# Patient Record
Sex: MALE | Race: WHITE | Employment: OTHER | URBAN - METROPOLITAN AREA
[De-identification: names, ages, dates, MRNs, and addresses within clinical notes are randomized per-mention and may not be internally consistent; named-entity substitution may affect disease eponyms.]

---

## 2017-01-06 ENCOUNTER — TELEPHONE (OUTPATIENT)
Dept: CARDIOLOGY CLINIC | Age: 72
End: 2017-01-06

## 2017-01-06 NOTE — TELEPHONE ENCOUNTER
----- Message from [address], [place] sent at 1/5/2017  4:45 PM EST -----  Echo shows normal heart function, thx.

## 2017-01-10 RX ORDER — OMEPRAZOLE 20 MG/1
CAPSULE, DELAYED RELEASE ORAL
Qty: 90 CAP | Refills: 0 | Status: SHIPPED | OUTPATIENT
Start: 2017-01-10 | End: 2017-04-08 | Stop reason: SDUPTHER

## 2017-01-24 RX ORDER — ATORVASTATIN CALCIUM 20 MG/1
TABLET, FILM COATED ORAL
Qty: 30 TAB | Refills: 6 | Status: SHIPPED | OUTPATIENT
Start: 2017-01-24 | End: 2017-04-24 | Stop reason: SDUPTHER

## 2017-02-21 ENCOUNTER — OFFICE VISIT (OUTPATIENT)
Dept: FAMILY MEDICINE CLINIC | Age: 72
End: 2017-02-21

## 2017-02-21 VITALS
BODY MASS INDEX: 27.08 KG/M2 | HEIGHT: 76 IN | DIASTOLIC BLOOD PRESSURE: 59 MMHG | OXYGEN SATURATION: 97 % | SYSTOLIC BLOOD PRESSURE: 109 MMHG | TEMPERATURE: 96.8 F | WEIGHT: 222.4 LBS | RESPIRATION RATE: 14 BRPM | HEART RATE: 60 BPM

## 2017-02-21 DIAGNOSIS — J01.01 ACUTE RECURRENT MAXILLARY SINUSITIS: ICD-10-CM

## 2017-02-21 DIAGNOSIS — M25.512 CHRONIC LEFT SHOULDER PAIN: ICD-10-CM

## 2017-02-21 DIAGNOSIS — G89.29 CHRONIC LEFT SHOULDER PAIN: ICD-10-CM

## 2017-02-21 DIAGNOSIS — J20.8 ACUTE BRONCHITIS DUE TO OTHER SPECIFIED ORGANISMS: Primary | ICD-10-CM

## 2017-02-21 DIAGNOSIS — J00 ACUTE NASOPHARYNGITIS: ICD-10-CM

## 2017-02-21 RX ORDER — HYDROCODONE POLISTIREX AND CHLORPHENIRAMINE POLISTIREX 10; 8 MG/5ML; MG/5ML
5 SUSPENSION, EXTENDED RELEASE ORAL
Qty: 160 ML | Refills: 0 | Status: SHIPPED | OUTPATIENT
Start: 2017-02-21 | End: 2017-11-14 | Stop reason: ALTCHOICE

## 2017-02-21 RX ORDER — PNEUMOCOCCAL 13-VALENT CONJUGATE VACCINE 2.2; 2.2; 2.2; 2.2; 2.2; 4.4; 2.2; 2.2; 2.2; 2.2; 2.2; 2.2; 2.2 UG/.5ML; UG/.5ML; UG/.5ML; UG/.5ML; UG/.5ML; UG/.5ML; UG/.5ML; UG/.5ML; UG/.5ML; UG/.5ML; UG/.5ML; UG/.5ML; UG/.5ML
INJECTION, SUSPENSION INTRAMUSCULAR
Refills: 0 | COMMUNITY
Start: 2016-11-21 | End: 2017-11-14 | Stop reason: ALTCHOICE

## 2017-02-21 RX ORDER — LEVOFLOXACIN 500 MG/1
500 TABLET, FILM COATED ORAL DAILY
Qty: 10 TAB | Refills: 0 | Status: SHIPPED | OUTPATIENT
Start: 2017-02-21 | End: 2017-03-03

## 2017-02-21 NOTE — PROGRESS NOTES
Chief Complaint   Patient presents with    Sinus Pain     C/o sinus headaches and pressure x 1 week, mary ear itching, taking zyrtec and mucinex DM with no relief,

## 2017-02-21 NOTE — PROGRESS NOTES
HISTORY OF PRESENT ILLNESS  Osvaldo Varela is a 66 y.o. male. HPI    Upper respiratory problem    Started >9 days ago not better,   otc not helping, have ++ Sore throat, with none painful Cough which are Not Productive yellowish , no hx of asthma , there has been a lot of decrease in the patient's sleep pattern, in addition there has been some muscle ache responding to OTC , no diarhea, no ear ache,also there has been a decrease in the appetite, has been had an exposure to sick person, fortunately a none smoker, never been exposed to chemical no tobacco chewing          Current Outpatient Prescriptions   Medication Sig Dispense Refill    atorvastatin (LIPITOR) 20 mg tablet take 1 tablet by mouth NIGHTLY 30 Tab 6    omeprazole (PRILOSEC) 20 mg capsule TAKE 1 CAPSULE DAILY 90 Cap 0    lisinopril (PRINIVIL, ZESTRIL) 30 mg tablet TAKE 1 TABLET DAILY 90 Tab 1    calcium-cholecalciferol, D3, (CALTRATE 600+D) tablet Take 1 Tab by mouth daily. 60 Tab 11    docusate sodium (COLACE) 50 mg capsule Take 50 mg by mouth two (2) times a day.  aspirin (ASPIRIN) 325 mg tablet Take 325 mg by mouth daily.  mometasone (NASONEX) 50 mcg/actuation nasal spray 2 Sprays by Both Nostrils route daily. 1 Container 6    chlorpheniramine-HYDROcodone (TUSSIONEX) 10-8 mg/5 mL suspension Take 5 mL by mouth every twelve (12) hours as needed for Cough or Congestion. Max Daily Amount: 10 mL. 160 mL 0    hydrocortisone (ANUSOL-HC) 2.5 % rectal cream Insert  into rectum four (4) times daily. 30 g 7    lidocaine (LIDODERM) 5 % Apply patch to the affected area for 12 hours a day and remove for 12 hours a day. 1 Each 0    meloxicam (MOBIC) 7.5 mg tablet Take 1 Tab by mouth two (2) times daily as needed for Pain. 60 Tab 3    DICLOFENAC POTASSIUM PO Take 75 mg by mouth as needed.  terazosin (HYTRIN) 1 mg capsule Take 1 Cap by mouth daily.  30 Cap 1     No Known Allergies  Past Medical History   Diagnosis Date    Anemia 9/10/2014    Back pain 4/1/2010    CAD (coronary artery disease)     Calf pain 3/11/2015    Chronic kidney disease      cyst on kidney    Chronic left shoulder pain 10/24/2016    Constipation 6/11/2014    Decreased vision 6/13/2011    Diverticulitis 3/11/2015    Enlarged LA (left atrium)     Fall at home 3/17/2016    GERD (gastroesophageal reflux disease)     Hemorrhoids 9/10/2014    HTN (hypertension) 8/24/2010    Hx-TIA (transient ischemic attack)     Hypercholesterolemia 4/1/2010    Hypertension     Irregular heart beat 9/23/2015    Kidney disease     Mitral regurgitation     Need for varicella vaccine 9/10/2014    Other ill-defined conditions(799.89)      heart murmur    Rash 8/24/2010    Rib pain on right side 3/17/2016    Right calf pain 3/11/2015    Risk for falls 4/4/2014    S/P knee replacement 6/11/2014    Screening for depression 4/4/2014    Tinea versicolor 9/10/2014    Urinary frequency 8/24/2010     Past Surgical History   Procedure Laterality Date    Pr cardiac surg procedure unlist       heart cath    Hx hernia repair      Hx urological      Pr colonoscopy flx dx w/collj spec when pfrmd  4/18/2011          Hx heent       nasal septum repair    Hx tonsillectomy  age 10    Hx orthopaedic       left knee surgery    Hx knee replacement Left     Hx orthopaedic      Hx heent       Family History   Problem Relation Age of Onset    COPD Mother     Heart Attack Mother     Hypertension Mother     Heart Disease Mother     COPD Father     Heart Attack Father     Hypertension Father     Heart Disease Father     Cancer Father     Cancer Maternal Grandmother      Social History   Substance Use Topics    Smoking status: Never Smoker    Smokeless tobacco: Never Used    Alcohol use 0.0 oz/week     0 Standard drinks or equivalent per week      Comment: 12 beers, half a fifth a month      Lab Results  Component Value Date/Time   WBC 6.5 10/24/2016 02:19 PM   HGB 13.0 10/24/2016 02:19 PM   HCT 38.9 10/24/2016 02:19 PM   PLATELET 902 28/84/0741 02:19 PM   MCV 93 10/24/2016 02:19 PM       Lab Results  Component Value Date/Time   GFR est  10/24/2016 02:19 PM   GFR est non-AA 89 10/24/2016 02:19 PM   Creatinine (POC) 0.8 03/29/2011 09:23 AM   Creatinine 0.81 10/24/2016 02:19 PM   BUN 15 10/24/2016 02:19 PM   Sodium 140 10/24/2016 02:19 PM   Potassium 4.7 10/24/2016 02:19 PM   Chloride 101 10/24/2016 02:19 PM   CO2 22 10/24/2016 02:19 PM         Review of Systems   Constitutional: Positive for chills and fever. HENT: Positive for congestion and sore throat. Negative for ear pain and nosebleeds. Eyes: Negative for blurred vision, pain and discharge. Respiratory: Positive for cough and sputum production. Negative for shortness of breath. Cardiovascular: Negative for chest pain and leg swelling. Gastrointestinal: Negative for constipation, diarrhea, nausea and vomiting. Genitourinary: Negative for frequency. Musculoskeletal: Negative for joint pain. Skin: Negative for itching and rash. Neurological: Positive for headaches. Psychiatric/Behavioral: Negative for depression. The patient is not nervous/anxious. Physical Exam   Constitutional: He is oriented to person, place, and time. He appears well-developed and well-nourished. HENT:   Head: Normocephalic and atraumatic. Nose: Mucosal edema and rhinorrhea present. Mouth/Throat: Mucous membranes are dry. Posterior oropharyngeal edema and posterior oropharyngeal erythema present. No oropharyngeal exudate. Eyes: Conjunctivae and EOM are normal.   Neck: Normal range of motion. Neck supple. Cardiovascular: Normal rate, regular rhythm and normal heart sounds. No murmur heard. Pulmonary/Chest: Effort normal and breath sounds normal. No respiratory distress. Abdominal: Soft. Bowel sounds are normal. He exhibits no distension. There is no rebound.    Musculoskeletal: He exhibits no edema or tenderness. Neurological: He is alert and oriented to person, place, and time. Skin: Skin is warm. No erythema. Psychiatric: He has a normal mood and affect. His behavior is normal.   Nursing note and vitals reviewed. ASSESSMENT and PLAN  Serena Mena was seen today for sinus pain. Diagnoses and all orders for this visit:    Acute bronchitis due to other specified organisms    Acute recurrent maxillary sinusitis  -     chlorpheniramine-HYDROcodone (TUSSIONEX) 10-8 mg/5 mL suspension; Take 5 mL by mouth every twelve (12) hours as needed for Cough or Congestion. Max Daily Amount: 10 mL. Chronic left shoulder pain  -     chlorpheniramine-HYDROcodone (TUSSIONEX) 10-8 mg/5 mL suspension; Take 5 mL by mouth every twelve (12) hours as needed for Cough or Congestion. Max Daily Amount: 10 mL. Acute nasopharyngitis    Other orders  -     levoFLOXacin (LEVAQUIN) 500 mg tablet; Take 1 Tab by mouth daily for 10 days.       Salt gurgle, incr po fluid intake, avoid cigs and 2nd hand exposure, rts if worsens, tylenol otc for pain, advised on meds side effects and compliance, hand washing and hygiene advised

## 2017-02-21 NOTE — MR AVS SNAPSHOT
Visit Information Date & Time Provider Department Dept. Phone Encounter #  
 2/21/2017  3:30 PM Juan Carlos Al MD [address] [facility] [phone] [id number] Your Appointments 4/24/2017 10:45 AM  
ROUTINE CARE with Juan Carlos Al MD  
[address] [facility] ([facility]) Appt Note: 6 [facility]  
 [address] [facility] [place] [address] [id number] [phone] [facility] [place] [id number] Upcoming Health Maintenance Date Due FOBT Q 1 YEAR AGE 50-75 3/11/2016 GLAUCOMA SCREENING Q2Y 8/12/2016 MEDICARE YEARLY EXAM 4/28/2017 Pneumococcal 65+ Low/Medium Risk (2 of 2 - PPSV23) 11/21/2017 DTaP/Tdap/Td series (2 - Td) 10/1/2023 Allergies as of 2/21/2017  Review Complete On: 2/21/2017 By: Khushbu Samuels LPN No Known Allergies Current Immunizations  Reviewed on 2/21/2017 Name Date Influenza High Dose Vaccine PF 9/14/2016, 9/23/2015, 9/10/2014 Influenza Vaccine PF 10/1/2013 Influenza Vaccine Split 9/29/2011 PREVNAR 7 11/14/2012 Pneumococcal Conjugate (PCV-13) 11/21/2016 Tdap 10/1/2013 Zoster Vaccine, Live 11/5/2013 Reviewed by Juan Carlos Al MD on 2/21/2017 at  4:10 PM  
 Reviewed by Juan Carlos Al MD on 2/21/2017 at  4:10 PM  
 Reviewed by Juan Carlos Al MD on 2/21/2017 at  4:13 PM  
You Were Diagnosed With   
  
 Codes Comments Acute bronchitis due to other specified organisms    -  Primary ICD-10-CM: J20.8 ICD-9-CM: 466.0 Acute recurrent maxillary sinusitis     ICD-10-CM: J01.01 
ICD-9-CM: 461.0 Chronic left shoulder pain     ICD-10-CM: M25.512, G89.29 ICD-9-CM: 719.41, 338.29 Acute nasopharyngitis     ICD-10-CM: Ana M Fail ICD-9-CM: 720 Vitals BP Pulse Temp Resp Height(growth percentile) Weight(growth percentile)  109/59 (BP 1 Location: Right arm, BP Patient Position: Sitting) 60 96.8 °F (36 °C) (Oral) 14 6' 3.5\" (1.918 m) 222 lb 6.4 oz (100.9 kg) SpO2 BMI Smoking Status 97% 27.43 kg/m2 Never Smoker Vitals History BMI and BSA Data Body Mass Index Body Surface Area  
 27.43 kg/m 2 2.32 m 2 Preferred Pharmacy Pharmacy Name Phone [phone]  [phone] Your Updated Medication List  
  
   
This list is accurate as of: 2/21/17  4:28 PM.  Always use your most recent med list.  
  
  
  
  
 aspirin 325 mg tablet Commonly known as:  ASPIRIN Take 325 mg by mouth daily. atorvastatin 20 mg tablet Commonly known as:  LIPITOR  
take 1 tablet by mouth NIGHTLY  
  
 calcium-cholecalciferol (D3) tablet Commonly known as:  CALTRATE 600+D Take 1 Tab by mouth daily. chlorpheniramine-HYDROcodone 10-8 mg/5 mL suspension Commonly known as:  Braden Ban Take 5 mL by mouth every twelve (12) hours as needed for Cough or Congestion. Max Daily Amount: 10 mL. COLACE 50 mg capsule Generic drug:  docusate sodium Take 50 mg by mouth two (2) times a day. DICLOFENAC POTASSIUM PO Take 75 mg by mouth as needed. hydrocortisone 2.5 % rectal cream  
Commonly known as:  ANUSOL-HC Insert  into rectum four (4) times daily. levoFLOXacin 500 mg tablet Commonly known as:  Kenard Boni Take 1 Tab by mouth daily for 10 days. lidocaine 5 % Commonly known as:  Malva Kym Apply patch to the affected area for 12 hours a day and remove for 12 hours a day. lisinopril 30 mg tablet Commonly known as:  PRINIVIL, ZESTRIL  
TAKE 1 TABLET DAILY  
  
 meloxicam 7.5 mg tablet Commonly known as:  MOBIC Take 1 Tab by mouth two (2) times daily as needed for Pain.  
  
 mometasone 50 mcg/actuation nasal spray Commonly known as:  NASONEX  
2 Sprays by Both Nostrils route daily. omeprazole 20 mg capsule Commonly known as:  PRILOSEC  
TAKE 1 CAPSULE DAILY PREVNAR 13 (PF) 0.5 mL Syrg injection Generic drug:  pneumococcal 13 jada conj dip  
inject 0.5 milliliter intramuscularly  
  
 terazosin 1 mg capsule Commonly known as:  HYTRIN Take 1 Cap by mouth daily. Prescriptions Printed Refills  
 chlorpheniramine-HYDROcodone (TUSSIONEX) 10-8 mg/5 mL suspension 0 Sig: Take 5 mL by mouth every twelve (12) hours as needed for Cough or Congestion. Max Daily Amount: 10 mL. Class: Print Route: Oral  
 levoFLOXacin (LEVAQUIN) 500 mg tablet 0 Sig: Take 1 Tab by mouth daily for 10 days. Class: Print Route: Oral  
  
Introducing [facility]! Dear Elisa Alcala: Thank you for requesting a [employer] account. Our records indicate that you already have an active [employer]astio account. You can access your account anytime at [email or web address] Did you know that you can access your hospital and ER discharge instructions at any time in [employer]? You can also review all of your test results from your hospital stay or ER visit. Additional Information If you have questions, please visit the Frequently Asked Questions section of the [employer] website at [email or web address] Remember, [employer] is NOT to be used for urgent needs. For medical emergencies, dial 911. Now available from your iPhone and Android! Please provide this summary of care documentation to your next provider. Your primary care clinician is listed as Chance Fontaine. If you have any questions after today's visit, please call [phone].

## 2017-03-27 RX ORDER — LISINOPRIL 30 MG/1
TABLET ORAL
Qty: 90 TAB | Refills: 0 | Status: SHIPPED | OUTPATIENT
Start: 2017-03-27 | End: 2017-06-25 | Stop reason: SDUPTHER

## 2017-04-12 RX ORDER — OMEPRAZOLE 20 MG/1
CAPSULE, DELAYED RELEASE ORAL
Qty: 90 CAP | Refills: 1 | Status: SHIPPED | OUTPATIENT
Start: 2017-04-12 | End: 2017-07-31 | Stop reason: SDUPTHER

## 2017-04-24 ENCOUNTER — OFFICE VISIT (OUTPATIENT)
Dept: FAMILY MEDICINE CLINIC | Age: 72
End: 2017-04-24

## 2017-04-24 VITALS
BODY MASS INDEX: 26.99 KG/M2 | HEART RATE: 57 BPM | SYSTOLIC BLOOD PRESSURE: 117 MMHG | HEIGHT: 76 IN | RESPIRATION RATE: 14 BRPM | OXYGEN SATURATION: 98 % | TEMPERATURE: 96.8 F | WEIGHT: 221.6 LBS | DIASTOLIC BLOOD PRESSURE: 78 MMHG

## 2017-04-24 DIAGNOSIS — Z13.39 SCREENING FOR ALCOHOLISM: ICD-10-CM

## 2017-04-24 DIAGNOSIS — Z12.11 SCREEN FOR COLON CANCER: ICD-10-CM

## 2017-04-24 DIAGNOSIS — Z00.00 ROUTINE GENERAL MEDICAL EXAMINATION AT A HEALTH CARE FACILITY: Primary | ICD-10-CM

## 2017-04-24 DIAGNOSIS — R35.1 NOCTURIA: ICD-10-CM

## 2017-04-24 DIAGNOSIS — Z23 ENCOUNTER FOR IMMUNIZATION: ICD-10-CM

## 2017-04-24 DIAGNOSIS — E78.00 HYPERCHOLESTEROLEMIA: ICD-10-CM

## 2017-04-24 DIAGNOSIS — Z71.89 ADVANCED DIRECTIVES, COUNSELING/DISCUSSION: ICD-10-CM

## 2017-04-24 RX ORDER — ATORVASTATIN CALCIUM 20 MG/1
TABLET, FILM COATED ORAL
Qty: 90 TAB | Refills: 3 | Status: SHIPPED | OUTPATIENT
Start: 2017-04-24 | End: 2017-07-31 | Stop reason: SDUPTHER

## 2017-04-24 RX ORDER — TAMSULOSIN HYDROCHLORIDE 0.4 MG/1
0.4 CAPSULE ORAL DAILY
Qty: 30 CAP | Refills: 2 | Status: SHIPPED | OUTPATIENT
Start: 2017-04-24 | End: 2017-07-25 | Stop reason: SDUPTHER

## 2017-04-24 NOTE — PROGRESS NOTES
This is a Subsequent Medicare Annual Wellness Visit providing Personalized Prevention Plan Services (PPPS) (Performed 12 months after initial AWV and PPPS )    I have reviewed the patient's medical history in detail and updated the computerized patient record. History     Present for CPE, last Complete Physical exam was last yr ,  Up todate w/ all vaccination, last tetanus vaccine was in < 5yrs ago . Last psa exam was normal and was in last yr,       last colonoscopy was normal and was   Ago,   No past surgical hx,  last bone dexa scan was normal and was   Ago,      No family hx of breast cancer   no family hx of prostate cancer   no family hx of colon cancer, parent  and not sexaully active and uses Safe sex, ++physically active,  compliant w/ meds, ++Rf needed for today for his meds. Screening for fall   Medications causing fall and the risk for future fall screened  the current meds r/w'd and addressed,  Depression    the depression at this age addressed pt with improvig interests and enjoy to do things, not anxious not depressed, not wanted to heart self or others  Functional assesement   pt at this visit, is physically functional with gait nl and nicely independent and walks w/out mobility device and, in addition mentaly is functional,  very Alert and oriented ,  Required Immunizations   The immunizations including flu, PNA, Tdap, r/w'd mostly uptodate ,   BMI for pt's age  the almost nl BMI r/w'd and information given,   bp was screened for abnormality,   the colon ca screening uptodate, no further testing required except for FIT, given to the pt today  The diabetic, lipid and daily Aspirin care    States that he needs refill for his statin 3months supply denies myalgia, w/ the c/o  Urinary Frequency mostly after he sleep  The history is provided by the patient. This is a new problem. The current episode started more than few week ago. The problem occurs every urination.  The problem has been gradually worsening. no hx of recurrent UTIs or urinary stasis. Past Medical History:   Diagnosis Date    Anemia 9/10/2014    Back pain 4/1/2010    CAD (coronary artery disease)     Calf pain 3/11/2015    Chronic kidney disease     cyst on kidney    Chronic left shoulder pain 10/24/2016    Constipation 6/11/2014    Decreased vision 6/13/2011    Diverticulitis 3/11/2015    Enlarged LA (left atrium)     Fall at home 3/17/2016    GERD (gastroesophageal reflux disease)     Hemorrhoids 9/10/2014    HTN (hypertension) 8/24/2010    Hx-TIA (transient ischemic attack)     Hypercholesterolemia 4/1/2010    Hypertension     Irregular heart beat 9/23/2015    Kidney disease     Mitral regurgitation     Need for varicella vaccine 9/10/2014    Other ill-defined conditions     heart murmur    Rash 8/24/2010    Rib pain on right side 3/17/2016    Right calf pain 3/11/2015    Risk for falls 4/4/2014    S/P knee replacement 6/11/2014    Screening for depression 4/4/2014    Tinea versicolor 9/10/2014    Urinary frequency 8/24/2010      Past Surgical History:   Procedure Laterality Date    CARDIAC SURG PROCEDURE UNLIST      heart cath    HX HEENT      nasal septum repair    HX HEENT      HX HERNIA REPAIR      HX KNEE REPLACEMENT Left     HX ORTHOPAEDIC      left knee surgery    HX ORTHOPAEDIC      HX TONSILLECTOMY  age 5   [facility]      MN COLONOSCOPY FLX DX W/COLLJ SPEC WHEN PFRMD  4/18/2011          Current Outpatient Prescriptions   Medication Sig Dispense Refill    omeprazole (PRILOSEC) 20 mg capsule TAKE 1 CAPSULE DAILY 90 Cap 1    lisinopril (PRINIVIL, ZESTRIL) 30 mg tablet TAKE 1 TABLET DAILY 90 Tab 0    PREVNAR 13, PF, 0.5 mL syrg injection inject 0.5 milliliter intramuscularly  0    atorvastatin (LIPITOR) 20 mg tablet take 1 tablet by mouth NIGHTLY 30 Tab 6    calcium-cholecalciferol, D3, (CALTRATE 600+D) tablet Take 1 Tab by mouth daily.  60 Tab 11    docusate sodium (COLACE) 50 mg capsule Take 50 mg by mouth two (2) times a day.  aspirin (ASPIRIN) 325 mg tablet Take 325 mg by mouth daily.  mometasone (NASONEX) 50 mcg/actuation nasal spray 2 Sprays by Both Nostrils route daily. 1 Container 6    chlorpheniramine-HYDROcodone (TUSSIONEX) 10-8 mg/5 mL suspension Take 5 mL by mouth every twelve (12) hours as needed for Cough or Congestion. Max Daily Amount: 10 mL. 160 mL 0    hydrocortisone (ANUSOL-HC) 2.5 % rectal cream Insert  into rectum four (4) times daily. 30 g 7    lidocaine (LIDODERM) 5 % Apply patch to the affected area for 12 hours a day and remove for 12 hours a day. 1 Each 0    meloxicam (MOBIC) 7.5 mg tablet Take 1 Tab by mouth two (2) times daily as needed for Pain. 60 Tab 3    DICLOFENAC POTASSIUM PO Take 75 mg by mouth as needed.  terazosin (HYTRIN) 1 mg capsule Take 1 Cap by mouth daily. 27 Cap 1     No Known Allergies  Family History   Problem Relation Age of Onset    COPD Mother     Heart Attack Mother     Hypertension Mother     Heart Disease Mother     COPD Father     Heart Attack Father     Hypertension Father     Heart Disease Father     Cancer Father     Cancer Maternal Grandmother      Social History   Substance Use Topics    Smoking status: Never Smoker    Smokeless tobacco: Never Used    Alcohol use 0.0 oz/week     0 Standard drinks or equivalent per week      Comment: 12 beers, half a fifth a month     Patient Active Problem List   Diagnosis Code    Back pain M54.9    Hypercholesterolemia E78.00    HTN (hypertension) I10    Rash R21    Urinary frequency R35.0    Foot pain, right M79.671    Acute sinusitis J01.90    Corn L84    Tick bite W57. XXXA    Decreased vision H54.7    Knee pain, left M25.562    Acute bronchitis J20.9    Otitis externa of right ear H60.91    ED (erectile dysfunction) N52.9    Risk for falls Z91.81    Screening for depression Z13.89    Osteopenia M85.80    S/P knee replacement Z96.659    Constipation K59.00    Need for varicella vaccine Z23    Anemia D64.9    Tinea versicolor B36.0    Hemorrhoids K64.9    Diverticulitis K57.92    Calf pain M79.669    Right calf pain M79.661    Irregular heart beat I49.9    Enlarged LA (left atrium) I51.7    Mitral regurgitation I34.0    Fall at home W19. Sharifa Phelan, Y92.099    Rib pain on right side R07.81    Anginal chest pain at rest [facility]) I20.8    Chronic left shoulder pain M25.512, G89.29    Acute nasopharyngitis J00       Depression Risk Factor Screening:     PHQ 2 / 9, over the last two weeks 2/21/2017   Little interest or pleasure in doing things Not at all   Feeling down, depressed or hopeless Not at all   Total Score PHQ 2 0     Alcohol Risk Factor Screening: On any occasion during the past 3 months, have you had more than 4 drinks containing alcohol? No    Do you average more than 14 drinks per week? No      Functional Ability and Level of Safety:     Hearing Loss   normal-to-mild    Activities of Daily Living   Self-care. Requires assistance with: no ADLs    Fall Risk     Fall Risk Assessment, last 12 mths 2/21/2017   Able to walk? Yes   Fall in past 12 months?  No   Fall with injury? -   Number of falls in past 12 months -   Fall Risk Score -     Abuse Screen   Patient is not abused    Review of Systems   Constitutional: negative  Eyes: negative  Ears, nose, mouth, throat, and face: negative  Respiratory: negative  Cardiovascular: negative  Gastrointestinal: negative  Genitourinary: +++nocturia  Integument/breast: negative  Hematologic/lymphatic: negative  Musculoskeletal:negative  Neurological: negative  Behavioral/Psych: negative  Endocrine: negative  Allergic/Immunologic: negative    Physical Examination     Evaluation of Cognitive Function:  Mood/affect:  happy  Appearance: age appropriate, well dressed and within normal Limits  Family member/caregiver input: wife    Visit Vitals    /78 (BP 1 Location: Left arm, BP Patient Position: At rest)    Pulse (!) 57    Temp 96.8 °F (36 °C) (Oral)    Resp 14    Ht 6' 3.5\" (1.918 m)    Wt 221 lb 9.6 oz (100.5 kg)    SpO2 98%    BMI 27.33 kg/m2     General:  Alert, cooperative, no distress, appears stated age. Head:  Normocephalic, without obvious abnormality, atraumatic. Eyes:  Conjunctivae/corneas clear. PERRL, EOMs intact. Fundi benign. Ears:  Normal TMs and external ear canals both ears. Nose: Nares normal. Septum midline. Mucosa normal. No drainage or sinus tenderness. Throat: Lips, mucosa, and tongue normal. Teeth and gums normal.   Neck: Supple, symmetrical, trachea midline, no adenopathy, thyroid: no enlargement/tenderness/nodules, no carotid bruit and no JVD. Back:   Symmetric, no curvature. ROM normal. No CVA tenderness. Lungs:   Clear to auscultation bilaterally. Chest wall:  No tenderness or deformity. Heart:  Regular rate and rhythm, S1, S2 normal, no murmur, click, rub or gallop. Breast Exam:  differed. Abdomen:   Soft, non-tender. Bowel sounds normal. No masses,  No organomegaly. Genitalia:  Not done. Rectal:  Not done  Guaiac Pstool. Extremities: Extremities normal, atraumatic, no cyanosis or edema. Pulses: 2+ and symmetric all extremities. Skin: Skin color, texture, turgor normal. No rashes or lesions. Lymph nodes: Cervical, supraclavicular, and axillary nodes normal.   Neurologic: CNII-XII intact. Normal strength, sensation and reflexes throughout.        Patient Care Team:  Albina Peabody, MD as PCP - General  Lorane Hodgkin, MD (Dermatology)  Stephen Arellano MD (Cardiology)  Juhi Matias MD (Plastic Surgery)  Rodriguez Rodriguez OD (Optometry)  Adelaida Hernandez MD as Physician (Cardiology)  Morales Chaudhary MD as Physician (Sleep Medicine)  Jose Hassan RN as Ambulatory Care Navigator  Hanna Tolentino MD (Gastroenterology)    Advice/Referrals/Counseling   Education and counseling provided:      Are appropriate based on today's review and evaluation  End-of-Life planning (with patient's consent): Initial ACP discussion, pt wants thewife as SDM,  Pneumococcal Vaccuptodateine, uptodate  Influenza Vaccine, uptodate  Hepatitis B Vaccine, declined  Prostate cancer screening tests done, with med tx  Colorectal cancer screening tests, fit test ordered today  Cardiovascular screening blood test, addressed  Bone mass measurement (DEXA),not ordered await for approval  Screening for glaucoma, done  Diabetes screening test, done today, no hx of it          Assessment/Plan   Trae Tomas was seen today for annual wellness visit.     Diagnoses and all orders for this visit:    Routine general medical examination at a health care facility  -     CBC W/O DIFF  -     METABOLIC PANEL, COMPREHENSIVE  -     TSH 3RD GENERATION  -     VITAMIN D, 25 HYDROXY  -     CHOLESTEROL, TOTAL  -     CHOLESTEROL, HDL  -     PSA - PROSTATE SPECIFIC AG  -     WI COLLECTION VENOUS BLOOD,VENIPUNCTURE    Screening for alcoholism  -     CBC W/O DIFF  -     METABOLIC PANEL, COMPREHENSIVE  -     TSH 3RD GENERATION  -     VITAMIN D, 25 HYDROXY  -     CHOLESTEROL, TOTAL  -     CHOLESTEROL, HDL  -     WI COLLECTION VENOUS BLOOD,VENIPUNCTURE    Encounter for immunization  -     CBC W/O DIFF  -     METABOLIC PANEL, COMPREHENSIVE  -     TSH 3RD GENERATION  -     VITAMIN D, 25 HYDROXY  -     CHOLESTEROL, TOTAL  -     CHOLESTEROL, HDL    Advanced directives, counseling/discussion  -     ADVANCE CARE PLANNING FIRST 30 MINS  -     CBC W/O DIFF  -     METABOLIC PANEL, COMPREHENSIVE  -     TSH 3RD GENERATION  -     VITAMIN D, 25 HYDROXY  -     CHOLESTEROL, TOTAL  -     CHOLESTEROL, HDL  -     WI COLLECTION VENOUS BLOOD,VENIPUNCTURE    Screen for colon cancer  -     OCCULT BLOOD, IMMUNOASSAY (FIT) (70854)  -     CBC W/O DIFF  -     METABOLIC PANEL, COMPREHENSIVE  -     TSH 3RD GENERATION  -     VITAMIN D, 25 HYDROXY  -     CHOLESTEROL, TOTAL  -     CHOLESTEROL, HDL  -     WI COLLECTION VENOUS BLOOD,VENIPUNCTURE    Hypercholesterolemia  -     CBC W/O DIFF  -     METABOLIC PANEL, COMPREHENSIVE  -     TSH 3RD GENERATION  -     VITAMIN D, 25 HYDROXY  -     CHOLESTEROL, TOTAL  -     CHOLESTEROL, HDL  -     MO COLLECTION VENOUS BLOOD,VENIPUNCTURE    Nocturia    Other orders  -     atorvastatin (LIPITOR) 20 mg tablet; take 1 tablet by mouth NIGHTLY  -     tamsulosin (FLOMAX) 0.4 mg capsule; Take 1 Cap by mouth daily. .    Was advised to stay at a healthy weight, which will lower the risk for many problems, such as the current obesity, less chance of developing diabetes, heart disease, and high blood pressure. Was also told to get at least 30 minutes of physical activity on most days of the week, was told to start with daily brisk walking as a good choice, and then later on can add and do other activities, such as running, swimming, cycling, or playing tennis or team sports for a better outcome.   No alcohol, Not tobacco smoke, and not allowing others to smoke around you,    lab results and schedule of future lab studies reviewed with patient  A lean diet also advised in addition of avoiding fast foods, in additions his current medications and their side effects reviewed with the patient      The continuity of the care  Pt was told if for any reason, the pt's future Office appointments and the concerns not addressed, pt should obtain the name of the corresponding tel representatives and call us back or send us a letter for future investigation,   Pt agreed

## 2017-04-24 NOTE — ACP (ADVANCE CARE PLANNING)
Advance Care Planning      Advance Care Planning        Authorized Decision Maker (if patient is incapable of making informed decisions): This person is: Other Legally Authorized Decision Maker which would be his current wife  Tel ## noted      For Patients with Decision Making Capacity:   Values/Goals: Exploration of values, goals, and preferences if recovery is not expected, even with continued medical treatment in the event of:  Imminent death, Severe, permanent brain injury  \"In these circumstances, what matters most to you? \" patient in the presence of familymember stated that care focused more on comfort and the quality of life than Care focused more on quantity (length) of life and wants to be DNR form given will sign and bring us a copy on the later date

## 2017-04-24 NOTE — PATIENT INSTRUCTIONS
Prostate Cancer Screening: Care Instructions  Your Care Instructions    The prostate gland is an organ found just below a man's bladder. It is the size and shape of a walnut. It surrounds the tube that carries urine from the bladder out of the body through the penis. This tube is called the urethra. Prostate cancer is the abnormal growth of cells in the prostate. It is the second most common type of cancer in men. (Skin cancer is the most common.)  Most cases of prostate cancer occur in men older than 72. The disease runs in families. And it's more common in -American men. When it's found and treated early, prostate cancer may be cured. But it is not always treated. This is because prostate cancer may not shorten your life, especially if you are older and the cancer is growing slowly. Follow-up care is a key part of your treatment and safety. Be sure to make and go to all appointments, and call your doctor if you are having problems. It's also a good idea to know your test results and keep a list of the medicines you take. What are the screening tests for prostate cancer? The main screening test for prostate cancer is the prostate-specific antigen (PSA) test. This is a blood test that measures how much PSA is in your blood. A high level may mean that you have an enlargement, an infection, or cancer. Along with the PSA test, you may have a digital rectal exam. The digital (finger) rectal exam checks for anything abnormal in your prostate. To do the exam, the doctor puts a lubricated, gloved finger into your rectum. If these tests suggest cancer, you may need a prostate biopsy. How is prostate cancer diagnosed? In a biopsy, the doctor takes small tissue samples from your prostate gland. Another doctor then looks at the tissue under a microscope to see if there are cancer cells, signs of infection, or other problems. The results help diagnose prostate cancer.   What are the pros and cons of screening? Neither a PSA test nor a digital rectal exam can tell you for sure that you do or do not have cancer. But they can help you decide if you need more tests, such as a prostate biopsy. Screening tests may be useful because most men with prostate cancer don't have symptoms. It can be hard to know if you have cancer until it is more advanced. And then it's harder to treat. But having a PSA test can also cause harm. The test may show high levels of PSA that aren't caused by cancer. So you could have a prostate biopsy you didn't need. Or the PSA test might be normal when there is cancer, so a cancer might not be found early. The test can also find cancers that would never have caused a problem during your lifetime. So you might have treatment that was not needed. Prostate cancer usually develops late in life and grows slowly. For many men, it does not shorten their lives. Some experts advise screening only for men who are at high risk. Talk with your doctor to see if screening is right for you. Where can you learn more? Go to http://nico-millicent.info/. Enter R550 in the search box to learn more about \"Prostate Cancer Screening: Care Instructions. \"  Current as of: July 26, 2016  Content Version: 11.2  © 5574-5235 [employer]. Care instructions adapted under license by [employer] (which disclaims liability or warranty for this information). If you have questions about a medical condition or this instruction, always ask your healthcare professional. [place] [address] any warranty or liability for your use of this information. Learning About Colonoscopy  What is a colonoscopy? A colonoscopy is a test (also called a procedure) that lets a doctor look inside your large intestine. The doctor uses a thin, lighted tube called a colonoscope.  The doctor uses it to look for small growths called polyps, colon or rectal cancer (colorectal cancer), or other problems like bleeding. During the procedure, the doctor can take samples of tissue. The samples can then be checked for cancer or other conditions. The doctor can also take out polyps. How is colonoscopy done? This procedure is done in a doctor's office or a clinic or hospital. You will get medicine to help you relax and not feel pain. Some people find that they do not remember having the test because of the medicine. The doctor gently moves the colonoscope, or scope, through the colon. The scope is also a small video camera. It lets the doctor see the colon and take pictures. A colonoscopy usually takes 30 to 45 minutes. It may take longer if the doctor has to remove polyps. How do you prepare for the procedure? You need to clean out your colon before the procedure so the doctor can see all of your colon. You may start the cleaning process a day or two before the test. This depends on which \"colon prep\" your doctor recommends. To clean your colon, you stop eating solid foods and drink only clear liquids. You can have water, tea, coffee, clear juices, clear broths, flavored ice pops, and gelatin (such as Jell-O). Do not drink anything red or purple, such as grape juice or fruit punch. And do not eat red or purple foods, such as grape ice pops or cherry gelatin. The day or night before the procedure, you drink a large amount of a special liquid. This causes loose, frequent stools. You will go to the bathroom a lot. It is very important to drink all of the colon prep liquid. If you have problems drinking the liquid, call your doctor. For many people, the prep is worse than the test. It may be uncomfortable, and you may feel hungry on the clear liquid diet. Some people do not go to work or do their usual activities on the day of the prep. Arrange to have someone take you home after the test.  What can you expect after a colonoscopy?   The nurses will watch you for 1 to 2 hours until the medicines wear off. Then you can go home. You will need a ride. Your doctor will tell you when you can eat and do your usual activities. Your doctor will talk to you about when you will need your next colonoscopy. The results of your test and your risk for colorectal cancer will help your doctor decide how often you need to be checked. Follow-up care is a key part of your treatment and safety. Be sure to make and go to all appointments, and call your doctor if you are having problems. It's also a good idea to know your test results and keep a list of the medicines you take. Where can you learn more? Go to http://nico-millicent.info/. Enter R715 in the search box to learn more about \"Learning About Colonoscopy. \"  Current as of: July 26, 2016  Content Version: 11.2  © 7973-8757 [employer]. Care instructions adapted under license by [employer] (which disclaims liability or warranty for this information). If you have questions about a medical condition or this instruction, always ask your healthcare professional. [place] [address] any warranty or liability for your use of this information. Heart-Healthy Diet: Care Instructions  Your Care Instructions    A heart-healthy diet has lots of vegetables, fruits, nuts, beans, and whole grains, and is low in salt. It limits foods that are high in saturated fat, such as meats, cheeses, and fried foods. It may be hard to change your diet, but even small changes can lower your risk of heart attack and heart disease. Follow-up care is a key part of your treatment and safety. Be sure to make and go to all appointments, and call your doctor if you are having problems. It's also a good idea to know your test results and keep a list of the medicines you take. How can you care for yourself at home? Watch your portions  · Learn what a serving is. A \"serving\" and a \"portion\" are not always the same thing.  Make sure that you are not eating larger portions than are recommended. For example, a serving of pasta is ½ cup. A serving size of meat is 2 to 3 ounces. A 3-ounce serving is about the size of a deck of cards. Measure serving sizes until you are good at [place]" them. Keep in mind that restaurants often serve portions that are 2 or 3 times the size of one serving. · To keep your energy level up and keep you from feeling hungry, eat often but in smaller portions. · Eat only the number of calories you need to stay at a healthy weight. If you need to lose weight, eat fewer calories than your body burns (through exercise and other physical activity). Eat more fruits and vegetables  · Eat a variety of fruit and vegetables every day. Dark green, deep orange, red, or yellow fruits and vegetables are especially good for you. Examples include spinach, carrots, peaches, and berries. · Keep carrots, celery, and other veggies handy for snacks. Buy fruit that is in season and store it where you can see it so that you will be tempted to eat it. · Cook dishes that have a lot of veggies in them, such as stir-fries and soups. Limit saturated and trans fat  · Read food labels, and try to avoid saturated and trans fats. They increase your risk of heart disease. Trans fat is found in many processed foods such as cookies and crackers. · Use olive or canola oil when you cook. Try cholesterol-lowering spreads, such as Benecol or Take Control. · Bake, broil, grill, or steam foods instead of frying them. · Choose lean meats instead of high-fat meats such as hot dogs and sausages. Cut off all visible fat when you prepare meat. · Eat fish, skinless poultry, and meat alternatives such as soy products instead of high-fat meats. Soy products, such as tofu, may be especially good for your heart. · Choose low-fat or fat-free milk and dairy products. Eat fish  · Eat at least two servings of fish a week.  Certain fish, such as salmon and tuna, contain omega-3 fatty acids, which may help reduce your risk of heart attack. Eat foods high in fiber  · Eat a variety of grain products every day. Include whole-grain foods that have lots of fiber and nutrients. Examples of whole-grain foods include oats, whole wheat bread, and brown rice. · Buy whole-grain breads and cereals, instead of white bread or pastries. Limit salt and sodium  · Limit how much salt and sodium you eat to help lower your blood pressure. · Taste food before you salt it. Add only a little salt when you think you need it. With time, your taste buds will adjust to less salt. · Eat fewer snack items, fast foods, and other high-salt, processed foods. Check food labels for the amount of sodium in packaged foods. · Choose low-sodium versions of canned goods (such as soups, vegetables, and beans). Limit sugar  · Limit drinks and foods with added sugar. These include candy, desserts, and soda pop. Limit alcohol  · Limit alcohol to no more than 2 drinks a day for men and 1 drink a day for women. Too much alcohol can cause health problems. When should you call for help? Watch closely for changes in your health, and be sure to contact your doctor if:  · You would like help planning heart-healthy meals. Where can you learn more? Go to http://nico-millicent.info/. Enter V137 in the search box to learn more about \"Heart-Healthy Diet: Care Instructions. \"  Current as of: January 27, 2016  Content Version: 11.2  © 9582-6978 [employer]. Care instructions adapted under license by [employer] (which disclaims liability or warranty for this information). If you have questions about a medical condition or this instruction, always ask your healthcare professional. [place] [address] any warranty or liability for your use of this information.          Advance Directives: Care Instructions  Your Care Instructions  An advance directive is a legal way to state your wishes at the end of your life. It tells your family and your doctor what to do if you can no longer say what you want. There are two main types of advance directives. You can change them any time that your wishes change. · A living will tells your family and your doctor your wishes about life support and other treatment. · A durable power of  for health care lets you name a person to make treatment decisions for you when you can't speak for yourself. This person is called a health care agent. If you do not have an advance directive, decisions about your medical care may be made by a doctor or a  who doesn't know you. It may help to think of an advance directive as a gift to the people who care for you. If you have one, they won't have to make tough decisions by themselves. Follow-up care is a key part of your treatment and safety. Be sure to make and go to all appointments, and call your doctor if you are having problems. It's also a good idea to know your test results and keep a list of the medicines you take. How can you care for yourself at home? · Discuss your wishes with your loved ones and your doctor. This way, there are no surprises. · Many states have a unique form. Or you might use a universal form that has been approved by many states. This kind of form can sometimes be completed and stored online. Your electronic copy will then be available wherever you have a connection to the Internet. In most cases, doctors will respect your wishes even if you have a form from a different state. · You don't need a  to do an advance directive. But you may want to get legal advice. · Think about these questions when you prepare an advance directive:  ¨ Who do you want to make decisions about your medical care if you are not able to? Many people choose a family member or close friend. ¨ Do you know enough about life support methods that might be used?  If not, talk to your doctor so you understand. ¨ What are you most afraid of that might happen? You might be afraid of having pain, losing your independence, or being kept alive by machines. ¨ Where would you prefer to die? Choices include your home, a hospital, or a nursing home. ¨ Would you like to have information about hospice care to support you and your family? ¨ Do you want to donate organs when you die? ¨ Do you want certain [employer] practices performed before you die? If so, put your wishes in the advance directive. · Read your advance directive every year, and make changes as needed. When should you call for help? Be sure to contact your doctor if you have any questions. Where can you learn more? Go to http://nico-millicent.info/. Enter R264 in the search box to learn more about \"Advance Directives: Care Instructions. \"  Current as of: November 17, 2016  Content Version: 11.2  © 9636-2972 [employer]. Care instructions adapted under license by [employer] (which disclaims liability or warranty for this information). If you have questions about a medical condition or this instruction, always ask your healthcare professional. [place] [address] any warranty or liability for your use of this information.

## 2017-04-24 NOTE — MR AVS SNAPSHOT
Visit Information Date & Time Provider Department Dept. Phone Encounter #  
 4/24/2017 10:45 AM Ally Robles MD  [facility] [phone] [id number] Upcoming Health Maintenance Date Due FOBT Q 1 YEAR AGE 50-75 3/11/2016 GLAUCOMA SCREENING Q2Y 8/12/2016 MEDICARE YEARLY EXAM 4/28/2017 Pneumococcal 65+ Low/Medium Risk (2 of 2 - PPSV23) 11/21/2017 DTaP/Tdap/Td series (2 - Td) 10/1/2023 Allergies as of 4/24/2017  Review Complete On: 4/24/2017 By: Leandro Franco LPN No Known Allergies Current Immunizations  Reviewed on 4/24/2017 Name Date Influenza High Dose Vaccine PF 9/14/2016, 9/23/2015, 9/10/2014 Influenza Vaccine PF 10/1/2013 Influenza Vaccine Split 9/29/2011 PREVNAR 7 11/14/2012 Pneumococcal Conjugate (PCV-13) 11/21/2016 Tdap 10/1/2013 Zoster Vaccine, Live 11/5/2013 Reviewed by Ally Robles MD on 4/24/2017 at 11:28 AM  
 Reviewed by Ally Robles MD on 4/24/2017 at 11:28 AM  
You Were Diagnosed With   
  
 Codes Comments Routine general medical examination at a health care facility    -  Primary ICD-10-CM: Z00.00 ICD-9-CM: V70.0 Screening for alcoholism     ICD-10-CM: Z13.89 ICD-9-CM: V79.1 Encounter for immunization     ICD-10-CM: V57 ICD-9-CM: V03.89 Advanced directives, counseling/discussion     ICD-10-CM: Z71.89 ICD-9-CM: V65.49 Screen for colon cancer     ICD-10-CM: Z12.11 ICD-9-CM: V76.51 Hypercholesterolemia     ICD-10-CM: E78.00 ICD-9-CM: 272.0 Vitals BP Pulse Temp Resp Height(growth percentile) Weight(growth percentile) 117/78 (BP 1 Location: Left arm, BP Patient Position: At rest) (!) 57 96.8 °F (36 °C) (Oral) 14 6' 3.5\" (1.918 m) 221 lb 9.6 oz (100.5 kg) SpO2 BMI Smoking Status 98% 27.33 kg/m2 Never Smoker BMI and BSA Data Body Mass Index Body Surface Area  
 27.33 kg/m 2 2.31 m 2 Preferred Pharmacy Pharmacy Name Phone [id number] [address] [facility] [phone] Your Updated Medication List  
  
   
This list is accurate as of: 4/24/17  5:01 PM.  Always use your most recent med list.  
  
  
  
  
 aspirin 325 mg tablet Commonly known as:  ASPIRIN Take 325 mg by mouth daily. atorvastatin 20 mg tablet Commonly known as:  LIPITOR  
take 1 tablet by mouth NIGHTLY  
  
 calcium-cholecalciferol (D3) tablet Commonly known as:  CALTRATE 600+D Take 1 Tab by mouth daily. chlorpheniramine-HYDROcodone 10-8 mg/5 mL suspension Commonly known as:  Hanson Hu Take 5 mL by mouth every twelve (12) hours as needed for Cough or Congestion. Max Daily Amount: 10 mL. COLACE 50 mg capsule Generic drug:  docusate sodium Take 50 mg by mouth two (2) times a day. DICLOFENAC POTASSIUM PO Take 75 mg by mouth as needed. hydrocortisone 2.5 % rectal cream  
Commonly known as:  ANUSOL-HC Insert  into rectum four (4) times daily. lidocaine 5 % Commonly known as:  Maninder Hammond Apply patch to the affected area for 12 hours a day and remove for 12 hours a day. lisinopril 30 mg tablet Commonly known as:  PRINIVIL, ZESTRIL  
TAKE 1 TABLET DAILY  
  
 meloxicam 7.5 mg tablet Commonly known as:  MOBIC Take 1 Tab by mouth two (2) times daily as needed for Pain.  
  
 mometasone 50 mcg/actuation nasal spray Commonly known as:  NASONEX  
2 Sprays by Both Nostrils route daily. omeprazole 20 mg capsule Commonly known as:  PRILOSEC  
TAKE 1 CAPSULE DAILY  
  
 PREVNAR 13 (PF) 0.5 mL Syrg injection Generic drug:  pneumococcal 13 jada conj dip  
inject 0.5 milliliter intramuscularly  
  
 tamsulosin 0.4 mg capsule Commonly known as:  FLOMAX Take 1 Cap by mouth daily. Prescriptions Sent to Pharmacy Refills  
 atorvastatin (LIPITOR) 20 mg tablet 3 Sig: take 1 tablet by mouth NIGHTLY  Class: Normal  
 Pharmacy: [address], [facility] Ph #: [phone]  
 tamsulosin (FLOMAX) 0.4 mg capsule 2 Sig: Take 1 Cap by mouth daily. Class: Normal  
 Pharmacy: [id number] [address], [address] Ph #: [phone] Route: Oral  
  
We Performed the Following ADVANCE CARE PLANNING FIRST 30 MINS [71580 CPT(R)] CBC W/O DIFF [97933 CPT(R)] CHOLESTEROL, HDL [id number] CPT(R)] CHOLESTEROL, TOTAL [97075 CPT(R)] METABOLIC PANEL, COMPREHENSIVE [81145 CPT(R)] OCCULT BLOOD, IMMUNOASSAY (FIT) [id number] CPT(R)] TN COLLECTION VENOUS BLOOD,VENIPUNCTURE [id number] CPT(R)] PROSTATE SPECIFIC AG (PSA) [id number] CPT(R)] TSH 3RD GENERATION [65240 CPT(R)] VITAMIN D, 25 HYDROXY [id number] CPT(R)] Patient Instructions Prostate Cancer Screening: Care Instructions Your Care Instructions The prostate gland is an organ found just below a man's bladder. It is the size and shape of a walnut. It surrounds the tube that carries urine from the bladder out of the body through the penis. This tube is called the urethra. Prostate cancer is the abnormal growth of cells in the prostate. It is the second most common type of cancer in men. (Skin cancer is the most common.) Most cases of prostate cancer occur in men older than 72. The disease runs in families. And it's more common in -American men. When it's found and treated early, prostate cancer may be cured. But it is not always treated. This is because prostate cancer may not shorten your life, especially if you are older and the cancer is growing slowly. Follow-up care is a key part of your treatment and safety. Be sure to make and go to all appointments, and call your doctor if you are having problems. It's also a good idea to know your test results and keep a list of the medicines you take. What are the screening tests for prostate cancer? The main screening test for prostate cancer is the prostate-specific antigen (PSA) test. This is a blood test that measures how much PSA is in your blood. A high level may mean that you have an enlargement, an infection, or cancer. Along with the PSA test, you may have a digital rectal exam. The digital (finger) rectal exam checks for anything abnormal in your prostate. To do the exam, the doctor puts a lubricated, gloved finger into your rectum. If these tests suggest cancer, you may need a prostate biopsy. How is prostate cancer diagnosed? In a biopsy, the doctor takes small tissue samples from your prostate gland. Another doctor then looks at the tissue under a microscope to see if there are cancer cells, signs of infection, or other problems. The results help diagnose prostate cancer. What are the pros and cons of screening? Neither a PSA test nor a digital rectal exam can tell you for sure that you do or do not have cancer. But they can help you decide if you need more tests, such as a prostate biopsy. Screening tests may be useful because most men with prostate cancer don't have symptoms. It can be hard to know if you have cancer until it is more advanced. And then it's harder to treat. But having a PSA test can also cause harm. The test may show high levels of PSA that aren't caused by cancer. So you could have a prostate biopsy you didn't need. Or the PSA test might be normal when there is cancer, so a cancer might not be found early. The test can also find cancers that would never have caused a problem during your lifetime. So you might have treatment that was not needed. Prostate cancer usually develops late in life and grows slowly. For many men, it does not shorten their lives. Some experts advise screening only for men who are at high risk. Talk with your doctor to see if screening is right for you. Where can you learn more? Go to http://nico-millicent.info/. Enter R550 in the search box to learn more about \"Prostate Cancer Screening: Care Instructions. \" Current as of: July 26, 2016 Content Version: 11.2 © 9838-5201 1d4 Pty. Care instructions adapted under license by [employer] (which disclaims liability or warranty for this information). If you have questions about a medical condition or this instruction, always ask your healthcare professional. [facility] any warranty or liability for your use of this information. Learning About Colonoscopy What is a colonoscopy? A colonoscopy is a test (also called a procedure) that lets a doctor look inside your large intestine. The doctor uses a thin, lighted tube called a colonoscope. The doctor uses it to look for small growths called polyps, colon or rectal cancer (colorectal cancer), or other problems like bleeding. During the procedure, the doctor can take samples of tissue. The samples can then be checked for cancer or other conditions. The doctor can also take out polyps. How is colonoscopy done? This procedure is done in a doctor's office or a clinic or hospital. You will get medicine to help you relax and not feel pain. Some people find that they do not remember having the test because of the medicine. The doctor gently moves the colonoscope, or scope, through the colon. The scope is also a small video camera. It lets the doctor see the colon and take pictures. A colonoscopy usually takes 30 to 45 minutes. It may take longer if the doctor has to remove polyps. How do you prepare for the procedure? You need to clean out your colon before the procedure so the doctor can see all of your colon. You may start the cleaning process a day or two before the test. This depends on which \"colon prep\" your doctor recommends. To clean your colon, you stop eating solid foods and drink only clear liquids.  You can have water, tea, coffee, clear juices, clear broths, flavored ice pops, and gelatin (such as Jell-O). Do not drink anything red or purple, such as grape juice or fruit punch. And do not eat red or purple foods, such as grape ice pops or cherry gelatin. The day or night before the procedure, you drink a large amount of a special liquid. This causes loose, frequent stools. You will go to the bathroom a lot. It is very important to drink all of the colon prep liquid. If you have problems drinking the liquid, call your doctor. For many people, the prep is worse than the test. It may be uncomfortable, and you may feel hungry on the clear liquid diet. Some people do not go to work or do their usual activities on the day of the prep. Arrange to have someone take you home after the test. 
What can you expect after a colonoscopy? The nurses will watch you for 1 to 2 hours until the medicines wear off. Then you can go home. You will need a ride. Your doctor will tell you when you can eat and do your usual activities. Your doctor will talk to you about when you will need your next colonoscopy. The results of your test and your risk for colorectal cancer will help your doctor decide how often you need to be checked. Follow-up care is a key part of your treatment and safety. Be sure to make and go to all appointments, and call your doctor if you are having problems. It's also a good idea to know your test results and keep a list of the medicines you take. Where can you learn more? Go to http://nico-millicent.info/. Enter W042 in the search box to learn more about \"Learning About Colonoscopy. \" Current as of: July 26, 2016 Content Version: 11.2 © 1347-6936 [employer]. Care instructions adapted under license by [employer] (which disclaims liability or warranty for this information).  If you have questions about a medical condition or this instruction, always ask your healthcare professional. [employer] disclaims any warranty or liability for your use of this information. Heart-Healthy Diet: Care Instructions Your Care Instructions A heart-healthy diet has lots of vegetables, fruits, nuts, beans, and whole grains, and is low in salt. It limits foods that are high in saturated fat, such as meats, cheeses, and fried foods. It may be hard to change your diet, but even small changes can lower your risk of heart attack and heart disease. Follow-up care is a key part of your treatment and safety. Be sure to make and go to all appointments, and call your doctor if you are having problems. It's also a good idea to know your test results and keep a list of the medicines you take. How can you care for yourself at home? Watch your portions · Learn what a serving is. A \"serving\" and a \"portion\" are not always the same thing. Make sure that you are not eating larger portions than are recommended. For example, a serving of pasta is ½ cup. A serving size of meat is 2 to 3 ounces. A 3-ounce serving is about the size of a deck of cards. Measure serving sizes until you are good at Bradley" them. Keep in mind that restaurants often serve portions that are 2 or 3 times the size of one serving. · To keep your energy level up and keep you from feeling hungry, eat often but in smaller portions. · Eat only the number of calories you need to stay at a healthy weight. If you need to lose weight, eat fewer calories than your body burns (through exercise and other physical activity). Eat more fruits and vegetables · Eat a variety of fruit and vegetables every day. Dark green, deep orange, red, or yellow fruits and vegetables are especially good for you. Examples include spinach, carrots, peaches, and berries. · Keep carrots, celery, and other veggies handy for snacks. Buy fruit that is in season and store it where you can see it so that you will be tempted to eat it. · Cook dishes that have a lot of veggies in them, such as stir-fries and soups. Limit saturated and trans fat · Read food labels, and try to avoid saturated and trans fats. They increase your risk of heart disease. Trans fat is found in many processed foods such as cookies and crackers. · Use olive or canola oil when you cook. Try cholesterol-lowering spreads, such as Benecol or Take Control. · Bake, broil, grill, or steam foods instead of frying them. · Choose lean meats instead of high-fat meats such as hot dogs and sausages. Cut off all visible fat when you prepare meat. · Eat fish, skinless poultry, and meat alternatives such as soy products instead of high-fat meats. Soy products, such as tofu, may be especially good for your heart. · Choose low-fat or fat-free milk and dairy products. Eat fish · Eat at least two servings of fish a week. Certain fish, such as salmon and tuna, contain omega-3 fatty acids, which may help reduce your risk of heart attack. Eat foods high in fiber · Eat a variety of grain products every day. Include whole-grain foods that have lots of fiber and nutrients. Examples of whole-grain foods include oats, whole wheat bread, and brown rice. · Buy whole-grain breads and cereals, instead of white bread or pastries. Limit salt and sodium · Limit how much salt and sodium you eat to help lower your blood pressure. · Taste food before you salt it. Add only a little salt when you think you need it. With time, your taste buds will adjust to less salt. · Eat fewer snack items, fast foods, and other high-salt, processed foods. Check food labels for the amount of sodium in packaged foods. · Choose low-sodium versions of canned goods (such as soups, vegetables, and beans). Limit sugar · Limit drinks and foods with added sugar. These include candy, desserts, and soda pop. Limit alcohol · Limit alcohol to no more than 2 drinks a day for men and 1 drink a day for women. Too much alcohol can cause health problems. When should you call for help? Watch closely for changes in your health, and be sure to contact your doctor if: 
· You would like help planning heart-healthy meals. Where can you learn more? Go to http://nico-millicent.info/. Enter V137 in the search box to learn more about \"Heart-Healthy Diet: Care Instructions. \" Current as of: January 27, 2016 Content Version: 11.2 © 9886-8359 [employer]. Care instructions adapted under license by [employer]im[employer] (which disclaims liability or warranty for this information). If you have questions about a medical condition or this instruction, always ask your healthcare professional. [place] [address] any warranty or liability for your use of this information. Advance Directives: Care Instructions Your Care Instructions An advance directive is a legal way to state your wishes at the end of your life. It tells your family and your doctor what to do if you can no longer say what you want. There are two main types of advance directives. You can change them any time that your wishes change. · A living will tells your family and your doctor your wishes about life support and other treatment. · A durable power of  for health care lets you name a person to make treatment decisions for you when you can't speak for yourself. This person is called a health care agent. If you do not have an advance directive, decisions about your medical care may be made by a doctor or a  who doesn't know you. It may help to think of an advance directive as a gift to the people who care for you. If you have one, they won't have to make tough decisions by themselves. Follow-up care is a key part of your treatment and safety.  Be sure to make and go to all appointments, and call your doctor if you are having problems. It's also a good idea to know your test results and keep a list of the medicines you take. How can you care for yourself at home? · Discuss your wishes with your loved ones and your doctor. This way, there are no surprises. · Many states have a unique form. Or you might use a universal form that has been approved by many states. This kind of form can sometimes be completed and stored online. Your electronic copy will then be available wherever you have a connection to the Internet. In most cases, doctors will respect your wishes even if you have a form from a different state. · You don't need a  to do an advance directive. But you may want to get legal advice. · Think about these questions when you prepare an advance directive: ¨ Who do you want to make decisions about your medical care if you are not able to? Many people choose a family member or close friend. ¨ Do you know enough about life support methods that might be used? If not, talk to your doctor so you understand. ¨ What are you most afraid of that might happen? You might be afraid of having pain, losing your independence, or being kept alive by machines. ¨ Where would you prefer to die? Choices include your home, a hospital, or a nursing home. ¨ Would you like to have information about hospice care to support you and your family? ¨ Do you want to donate organs when you die? ¨ Do you want certain Jainism practices performed before you die? If so, put your wishes in the advance directive. · Read your advance directive every year, and make changes as needed. When should you call for help? Be sure to contact your doctor if you have any questions. Where can you learn more? Go to http://nico-millicent.info/. Enter R264 in the search box to learn more about \"Advance Directives: Care Instructions. \" Current as of: November 17, 2016 Content Version: 11.2 © 8335-4971 Healthwise, Incorporated. Care instructions adapted under license by Shelfari (which disclaims liability or warranty for this information). If you have questions about a medical condition or this instruction, always ask your healthcare professional. [address] any warranty or liability for your use of this information. Introducing [facility]! Dear Katrina Jeffries: Thank you for requesting a Snoox account. Our records indicate that you already have an active Snoox account. You can access your account anytime at [email or web address] Did you know that you can access your hospital and ER discharge instructions at any time in Snoox? You can also review all of your test results from your hospital stay or ER visit. Additional Information If you have questions, please visit the Frequently Asked Questions section of the Snoox website at [email or web address] Remember, Snoox is NOT to be used for urgent needs. For medical emergencies, dial 911. Now available from your iPhone and Android! Please provide this summary of care documentation to your next provider. Your primary care clinician is listed as Piyush Moss. If you have any questions after today's visit, please call [phone].

## 2017-04-24 NOTE — PROGRESS NOTES
Jc Garcia. Name and  verified        Chief Complaint   Patient presents with   [address] [facility] reviewed-discussed with patient.

## 2017-04-25 LAB
25(OH)D3+25(OH)D2 SERPL-MCNC: 28.6 NG/ML (ref 30–100)
ALBUMIN SERPL-MCNC: 4.5 G/DL (ref 3.5–4.8)
ALBUMIN/GLOB SERPL: 1.9 {RATIO} (ref 1.2–2.2)
ALP SERPL-CCNC: 90 IU/L (ref 39–117)
ALT SERPL-CCNC: 21 IU/L (ref 0–44)
AST SERPL-CCNC: 19 IU/L (ref 0–40)
BILIRUB SERPL-MCNC: 1.3 MG/DL (ref 0–1.2)
BUN SERPL-MCNC: 14 MG/DL (ref 8–27)
BUN/CREAT SERPL: 15 (ref 10–24)
CALCIUM SERPL-MCNC: 9.9 MG/DL (ref 8.6–10.2)
CHLORIDE SERPL-SCNC: 98 MMOL/L (ref 96–106)
CHOLEST SERPL-MCNC: 145 MG/DL (ref 100–199)
CO2 SERPL-SCNC: 24 MMOL/L (ref 18–29)
CREAT SERPL-MCNC: 0.91 MG/DL (ref 0.76–1.27)
ERYTHROCYTE [DISTWIDTH] IN BLOOD BY AUTOMATED COUNT: 13.7 % (ref 12.3–15.4)
GLOBULIN SER CALC-MCNC: 2.4 G/DL (ref 1.5–4.5)
GLUCOSE SERPL-MCNC: 81 MG/DL (ref 65–99)
HCT VFR BLD AUTO: 40.9 % (ref 37.5–51)
HDLC SERPL-MCNC: 46 MG/DL
HGB BLD-MCNC: 13.6 G/DL (ref 12.6–17.7)
MCH RBC QN AUTO: 30.5 PG (ref 26.6–33)
MCHC RBC AUTO-ENTMCNC: 33.3 G/DL (ref 31.5–35.7)
MCV RBC AUTO: 92 FL (ref 79–97)
PLATELET # BLD AUTO: 266 X10E3/UL (ref 150–379)
POTASSIUM SERPL-SCNC: 4.6 MMOL/L (ref 3.5–5.2)
PROT SERPL-MCNC: 6.9 G/DL (ref 6–8.5)
PSA SERPL-MCNC: 1.3 NG/ML (ref 0–4)
RBC # BLD AUTO: 4.46 X10E6/UL (ref 4.14–5.8)
SODIUM SERPL-SCNC: 139 MMOL/L (ref 134–144)
TSH SERPL DL<=0.005 MIU/L-ACNC: 1.71 UIU/ML (ref 0.45–4.5)
WBC # BLD AUTO: 7 X10E3/UL (ref 3.4–10.8)

## 2017-05-01 ENCOUNTER — APPOINTMENT (OUTPATIENT)
Dept: CT IMAGING | Age: 72
End: 2017-05-01
Attending: EMERGENCY MEDICINE
Payer: MEDICARE

## 2017-05-01 ENCOUNTER — HOSPITAL ENCOUNTER (OUTPATIENT)
Age: 72
Setting detail: OBSERVATION
Discharge: HOME | End: 2017-05-02
Attending: EMERGENCY MEDICINE | Admitting: INTERNAL MEDICINE
Payer: MEDICARE

## 2017-05-01 DIAGNOSIS — I10 ESSENTIAL HYPERTENSION: ICD-10-CM

## 2017-05-01 DIAGNOSIS — E78.00 HYPERCHOLESTEROLEMIA: ICD-10-CM

## 2017-05-01 DIAGNOSIS — G45.9 TRANSIENT CEREBRAL ISCHEMIA, UNSPECIFIED TYPE: Primary | ICD-10-CM

## 2017-05-01 LAB
ALBUMIN SERPL BCP-MCNC: 3.8 G/DL (ref 3.5–5)
ALBUMIN/GLOB SERPL: 1.1 {RATIO} (ref 1.1–2.2)
ALP SERPL-CCNC: 93 U/L (ref 45–117)
ALT SERPL-CCNC: 29 U/L (ref 12–78)
ANION GAP BLD CALC-SCNC: 6 MMOL/L (ref 5–15)
APPEARANCE UR: ABNORMAL
AST SERPL W P-5'-P-CCNC: 20 U/L (ref 15–37)
BASOPHILS # BLD AUTO: 0 K/UL (ref 0–0.1)
BASOPHILS # BLD: 1 % (ref 0–1)
BILIRUB SERPL-MCNC: 0.9 MG/DL (ref 0.2–1)
BILIRUB UR QL: NEGATIVE
BUN SERPL-MCNC: 14 MG/DL (ref 6–20)
BUN/CREAT SERPL: 12 (ref 12–20)
CALCIUM SERPL-MCNC: 9 MG/DL (ref 8.5–10.1)
CHLORIDE SERPL-SCNC: 106 MMOL/L (ref 97–108)
CK MB CFR SERPL CALC: 2.6 % (ref 0–2.5)
CK MB SERPL-MCNC: 8.4 NG/ML (ref 5–25)
CK SERPL-CCNC: 324 U/L (ref 39–308)
CO2 SERPL-SCNC: 28 MMOL/L (ref 21–32)
COLOR UR: ABNORMAL
CREAT SERPL-MCNC: 1.18 MG/DL (ref 0.7–1.3)
EOSINOPHIL # BLD: 0.2 K/UL (ref 0–0.4)
EOSINOPHIL NFR BLD: 3 % (ref 0–7)
ERYTHROCYTE [DISTWIDTH] IN BLOOD BY AUTOMATED COUNT: 13.2 % (ref 11.5–14.5)
GLOBULIN SER CALC-MCNC: 3.4 G/DL (ref 2–4)
GLUCOSE SERPL-MCNC: 117 MG/DL (ref 65–100)
GLUCOSE UR STRIP.AUTO-MCNC: NEGATIVE MG/DL
HCT VFR BLD AUTO: 35.5 % (ref 36.6–50.3)
HEMOCCULT STL QL IA: NEGATIVE
HGB BLD-MCNC: 12.1 G/DL (ref 12.1–17)
HGB UR QL STRIP: NEGATIVE
KETONES UR QL STRIP.AUTO: NEGATIVE MG/DL
LEUKOCYTE ESTERASE UR QL STRIP.AUTO: NEGATIVE
LYMPHOCYTES # BLD AUTO: 27 % (ref 12–49)
LYMPHOCYTES # BLD: 1.7 K/UL (ref 0.8–3.5)
MCH RBC QN AUTO: 31.3 PG (ref 26–34)
MCHC RBC AUTO-ENTMCNC: 34.1 G/DL (ref 30–36.5)
MCV RBC AUTO: 91.7 FL (ref 80–99)
MONOCYTES # BLD: 0.6 K/UL (ref 0–1)
MONOCYTES NFR BLD AUTO: 9 % (ref 5–13)
NEUTS SEG # BLD: 4 K/UL (ref 1.8–8)
NEUTS SEG NFR BLD AUTO: 60 % (ref 32–75)
NITRITE UR QL STRIP.AUTO: NEGATIVE
PH UR STRIP: 6 [PH] (ref 5–8)
PLATELET # BLD AUTO: 185 K/UL (ref 150–400)
POTASSIUM SERPL-SCNC: 3.9 MMOL/L (ref 3.5–5.1)
PROT SERPL-MCNC: 7.2 G/DL (ref 6.4–8.2)
PROT UR STRIP-MCNC: NEGATIVE MG/DL
RBC # BLD AUTO: 3.87 M/UL (ref 4.1–5.7)
SODIUM SERPL-SCNC: 140 MMOL/L (ref 136–145)
SP GR UR REFRACTOMETRY: 1.01 (ref 1–1.03)
TROPONIN I SERPL-MCNC: <0.04 NG/ML
UROBILINOGEN UR QL STRIP.AUTO: 1 EU/DL (ref 0.2–1)
WBC # BLD AUTO: 6.5 K/UL (ref 4.1–11.1)

## 2017-05-01 PROCEDURE — 70450 CT HEAD/BRAIN W/O DYE: CPT

## 2017-05-01 PROCEDURE — 82553 CREATINE MB FRACTION: CPT | Performed by: EMERGENCY MEDICINE

## 2017-05-01 PROCEDURE — 85025 COMPLETE CBC W/AUTO DIFF WBC: CPT | Performed by: EMERGENCY MEDICINE

## 2017-05-01 PROCEDURE — 99218 HC RM OBSERVATION: CPT

## 2017-05-01 PROCEDURE — 99285 EMERGENCY DEPT VISIT HI MDM: CPT

## 2017-05-01 PROCEDURE — 81003 URINALYSIS AUTO W/O SCOPE: CPT | Performed by: EMERGENCY MEDICINE

## 2017-05-01 PROCEDURE — 36415 COLL VENOUS BLD VENIPUNCTURE: CPT | Performed by: EMERGENCY MEDICINE

## 2017-05-01 PROCEDURE — 74011250636 HC RX REV CODE- 250/636: Performed by: EMERGENCY MEDICINE

## 2017-05-01 PROCEDURE — 93005 ELECTROCARDIOGRAM TRACING: CPT

## 2017-05-01 PROCEDURE — 82550 ASSAY OF CK (CPK): CPT | Performed by: EMERGENCY MEDICINE

## 2017-05-01 PROCEDURE — 74011250637 HC RX REV CODE- 250/637: Performed by: INTERNAL MEDICINE

## 2017-05-01 PROCEDURE — 80053 COMPREHEN METABOLIC PANEL: CPT | Performed by: EMERGENCY MEDICINE

## 2017-05-01 PROCEDURE — 84484 ASSAY OF TROPONIN QUANT: CPT | Performed by: EMERGENCY MEDICINE

## 2017-05-01 PROCEDURE — 96360 HYDRATION IV INFUSION INIT: CPT

## 2017-05-01 RX ORDER — ACETAMINOPHEN 650 MG/1
650 SUPPOSITORY RECTAL
Status: DISCONTINUED | OUTPATIENT
Start: 2017-05-01 | End: 2017-05-02 | Stop reason: HOSPADM

## 2017-05-01 RX ORDER — ATORVASTATIN CALCIUM 20 MG/1
20 TABLET, FILM COATED ORAL
Status: DISCONTINUED | OUTPATIENT
Start: 2017-05-02 | End: 2017-05-02 | Stop reason: HOSPADM

## 2017-05-01 RX ORDER — ASPIRIN 325 MG
325 TABLET ORAL DAILY
Status: DISCONTINUED | OUTPATIENT
Start: 2017-05-02 | End: 2017-05-02 | Stop reason: HOSPADM

## 2017-05-01 RX ORDER — ENOXAPARIN SODIUM 100 MG/ML
40 INJECTION SUBCUTANEOUS EVERY 24 HOURS
Status: DISCONTINUED | OUTPATIENT
Start: 2017-05-02 | End: 2017-05-02 | Stop reason: HOSPADM

## 2017-05-01 RX ORDER — FAMOTIDINE 20 MG/1
20 TABLET, FILM COATED ORAL EVERY 12 HOURS
Status: DISCONTINUED | OUTPATIENT
Start: 2017-05-01 | End: 2017-05-02 | Stop reason: HOSPADM

## 2017-05-01 RX ORDER — SODIUM CHLORIDE 0.9 % (FLUSH) 0.9 %
5-10 SYRINGE (ML) INJECTION EVERY 8 HOURS
Status: DISCONTINUED | OUTPATIENT
Start: 2017-05-01 | End: 2017-05-02 | Stop reason: HOSPADM

## 2017-05-01 RX ORDER — LABETALOL HYDROCHLORIDE 5 MG/ML
5 INJECTION, SOLUTION INTRAVENOUS
Status: DISCONTINUED | OUTPATIENT
Start: 2017-05-01 | End: 2017-05-02 | Stop reason: HOSPADM

## 2017-05-01 RX ORDER — ACETAMINOPHEN 325 MG/1
650 TABLET ORAL
Status: DISCONTINUED | OUTPATIENT
Start: 2017-05-01 | End: 2017-05-02 | Stop reason: HOSPADM

## 2017-05-01 RX ORDER — TAMSULOSIN HYDROCHLORIDE 0.4 MG/1
0.4 CAPSULE ORAL DAILY
Status: DISCONTINUED | OUTPATIENT
Start: 2017-05-02 | End: 2017-05-02 | Stop reason: HOSPADM

## 2017-05-01 RX ORDER — SODIUM CHLORIDE 0.9 % (FLUSH) 0.9 %
5-10 SYRINGE (ML) INJECTION AS NEEDED
Status: DISCONTINUED | OUTPATIENT
Start: 2017-05-01 | End: 2017-05-02 | Stop reason: HOSPADM

## 2017-05-01 RX ADMIN — FAMOTIDINE 20 MG: 20 TABLET ORAL at 22:48

## 2017-05-01 RX ADMIN — SODIUM CHLORIDE 1000 ML: 900 INJECTION, SOLUTION INTRAVENOUS at 16:53

## 2017-05-01 RX ADMIN — Medication 10 ML: at 22:49

## 2017-05-01 NOTE — ED PROVIDER NOTES
HPI Comments: Veronica Shah., 66 y.o. Male with PMHx of HTN, CAD, GERD, CKD, anemia, TIA, and hypercholesterolemia presents ambulatory to the ED with cc of severe pressure HA and vision change episode while driving at ~ 2 PM. The pt felt fine today and had finished packing up his truck to head to the beach when he developed a pressing sensation in his had that radiated inward with associated blurred and dark vision. The episode only lasted 1-2 seconds and he felt normal ~ 10 minutes later. Wife and pt note that the last time he had similar sxs was when he had a TIA ~ 4 years ago. Pt states that he called his insurance company's nurse and was instructed to come to the ED. He takes  mg daily and took 1 tablet PTA. Pt also takes Flomax, omeprazole, lisinopril, and Lipitor daily. He is not currently experiencing any head pressure or vision changes. Pt denies any fevers, chills, nausea, vomiting, diarrhea, CP, SOB, numbness, tingling, change in sensation, one-sided weakness, myalgias, or arthralgias. PCP: Negar Fong MD  Neurologist: Dr. Shira Lawton history significant for: - Tobacco, + EtOH, - Illicit Drug Use  PSHx: cardiac catheterization, tonsillectomy, L knee surgery    There are no other complaints, changes, or physical findings at this time. Written by Jie Romo ED Scribe, as dictated by Yani Angulo MD.      The history is provided by the patient. No  was used.         Past Medical History:   Diagnosis Date    Anemia 9/10/2014    Back pain 4/1/2010    CAD (coronary artery disease)     Calf pain 3/11/2015    Chronic kidney disease     cyst on kidney    Chronic left shoulder pain 10/24/2016    Constipation 6/11/2014    Decreased vision 6/13/2011    Diverticulitis 3/11/2015    Enlarged LA (left atrium)     Fall at home 3/17/2016    GERD (gastroesophageal reflux disease)     Hemorrhoids 9/10/2014    HTN (hypertension) 8/24/2010    Hx-TIA (transient ischemic attack)     Hypercholesterolemia 4/1/2010    Hypertension     Irregular heart beat 9/23/2015    Kidney disease     Mitral regurgitation     Need for varicella vaccine 9/10/2014    Other ill-defined conditions     heart murmur    Rash 8/24/2010    Rib pain on right side 3/17/2016    Right calf pain 3/11/2015    Risk for falls 4/4/2014    S/P knee replacement 6/11/2014    Screening for depression 4/4/2014    Tinea versicolor 9/10/2014    Urinary frequency 8/24/2010       Past Surgical History:   Procedure Laterality Date    CARDIAC SURG PROCEDURE UNLIST      heart cath    HX HEENT      nasal septum repair    HX HEENT      HX HERNIA REPAIR      HX KNEE REPLACEMENT Left     HX ORTHOPAEDIC      left knee surgery    HX ORTHOPAEDIC      HX TONSILLECTOMY  age 5    HX UROLOGICAL      NV COLONOSCOPY FLX DX W/COLLJ SPEC WHEN PFRMD  4/18/2011              Family History:   Problem Relation Age of Onset    COPD Mother     Heart Attack Mother     Hypertension Mother     Heart Disease Mother     COPD Father     Heart Attack Father     Hypertension Father     Heart Disease Father     Cancer Father     Cancer Maternal Grandmother        Social History     Social History    Marital status:      Spouse name: N/A    Number of children: N/A    Years of education: N/A     Occupational History    Not on file. Social History Main Topics    Smoking status: Never Smoker    Smokeless tobacco: Never Used    Alcohol use 0.0 oz/week     0 Standard drinks or equivalent per week      Comment: 12 beers, half a fifth a month    Drug use: No    Sexual activity: No     Other Topics Concern    Not on file     Social History Narrative    ** Merged History Encounter **              ALLERGIES: Review of patient's allergies indicates no known allergies. Review of Systems   Constitutional: Negative for chills and fever. Eyes: Positive for visual disturbance.    Respiratory: Negative for shortness of breath. Cardiovascular: Negative for chest pain. Gastrointestinal: Negative for abdominal pain, diarrhea, nausea and vomiting. Endocrine: Negative for heat intolerance. Genitourinary: Negative for dysuria. Musculoskeletal: Negative for arthralgias and myalgias. Skin: Negative for rash. Allergic/Immunologic: Negative for immunocompromised state. Neurological: Positive for headaches. Negative for weakness and numbness. (-) loss of sensation   Hematological: Does not bruise/bleed easily. Psychiatric/Behavioral: Negative. All other systems reviewed and are negative. Patient Vitals for the past 12 hrs:   Temp Pulse Resp BP SpO2   05/01/17 1707 - (!) 58 15 - 99 %   05/01/17 1655 - 71 - 132/85 98 %   05/01/17 1652 - 68 - (!) 140/109 97 %   05/01/17 1651 - 62 - 136/88 -   05/01/17 1507 98.3 °F (36.8 °C) 73 16 133/86 98 %       Physical Exam   Constitutional: He is oriented to person, place, and time. He appears well-developed and well-nourished. No distress. NAD   HENT:   Head: Normocephalic and atraumatic. Eyes: EOM are normal. Pupils are equal, round, and reactive to light. Pupils equally reactive, EOMI   Neck: Normal range of motion. Neck supple. Cardiovascular: Normal rate, regular rhythm and normal heart sounds. Pulmonary/Chest: Effort normal and breath sounds normal. He has no wheezes. Abdominal: Soft. Bowel sounds are normal. There is no tenderness. Musculoskeletal: Normal range of motion. He exhibits no edema or tenderness. Neurological: He is alert and oriented to person, place, and time. No cranial nerve deficit. No sensorimotor deficits   Skin: Skin is warm and dry. Psychiatric: He has a normal mood and affect. Nursing note and vitals reviewed.        MDM  Number of Diagnoses or Management Options  Transient cerebral ischemia, unspecified type:   Diagnosis management comments: Dehydration, orthostatic TIA, electrolyte abnormality, UTI       Amount and/or Complexity of Data Reviewed  Clinical lab tests: ordered and reviewed  Tests in the radiology section of CPT®: ordered and reviewed  Tests in the medicine section of CPT®: ordered and reviewed  Obtain history from someone other than the patient: (wife)  Review and summarize past medical records: yes  Discuss the patient with other providers: yes (Neuro, hospitalist)  Independent visualization of images, tracings, or specimens: yes    Patient Progress  Patient progress: stable    ED Course       Procedures    EKG interpretation: (Preliminary) 15:14  Rhythm: normal sinus rhythm; and regular . Rate (approx.): 66; Axis: normal; IA interval: normal; QRS interval: normal ; ST/T wave: normal;  Other findings: no significant change. Written by NOBLE Recio, as dictated by Amanda Rodriguez MD.    CONSULT NOTE:   5:20 PM  Amanda Rodriguez MD spoke with Dr. Roberta Acevedo,   Specialty: neuro  Discussed pt's hx, disposition, and available diagnostic and imaging results. Reviewed care plans. Consultant agrees with plans as outlined. Dr. Roberta Acevedo recommends hospital admission. Written by NOBLE Recio, as dictated by Amanda Rodriguez MD.    CONSULT NOTE:   5:23 PM  Amanda Rodriguez MD spoke with Dr. Lupe Nash,   Specialty: Hospitalist  Discussed pt's hx, disposition, and available diagnostic and imaging results. Reviewed care plans. Consultant will evaluate pt for admission. Written by NOBLE Recio, as dictated by Amanda Rodriguez MD.      5:31 PM  Discussed need for hospital admission with the pt and his wife, they indicate their understanding and agree with admission.    Written by NOBLE Recio, as dictated by Amanda Rodriguez MD.    LABORATORY TESTS:  Recent Results (from the past 12 hour(s))   EKG, 12 LEAD, INITIAL    Collection Time: 05/01/17  3:14 PM   Result Value Ref Range    Ventricular Rate 66 BPM    Atrial Rate 66 BPM    P-R Interval 198 ms    QRS Duration 96 ms    Q-T Interval 396 ms    QTC Calculation (Bezet) 415 ms    Calculated P Axis 43 degrees    Calculated R Axis 8 degrees    Calculated T Axis 18 degrees    Diagnosis       Normal sinus rhythm  Cannot rule out Inferior infarct , age undetermined  When compared with ECG of 13-MAY-2014 08:17,  No significant change was found     CBC WITH AUTOMATED DIFF    Collection Time: 05/01/17  3:19 PM   Result Value Ref Range    WBC 6.5 4.1 - 11.1 K/uL    RBC 3.87 (L) 4.10 - 5.70 M/uL    HGB 12.1 12.1 - 17.0 g/dL    HCT 35.5 (L) 36.6 - 50.3 %    MCV 91.7 80.0 - 99.0 FL    MCH 31.3 26.0 - 34.0 PG    MCHC 34.1 30.0 - 36.5 g/dL    RDW 13.2 11.5 - 14.5 %    PLATELET 314 677 - 271 K/uL    NEUTROPHILS 60 32 - 75 %    LYMPHOCYTES 27 12 - 49 %    MONOCYTES 9 5 - 13 %    EOSINOPHILS 3 0 - 7 %    BASOPHILS 1 0 - 1 %    ABS. NEUTROPHILS 4.0 1.8 - 8.0 K/UL    ABS. LYMPHOCYTES 1.7 0.8 - 3.5 K/UL    ABS. MONOCYTES 0.6 0.0 - 1.0 K/UL    ABS. EOSINOPHILS 0.2 0.0 - 0.4 K/UL    ABS. BASOPHILS 0.0 0.0 - 0.1 K/UL   METABOLIC PANEL, COMPREHENSIVE    Collection Time: 05/01/17  3:19 PM   Result Value Ref Range    Sodium 140 136 - 145 mmol/L    Potassium 3.9 3.5 - 5.1 mmol/L    Chloride 106 97 - 108 mmol/L    CO2 28 21 - 32 mmol/L    Anion gap 6 5 - 15 mmol/L    Glucose 117 (H) 65 - 100 mg/dL    BUN 14 6 - 20 MG/DL    Creatinine 1.18 0.70 - 1.30 MG/DL    BUN/Creatinine ratio 12 12 - 20      GFR est AA >60 >60 ml/min/1.73m2    GFR est non-AA >60 >60 ml/min/1.73m2    Calcium 9.0 8.5 - 10.1 MG/DL    Bilirubin, total 0.9 0.2 - 1.0 MG/DL    ALT (SGPT) 29 12 - 78 U/L    AST (SGOT) 20 15 - 37 U/L    Alk.  phosphatase 93 45 - 117 U/L    Protein, total 7.2 6.4 - 8.2 g/dL    Albumin 3.8 3.5 - 5.0 g/dL    Globulin 3.4 2.0 - 4.0 g/dL    A-G Ratio 1.1 1.1 - 2.2     CK W/ REFLX CKMB    Collection Time: 05/01/17  3:19 PM   Result Value Ref Range     (H) 39 - 308 U/L   TROPONIN I    Collection Time: 05/01/17  3:19 PM   Result Value Ref Range    Troponin-I, Qt. <0.04 <0.05 ng/mL   CK-MB,QUANT. Collection Time: 05/01/17  3:19 PM   Result Value Ref Range    CK - MB 8.4 (H) <3.6 NG/ML    CK-MB Index 2.6 (H) 0 - 2.5     URINALYSIS W/ RFLX MICROSCOPIC    Collection Time: 05/01/17  5:02 PM   Result Value Ref Range    Color YELLOW/STRAW      Appearance CLOUDY (A) CLEAR      Specific gravity 1.007 1.003 - 1.030      pH (UA) 6.0 5.0 - 8.0      Protein NEGATIVE  NEG mg/dL    Glucose NEGATIVE  NEG mg/dL    Ketone NEGATIVE  NEG mg/dL    Bilirubin NEGATIVE  NEG      Blood NEGATIVE  NEG      Urobilinogen 1.0 0.2 - 1.0 EU/dL    Nitrites NEGATIVE  NEG      Leukocyte Esterase NEGATIVE  NEG         IMAGING RESULTS:  CT HEAD WO CONT   Final Result   EXAM: Head CT     INDICATION: Head pressure and numbness, followed by a presyncopal episode. Symptoms now resolved.     COMPARISON: None.     TECHNIQUE: Unenhanced CT of the head was performed using 5 mm images. Brain and  bone windows were generated. CT dose reduction was achieved through use of a  standardized protocol tailored for this examination and automatic exposure  control for dose modulation.      FINDINGS:  The ventricles and sulci are normal in size, shape and configuration and  midline. There is no significant white matter disease. There is no intracranial  hemorrhage, extra-axial collection, mass, mass effect or midline shift. The  basilar cisterns are open. No acute infarct is identified. The bone windows  demonstrate no abnormalities. The visualized portions of the paranasal sinuses  and mastoid air cells are clear.     IMPRESSION  IMPRESSION: No acute intracranial process seen             MEDICATIONS GIVEN:  Medications   sodium chloride 0.9 % bolus infusion 1,000 mL (0 mL IntraVENous IV Completed 5/1/17 [id number])       IMPRESSION:  1. Transient cerebral ischemia, unspecified type        PLAN:  1. Admit      Admit Note:  5:23 PM  Pt is being admitted by Dr. Alise Mahan.  The results of their tests and reason(s) for their admission have been discussed with pt and/or available family. They convey agreement and understanding for the need to be admitted and for admission diagnosis. This note is prepared by Sonny Smith, acting as a Scribe for Amanda Rodriguez MD.    Amanda Rodriguez MD: The scribe's documentation has been prepared under my direction and personally reviewed by me in its entirety. I confirm that the notes above accurately reflects all work, treatment, procedures, and medical decision making performed by me.

## 2017-05-01 NOTE — IP AVS SNAPSHOT
[address] [facility] 
[phone] Patient: Matthew Mckinley MRN: [id number] UBO:[birth year] You are allergic to the following No active allergies Recent Documentation Height Weight BMI Smoking Status 1.892 m 100.7 kg 28.12 kg/m2 Never Smoker Emergency Contacts Name Discharge Info Relation Home Work Mobile [address] 046 CAREGIVER [3] Spouse [3] [phone] About your hospitalization You were admitted on:  May 1, 2017 You last received care in the:  [facility] 3 NEUROSCIENCE TELEMETRY You were discharged on:  May 2, 2017 Unit phone number:  [phone] Why you were hospitalized Your primary diagnosis was:  Not on File Your diagnoses also included:  Tia (Transient Ischemic Attack) Providers Seen During Your Hospitalizations Provider Role Specialty Primary office phone Renetta Sánchez MD Attending Provider Emergency Medicine [phone] Harry Bah MD Attending Provider Internal Medicine [phone] Your Primary Care Physician (PCP) Primary Care Physician Office Phone Office Fax Anabella Ji [phone] Follow-up Information Follow up With Details Comments Contact Info Jimy Gupta MD  As needed [address] [facility] [address] [phone] 
[phone] Current Discharge Medication List  
  
CONTINUE these medications which have NOT CHANGED Dose & Instructions Dispensing Information Comments Morning Noon Evening Bedtime  
 aspirin 325 mg tablet Commonly known as:  ASPIRIN Your last dose was: Your next dose is:    
   
   
 Dose:  325 mg Take 325 mg by mouth daily. Refills:  0  
     
   
   
   
  
 atorvastatin 20 mg tablet Commonly known as:  LIPITOR Your last dose was: Your next dose is:    
   
   
 take 1 tablet by mouth NIGHTLY Quantity:  90 Tab Refills:  3  
     
   
   
   
  
 calcium-cholecalciferol (D3) tablet Commonly known as:  CALTRATE 600+D Your last dose was: Your next dose is:    
   
   
 Dose:  1 Tab Take 1 Tab by mouth daily. Quantity:  60 Tab Refills:  11  
     
   
   
   
  
 chlorpheniramine-HYDROcodone 10-8 mg/5 mL suspension Commonly known as:  Olivia Hutchinss Your last dose was: Your next dose is:    
   
   
 Dose:  5 mL Take 5 mL by mouth every twelve (12) hours as needed for Cough or Congestion. Max Daily Amount: 10 mL. Quantity:  160 mL Refills:  0  
     
   
   
   
  
 COLACE 50 mg capsule Generic drug:  docusate sodium Your last dose was: Your next dose is:    
   
   
 Dose:  50 mg Take 50 mg by mouth two (2) times a day. Refills:  0 DICLOFENAC POTASSIUM PO Your last dose was: Your next dose is:    
   
   
 Dose:  75 mg Take 75 mg by mouth as needed. Refills:  0  
     
   
   
   
  
 hydrocortisone 2.5 % rectal cream  
Commonly known as:  ANUSOL-HC Your last dose was: Your next dose is: Insert  into rectum four (4) times daily. Quantity:  30 g Refills:  7  
     
   
   
   
  
 lidocaine 5 % Commonly known as:  Chancy Kalata Your last dose was: Your next dose is:    
   
   
 Apply patch to the affected area for 12 hours a day and remove for 12 hours a day. Quantity:  1 Each Refills:  0  
     
   
   
   
  
 lisinopril 30 mg tablet Commonly known as:  Kiko Kapoor Your last dose was: Your next dose is: TAKE 1 TABLET DAILY Quantity:  90 Tab Refills:  0  
     
   
   
   
  
 meloxicam 7.5 mg tablet Commonly known as:  MOBIC Your last dose was:     
   
Your next dose is:    
   
   
 Dose:  7.5 mg  
 Take 1 Tab by mouth two (2) times daily as needed for Pain. Quantity:  60 Tab Refills:  3  
     
   
   
   
  
 mometasone 50 mcg/actuation nasal spray Commonly known as:  Chelsey Tilley Your last dose was: Your next dose is:    
   
   
 Dose:  2 Spray 2 Sprays by Both Nostrils route daily. Quantity:  1 Container Refills:  6  
     
   
   
   
  
 omeprazole 20 mg capsule Commonly known as:  PRILOSEC Your last dose was: Your next dose is: TAKE 1 CAPSULE DAILY Quantity:  90 Cap Refills:  1 PREVNAR 13 (PF) 0.5 mL Syrg injection Generic drug:  pneumococcal 13 jada conj dip Your last dose was: Your next dose is:    
   
   
 inject 0.5 milliliter intramuscularly Refills:  0  
     
   
   
   
  
 tamsulosin 0.4 mg capsule Commonly known as:  FLOMAX Your last dose was: Your next dose is:    
   
   
 Dose:  0.4 mg Take 1 Cap by mouth daily. Quantity:  30 Cap Refills:  2 Discharge Instructions HOSPITALIST DISCHARGE INSTRUCTIONS 
 
NAME: Josiah Carvalho. :  1945 MRN:  [id number] Date/Time:  2017 1:31 PM 
 
ADMIT DATE: 2017 DISCHARGE DATE: 2017 Attending Physician: Pankaj Rogers MD 
 
DISCHARGE DIAGNOSIS: 
Transient ischemic attack Hyperlipidemia Hypertension BPH 
GERD MEDICATIONS: 
See above · It is important that you take the medication exactly as they are prescribed. · Keep your medication in the bottles provided by the pharmacist and keep a list of the medication names, dosages, and times to be taken in your wallet. · Do not take other medications without consulting your doctor. Pain Management: per above medications What to do at [facility] Recommended diet:  Resume previous diet Recommended activity: Activity as tolerated If you have questions regarding the hospital related prescriptions or hospital related issues please call [facility] at . You can always direct your questions to your primary care doctor if you are unable to reach your hospital physician; your PCP works as an extension of your hospital doctor just like your hospital doctor is an extension of your PCP for your time at [facility]. If you experience any of the following symptoms then please call your primary care physician or return to the emergency room if you cannot get hold of your doctor: 
Fever, chills, nausea, vomiting, diarrhea, change in mentation, falling, bleeding, shortness of breath Additional Instructions: 
 
 
Bring these papers with you to your follow up appointments. The papers will help your doctors be sure to continue the care plan from the hospital. 
 
 
 
 
 
 
Information obtained by : 
I understand that if any problems occur once I am at home I am to contact my physician. I understand and acknowledge receipt of the instructions indicated above. Physician's or R.N.'s Signature                                                                  Date/Time Patient or Representative Signature                                                          Date/Time Transient Ischemic Attack: Care Instructions Your Care Instructions A transient ischemic attack (TIA) is when blood flow to a part of your brain is blocked for a short time. A TIA is like a stroke but usually lasts only a few minutes. A TIA does not cause lasting brain damage.  Any vision problems, slurred speech, or other symptoms usually go away in 10 to 20 minutes. But they may last for up to 24 hours. TIAs are often warning signs of a stroke. Some people who have a TIA may have a stroke in the future. A stroke can cause symptoms like those of a TIA. But a stroke causes lasting damage to your brain. You can take steps to help prevent a stroke. One thing you can do is get early treatment. If you have other new symptoms, or if your symptoms do not get better, go back to the emergency room or call your doctor right away. Getting treatment right away may prevent long-term brain damage caused by a stroke. The doctor has checked you carefully, but problems can develop later. If you notice any problems or new symptoms, get medical treatment right away. Follow-up care is a key part of your treatment and safety. Be sure to make and go to all appointments, and call your doctor if you are having problems. It's also a good idea to know your test results and keep a list of the medicines you take. How can you care for yourself at home? Medicines · Be safe with medicines. Take your medicines exactly as prescribed. Call your doctor if you think you are having a problem with your medicine. · If you take a blood thinner, such as aspirin, be sure you get instructions about how to take your medicine safely. Blood thinners can cause serious bleeding problems. · Call your doctor if you are not able to take your medicines for any reason. · Do not take any over-the-counter medicines or herbal products without talking to your doctor first. 
· If you take birth control pills or hormone therapy, talk to your doctor. Ask if these treatments are right for you. Lifestyle changes · Do not smoke. If you need help quitting, talk to your doctor about stop-smoking programs and medicines. · Be active. If your doctor recommends it, get more exercise. Walking is a good choice. Bit by bit, increase the amount you walk every day.  Try for at least 30 minutes on most days of the week. You also may want to swim, bike, or do other activities. · Eat heart-healthy foods. These include fruits, vegetables, high-fiber foods, fish, and foods that are low in sodium, saturated fat, and trans fat. · Stay at a healthy weight. Lose weight if you need to. · Limit alcohol to 2 drinks a day for men and 1 drink a day for women. Staying healthy · Manage other health problems such as diabetes, high blood pressure, and high cholesterol. · Get the flu vaccine every year. When should you call for help? Call 911 anytime you think you may need emergency care. For example, call if: 
· You have new or worse symptoms of a stroke. These may include: 
¨ Sudden numbness, tingling, weakness, or loss of movement in your face, arm, or leg, especially on only one side of your body. ¨ Sudden vision changes. ¨ Sudden trouble speaking. ¨ Sudden confusion or trouble understanding simple statements. ¨ Sudden problems with walking or balance. ¨ A sudden, severe headache that is different from past headaches. Call 911 even if these symptoms go away in a few minutes. · You feel like you are having another TIA. Watch closely for changes in your health, and be sure to contact your doctor if you have any problems. Where can you learn more? Go to http://nico-millicent.info/. Enter (07) [phone] in the search box to learn more about \"Transient Ischemic Attack: Care Instructions. \" Current as of: June 4, 2016 Content Version: 11.2 © 9829-8435 [employer]. Care instructions adapted under license by [employer] (which disclaims liability or warranty for this information). If you have questions about a medical condition or this instruction, always ask your healthcare professional. [place] [address] any warranty or liability for your use of this information. Discharge Orders None [facility] Announcement We are excited to announce that we are making your provider's discharge notes available to you in TrendMD. You will see these notes when they are completed and signed by the physician that discharged you from your recent hospital stay. If you have any questions or concerns about any information you see in TrendMD, please call the Health Information Department where you were seen or reach out to your Primary Care Provider for more information about your plan of care. Introducing [facility]! Dear Mikki Paez: Thank you for requesting a TrendMD account. Our records indicate that you already have an active TrendMD account. You can access your account anytime at [email or web address] Did you know that you can access your hospital and ER discharge instructions at any time in TrendMD? You can also review all of your test results from your hospital stay or ER visit. Additional Information If you have questions, please visit the Frequently Asked Questions section of the TrendMD website at [email or web address] Remember, TrendMD is NOT to be used for urgent needs. For medical emergencies, dial 911. Now available from your iPhone and Android! General Information Please provide this summary of care documentation to your next provider. Patient Signature:  ____________________________________________________________ Date:  ____________________________________________________________  
  
Sallie Mercedes Provider Signature:  ____________________________________________________________ Date:  ____________________________________________________________

## 2017-05-02 ENCOUNTER — APPOINTMENT (OUTPATIENT)
Dept: MRI IMAGING | Age: 72
End: 2017-05-02
Attending: INTERNAL MEDICINE
Payer: MEDICARE

## 2017-05-02 VITALS
SYSTOLIC BLOOD PRESSURE: 110 MMHG | HEIGHT: 75 IN | RESPIRATION RATE: 18 BRPM | DIASTOLIC BLOOD PRESSURE: 74 MMHG | BODY MASS INDEX: 27.6 KG/M2 | WEIGHT: 222 LBS | TEMPERATURE: 98.4 F | OXYGEN SATURATION: 98 % | HEART RATE: 62 BPM

## 2017-05-02 PROBLEM — G45.9 TIA (TRANSIENT ISCHEMIC ATTACK): Status: ACTIVE | Noted: 2017-05-02

## 2017-05-02 LAB
ATRIAL RATE: 66 BPM
CALCULATED P AXIS, ECG09: 43 DEGREES
CALCULATED R AXIS, ECG10: 8 DEGREES
CALCULATED T AXIS, ECG11: 18 DEGREES
CHOLEST SERPL-MCNC: 119 MG/DL
DIAGNOSIS, 93000: NORMAL
EST. AVERAGE GLUCOSE BLD GHB EST-MCNC: 111 MG/DL
HBA1C MFR BLD: 5.5 % (ref 4.2–6.3)
HDLC SERPL-MCNC: 45 MG/DL
HDLC SERPL: 2.6 {RATIO} (ref 0–5)
LDLC SERPL CALC-MCNC: 50.4 MG/DL (ref 0–100)
LIPID PROFILE,FLP: NORMAL
P-R INTERVAL, ECG05: 198 MS
Q-T INTERVAL, ECG07: 396 MS
QRS DURATION, ECG06: 96 MS
QTC CALCULATION (BEZET), ECG08: 415 MS
TRIGL SERPL-MCNC: 118 MG/DL (ref ?–150)
VENTRICULAR RATE, ECG03: 66 BPM
VLDLC SERPL CALC-MCNC: 23.6 MG/DL

## 2017-05-02 PROCEDURE — 97530 THERAPEUTIC ACTIVITIES: CPT

## 2017-05-02 PROCEDURE — 70544 MR ANGIOGRAPHY HEAD W/O DYE: CPT

## 2017-05-02 PROCEDURE — 70547 MR ANGIOGRAPHY NECK W/O DYE: CPT

## 2017-05-02 PROCEDURE — 97165 OT EVAL LOW COMPLEX 30 MIN: CPT

## 2017-05-02 PROCEDURE — 74011250637 HC RX REV CODE- 250/637: Performed by: INTERNAL MEDICINE

## 2017-05-02 PROCEDURE — 80061 LIPID PANEL: CPT | Performed by: INTERNAL MEDICINE

## 2017-05-02 PROCEDURE — G8987 SELF CARE CURRENT STATUS: HCPCS

## 2017-05-02 PROCEDURE — G8989 SELF CARE D/C STATUS: HCPCS

## 2017-05-02 PROCEDURE — 74011250636 HC RX REV CODE- 250/636: Performed by: INTERNAL MEDICINE

## 2017-05-02 PROCEDURE — 93306 TTE W/DOPPLER COMPLETE: CPT

## 2017-05-02 PROCEDURE — 36415 COLL VENOUS BLD VENIPUNCTURE: CPT | Performed by: INTERNAL MEDICINE

## 2017-05-02 PROCEDURE — 70551 MRI BRAIN STEM W/O DYE: CPT

## 2017-05-02 PROCEDURE — G8988 SELF CARE GOAL STATUS: HCPCS

## 2017-05-02 PROCEDURE — 96372 THER/PROPH/DIAG INJ SC/IM: CPT

## 2017-05-02 PROCEDURE — 83036 HEMOGLOBIN GLYCOSYLATED A1C: CPT | Performed by: INTERNAL MEDICINE

## 2017-05-02 PROCEDURE — 99218 HC RM OBSERVATION: CPT

## 2017-05-02 RX ADMIN — ENOXAPARIN SODIUM 40 MG: 40 INJECTION SUBCUTANEOUS at 08:00

## 2017-05-02 RX ADMIN — LISINOPRIL 30 MG: 20 TABLET ORAL at 08:00

## 2017-05-02 RX ADMIN — FAMOTIDINE 20 MG: 20 TABLET ORAL at 08:00

## 2017-05-02 RX ADMIN — ASPIRIN 325 MG: 325 TABLET ORAL at 08:00

## 2017-05-02 RX ADMIN — Medication 10 ML: at 03:56

## 2017-05-02 RX ADMIN — TAMSULOSIN HYDROCHLORIDE 0.4 MG: 0.4 CAPSULE ORAL at 08:00

## 2017-05-02 NOTE — PROGRESS NOTES
Occupational Therapy neurological EVALUATION with discharge  Patient: Maria R Tejada (72 y.o. male)  Date: 5/2/2017  Primary Diagnosis: TIA        Precautions: None       ASSESSMENT:  Based on the objective data described below, the patient presents with resolution of visual changes and reports back to ADL baseline s/p admission for TIA. Pt reported change in vision while driving with scattered colors for 1-2 seconds, denied any HA. Pt demonstrated 5/5 B UE strength and functional ROM of UE. Pt with intact finger to nose with eyes closed, slightly dysmetric bilaterally but likely related to speed rather than precision. Pt is up ad ryan in room but wife reports at least 3 falls since start of the new year. Wife reports pt's balance has worsened since TKR 3 yrs ago. Pt is at his ADL baseline and will defer any OP recommendation to PT for balance. Further skilled acute occupational therapy is not indicated at this time. Discharge Recommendations: defer to PT  Further Equipment Recommendations for Discharge: none     SUBJECTIVE:   Patient stated I didn't have a headache.  (ER reported pt with severe HA)    OBJECTIVE DATA SUMMARY:   HISTORY:   Past Medical History:   Diagnosis Date    Anemia 9/10/2014    Back pain 4/1/2010    CAD (coronary artery disease)     Calf pain 3/11/2015    Chronic kidney disease     cyst on kidney    Chronic left shoulder pain 10/24/2016    Constipation 6/11/2014    Decreased vision 6/13/2011    Diverticulitis 3/11/2015    Enlarged LA (left atrium)     Fall at home 3/17/2016    GERD (gastroesophageal reflux disease)     Hemorrhoids 9/10/2014    HTN (hypertension) 8/24/2010    Hx-TIA (transient ischemic attack)     Hypercholesterolemia 4/1/2010    Hypertension     Irregular heart beat 9/23/2015    Kidney disease     Mitral regurgitation     Need for varicella vaccine 9/10/2014    Other ill-defined conditions     heart murmur    Rash 8/24/2010    Rib pain on right side 3/17/2016    Right calf pain 3/11/2015    Risk for falls 4/4/2014    S/P knee replacement 6/11/2014    Screening for depression 4/4/2014    Tinea versicolor 9/10/2014    Urinary frequency 8/24/2010     Past Surgical History:   Procedure Laterality Date    CARDIAC SURG PROCEDURE UNLIST      heart cath    HX HEENT      nasal septum repair    HX HEENT      HX HERNIA REPAIR      HX KNEE REPLACEMENT Left     HX ORTHOPAEDIC      left knee surgery    HX ORTHOPAEDIC      HX TONSILLECTOMY  age 5    HX UROLOGICAL      IL COLONOSCOPY FLX DX W/COLLJ SPEC WHEN PFRMD  4/18/2011            Prior Level of Function/Home Situation: independent, active, lives with wife, ambulates independently, 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 Wife reports two falls while hunting and one in yard since start of 2017. Expanded or extensive additional review of patient history: hx TIA     Home Situation  Home Environment: Private residence  # Steps to Enter: 4  Rails to Enter: Yes  Hand Rails : Bilateral  One/Two Story Residence: Two story  # of Interior Steps: 25  Interior Rails: Left  Living Alone: No  Support Systems: Family member(s), Spouse/Significant Other/Partner  Patient Expects to be Discharged to[de-identified] Private residence  Current DME Used/Available at Home: Walker, rolling, Commode, bedside  Tub or Shower Type: Tub/Shower combination (cut out tub)  [x]  Right hand dominant   []  Left hand dominant    EXAMINATION OF PERFORMANCE DEFICITS:  Cognitive/Behavioral Status:  Neurologic State: Alert  Orientation Level: Oriented X4  Cognition: Appropriate decision making; Appropriate for age attention/concentration; Appropriate safety awareness; Follows commands  Perception: Appears intact  Perseveration: No perseveration noted  Safety/Judgement: Awareness of environment; Fall prevention;Home safety    Skin: intact    Edema: none noted    Hearing:   Auditory  Auditory Impairment: None    Vision/Perceptual:    Tracking: Able to track stimulus in all quadrants w/o difficulty                 Diplopia: No    Acuity: Within Defined Limits;Able to read clock/calendar on wall without difficulty    Corrective Lenses: Glasses    Range of Motion:    AROM: Within functional limits                         Strength:    Strength: Within functional limits (5/5 B UE)                Coordination:  Coordination: Within functional limits  Fine Motor Skills-Upper: Left Intact; Right Intact    Gross Motor Skills-Upper: Left Intact; Right Intact    Tone & Sensation:       Sensation: Intact                      Balance:  Sitting: Intact  Standing: Intact; Without support    Functional Mobility and Transfers for ADLs:  Bed Mobility:       Transfers:  Sit to Stand: Independent  Functional Transfers  Toilet Transfer : Independent    ADL Assessment:  Feeding: Independent    Oral Facial Hygiene/Grooming: Independent    Bathing: Independent    Upper Body Dressing: Independent    Lower Body Dressing: Independent    Toileting: Independent                ADL Intervention and task modifications:         Pt educated on role of OT and rationale s/p admission for TIA. Cognitive Retraining  Safety/Judgement: Awareness of environment; Fall prevention;Home safety       Functional Measure:   Fugl-Merritt Assessment of Motor Recovery after Stroke:     Reflex Activity  Flexors/Biceps/Fingers: Can be elicited  Extensors/Triceps: Can be elicited  Reflex Subtotal: 4    Volitional Movement Within Synergies  Shoulder Retraction: Full  Shoulder Elevation: Full  Shoulder Abduction (90 degrees): Full  Shoulder External Rotation: Full  Elbow Flexion: Full  Forearm Supination: Full  Shoulder Adduction/Internal Rotation: Full  Elbow Extension: Full  Forearm Pronation: Full  Subtotal: 18    Volitional Movement Mixing Synergies  Hand to Lumbar Spine: Full  Shoulder Flexion (0-90 degrees): Full  Pronation-Supination: Full  Subtotal: 6    Volitional Movement With Little or No Synergy  Shoulder Abduction (0-90 degrees): Full  Shoulder Flexion ( degrees): Full  Pronation/Supination: Full  Subtotal : 6    Normal Reflex Activity  Biceps, Triceps, Finger Flexors: Full  Subtotal : 2    Upper Extremity Total   Upper Extremity Total: 36    Wrist  Stability at 15 Degree Dorsiflexion: Full  Repeated Dorsiflexion/ Volar Flexion: Full  Stability at 15 Degree Dorsiflexion: Full  Repeated Dorsiflexion/ Volar Flexion: Full  Circumduction: Full  Wrist Total: 10    Hand  Mass Flexion: Full  Mass Extension: Full  Grasp A: Full  Grasp B: Full  Grasp C: Full  Grasp D: Full  Grasp E: Full  Hand Total: 14    Coordination/Speed  Tremor: None  Dysmetria: Slight (slighlty dysmetric, likely related to increased speed)  Time: <1s  Coordination/Speed Total : 5    Total A-D  Total A-D (Motor Function): 65/66     Percentage of impairment CH  0% CI  1-19% CJ  20-39% CK  40-59% CL  60-79% CM  80-99% CN  100%   Fugl-Merritt score: 0-66 66 53-65 39-52 26-38 13-25 1-12   0      This is a reliable/valid measure of arm function after a neurological event. It has established value to characterize functional status and for measuring spontaneous and therapy-induced recovery; tests proximal and distal motor functions. Fugl-Merritt Assessment  UE scores recorded between five and 30 days post neurologic event can be used to predict UE recovery at six months post neurologic event. Severe = 0-21 points   Moderately Severe = 22-33 points   Moderate = 34-47 points   Mild = 48-66 points  NURA Beckham, ESTUARDO Mccormack, & RYAN Bryant (1992). Measurement of motor recovery after stroke: Outcome assessment and sample size requirements.  Stroke, 23, pp. 1670-2774.   ------------------------------------------------------------------------------------------------------------------------------------------------------------------  MCID:  Stroke:   Enedina Brower al, 2001; n = 171; mean age 79 (6) years; assessed within 16 (12) days of stroke, Acute Stroke)  FMA Motor Scores from Admission to Discharge   10 point increase in FMA Upper Extremity = 1.5 change in discharge FIM   10 point increase in FMA Lower Extremity = 1.9 change in discharge FIM  MDC:   Stroke:   Kendal Ernst et al, 2008, n = 14, mean age = 59.9 (14.6) years, assessed on average 14 (6.5) months post stroke, Chronic Stroke)   FMA = 5.2 points for the Upper Extremity portion of the assessment     G codes: In compliance with CMSs Claims Based Outcome Reporting, the following G-code set was chosen for this patient based on their primary functional limitation being treated: The outcome measure chosen to determine the severity of the functional limitation was the [facility] with a score of 65/66 which was correlated with the impairment scale. ? Self Care:     - CURRENT STATUS: CI - 1%-19% impaired, limited or restricted    - GOAL STATUS: CI - 1%-19% impaired, limited or restricted    - D/C STATUS:  CI - 1%-19% impaired, limited or restricted      Occupational Therapy Evaluation Charge Determination   History Examination Decision-Making   LOW Complexity : Brief history review  LOW Complexity : 1-3 performance deficits relating to physical, cognitive , or psychosocial skils that result in activity limitations and / or participation restrictions  LOW Complexity : No comorbidities that affect functional and no verbal or physical assistance needed to complete eval tasks       Based on the above components, the patient evaluation is determined to be of the following complexity level: LOW     Pain:  Pain Scale 1: Numeric (0 - 10)  Pain Intensity 1: 0              Activity Tolerance:   VSS  Please refer to the flowsheet for vital signs taken during this treatment.   After treatment:   [x]  Patient left in no apparent distress with transport for MRI  []  Patient left in no apparent distress in bed  []  Call bell left within reach  [x]  Nursing notified  [x] Caregiver present  []  Bed alarm activated    COMMUNICATION/EDUCATION:   Findings and recommendations were discussed with: Physical Therapist and Registered Nurse      Patient and/or family was verbally educated on the BE FAST acronym for signs/symptoms of CVA and TIA. BE FAST was written on patient's communication board  for visual education and reinforcement. All questions answered with patient indicating good understanding. []      Home safety education was provided and the patient/caregiver indicated understanding. [x]      Patient/family have participated as able and agree with findings and recommendations. []      Patient is unable to participate in plan of care at this time. This patients plan of care is appropriate for delegation to [facility].     Thank you for this referral.  Mary Grace Garcia OT  Time Calculation: 21 mins

## 2017-05-02 NOTE — INTERDISCIPLINARY ROUNDS
NeuroScience Telemetry Unit Interdisciplinary rounds were held today to discuss patient plan of care and outcomes. The interdisciplinary team collaborated to facilitate discharge planning needs. The following members were present; Nurse Manager, Ascencion Fang [id number], Pharmacist, Primary Nurse, , Physical Therapy,   Physician, and Case Management.      PLAN:  Possible DC later today  after MRI and echo

## 2017-05-02 NOTE — CONSULTS
NEUROLOGY CONSULTATION NOTE       DATE OF CONSULTATION: 5/2/2017    CONSULTED BY: Perla Awad MD    Reason for Consult  I have been asked to see the patient in neurological consultation to render advice and opinion regarding vision changes    HISTORY OF PRESENT ILLNESS  Carmel Holden is a 66 y.o. male who presents to the hospital because of vision changes. The patient was driving to [place] when all of a sudden for a few seconds he lost his vision. It was as if all the colors were mingled together and was not able to see the road. When this happened, there was no associated weakness, no changes in speech or comprehension. He did drive himself to the hospital for an evaluation. CT scan of the head was done which was normal and the patient was seen by to the neurology and TIA was suspected.     ROS  A ten system review of constitutional, cardiovascular, respiratory, musculoskeletal, endocrine, skin, SHEENT, genitourinary, psychiatric and neurologic systems was obtained and is unremarkable except as stated in HPI     PMH  Past Medical History:   Diagnosis Date    Anemia 9/10/2014    Back pain 4/1/2010    CAD (coronary artery disease)     Calf pain 3/11/2015    Chronic kidney disease     cyst on kidney    Chronic left shoulder pain 10/24/2016    Constipation 6/11/2014    Decreased vision 6/13/2011    Diverticulitis 3/11/2015    Enlarged LA (left atrium)     Fall at home 3/17/2016    GERD (gastroesophageal reflux disease)     Hemorrhoids 9/10/2014    HTN (hypertension) 8/24/2010    Hx-TIA (transient ischemic attack)     Hypercholesterolemia 4/1/2010    Hypertension     Irregular heart beat 9/23/2015    Kidney disease     Mitral regurgitation     Need for varicella vaccine 9/10/2014    Other ill-defined conditions     heart murmur    Rash 8/24/2010    Rib pain on right side 3/17/2016    Right calf pain 3/11/2015    Risk for falls 4/4/2014    S/P knee replacement 6/11/2014    Screening for depression 4/4/2014    Tinea versicolor 9/10/2014    Urinary frequency 8/24/2010       FH  Family History   Problem Relation Age of Onset    COPD Mother     Heart Attack Mother     Hypertension Mother     Heart Disease Mother     COPD Father     Heart Attack Father     Hypertension Father     Heart Disease Father     Cancer Father     Cancer Maternal Grandmother        [address] Hammonds  Social History     Social History    Marital status:      Spouse name: N/A    Number of children: N/A    Years of education: N/A     Social History Main Topics    Smoking status: Never Smoker    Smokeless tobacco: Never Used    Alcohol use 0.0 oz/week     0 Standard drinks or equivalent per week      Comment: 12 beers, half a fifth a month    Drug use: No    Sexual activity: No     Other Topics Concern    Not on file     Social History Narrative    ** Merged History Encounter **            ALLERGIES  No Known Allergies    PHYSICAL EXAM  EXAMINATION:   Patient Vitals for the past 24 hrs:   Temp Pulse Resp BP SpO2   05/02/17 0740 98.4 °F (36.9 °C) 62 18 110/74 98 %   05/02/17 0356 98 °F (36.7 °C) 63 18 117/73 98 %   05/01/17 2213 96.9 °F (36.1 °C) (!) 54 16 129/82 99 %   05/01/17 1818 - (!) 59 15 - 99 %   05/01/17 1804 - 63 19 - 100 %   05/01/17 1707 - (!) 58 15 - 99 %   05/01/17 1655 - 71 - 132/85 98 %   05/01/17 1652 - 68 - (!) 140/109 97 %   05/01/17 1651 - 62 - 136/88 -   05/01/17 1507 98.3 °F (36.8 °C) 73 16 133/86 98 %        General:   General appearance: Pt is in no acute distress   Distal pulses are preserved  Fundoscopic exam: attempted    Neurological Examination:   Mental Status:  AAO x3. Speech is fluent. Follows commands, has normal fund of knowledge, attention, short term recall, comprehension and insight. Cranial Nerves: Visual fields are full. PERRL, Extraocular movements are full. Facial sensation intact V1- V3. Facial movement intact, symmetric. Hearing intact to conversation.  Palate elevates symmetrically. Shoulder shrug symmetric. Tongue midline. Motor: Strength is 5/5 in all 4 ext. Normal tone. No atrophy. Sensation: Normal to light touch    Reflexes: DTRs absent throughout. Coordination/Cerebellar: Intact to finger-nose-finger     Gait: Deferred    Skin: No significant bruising or lacerations. LAB DATA REVIEWED:    Results for orders placed or performed during the hospital encounter of 05/01/17   CBC WITH AUTOMATED DIFF   Result Value Ref Range    WBC 6.5 4.1 - 11.1 K/uL    RBC 3.87 (L) 4.10 - 5.70 M/uL    HGB 12.1 12.1 - 17.0 g/dL    HCT 35.5 (L) 36.6 - 50.3 %    MCV 91.7 80.0 - 99.0 FL    MCH 31.3 26.0 - 34.0 PG    MCHC 34.1 30.0 - 36.5 g/dL    RDW 13.2 11.5 - 14.5 %    PLATELET 229 355 - 923 K/uL    NEUTROPHILS 60 32 - 75 %    LYMPHOCYTES 27 12 - 49 %    MONOCYTES 9 5 - 13 %    EOSINOPHILS 3 0 - 7 %    BASOPHILS 1 0 - 1 %    ABS. NEUTROPHILS 4.0 1.8 - 8.0 K/UL    ABS. LYMPHOCYTES 1.7 0.8 - 3.5 K/UL    ABS. MONOCYTES 0.6 0.0 - 1.0 K/UL    ABS. EOSINOPHILS 0.2 0.0 - 0.4 K/UL    ABS. BASOPHILS 0.0 0.0 - 0.1 K/UL   METABOLIC PANEL, COMPREHENSIVE   Result Value Ref Range    Sodium 140 136 - 145 mmol/L    Potassium 3.9 3.5 - 5.1 mmol/L    Chloride 106 97 - 108 mmol/L    CO2 28 21 - 32 mmol/L    Anion gap 6 5 - 15 mmol/L    Glucose 117 (H) 65 - 100 mg/dL    BUN 14 6 - 20 MG/DL    Creatinine 1.18 0.70 - 1.30 MG/DL    BUN/Creatinine ratio 12 12 - 20      GFR est AA >60 >60 ml/min/1.73m2    GFR est non-AA >60 >60 ml/min/1.73m2    Calcium 9.0 8.5 - 10.1 MG/DL    Bilirubin, total 0.9 0.2 - 1.0 MG/DL    ALT (SGPT) 29 12 - 78 U/L    AST (SGOT) 20 15 - 37 U/L    Alk. phosphatase 93 45 - 117 U/L    Protein, total 7.2 6.4 - 8.2 g/dL    Albumin 3.8 3.5 - 5.0 g/dL    Globulin 3.4 2.0 - 4.0 g/dL    A-G Ratio 1.1 1.1 - 2.2     CK W/ REFLX CKMB   Result Value Ref Range     (H) 39 - 308 U/L   TROPONIN I   Result Value Ref Range    Troponin-I, Qt. <0.04 <0.05 ng/mL   CK-MB,QUANT.    Result Value Ref Range    CK - MB 8.4 (H) <3.6 NG/ML    CK-MB Index 2.6 (H) 0 - 2.5     URINALYSIS W/ RFLX MICROSCOPIC   Result Value Ref Range    Color YELLOW/STRAW      Appearance CLOUDY (A) CLEAR      Specific gravity 1.007 1.003 - 1.030      pH (UA) 6.0 5.0 - 8.0      Protein NEGATIVE  NEG mg/dL    Glucose NEGATIVE  NEG mg/dL    Ketone NEGATIVE  NEG mg/dL    Bilirubin NEGATIVE  NEG      Blood NEGATIVE  NEG      Urobilinogen 1.0 0.2 - 1.0 EU/dL    Nitrites NEGATIVE  NEG      Leukocyte Esterase NEGATIVE  NEG     LIPID PANEL   Result Value Ref Range    LIPID PROFILE          Cholesterol, total 119 <200 MG/DL    Triglyceride 118 <150 MG/DL    HDL Cholesterol 45 MG/DL    LDL, calculated 50.4 0 - 100 MG/DL    VLDL, calculated 23.6 MG/DL    CHOL/HDL Ratio 2.6 0 - 5.0     HEMOGLOBIN A1C WITH EAG   Result Value Ref Range    Hemoglobin A1c 5.5 4.2 - 6.3 %    Est. average glucose 111 mg/dL   EKG, 12 LEAD, INITIAL   Result Value Ref Range    Ventricular Rate 66 BPM    Atrial Rate 66 BPM    P-R Interval 198 ms    QRS Duration 96 ms    Q-T Interval 396 ms    QTC Calculation (Bezet) 415 ms    Calculated P Axis 43 degrees    Calculated R Axis 8 degrees    Calculated T Axis 18 degrees    Diagnosis       Normal sinus rhythm  Cannot rule out Inferior infarct , age undetermined  When compared with ECG of 13-MAY-2014 08:17,  No significant change was found          Imaging review:  CT scan head  No acute intracranial process seen    Stroke workup    MRI Brain:     MRA head/neck:     Carotids:     TTE:     Stroke labs:  HgBA1c    Lab Results   Component Value Date/Time    Hemoglobin A1c 5.5 05/02/2017 03:56 AM     LDL   Lab Results   Component Value Date/Time    LDL, calculated 50.4 05/02/2017 03:56 AM       HOME MEDS  Prior to Admission Medications   Prescriptions Last Dose Informant Patient Reported? Taking? DICLOFENAC POTASSIUM PO   Yes No   Sig: Take 75 mg by mouth as needed.    PREVNAR 13, PF, 0.5 mL syrg injection   Yes No   Sig: inject 0.5 milliliter intramuscularly   aspirin (ASPIRIN) 325 mg tablet 2017 at Unknown time  Yes Yes   Sig: Take 325 mg by mouth daily. atorvastatin (LIPITOR) 20 mg tablet 2017 at Unknown time  No Yes   Sig: take 1 tablet by mouth NIGHTLY   calcium-cholecalciferol, D3, (CALTRATE 600+D) tablet   No No   Sig: Take 1 Tab by mouth daily. chlorpheniramine-HYDROcodone (TUSSIONEX) 10-8 mg/5 mL suspension   No No   Sig: Take 5 mL by mouth every twelve (12) hours as needed for Cough or Congestion. Max Daily Amount: 10 mL. docusate sodium (COLACE) 50 mg capsule   Yes No   Sig: Take 50 mg by mouth two (2) times a day. hydrocortisone (ANUSOL-HC) 2.5 % rectal cream   No No   Sig: Insert  into rectum four (4) times daily. lidocaine (LIDODERM) 5 %   No No   Sig: Apply patch to the affected area for 12 hours a day and remove for 12 hours a day. lisinopril (PRINIVIL, ZESTRIL) 30 mg tablet 2017 at Unknown time  No Yes   Sig: TAKE 1 TABLET DAILY   meloxicam (MOBIC) 7.5 mg tablet   No No   Sig: Take 1 Tab by mouth two (2) times daily as needed for Pain.   mometasone (NASONEX) 50 mcg/actuation nasal spray 2017 at Unknown time  No Yes   Si Sprays by Both Nostrils route daily. omeprazole (PRILOSEC) 20 mg capsule   No Yes   Sig: TAKE 1 CAPSULE DAILY   tamsulosin (FLOMAX) 0.4 mg capsule   No Yes   Sig: Take 1 Cap by mouth daily.       Facility-Administered Medications: None       CURRENT MEDS  Current Facility-Administered Medications   Medication Dose Route Frequency    aspirin (ASPIRIN) tablet 325 mg  325 mg Oral DAILY    atorvastatin (LIPITOR) tablet 20 mg  20 mg Oral QHS    lisinopril (PRINIVIL, ZESTRIL) tablet 30 mg  30 mg Oral DAILY    tamsulosin (FLOMAX) capsule 0.4 mg  0.4 mg Oral DAILY    sodium chloride (NS) flush 5-10 mL  5-10 mL IntraVENous Q8H    famotidine (PEPCID) tablet 20 mg  20 mg Oral Q12H    enoxaparin (LOVENOX) injection 40 mg  40 mg SubCUTAneous Q24H       IMPRESSION:  Oneda Basket Ashley Patel is a 66 y.o. male who presents with visual disturbance that lasted for a few seconds. Patient back to baseline. No focal neurological deficits. Suspect TIA. RECOMMENDATIONS:  - MRI brain w/o C + MRA headpending  - MRA Neckpending  - TTEpending  - Telemetry  - Permissive HTN (SBP<220/<120) for 24 hrs from symptom onset and then BP goal is less than 140/90  - Stroke labs (HgbA1c, TSH, lipid panel)normal  - Continue  mg daily  -Continue atorvastatin 20 mg daily  - ST/OT/PT eval  - Discussed healthy lifestyle changes, and modifiable risk factors for stroke with patient and family    If the above workup is negative, the patient can be discharged from neurological standpoint and will need an outpatient follow-up.   Call with questions      Devyn Haro MD  Diplomate, American Board of Psychiatry & Neurology (Neurology)  [employer] Board of Psychiatry & Neurology (Clinical Neurophysiology)

## 2017-05-02 NOTE — ROUTINE PROCESS
TRANSFER - IN REPORT:    Verbal report received from Jennie Lee (name) on Anmol Hanson.  being received from ED (unit) for routine progression of care      Report consisted of patients Situation, Background, Assessment and   Recommendations(SBAR). Information from the following report(s) SBAR, Kardex, ED Summary, Procedure Summary, Intake/Output, MAR and Recent Results was reviewed with the receiving nurse. Opportunity for questions and clarification was provided. Assessment completed upon patients arrival to unit and care assumed.

## 2017-05-02 NOTE — PROGRESS NOTES
Pt is a 67 y/o  male under observation for TIA. CM provided pt with [facility] and Medicare verbal and written notification of observation status. Pt lives with spouse in a two story residence with 18 steps to the bedroom and four steps to the entrance of the residence. Pt is independent to include driving. Pt had previous HH with [facility] [address]. Pt has no previous SNF provider. Pt has access to BSC, rolling walker and cane. Pt prefers to use [employer]. CM met with pt to complete initial assessment. Pt is alert and oriented to person, place, time and situation. There were no additional needs. Care Management Interventions  PCP Verified by CM: Yes (Dr. Jhoyn Wilson )  Mode of Transport at Discharge:  Other (see comment) (private vehicle )  Transition of Care Consult (CM Consult): Discharge Planning (CM to assist as needed )  Discharge Durable Medical Equipment: No (pt has BSC, rolling walker, and cane )  Health Maintenance Reviewed: Yes  Physical Therapy Consult: Yes  Occupational Therapy Consult: Yes  Speech Therapy Consult: Yes  Current Support Network: Lives with Spouse  Confirm Follow Up Transport: Self  Discharge Location  Discharge Placement: Home    SABRINA Ivey, [address] [facility]   [phone]

## 2017-05-02 NOTE — DISCHARGE INSTRUCTIONS
HOSPITALIST DISCHARGE INSTRUCTIONS    NAME: Fatou Mckeon :  1945   MRN:  [id number]     Date/Time:  2017 1:31 PM    ADMIT DATE: 2017   DISCHARGE DATE: 2017     Attending Physician: Greg Ariza MD    DISCHARGE DIAGNOSIS:  Transient ischemic attack  Hyperlipidemia   Hypertension  BPH  GERD    MEDICATIONS:  See above    · It is important that you take the medication exactly as they are prescribed. · Keep your medication in the bottles provided by the pharmacist and keep a list of the medication names, dosages, and times to be taken in your wallet. · Do not take other medications without consulting your doctor. Pain Management: per above medications    What to do at [address]:  Resume previous diet    Recommended activity: Activity as tolerated    If you have questions regarding the hospital related prescriptions or hospital related issues please call [facility] at . You can always direct your questions to your primary care doctor if you are unable to reach your hospital physician; your PCP works as an extension of your hospital doctor just like your hospital doctor is an extension of your PCP for your time at [facility]. If you experience any of the following symptoms then please call your primary care physician or return to the emergency room if you cannot get hold of your doctor:  Fever, chills, nausea, vomiting, diarrhea, change in mentation, falling, bleeding, shortness of breath    Additional Instructions:      Bring these papers with you to your follow up appointments. The papers will help your doctors be sure to continue the care plan from the hospital.              Information obtained by :  I understand that if any problems occur once I am at home I am to contact my physician. I understand and acknowledge receipt of the instructions indicated above. [de-identified] or R.N.'s Signature                                                                  Date/Time                                                                                                                                              Patient or Representative Signature                                                          Date/Time       Transient Ischemic Attack: Care Instructions  Your Care Instructions    A transient ischemic attack (TIA) is when blood flow to a part of your brain is blocked for a short time. A TIA is like a stroke but usually lasts only a few minutes. A TIA does not cause lasting brain damage. Any vision problems, slurred speech, or other symptoms usually go away in 10 to 20 minutes. But they may last for up to 24 hours. TIAs are often warning signs of a stroke. Some people who have a TIA may have a stroke in the future. A stroke can cause symptoms like those of a TIA. But a stroke causes lasting damage to your brain. You can take steps to help prevent a stroke. One thing you can do is get early treatment. If you have other new symptoms, or if your symptoms do not get better, go back to the emergency room or call your doctor right away. Getting treatment right away may prevent long-term brain damage caused by a stroke. The doctor has checked you carefully, but problems can develop later. If you notice any problems or new symptoms, get medical treatment right away. Follow-up care is a key part of your treatment and safety. Be sure to make and go to all appointments, and call your doctor if you are having problems. It's also a good idea to know your test results and keep a list of the medicines you take. How can you care for yourself at home? Medicines  · Be safe with medicines. Take your medicines exactly as prescribed.  Call your doctor if you think you are having a problem with your medicine. · If you take a blood thinner, such as aspirin, be sure you get instructions about how to take your medicine safely. Blood thinners can cause serious bleeding problems. · Call your doctor if you are not able to take your medicines for any reason. · Do not take any over-the-counter medicines or herbal products without talking to your doctor first.  · If you take birth control pills or hormone therapy, talk to your doctor. Ask if these treatments are right for you. Lifestyle changes  · Do not smoke. If you need help quitting, talk to your doctor about stop-smoking programs and medicines. · Be active. If your doctor recommends it, get more exercise. Walking is a good choice. Bit by bit, increase the amount you walk every day. Try for at least 30 minutes on most days of the week. You also may want to swim, bike, or do other activities. · Eat heart-healthy foods. These include fruits, vegetables, high-fiber foods, fish, and foods that are low in sodium, saturated fat, and trans fat. · Stay at a healthy weight. Lose weight if you need to. · Limit alcohol to 2 drinks a day for men and 1 drink a day for women. Staying healthy  · Manage other health problems such as diabetes, high blood pressure, and high cholesterol. · Get the flu vaccine every year. When should you call for help? Call 911 anytime you think you may need emergency care. For example, call if:  · You have new or worse symptoms of a stroke. These may include:  ¨ Sudden numbness, tingling, weakness, or loss of movement in your face, arm, or leg, especially on only one side of your body. ¨ Sudden vision changes. ¨ Sudden trouble speaking. ¨ Sudden confusion or trouble understanding simple statements. ¨ Sudden problems with walking or balance. ¨ A sudden, severe headache that is different from past headaches. Call 911 even if these symptoms go away in a few minutes. · You feel like you are having another TIA.   Watch closely for changes in your health, and be sure to contact your doctor if you have any problems. Where can you learn more? Go to http://nico-millicent.info/. Enter ([id number] in the search box to learn more about \"Transient Ischemic Attack: Care Instructions. \"  Current as of: June 4, 2016  Content Version: 11.2  © 3451-1874 [employer]. Care instructions adapted under license by [employer] (which disclaims liability or warranty for this information). If you have questions about a medical condition or this instruction, always ask your healthcare professional. [place] [address] any warranty or liability for your use of this information.

## 2017-05-02 NOTE — PROGRESS NOTES
Problem: TIA: First 24 hours  Goal: *Hemodynamically stable  Outcome: Progressing Towards Goal  Vital signs stable. NIH 0.  Goal: *Progressive mobility and function  Outcome: Progressing Towards Goal  Up ad ryan. Gait steady.   Goal: *Absence of aspiration  Outcome: Progressing Towards Goal  STAND swallow evaluation passed

## 2017-05-02 NOTE — PROGRESS NOTES
Speech Therapy    Chart reviewed and spoke with RN. Patient has a regular diet ordered. No speech, swallow, or communication issues per RN. Will complete order. Rupali Womack M.S., CCC-SLP

## 2017-05-02 NOTE — ED NOTES
TRANSFER - OUT REPORT:    Verbal report given to Antoine Brandt (name) on Matthew Barth.  being transferred to [facility] (unit) for routine progression of care       Report consisted of patients Situation, Background, Assessment and   Recommendations(SBAR). Information from the following report(s) SBAR and ED Summary was reviewed with the receiving nurse. Lines:   Peripheral IV 05/01/17 Right Antecubital (Active)   Site Assessment Clean, dry, & intact 5/1/2017  5:18 PM   Phlebitis Assessment 0 5/1/2017  5:18 PM   Infiltration Assessment 0 5/1/2017  5:18 PM   Dressing Status Clean, dry, & intact 5/1/2017  5:18 PM        Opportunity for questions and clarification was provided.       Patient transported with:   [facility]
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
## 2017-05-02 NOTE — H&P
Hospitalist Admission Note    NAME: Karley Matos. :  1945   MRN:  [id number]     Date/Time:  2017 8:56 PM    Patient PCP: Kaleigh Agarwal MD  ________________________________________________________________________    My assessment of this patient's clinical condition and my plan of care is as follows. Assessment / Plan:    TIA? (brief visual disturbance and HA)  - CT head negative  - ordered MRI brain + MRA head and neck to assess his occipital lobe and posterior circulation better  - Echocardiogram to assess LVEF and valves  - Telemetry to screen for atrial fibrillation  - Permissive HTN (SBP<220/<120) for 24 hrs from symptom onset and then adjust medications for BP goal is less than 140/90  - HgBA1c and LDL . Continue his statin -adjust dose prn   - Continue ASA 325mg daily for now. His symptoms lasted only a couple of seconds and associated with some HA. Could change to Plavix if neurology feels this is more TIA than complicated migraine.   - ST/OT/PT eval and treat  - Neurology consult pending    HLD  HTN  Mitral regurgitation (mild on last Echo)  -continue asa, ACEi, statin  -follows with RCA. BPH  -continue flomax    GERD  -continue with H2B. Hold PPI      Code Status:  Full  Surrogate Decision Maker:  wife    DVT Prophylaxis:  lovenox  GI Prophylaxis: not indicated          Subjective:   CHIEF COMPLAINT:   Concern that he is having a stroke    HISTORY OF PRESENT ILLNESS:       Kerline Kim is a 66 y.o.  male with a history of HTN, mitral regurgitation and prior TIA who presents with transient HA and vision disturbance. Patient was driving with his wife when he suddenly experience severe head pressure and his vision appears like all the \"colors are scrambled\". This all lasted 1-2 seconds and he did not need to pull over and kept driving until he got home.    He did not have any dizziness, confusion, nausea, vomiting, CP, SOB, focal weakness or speech disturbance. They called their insurance company and spoke with a triage nurse who recommended that they go to the ER for evaluation. CT head negative, UA, CBC and CMP all wnl. He reports having had a similar episode 4 years ago but his visual change then, was more \"black and white\". Teleneurology recommended admission for work up and neuro consult     We were asked to admit for work up and evaluation of the above problems.      Past Medical History:   Diagnosis Date    Anemia 9/10/2014    Back pain 4/1/2010    CAD (coronary artery disease)     Calf pain 3/11/2015    Chronic kidney disease     cyst on kidney    Chronic left shoulder pain 10/24/2016    Constipation 6/11/2014    Decreased vision 6/13/2011    Diverticulitis 3/11/2015    Enlarged LA (left atrium)     Fall at home 3/17/2016    GERD (gastroesophageal reflux disease)     Hemorrhoids 9/10/2014    HTN (hypertension) 8/24/2010    Hx-TIA (transient ischemic attack)     Hypercholesterolemia 4/1/2010    Hypertension     Irregular heart beat 9/23/2015    Kidney disease     Mitral regurgitation     Need for varicella vaccine 9/10/2014    Other ill-defined conditions     heart murmur    Rash 8/24/2010    Rib pain on right side 3/17/2016    Right calf pain 3/11/2015    Risk for falls 4/4/2014    S/P knee replacement 6/11/2014    Screening for depression 4/4/2014    Tinea versicolor 9/10/2014    Urinary frequency 8/24/2010        Past Surgical History:   Procedure Laterality Date    CARDIAC SURG PROCEDURE UNLIST      heart cath    HX HEENT      nasal septum repair    HX HEENT      HX HERNIA REPAIR      HX KNEE REPLACEMENT Left     HX ORTHOPAEDIC      left knee surgery    HX ORTHOPAEDIC      HX TONSILLECTOMY  age 5    HX UROLOGICAL      ID COLONOSCOPY FLX DX W/COLLJ SPEC WHEN PFRMD  4/18/2011            Social History   Substance Use Topics    Smoking status: Never Smoker    Smokeless tobacco: Never Used   [facility] Alcohol use 0.0 oz/week     0 Standard drinks or equivalent per week      Comment: 12 beers, half a fifth a month        Family History   Problem Relation Age of Onset   [place] COPD Mother     Heart Attack Mother     Hypertension Mother     Heart Disease Mother     COPD Father     Heart Attack Father     Hypertension Father     Heart Disease Father     Cancer Father     Cancer Maternal Grandmother      No Known Allergies     Prior to Admission medications    Medication Sig Start Date End Date Taking? Authorizing Provider   atorvastatin (LIPITOR) 20 mg tablet take 1 tablet by mouth NIGHTLY 4/24/17  Yes Ольга Malin MD   tamsulosin (FLOMAX) 0.4 mg capsule Take 1 Cap by mouth daily. 4/24/17  Yes Ольга Malin MD   omeprazole (PRILOSEC) 20 mg capsule TAKE 1 CAPSULE DAILY 4/12/17  Yes Ольга Malin MD   lisinopril (PRINIVIL, ZESTRIL) 30 mg tablet TAKE 1 TABLET DAILY 3/27/17  Yes Ольга Malin MD   aspirin (ASPIRIN) 325 mg tablet Take 325 mg by mouth daily. Yes Historical Provider   PREVNAR 13, PF, 0.5 mL syrg injection inject 0.5 milliliter intramuscularly 11/21/16   Historical Provider   chlorpheniramine-HYDROcodone (TUSSIONEX) 10-8 mg/5 mL suspension Take 5 mL by mouth every twelve (12) hours as needed for Cough or Congestion. Max Daily Amount: 10 mL. 2/21/17   Ольга Malin MD   hydrocortisone (ANUSOL-HC) 2.5 % rectal cream Insert  into rectum four (4) times daily. 4/27/16   Ольга Malin MD   calcium-cholecalciferol, D3, (CALTRATE 600+D) tablet Take 1 Tab by mouth daily. 3/17/16   Ольга Malin MD   lidocaine (LIDODERM) 5 % Apply patch to the affected area for 12 hours a day and remove for 12 hours a day. 3/17/16   Ольга Malin MD   meloxicam (MOBIC) 7.5 mg tablet Take 1 Tab by mouth two (2) times daily as needed for Pain. 3/17/16   Ольга Malin MD   docusate sodium (COLACE) 50 mg capsule Take 50 mg by mouth two (2) times a day.     Historical Provider   DICLOFENAC POTASSIUM PO Take 75 mg by mouth as needed. Historical Provider   mometasone (NASONEX) 50 mcg/actuation nasal spray 2 Sprays by Both Nostrils route daily. 4/4/14   Laine Belcher MD       REVIEW OF SYSTEMS:       Total of 12 systems reviewed as follows:       POSITIVE= underlined text  Negative = text not underlined  General:  fever, chills, sweats, generalized weakness, weight loss/gain,      loss of appetite   Eyes:    blurred vision, eye pain, visual disturbance, double vision  ENT:    rhinorrhea, pharyngitis   Respiratory:   cough, sputum production, SOB, BEAR, wheezing, pleuritic pain   Cardiology:   chest pain, palpitations, orthopnea, PND, edema, syncope   Gastrointestinal:  abdominal pain , N/V, diarrhea, dysphagia, constipation, bleeding   Genitourinary:  frequency, urgency, dysuria, hematuria, incontinence   Muskuloskeletal :  arthralgia, myalgia, back pain  Hematology:  easy bruising, nose or gum bleeding, lymphadenopathy   Dermatological: rash, ulceration, pruritis, color change / jaundice  Endocrine:   hot flashes or polydipsia   Neurological:  headache, dizziness, confusion, focal weakness, paresthesia,     Speech difficulties, memory loss, gait difficulty  Psychological: Feelings of anxiety, depression, agitation    Objective:   VITALS:    Visit Vitals    /85 (BP Patient Position: Standing)    Pulse (!) 59    Temp 98.3 °F (36.8 °C)    Resp 15    Ht 6' 2.5\" (1.892 m)    Wt 100.7 kg (222 lb 0.1 oz)    SpO2 99%    BMI 28.12 kg/m2       PHYSICAL EXAM:    General:    Alert, cooperative, no distress, appears stated age. HEENT: Atraumatic, anicteric sclerae, pink conjunctivae     No oral ulcers, mucosa moist, throat clear, dentition fair  Neck:  Supple, symmetrical,  thyroid: non tender  Lungs:   Clear to auscultation bilaterally. No Wheezing or Rhonchi. No rales. Chest wall:  No tenderness  No Accessory muscle use. Heart:   Regular  rhythm,  No  murmur   No edema  Abdomen:   Soft, non-tender. Not distended.   Bowel sounds normal  Extremities: No cyanosis. No clubbing,      Skin turgor normal, Capillary refill normal, Radial dial pulse 2+  Skin:     Not pale. Not Jaundiced  No rashes   Psych:  Good insight. Not depressed. Not anxious or agitated. Neurologic: EOMs intact. No facial asymmetry. No aphasia or slurred speech. Symmetrical strength, Sensation grossly intact. Alert and oriented X 4.     _______________________________________________________________________  Care Plan discussed with:    Comments   Patient x    Family      RN     Care Manager                    Consultant:      _______________________________________________________________________  Expected  Disposition:   Home with Family x   HH/PT/OT/RN    SNF/LTC    ISAEL    ________________________________________________________________________  TOTAL TIME:  48  Minutes    Critical Care Provided     Minutes non procedure based      Comments    x Reviewed previous records   >50% of visit spent in counseling and coordination of care  Discussion with patient and/or family and questions answered       Given the patient's current clinical presentation, I have a high level of concern for decompensation if discharged from the ED. Complex decision making was performed which includes reviewing the patient's available past medical records, laboratory results, and Xray films. I have also directly communicated my plan and discussed this case with the involved ED physician.     ____________________________________________________________________  Jamia Arriola MD    Procedures: see electronic medical records for all procedures/Xrays and details which were not copied into this note but were reviewed prior to creation of Plan.     LAB DATA REVIEWED:    Recent Results (from the past 24 hour(s))   EKG, 12 LEAD, INITIAL    Collection Time: 05/01/17  3:14 PM   Result Value Ref Range    Ventricular Rate 66 BPM    Atrial Rate 66 BPM    P-R Interval 198 ms    QRS Duration 96 ms Q-T Interval 396 ms    QTC Calculation (Bezet) 415 ms    Calculated P Axis 43 degrees    Calculated R Axis 8 degrees    Calculated T Axis 18 degrees    Diagnosis       Normal sinus rhythm  Cannot rule out Inferior infarct , age undetermined  When compared with ECG of 13-MAY-2014 08:17,  No significant change was found     CBC WITH AUTOMATED DIFF    Collection Time: 05/01/17  3:19 PM   Result Value Ref Range    WBC 6.5 4.1 - 11.1 K/uL    RBC 3.87 (L) 4.10 - 5.70 M/uL    HGB 12.1 12.1 - 17.0 g/dL    HCT 35.5 (L) 36.6 - 50.3 %    MCV 91.7 80.0 - 99.0 FL    MCH 31.3 26.0 - 34.0 PG    MCHC 34.1 30.0 - 36.5 g/dL    RDW 13.2 11.5 - 14.5 %    PLATELET 648 824 - 795 K/uL    NEUTROPHILS 60 32 - 75 %    LYMPHOCYTES 27 12 - 49 %    MONOCYTES 9 5 - 13 %    EOSINOPHILS 3 0 - 7 %    BASOPHILS 1 0 - 1 %    ABS. NEUTROPHILS 4.0 1.8 - 8.0 K/UL    ABS. LYMPHOCYTES 1.7 0.8 - 3.5 K/UL    ABS. MONOCYTES 0.6 0.0 - 1.0 K/UL    ABS. EOSINOPHILS 0.2 0.0 - 0.4 K/UL    ABS. BASOPHILS 0.0 0.0 - 0.1 K/UL   METABOLIC PANEL, COMPREHENSIVE    Collection Time: 05/01/17  3:19 PM   Result Value Ref Range    Sodium 140 136 - 145 mmol/L    Potassium 3.9 3.5 - 5.1 mmol/L    Chloride 106 97 - 108 mmol/L    CO2 28 21 - 32 mmol/L    Anion gap 6 5 - 15 mmol/L    Glucose 117 (H) 65 - 100 mg/dL    BUN 14 6 - 20 MG/DL    Creatinine 1.18 0.70 - 1.30 MG/DL    BUN/Creatinine ratio 12 12 - 20      GFR est AA >60 >60 ml/min/1.73m2    GFR est non-AA >60 >60 ml/min/1.73m2    Calcium 9.0 8.5 - 10.1 MG/DL    Bilirubin, total 0.9 0.2 - 1.0 MG/DL    ALT (SGPT) 29 12 - 78 U/L    AST (SGOT) 20 15 - 37 U/L    Alk.  phosphatase 93 45 - 117 U/L    Protein, total 7.2 6.4 - 8.2 g/dL    Albumin 3.8 3.5 - 5.0 g/dL    Globulin 3.4 2.0 - 4.0 g/dL    A-G Ratio 1.1 1.1 - 2.2     CK W/ REFLX CKMB    Collection Time: 05/01/17  3:19 PM   Result Value Ref Range     (H) 39 - 308 U/L   TROPONIN I    Collection Time: 05/01/17  3:19 PM   Result Value Ref Range    Troponin-I, Qt. <0.04 <0.05 ng/mL   CK-MB,QUANT.     Collection Time: 05/01/17  3:19 PM   Result Value Ref Range    CK - MB 8.4 (H) <3.6 NG/ML    CK-MB Index 2.6 (H) 0 - 2.5     URINALYSIS W/ RFLX MICROSCOPIC    Collection Time: 05/01/17  5:02 PM   Result Value Ref Range    Color YELLOW/STRAW      Appearance CLOUDY (A) CLEAR      Specific gravity 1.007 1.003 - 1.030      pH (UA) 6.0 5.0 - 8.0      Protein NEGATIVE  NEG mg/dL    Glucose NEGATIVE  NEG mg/dL    Ketone NEGATIVE  NEG mg/dL    Bilirubin NEGATIVE  NEG      Blood NEGATIVE  NEG      Urobilinogen 1.0 0.2 - 1.0 EU/dL    Nitrites NEGATIVE  NEG      Leukocyte Esterase NEGATIVE  NEG

## 2017-05-02 NOTE — ROUTINE PROCESS
* No surgery found *  Bedside and Verbal shift change report given to Meghan Plasencia (oncoming nurse) by Kimberly Reed (offgoing nurse). Report included the following information SBAR, Kardex, ED Summary, Procedure Summary, Intake/Output, MAR and Recent Results.     Zone Phone:   NA      Significant changes during shift:  None        Patient Information    Karley Fine  66 y.o.  5/1/2017  4:16 PM by Shirley Latif MD. Karley Matos. was admitted from Home    Problem List    Patient Active Problem List    Diagnosis Date Noted    Acute nasopharyngitis 02/21/2017    Anginal chest pain at rest [facility]) 10/24/2016    Chronic left shoulder pain 10/24/2016    Fall at home 03/17/2016    Rib pain on right side 03/17/2016    Enlarged LA (left atrium)     Mitral regurgitation     Irregular heart beat 09/23/2015    Diverticulitis 03/11/2015    Calf pain 03/11/2015    Right calf pain 03/11/2015    Need for varicella vaccine 09/10/2014    Anemia 09/10/2014    Tinea versicolor 09/10/2014    Hemorrhoids 09/10/2014    S/P knee replacement 06/11/2014    Constipation 06/11/2014    Risk for falls 04/04/2014    Screening for depression 04/04/2014    Osteopenia 04/04/2014    ED (erectile dysfunction) 07/16/2012    Otitis externa of right ear 04/11/2012    Acute bronchitis 02/03/2012    Knee pain, left 11/28/2011    Tick bite 06/13/2011    Decreased vision 06/13/2011    Foot pain, right 01/24/2011    Acute sinusitis 01/24/2011    Corn 01/24/2011    HTN (hypertension) 08/24/2010    Rash 08/24/2010    Urinary frequency 08/24/2010    Back pain 04/01/2010    Hypercholesterolemia 04/01/2010     Past Medical History:   Diagnosis Date    Anemia 9/10/2014    Back pain 4/1/2010    CAD (coronary artery disease)     Calf pain 3/11/2015    Chronic kidney disease     cyst on kidney    Chronic left shoulder pain 10/24/2016    Constipation 6/11/2014    Decreased vision 6/13/2011    Diverticulitis 3/11/2015    Enlarged LA (left atrium)     Fall at home 3/17/2016    GERD (gastroesophageal reflux disease)     Hemorrhoids 9/10/2014    HTN (hypertension) 8/24/2010    Hx-TIA (transient ischemic attack)     Hypercholesterolemia 4/1/2010    Hypertension     Irregular heart beat 9/23/2015    Kidney disease     Mitral regurgitation     Need for varicella vaccine 9/10/2014    Other ill-defined conditions     heart murmur    Rash 8/24/2010    Rib pain on right side 3/17/2016    Right calf pain 3/11/2015    Risk for falls 4/4/2014    S/P knee replacement 6/11/2014    Screening for depression 4/4/2014    Tinea versicolor 9/10/2014    Urinary frequency 8/24/2010         Core Measures:    CVA: YES YES    Post Op Surgical (If Applicable):     NA    Activity Status:    OOB to Chair Yes  Ambulated this shift Yes   Bed Rest No    Supplemental O2: (If Applicable)    NONE      LINES AND DRAINS:    NONE    DVT prophylaxis:    DVT prophylaxis Med- Yes  DVT prophylaxis SCD or RAFA- No     Wounds: (If Applicable)    NONE    Patient Safety:    Falls Score Total Score: 1  Safety Level_______  Bed Alarm On? No  Sitter?  No    Plan for upcoming shift: MRI, ECHO        Discharge Plan: Yes home    Active Consults:  IP CONSULT TO NEUROLOGY  IP CONSULT TO NEUROLOGY

## 2017-05-02 NOTE — ROUTINE PROCESS
Stroke Education provided to patient and the following topics were discussed    1. Patients personal risk factors for stroke are hypertension, family history, hyperlipidemia, hypercoagulable state, obesity, heart valve disease and prior stroke    2. Warning signs of Stroke:        * Sudden numbness or weakness of the face, arm or leg, especially on one side of          The body            * Sudden confusion, trouble speaking or understanding        * Sudden trouble seeing in one or both eyes        * Sudden trouble walking, dizziness, loss of balance or coordination        * Sudden severe headache with no known cause      3. Importance of activation Emergency Medical Services ( 9-1-1 ) immediately if experience any warning signs of stroke. 4. Be sure and schedule a follow-up appointment with your primary care doctor or any specialists as instructed. 5. You must take medicine every day to treat your risk factors for stroke. Be sure to take your medicines exactly as your doctor tells you: no more, no less. Know what your medicines are for , what they do. Anti-thrombotics /anticoagulants can help prevent strokes. You are taking the following medicine(s)  ASPIRIN     6. Smoking and second-hand smoke greatly increase your risk of stroke, cardiovascular disease and death. Smoking history never    7. Information provided was [facility] Stroke Education [facility] [place] or Verbal Education    8. Documentation of teaching completed in Patient Education Activity and on Care Plan with teaching response noted?   yes

## 2017-06-26 RX ORDER — LISINOPRIL 30 MG/1
TABLET ORAL
Qty: 90 TAB | Refills: 1 | Status: SHIPPED | OUTPATIENT
Start: 2017-06-26 | End: 2017-07-31 | Stop reason: SDUPTHER

## 2017-07-27 RX ORDER — TAMSULOSIN HYDROCHLORIDE 0.4 MG/1
CAPSULE ORAL
Qty: 30 CAP | Refills: 2 | Status: SHIPPED | OUTPATIENT
Start: 2017-07-27 | End: 2017-11-14 | Stop reason: SDUPTHER

## 2017-08-02 RX ORDER — LISINOPRIL 30 MG/1
30 TABLET ORAL DAILY
Qty: 90 TAB | Refills: 1 | Status: SHIPPED | OUTPATIENT
Start: 2017-08-02 | End: 2017-11-16 | Stop reason: SDUPTHER

## 2017-08-02 RX ORDER — ATORVASTATIN CALCIUM 20 MG/1
TABLET, FILM COATED ORAL
Qty: 90 TAB | Refills: 3 | Status: SHIPPED | OUTPATIENT
Start: 2017-08-02 | End: 2017-10-03 | Stop reason: SDUPTHER

## 2017-08-02 RX ORDER — OMEPRAZOLE 20 MG/1
20 CAPSULE, DELAYED RELEASE ORAL DAILY
Qty: 90 CAP | Refills: 1 | Status: SHIPPED | OUTPATIENT
Start: 2017-08-02 | End: 2018-03-15 | Stop reason: SDUPTHER

## 2017-10-03 ENCOUNTER — OFFICE VISIT (OUTPATIENT)
Dept: FAMILY MEDICINE CLINIC | Age: 72
End: 2017-10-03

## 2017-10-03 VITALS
OXYGEN SATURATION: 99 % | TEMPERATURE: 96.2 F | DIASTOLIC BLOOD PRESSURE: 70 MMHG | HEIGHT: 74 IN | HEART RATE: 54 BPM | SYSTOLIC BLOOD PRESSURE: 111 MMHG | WEIGHT: 225 LBS | BODY MASS INDEX: 28.88 KG/M2 | RESPIRATION RATE: 14 BRPM

## 2017-10-03 DIAGNOSIS — M70.61 TROCHANTERIC BURSITIS OF RIGHT HIP: Primary | ICD-10-CM

## 2017-10-03 DIAGNOSIS — M25.551 HIP PAIN, CHRONIC, RIGHT: ICD-10-CM

## 2017-10-03 DIAGNOSIS — G89.29 HIP PAIN, CHRONIC, RIGHT: ICD-10-CM

## 2017-10-03 DIAGNOSIS — E78.00 HYPERCHOLESTEROLEMIA: ICD-10-CM

## 2017-10-03 RX ORDER — ATORVASTATIN CALCIUM 40 MG/1
TABLET, FILM COATED ORAL
Qty: 90 TAB | Refills: 3 | Status: SHIPPED | OUTPATIENT
Start: 2017-10-03 | End: 2018-09-11 | Stop reason: SDUPTHER

## 2017-10-03 NOTE — PATIENT INSTRUCTIONS
Hip Pain: Care Instructions  Your Care Instructions  Hip pain may be caused by many things, including overuse, a fall, or a twisting movement. Another cause of hip pain is arthritis. Your pain may increase when you stand up, walk, or squat. The pain may come and go or may be constant. Home treatment can help relieve hip pain, swelling, and stiffness. If your pain is ongoing, you may need more tests and treatment. Follow-up care is a key part of your treatment and safety. Be sure to make and go to all appointments, and call your doctor if you are having problems. Its also a good idea to know your test results and keep a list of the medicines you take. How can you care for yourself at home? · Take pain medicines exactly as directed. ¨ If the doctor gave you a prescription medicine for pain, take it as prescribed. ¨ If you are not taking a prescription pain medicine, ask your doctor if you can take an over-the-counter medicine. · Rest and protect your hip. Take a break from any activity, including standing or walking, that may cause pain. · Put ice or a cold pack against your hip for 10 to 20 minutes at a time. Try to do this every 1 to 2 hours for the next 3 days (when you are awake) or until the swelling goes down. Put a thin cloth between the ice and your skin. · Sleep on your healthy side with a pillow between your knees, or sleep on your back with pillows under your knees. · If there is no swelling, you can put moist heat, a heating pad, or a warm cloth on your hip. Do gentle stretching exercises to help keep your hip flexible. · Learn how to prevent falls. Have your vision and hearing checked regularly. Wear slippers or shoes with a nonskid sole. · Stay at a healthy weight. · Wear comfortable shoes. When should you call for help? Call 911 anytime you think you may need emergency care. For example, call if:  · You have sudden chest pain and shortness of breath, or you cough up blood.   · You are not able to stand or walk or bear weight. · Your buttocks, legs, or feet feel numb or tingly. · Your leg or foot is cool or pale or changes color. · You have severe pain. Call your doctor now or seek immediate medical care if:  · You have signs of infection, such as:  ¨ Increased pain, swelling, warmth, or redness in the hip area. ¨ Red streaks leading from the hip area. ¨ Pus draining from the hip area. ¨ A fever. · You have signs of a blood clot, such as:  ¨ Pain in your calf, back of the knee, thigh, or groin. ¨ Redness and swelling in your leg or groin. · You are not able to bend, straighten, or move your leg normally. · You have trouble urinating or having bowel movements. Watch closely for changes in your health, and be sure to contact your doctor if:  · You do not get better as expected. Where can you learn more? Go to http://nico-millicent.info/. Enter H485 in the search box to learn more about \"Hip Pain: Care Instructions. \"  Current as of: March 20, 2017  Content Version: 11.3  © 5381-8386 [employer]. Care instructions adapted under license by [employer] (which disclaims liability or warranty for this information). If you have questions about a medical condition or this instruction, always ask your healthcare professional. [place] [address] any warranty or liability for your use of this information.

## 2017-10-03 NOTE — PROGRESS NOTES
HISTORY OF PRESENT ILLNESS  Sammy Hyman is a 67 y.o. male. HPI     HIP  pain  The history is provided by the patient. This is a chronic problem. Episode onset:3 weeks ago, pt's MBI is at 28.89, pt  is working at this time but is cumbersome for the patient, to do any mopping, heavy pushing and lifting, , pt is currently able to walk w/out any mobility device. and the patient states that the pain is worsening specially by going up and down the steps . The problem occurs constantly. The problem has changed and worsening since onset. The pain is present in the RT Hip area. is a dull pain and  is at a severity of 6/10. the pt has the AM stiffness, but denies numbness and tingling sensations  There has been no history of extremity trauma. no incontinence of urine nor of stool, and no lower ext Weaknesses, last inj was in last may,         Current Outpatient Prescriptions   Medication Sig Dispense Refill    FLUARIX QUAD 8828-2183, PF, syrg injection inject 0.5 milliliter intramuscularly  0    omeprazole (PRILOSEC) 20 mg capsule Take 1 Cap by mouth daily. 90 Cap 1    atorvastatin (LIPITOR) 20 mg tablet take 1 tablet by mouth NIGHTLY 90 Tab 3    lisinopril (PRINIVIL, ZESTRIL) 30 mg tablet Take 1 Tab by mouth daily. 90 Tab 1    tamsulosin (FLOMAX) 0.4 mg capsule take 1 capsule by mouth once daily 30 Cap 2    PREVNAR 13, PF, 0.5 mL syrg injection inject 0.5 milliliter intramuscularly  0    calcium-cholecalciferol, D3, (CALTRATE 600+D) tablet Take 1 Tab by mouth daily. 60 Tab 11    docusate sodium (COLACE) 50 mg capsule Take 50 mg by mouth two (2) times a day.  DICLOFENAC POTASSIUM PO Take 75 mg by mouth as needed.  aspirin (ASPIRIN) 325 mg tablet Take 325 mg by mouth daily.  mometasone (NASONEX) 50 mcg/actuation nasal spray 2 Sprays by Both Nostrils route daily.  1 Container 6    chlorpheniramine-HYDROcodone (TUSSIONEX) 10-8 mg/5 mL suspension Take 5 mL by mouth every twelve (12) hours as needed for Cough or Congestion. Max Daily Amount: 10 mL. 160 mL 0    hydrocortisone (ANUSOL-HC) 2.5 % rectal cream Insert  into rectum four (4) times daily. 30 g 7    lidocaine (LIDODERM) 5 % Apply patch to the affected area for 12 hours a day and remove for 12 hours a day. 1 Each 0    meloxicam (MOBIC) 7.5 mg tablet Take 1 Tab by mouth two (2) times daily as needed for Pain.  61 Tab 3     No Known Allergies  Past Medical History:   Diagnosis Date    Anemia 9/10/2014    Back pain 4/1/2010    CAD (coronary artery disease)     Calf pain 3/11/2015    Chronic kidney disease     cyst on kidney    Chronic left shoulder pain 10/24/2016    Constipation 6/11/2014    Decreased vision 6/13/2011    Diverticulitis 3/11/2015    Enlarged LA (left atrium)     Fall at home 3/17/2016    GERD (gastroesophageal reflux disease)     Hemorrhoids 9/10/2014    HTN (hypertension) 8/24/2010    Hx-TIA (transient ischemic attack)     Hypercholesterolemia 4/1/2010    Hypertension     Irregular heart beat 9/23/2015    Kidney disease     Mitral regurgitation     Need for varicella vaccine 9/10/2014    Other ill-defined conditions(799.89)     heart murmur    Rash 8/24/2010    Rib pain on right side 3/17/2016    Right calf pain 3/11/2015    Risk for falls 4/4/2014    S/P knee replacement 6/11/2014    Screening for depression 4/4/2014    Tinea versicolor 9/10/2014    Urinary frequency 8/24/2010     Past Surgical History:   Procedure Laterality Date    CARDIAC SURG PROCEDURE UNLIST      heart cath    HX HEENT      nasal septum repair    HX HEENT      HX HERNIA REPAIR      HX KNEE REPLACEMENT Left     HX ORTHOPAEDIC      left knee surgery    HX ORTHOPAEDIC      HX TONSILLECTOMY  age 5    HX UROLOGICAL      IN COLONOSCOPY FLX DX W/COLLJ SPEC WHEN PFRMD  4/18/2011          Family History   Problem Relation Age of Onset    COPD Mother     Heart Attack Mother     Hypertension Mother     Heart Disease Mother     COPD Father     Heart Attack Father     Hypertension Father     Heart Disease Father     Cancer Father     Cancer Maternal Grandmother      Social History   Substance Use Topics    Smoking status: Never Smoker    Smokeless tobacco: Never Used    Alcohol use 0.0 oz/week     0 Standard drinks or equivalent per week      Comment: 12 beers, half a fifth a month      Lab Results  Component Value Date/Time   WBC 6.5 05/01/2017 03:19 PM   HGB 12.1 05/01/2017 03:19 PM   HCT 35.5 05/01/2017 03:19 PM   PLATELET 155 44/30/9680 03:19 PM   MCV 91.7 05/01/2017 03:19 PM     Lab Results  Component Value Date/Time   TSH 1.710 04/24/2017 11:48 AM         Review of Systems   Constitutional: Negative for chills and fever. HENT: Negative for ear pain and nosebleeds. Eyes: Negative for blurred vision, pain and discharge. Respiratory: Negative for shortness of breath. Cardiovascular: Negative for chest pain and leg swelling. Gastrointestinal: Negative for constipation, diarrhea, nausea and vomiting. Genitourinary: Negative for frequency. Musculoskeletal: Negative for joint pain. Skin: Negative for itching and rash. Neurological: Negative for headaches. Psychiatric/Behavioral: Negative for depression. The patient is not nervous/anxious. Physical Exam   Constitutional: He is oriented to person, place, and time. He appears well-developed and well-nourished. HENT:   Head: Normocephalic and atraumatic. Mouth/Throat: No oropharyngeal exudate. Eyes: Conjunctivae and EOM are normal.   Neck: Normal range of motion. Neck supple. Cardiovascular: Normal rate, regular rhythm and normal heart sounds. No murmur heard. Pulmonary/Chest: Effort normal and breath sounds normal. No respiratory distress. Abdominal: Soft. Bowel sounds are normal. He exhibits no distension. There is no rebound. Musculoskeletal: He exhibits tenderness and deformity. He exhibits no edema.    Neurological: He is alert and oriented to person, place, and time. Skin: Skin is warm. No erythema. Psychiatric: He has a normal mood and affect. His behavior is normal.   Nursing note and vitals reviewed. ASSESSMENT and PLAN  Diagnoses and all orders for this visit:    1. Hip pain, chronic, right  -     METHYLPREDNISOLONE ACETATE INJECTION 40 MG  -     OR THER/PROPH/DIAG INJECTION, SUBCUT/IM    2. Hypercholesterolemia  -     METHYLPREDNISOLONE ACETATE INJECTION 40 MG  -     OR THER/PROPH/DIAG INJECTION, SUBCUT/IM    Other orders  -     atorvastatin (LIPITOR) 40 mg tablet; take 1 tablet by mouth NIGHTLY  -     methylPREDNISolone acetate (DEPO-MEDROL) 40 mg/mL injection; 1 mL by IntraMUSCular route once for 1 dose. After consent was obtained, using sterile technique the rt hip was prepped and The joint was entered and 0ml's of  fluid was withdrawn. Steroid 40 mg and 2 ml plain Lidocaine was then injected and the needle withdrawn. The procedure was well tolerated. The patient is asked to continue to rest the joint for a few more days before resuming regular activities. It may be more painful for the first 1-2 days. Watch for fever, or increased swelling or persistent pain in the joint. Call or return to clinic prn if such symptoms occur or there is failure to improve as anticipated.

## 2017-10-03 NOTE — PROGRESS NOTES
[address]  OFFICE PROCEDURE PROGRESS NOTE        Chart reviewed for the following:   Yen Spaulding LPN, have reviewed the History, Physical and updated the Allergic reactions for [address] performed immediately prior to start of procedure:   Yen Spaulding LPN, have performed the following reviews on Leonid Jean prior to the start of the procedure:            * Patient was identified by name and date of birth   * Agreement on procedure being performed was verified  * Risks and Benefits explained to the patient  * Procedure site verified and marked as necessary  * Patient was positioned for comfort  * Consent was signed and verified     Time: 12:01pm    Date of procedure: 10/3/2017    Procedure performed by:  Ponce Cuevas MD    Provider assisted by:  Christiana mendenhall    Patient assisted by: self    How tolerated by patient: tolerated the procedure well with no complications    Post Procedural Pain Scale: 3    Comments: none    TIME ENDED:12:25pm    PROCEDURE: right hip steroid injection    PRE-PAIN: 6    POST-PAIN: 3    SPECIMEN SENT TO LAB: no    Flu depo-medrol  given. Tolerated well, no reaction noted.

## 2017-10-05 RX ORDER — METHYLPREDNISOLONE ACETATE 40 MG/ML
40 INJECTION, SUSPENSION INTRA-ARTICULAR; INTRALESIONAL; INTRAMUSCULAR; SOFT TISSUE ONCE
Qty: 1 VIAL | Refills: 0
Start: 2017-10-05 | End: 2017-10-31 | Stop reason: CLARIF

## 2017-10-31 RX ORDER — METHYLPREDNISOLONE ACETATE 40 MG/ML
40 INJECTION, SUSPENSION INTRA-ARTICULAR; INTRALESIONAL; INTRAMUSCULAR; SOFT TISSUE ONCE
Qty: 1 ML | Refills: 0
Start: 2017-10-31 | End: 2017-10-31 | Stop reason: CLARIF

## 2017-11-14 ENCOUNTER — OFFICE VISIT (OUTPATIENT)
Dept: FAMILY MEDICINE CLINIC | Age: 72
End: 2017-11-14

## 2017-11-14 ENCOUNTER — APPOINTMENT (OUTPATIENT)
Dept: GENERAL RADIOLOGY | Age: 72
End: 2017-11-14
Attending: EMERGENCY MEDICINE
Payer: MEDICARE

## 2017-11-14 ENCOUNTER — APPOINTMENT (OUTPATIENT)
Dept: CT IMAGING | Age: 72
End: 2017-11-14
Attending: EMERGENCY MEDICINE
Payer: MEDICARE

## 2017-11-14 ENCOUNTER — HOSPITAL ENCOUNTER (OUTPATIENT)
Age: 72
Setting detail: OBSERVATION
Discharge: HOME | End: 2017-11-16
Attending: EMERGENCY MEDICINE | Admitting: INTERNAL MEDICINE
Payer: MEDICARE

## 2017-11-14 VITALS
WEIGHT: 219.6 LBS | TEMPERATURE: 97 F | RESPIRATION RATE: 18 BRPM | DIASTOLIC BLOOD PRESSURE: 80 MMHG | HEART RATE: 62 BPM | HEIGHT: 74 IN | SYSTOLIC BLOOD PRESSURE: 141 MMHG | BODY MASS INDEX: 28.18 KG/M2 | OXYGEN SATURATION: 96 %

## 2017-11-14 DIAGNOSIS — G45.9 TRANSIENT CEREBRAL ISCHEMIA, UNSPECIFIED TYPE: ICD-10-CM

## 2017-11-14 DIAGNOSIS — R42 DIZZINESS: Primary | ICD-10-CM

## 2017-11-14 DIAGNOSIS — H53.8 BLURRY VISION, BILATERAL: ICD-10-CM

## 2017-11-14 DIAGNOSIS — H53.9 VISUAL DISTURBANCE: Primary | ICD-10-CM

## 2017-11-14 LAB
ALBUMIN SERPL-MCNC: 3.7 G/DL (ref 3.5–5)
ALBUMIN/GLOB SERPL: 1 {RATIO} (ref 1.1–2.2)
ALP SERPL-CCNC: 89 U/L (ref 45–117)
ALT SERPL-CCNC: 31 U/L (ref 12–78)
ANION GAP SERPL CALC-SCNC: 8 MMOL/L (ref 5–15)
APPEARANCE UR: CLEAR
AST SERPL-CCNC: 17 U/L (ref 15–37)
BACTERIA URNS QL MICRO: NEGATIVE /HPF
BASOPHILS # BLD: 0 K/UL (ref 0–0.1)
BASOPHILS NFR BLD: 0 % (ref 0–1)
BILIRUB SERPL-MCNC: 1.1 MG/DL (ref 0.2–1)
BILIRUB UR QL: NEGATIVE
BUN SERPL-MCNC: 12 MG/DL (ref 6–20)
BUN/CREAT SERPL: 10 (ref 12–20)
CALCIUM SERPL-MCNC: 9.3 MG/DL (ref 8.5–10.1)
CHLORIDE SERPL-SCNC: 106 MMOL/L (ref 97–108)
CK SERPL-CCNC: 209 U/L (ref 39–308)
CO2 SERPL-SCNC: 27 MMOL/L (ref 21–32)
COLOR UR: NORMAL
CREAT SERPL-MCNC: 1.15 MG/DL (ref 0.7–1.3)
EOSINOPHIL # BLD: 0.1 K/UL (ref 0–0.4)
EOSINOPHIL NFR BLD: 1 % (ref 0–7)
EPITH CASTS URNS QL MICRO: NORMAL /LPF
ERYTHROCYTE [DISTWIDTH] IN BLOOD BY AUTOMATED COUNT: 12.8 % (ref 11.5–14.5)
GLOBULIN SER CALC-MCNC: 3.6 G/DL (ref 2–4)
GLUCOSE SERPL-MCNC: 127 MG/DL (ref 65–100)
GLUCOSE UR STRIP.AUTO-MCNC: NEGATIVE MG/DL
HCT VFR BLD AUTO: 36.5 % (ref 36.6–50.3)
HGB BLD-MCNC: 12.2 G/DL (ref 12.1–17)
HGB UR QL STRIP: NEGATIVE
KETONES UR QL STRIP.AUTO: NEGATIVE MG/DL
LEUKOCYTE ESTERASE UR QL STRIP.AUTO: NEGATIVE
LYMPHOCYTES # BLD: 1.3 K/UL (ref 0.8–3.5)
LYMPHOCYTES NFR BLD: 20 % (ref 12–49)
MCH RBC QN AUTO: 30.6 PG (ref 26–34)
MCHC RBC AUTO-ENTMCNC: 33.4 G/DL (ref 30–36.5)
MCV RBC AUTO: 91.5 FL (ref 80–99)
MONOCYTES # BLD: 0.3 K/UL (ref 0–1)
MONOCYTES NFR BLD: 5 % (ref 5–13)
NEUTS SEG # BLD: 4.8 K/UL (ref 1.8–8)
NEUTS SEG NFR BLD: 74 % (ref 32–75)
NITRITE UR QL STRIP.AUTO: NEGATIVE
PH UR STRIP: 6 [PH] (ref 5–8)
PLATELET # BLD AUTO: 240 K/UL (ref 150–400)
POTASSIUM SERPL-SCNC: 3.9 MMOL/L (ref 3.5–5.1)
PROT SERPL-MCNC: 7.3 G/DL (ref 6.4–8.2)
PROT UR STRIP-MCNC: NEGATIVE MG/DL
RBC # BLD AUTO: 3.99 M/UL (ref 4.1–5.7)
RBC #/AREA URNS HPF: NORMAL /HPF (ref 0–5)
SODIUM SERPL-SCNC: 141 MMOL/L (ref 136–145)
SP GR UR REFRACTOMETRY: 1 (ref 1–1.03)
TROPONIN I SERPL-MCNC: <0.04 NG/ML
UA: UC IF INDICATED,UAUC: NORMAL
UROBILINOGEN UR QL STRIP.AUTO: 0.2 EU/DL (ref 0.2–1)
WBC # BLD AUTO: 6.5 K/UL (ref 4.1–11.1)
WBC URNS QL MICRO: NORMAL /HPF (ref 0–4)

## 2017-11-14 PROCEDURE — 70450 CT HEAD/BRAIN W/O DYE: CPT

## 2017-11-14 PROCEDURE — 99218 HC RM OBSERVATION: CPT

## 2017-11-14 PROCEDURE — 71020 XR CHEST PA LAT: CPT

## 2017-11-14 PROCEDURE — 84484 ASSAY OF TROPONIN QUANT: CPT | Performed by: EMERGENCY MEDICINE

## 2017-11-14 PROCEDURE — 99285 EMERGENCY DEPT VISIT HI MDM: CPT

## 2017-11-14 PROCEDURE — 36415 COLL VENOUS BLD VENIPUNCTURE: CPT | Performed by: EMERGENCY MEDICINE

## 2017-11-14 PROCEDURE — 93005 ELECTROCARDIOGRAM TRACING: CPT

## 2017-11-14 PROCEDURE — 80053 COMPREHEN METABOLIC PANEL: CPT | Performed by: EMERGENCY MEDICINE

## 2017-11-14 PROCEDURE — 81001 URINALYSIS AUTO W/SCOPE: CPT | Performed by: EMERGENCY MEDICINE

## 2017-11-14 PROCEDURE — 82550 ASSAY OF CK (CPK): CPT | Performed by: EMERGENCY MEDICINE

## 2017-11-14 PROCEDURE — 85025 COMPLETE CBC W/AUTO DIFF WBC: CPT | Performed by: EMERGENCY MEDICINE

## 2017-11-14 RX ORDER — ACETAMINOPHEN 500 MG
1000 TABLET ORAL
COMMUNITY

## 2017-11-14 RX ORDER — HYDROCODONE POLISTIREX AND CHLORPHENIRAMINE POLISTIREX 10; 8 MG/5ML; MG/5ML
5 SUSPENSION, EXTENDED RELEASE ORAL
COMMUNITY
End: 2017-11-16

## 2017-11-14 RX ORDER — TAMSULOSIN HYDROCHLORIDE 0.4 MG/1
CAPSULE ORAL
Qty: 30 CAP | Refills: 2 | Status: SHIPPED | OUTPATIENT
Start: 2017-11-14 | End: 2019-08-04 | Stop reason: SDUPTHER

## 2017-11-14 RX ORDER — MOMETASONE FUROATE 50 UG/1
2 SPRAY, METERED NASAL
COMMUNITY
End: 2017-11-16

## 2017-11-14 RX ORDER — OXYMETAZOLINE HCL 0.05 %
1 SPRAY, NON-AEROSOL (ML) NASAL
COMMUNITY
End: 2020-12-14 | Stop reason: ALTCHOICE

## 2017-11-14 NOTE — MR AVS SNAPSHOT
Visit Information Date & Time Provider Department Dept. Phone Encounter #  
 11/14/2017 11:45 AM Chino Mujica MD [facility] [phone] [id number] Your Appointments 12/26/2017  9:20 AM  
Follow Up with Lars Cowden, MD  
[address] [facility] [address] [facility]) Appt Note: f/up get medications adjusted [address] [facility] [address] [address]  
[facility] [address] [facility],[address] [id number]  
  
    
 1/4/2018  1:45 PM  
ANNUAL with Jovan Waddell MD  
[facility] [address] [facility]) Appt Note: Per Dr. Xochitl Lui [facility]  
[phone] [address] [place] [address] Upcoming Health Maintenance Date Due  
 GLAUCOMA SCREENING Q2Y 8/12/2016 Pneumococcal 65+ Low/Medium Risk (2 of 2 - PPSV23) 11/21/2017 MEDICARE YEARLY EXAM 4/25/2018 FOBT Q 1 YEAR AGE 50-75 4/26/2018 DTaP/Tdap/Td series (2 - Td) 10/1/2023 Allergies as of 11/14/2017  Review Complete On: 11/14/2017 By: Shannan Luna LPN No Known Allergies Current Immunizations  Reviewed on 4/24/2017 Name Date Influenza High Dose Vaccine PF 9/14/2016, 9/23/2015, 9/10/2014 Influenza Vaccine 9/25/2017 Influenza Vaccine (Quadrivalent) 9/27/2017 Influenza Vaccine PF 10/1/2013 Influenza Vaccine Split 9/29/2011 PREVNAR 7 11/14/2012 Pneumococcal Conjugate (PCV-13) 11/21/2016 Tdap 10/1/2013 Zoster Vaccine, Live 11/5/2013 Not reviewed this visit You Were Diagnosed With   
  
 Codes Comments Dizziness    -  Primary ICD-10-CM: G82 ICD-9-CM: 780.4 Blurry vision, bilateral     ICD-10-CM: H53.8 ICD-9-CM: 368.8 Vitals BP Pulse Temp Resp Height(growth percentile) Weight(growth percentile)  141/80 (BP 1 Location: Right arm, BP Patient Position: Sitting) 62 97 °F (36.1 °C) (Oral) 18 6' 2\" (1.88 m) 219 lb 9.6 oz (99.6 kg) SpO2 BMI Smoking Status 96% 28.19 kg/m2 Never Smoker Vitals History BMI and BSA Data Body Mass Index Body Surface Area  
 28.19 kg/m 2 2.28 m 2 Preferred Pharmacy Pharmacy Name Phone [address] [phone] Your Updated Medication List  
  
   
This list is accurate as of: 11/14/17  1:41 PM.  Always use your most recent med list.  
  
  
  
  
 aspirin 325 mg tablet Commonly known as:  ASPIRIN Take 325 mg by mouth daily. atorvastatin 40 mg tablet Commonly known as:  LIPITOR  
take 1 tablet by mouth NIGHTLY  
  
 calcium-cholecalciferol (D3) tablet Commonly known as:  CALTRATE 600+D Take 1 Tab by mouth daily. COLACE 50 mg capsule Generic drug:  docusate sodium Take 50 mg by mouth two (2) times a day. hydrocortisone 2.5 % rectal cream  
Commonly known as:  ANUSOL-HC Insert  into rectum four (4) times daily. lidocaine 5 % Commonly known as:  Mars Carranza Apply patch to the affected area for 12 hours a day and remove for 12 hours a day. lisinopril 30 mg tablet Commonly known as:  Radha [place] Take 1 Tab by mouth daily. mometasone 50 mcg/actuation nasal spray Commonly known as:  NASONEX  
2 Sprays by Both Nostrils route daily. omeprazole 20 mg capsule Commonly known as:  PRILOSEC Take 1 Cap by mouth daily. tamsulosin 0.4 mg capsule Commonly known as:  FLOMAX  
take 1 capsule by mouth once daily Prescriptions Sent to Pharmacy Refills  
 tamsulosin (FLOMAX) 0.4 mg capsule 2 Sig: take 1 capsule by mouth once daily Class: Normal  
 Pharmacy: [address], [address] [place] [address] #: [phone] Patient Instructions Dizziness: Care Instructions Your Care Instructions Dizziness is the feeling of unsteadiness or fuzziness in your head. It is different than having vertigo, which is a feeling that the room is spinning or that you are moving or falling. It is also different from lightheadedness, which is the feeling that you are about to faint. It can be hard to know what causes dizziness. Some people feel dizzy when they have migraine headaches. Sometimes bouts of flu can make you feel dizzy. Some medical conditions, such as heart problems or high blood pressure, can make you feel dizzy. Many medicines can cause dizziness, including medicines for high blood pressure, pain, or anxiety. If a medicine causes your symptoms, your doctor may recommend that you stop or change the medicine. If it is a problem with your heart, you may need medicine to help your heart work better. If there is no clear reason for your symptoms, your doctor may suggest watching and waiting for a while to see if the dizziness goes away on its own. Follow-up care is a key part of your treatment and safety. Be sure to make and go to all appointments, and call your doctor if you are having problems. It's also a good idea to know your test results and keep a list of the medicines you take. How can you care for yourself at home? · If your doctor recommends or prescribes medicine, take it exactly as directed. Call your doctor if you think you are having a problem with your medicine. · Do not drive while you feel dizzy. · Try to prevent falls. Steps you can take include: ¨ Using nonskid mats, adding grab bars near the tub, and using night-lights. ¨ Clearing your home so that walkways are free of anything you might trip on. ¨ Letting family and friends know that you have been feeling dizzy. This will help them know how to help you. When should you call for help? Call 911 anytime you think you may need emergency care. For example, call if: 
? · You passed out (lost consciousness). ? · You have dizziness along with symptoms of a heart attack. These may include: ¨ Chest pain or pressure, or a strange feeling in the chest. 
¨ Sweating. ¨ Shortness of breath. ¨ Nausea or vomiting. ¨ Pain, pressure, or a strange feeling in the back, neck, jaw, or upper belly or in one or both shoulders or arms. ¨ Lightheadedness or sudden weakness. ¨ A fast or irregular heartbeat. ? · You have symptoms of a stroke. These may include: 
¨ Sudden numbness, tingling, weakness, or loss of movement in your face, arm, or leg, especially on only one side of your body. ¨ Sudden vision changes. ¨ Sudden trouble speaking. ¨ Sudden confusion or trouble understanding simple statements. ¨ Sudden problems with walking or balance. ¨ A sudden, severe headache that is different from past headaches. ?Call your doctor now or seek immediate medical care if: 
? · You feel dizzy and have a fever, headache, or ringing in your ears. ? · You have new or increased nausea and vomiting. ? · Your dizziness does not go away or comes back. ? Watch closely for changes in your health, and be sure to contact your doctor if: 
? · You do not get better as expected. Where can you learn more? Go to http://nico-millicent.info/. Enter T758 in the search box to learn more about \"Dizziness: Care Instructions. \" Current as of: March 20, 2017 Content Version: 11.4 © 3188-4690 [employer]. Care instructions adapted under license by [employer] (which disclaims liability or warranty for this information). If you have questions about a medical condition or this instruction, always ask your healthcare professional. [address] any warranty or liability for your use of this information. Introducing [facility]! Dear Uzma Calero: Thank you for requesting a [employer] account.   Our records indicate that you already have an active [employer] account. You can access your account anytime at [email or web address] "[email or web address] Did you know that you can access your hospital and ER discharge instructions at any time in Sim[employer]? You can also review all of your test results from your hospital stay or ER visit. Additional Information If you have questions, please visit the Frequently Asked Questions section of the [employer] website at [email or web address] "[email or web address] Remember, SimPrints is NOT to be used for urgent needs. For medical emergencies, dial 911. Now available from your iPhone and Android! Please provide this summary of care documentation to your next provider. Your primary care clinician is listed as Shelley Bhakta. If you have any questions after today's visit, please call [phone].

## 2017-11-14 NOTE — ED NOTES
Received pt to exam room for c/o dizziness and just not feeling right. Pt has hx of previous TIA and PCP referred to ED.

## 2017-11-14 NOTE — PATIENT INSTRUCTIONS
Dizziness: Care Instructions  Your Care Instructions  Dizziness is the feeling of unsteadiness or fuzziness in your head. It is different than having vertigo, which is a feeling that the room is spinning or that you are moving or falling. It is also different from lightheadedness, which is the feeling that you are about to faint. It can be hard to know what causes dizziness. Some people feel dizzy when they have migraine headaches. Sometimes bouts of flu can make you feel dizzy. Some medical conditions, such as heart problems or high blood pressure, can make you feel dizzy. Many medicines can cause dizziness, including medicines for high blood pressure, pain, or anxiety. If a medicine causes your symptoms, your doctor may recommend that you stop or change the medicine. If it is a problem with your heart, you may need medicine to help your heart work better. If there is no clear reason for your symptoms, your doctor may suggest watching and waiting for a while to see if the dizziness goes away on its own. Follow-up care is a key part of your treatment and safety. Be sure to make and go to all appointments, and call your doctor if you are having problems. It's also a good idea to know your test results and keep a list of the medicines you take. How can you care for yourself at home? · If your doctor recommends or prescribes medicine, take it exactly as directed. Call your doctor if you think you are having a problem with your medicine. · Do not drive while you feel dizzy. · Try to prevent falls. Steps you can take include:  ¨ Using nonskid mats, adding grab bars near the tub, and using night-lights. ¨ Clearing your home so that walkways are free of anything you might trip on. ¨ Letting family and friends know that you have been feeling dizzy. This will help them know how to help you. When should you call for help? Call 911 anytime you think you may need emergency care.  For example, call if:  ? · You passed out (lost consciousness). ? · You have dizziness along with symptoms of a heart attack. These may include:  ¨ Chest pain or pressure, or a strange feeling in the chest.  ¨ Sweating. ¨ Shortness of breath. ¨ Nausea or vomiting. ¨ Pain, pressure, or a strange feeling in the back, neck, jaw, or upper belly or in one or both shoulders or arms. ¨ Lightheadedness or sudden weakness. ¨ A fast or irregular heartbeat. ? · You have symptoms of a stroke. These may include:  ¨ Sudden numbness, tingling, weakness, or loss of movement in your face, arm, or leg, especially on only one side of your body. ¨ Sudden vision changes. ¨ Sudden trouble speaking. ¨ Sudden confusion or trouble understanding simple statements. ¨ Sudden problems with walking or balance. ¨ A sudden, severe headache that is different from past headaches. ?Call your doctor now or seek immediate medical care if:  ? · You feel dizzy and have a fever, headache, or ringing in your ears. ? · You have new or increased nausea and vomiting. ? · Your dizziness does not go away or comes back. ? Watch closely for changes in your health, and be sure to contact your doctor if:  ? · You do not get better as expected. Where can you learn more? Go to http://nico-millicent.info/. Enter K175 in the search box to learn more about \"Dizziness: Care Instructions. \"  Current as of: March 20, 2017  Content Version: 11.4  © 6577-6346 [employer]. Care instructions adapted under license by [employer] (which disclaims liability or warranty for this information). If you have questions about a medical condition or this instruction, always ask your healthcare professional. [place] [address] any warranty or liability for your use of this information.

## 2017-11-14 NOTE — PROGRESS NOTES
HISTORY OF PRESENT ILLNESS  Vincent Malone is a 67 y.o. male. HPI   Present with dizziness trouble with his vision and an almost fall few days ago on the stairway, in addition he has a hx of abnl cardiac cath and questionable TIA  In May 2017, since then for the last 5 months, he has been taking 325 mg of daily, seen the cardiologist and was told to be ok, was told that he may need a valve surgery later on, but his echo improved from a mod MR to Mild MR, pt has no LOC recently, went to Hospital for the same reason in May of 2017, stating that the MD's ran all the tests on him and was marked for TIA and had nl work up,   Pt also stating that it has been 2 weeks since the 12 hrs of driving each way to [facility] for [employer],, felt great when came back from the trip but his trouble started to worsen on him since Sat, and stating that he has feeling dizzy as sitting on the examining table right now, pt states that he no n/ v and having no Headache, denies fever and has had no chest pain. Pt denies any ringing sensation and has no spining sensation today or not recently, stating that whatever it is, is not getting better, als had no change of meds, had some sweating and some chills, recently had lost two family members in one hr last week not depressed, but feeling nervous, and not feeling right         Current Outpatient Prescriptions   Medication Sig Dispense Refill    atorvastatin (LIPITOR) 40 mg tablet take 1 tablet by mouth NIGHTLY 90 Tab 3    omeprazole (PRILOSEC) 20 mg capsule Take 1 Cap by mouth daily. 90 Cap 1    lisinopril (PRINIVIL, ZESTRIL) 30 mg tablet Take 1 Tab by mouth daily. 90 Tab 1    tamsulosin (FLOMAX) 0.4 mg capsule take 1 capsule by mouth once daily 30 Cap 2    docusate sodium (COLACE) 50 mg capsule Take 50 mg by mouth two (2) times a day.  aspirin (ASPIRIN) 325 mg tablet Take 325 mg by mouth daily.       mometasone (NASONEX) 50 mcg/actuation nasal spray 2 Sprays by Both Nostrils route daily. 1 Container 6    chlorpheniramine-HYDROcodone (TUSSIONEX) 10-8 mg/5 mL suspension Take 5 mL by mouth every twelve (12) hours as needed for Cough or Congestion. Max Daily Amount: 10 mL. 160 mL 0    hydrocortisone (ANUSOL-HC) 2.5 % rectal cream Insert  into rectum four (4) times daily. 30 g 7    calcium-cholecalciferol, D3, (CALTRATE 600+D) tablet Take 1 Tab by mouth daily. 60 Tab 11    lidocaine (LIDODERM) 5 % Apply patch to the affected area for 12 hours a day and remove for 12 hours a day.  1 Each 0     No Known Allergies  Past Medical History:   Diagnosis Date    Anemia 9/10/2014    Back pain 4/1/2010    CAD (coronary artery disease)     Calf pain 3/11/2015    Chronic kidney disease     cyst on kidney    Chronic left shoulder pain 10/24/2016    Constipation 6/11/2014    Decreased vision 6/13/2011    Diverticulitis 3/11/2015    Enlarged LA (left atrium)     Fall at home 3/17/2016    GERD (gastroesophageal reflux disease)     Hemorrhoids 9/10/2014    Hip pain, chronic, right 10/3/2017    HTN (hypertension) 8/24/2010    Hx-TIA (transient ischemic attack)     Hypercholesterolemia 4/1/2010    Hypertension     Irregular heart beat 9/23/2015    Kidney disease     Mitral regurgitation     Need for varicella vaccine 9/10/2014    Other ill-defined conditions(799.89)     heart murmur    Rash 8/24/2010    Rib pain on right side 3/17/2016    Right calf pain 3/11/2015    Risk for falls 4/4/2014    S/P knee replacement 6/11/2014    Screening for depression 4/4/2014    Tinea versicolor 9/10/2014    Urinary frequency 8/24/2010     Past Surgical History:   Procedure Laterality Date    CARDIAC SURG PROCEDURE UNLIST      heart cath    HX HEENT      nasal septum repair    HX HEENT      HX HERNIA REPAIR      HX KNEE REPLACEMENT Left     HX ORTHOPAEDIC      left knee surgery    HX ORTHOPAEDIC      HX TONSILLECTOMY  age 5   [facility]      MI COLONOSCOPY FLX DX W/COLLJ SPEC WHEN PFRMD  4/18/2011          Family History   Problem Relation Age of Onset    COPD Mother     Heart Attack Mother     Hypertension Mother     Heart Disease Mother     COPD Father     Heart Attack Father     Hypertension Father     Heart Disease Father     Cancer Father     Cancer Maternal Grandmother      Social History   Substance Use Topics    Smoking status: Never Smoker    Smokeless tobacco: Never Used    Alcohol use 0.0 oz/week     0 Standard drinks or equivalent per week      Comment: 12 beers, half a fifth a month      Lab Results  Component Value Date/Time   WBC 6.5 05/01/2017 03:19 PM   HGB 12.1 05/01/2017 03:19 PM   HCT 35.5 05/01/2017 03:19 PM   PLATELET 597 93/88/7121 03:19 PM   MCV 91.7 05/01/2017 03:19 PM     Lab Results  Component Value Date/Time   Hemoglobin A1c 5.5 05/02/2017 03:56 AM   Hemoglobin A1c 5.9 05/13/2014 09:30 AM   Hemoglobin A1c 5.8 04/04/2014 08:36 AM   Glucose 117 05/01/2017 03:19 PM   LDL, calculated 50.4 05/02/2017 03:56 AM   Creatinine (POC) 0.8 03/29/2011 09:23 AM   Creatinine 1.18 05/01/2017 03:19 PM      Lab Results  Component Value Date/Time   Cholesterol, total 119 05/02/2017 03:56 AM   HDL Cholesterol 45 05/02/2017 03:56 AM   LDL, calculated 50.4 05/02/2017 03:56 AM   Triglyceride 118 05/02/2017 03:56 AM   CHOL/HDL Ratio 2.6 05/02/2017 03:56 AM   Lab Results  Component Value Date/Time   TSH 1.710 04/24/2017 11:48 AM               Review of Systems   Constitutional: Negative for chills and fever. HENT: Negative for ear pain and nosebleeds. Eyes: Negative for blurred vision, pain and discharge. Respiratory: Negative for shortness of breath. Cardiovascular: Negative for chest pain and leg swelling. Gastrointestinal: Negative for constipation, diarrhea, nausea and vomiting. Genitourinary: Negative for frequency. Musculoskeletal: Negative for joint pain. Skin: Negative for itching and rash. Neurological: Negative for headaches. Psychiatric/Behavioral: Negative for depression. The patient is not nervous/anxious. Physical Exam   Constitutional: He is oriented to person, place, and time. He appears well-developed and well-nourished. HENT:   Head: Normocephalic and atraumatic. Mouth/Throat: No oropharyngeal exudate. Eyes: Conjunctivae and EOM are normal.   Neck: Normal range of motion. Neck supple. Cardiovascular: Normal rate, regular rhythm and normal heart sounds. No murmur heard. Pulmonary/Chest: Effort normal and breath sounds normal. No respiratory distress. Abdominal: Soft. Bowel sounds are normal. He exhibits no distension. There is no rebound. Musculoskeletal: He exhibits no edema or tenderness. Neurological: He is alert and oriented to person, place, and time. Skin: Skin is warm. No erythema. Psychiatric: He has a normal mood and affect. His behavior is normal.   Nursing note and vitals reviewed. ASSESSMENT and PLAN  Diagnoses and all orders for this visit:    1. Dizziness    2.  Blurry vision, bilateral    Other orders  -     tamsulosin (FLOMAX) 0.4 mg capsule; take 1 capsule by mouth once daily      Pt's wife was talked to and was told that the best way to deal with her  at this time with his pmhx  Is a recommendation of an acute attention at ER for further care, pt and the wife agreed with the concern f/u prn

## 2017-11-14 NOTE — ED PROVIDER NOTES
[facility].  EMERGENCY DEPARTMENT HISTORY AND PHYSICAL EXAM       Date of Service: 11/14/2017   Patient Name: Joni Morel   YOB: 1945  Medical Record Number: [id number]    History of Presenting Illness     Chief Complaint   Patient presents with    Dizziness        History Provided By:  patient    Additional History:   Joni Morel is a 67 y.o. male with PMhx significant for TIA, HTN, HLD, CAD, MR who presents ambulatory to the ED with cc of dizziness. Patient was driving from [facility] on Saturday when he states his vision got \"yellow\" and felt like it was closing in, at which point he pulled over tot he side of the road. The episode lasted less than a minute, and resolved, but he has had a sensation of \"closing in\"/dizziness/lightheadedness since then. He denies vertigo or room spinning. Says it is worse when he goes from laying/sitting to a standing position. He has not had a syncopal event. Denies chest pain, but says he has a baseline level of SOB on exertion. Denies headache. Patient says he was here in May for a TIA workup. He takes 325mg ASA daily, which he has been compliant with. Social Hx: Denies Tobacco, Occasional beer/alcohol EtOH, Denies Illicit Drugs    There are no other complaints, changes or physical findings at this time.     Primary Care Provider: Alma Rosa Alexander MD     Past History     Past Medical History:   Past Medical History:   Diagnosis Date    Anemia 9/10/2014    Back pain 4/1/2010    Blurry vision, bilateral 11/14/2017    CAD (coronary artery disease)     Calf pain 3/11/2015    Chronic kidney disease     cyst on kidney    Chronic left shoulder pain 10/24/2016    Constipation 6/11/2014    Decreased vision 6/13/2011    Diverticulitis 3/11/2015    Dizziness 11/14/2017    Enlarged LA (left atrium)     Fall at home 3/17/2016    GERD (gastroesophageal reflux disease)     Hemorrhoids 9/10/2014    Hip pain, chronic, right 10/3/2017    HTN (hypertension) 8/24/2010    Hx-TIA (transient ischemic attack)     Hypercholesterolemia 4/1/2010    Hypertension     Irregular heart beat 9/23/2015    Kidney disease     Mitral regurgitation     Need for varicella vaccine 9/10/2014    Other ill-defined conditions(799.89)     heart murmur    Rash 8/24/2010    Rib pain on right side 3/17/2016    Right calf pain 3/11/2015    Risk for falls 4/4/2014    S/P knee replacement 6/11/2014    Screening for depression 4/4/2014    Tinea versicolor 9/10/2014    Urinary frequency 8/24/2010        Past Surgical History:   Past Surgical History:   Procedure Laterality Date    CARDIAC SURG PROCEDURE UNLIST      heart cath    HX HEENT      nasal septum repair    HX HEENT      HX HERNIA REPAIR      HX KNEE REPLACEMENT Left     HX ORTHOPAEDIC      left knee surgery    HX ORTHOPAEDIC      HX TONSILLECTOMY  age 10    HX UROLOGICAL      MN COLONOSCOPY FLX DX W/COLLJ SPEC WHEN PFRMD  4/18/2011             Family History:   Family History   Problem Relation Age of Onset    COPD Mother     Heart Attack Mother     Hypertension Mother     Heart Disease Mother     COPD Father     Heart Attack Father     Hypertension Father     Heart Disease Father     Cancer Father     Cancer Maternal Grandmother         Social History:   Social History   Substance Use Topics    Smoking status: Never Smoker    Smokeless tobacco: Never Used    Alcohol use 0.0 oz/week     0 Standard drinks or equivalent per week      Comment: 12 beers, half a fifth a month        Allergies:   No Known Allergies      Review of Systems   Review of Systems   Constitutional: Negative for diaphoresis and fever. HENT: Negative for hearing loss and sore throat. Eyes: Positive for visual disturbance. Negative for photophobia and pain. Respiratory: Positive for shortness of breath. Negative for cough. Cardiovascular: Negative for chest pain and palpitations. Gastrointestinal: Negative for abdominal pain, nausea and vomiting. Endocrine: Negative for polydipsia and polyuria. Genitourinary: Negative for dysuria and hematuria. Musculoskeletal: Negative for arthralgias and back pain. Neurological: Positive for light-headedness. Negative for dizziness, syncope and headaches. Psychiatric/Behavioral: Negative for agitation and confusion. Physical Exam  Physical Exam   Constitutional: He is oriented to person, place, and time. He appears well-developed and well-nourished. HENT:   Head: Normocephalic and atraumatic. Eyes: Conjunctivae are normal. Pupils are equal, round, and reactive to light. Right eye exhibits no discharge. Neck: Normal range of motion. Neck supple. No tracheal deviation present. Cardiovascular: Normal rate, regular rhythm and normal heart sounds. No murmur heard. Pulmonary/Chest: Effort normal and breath sounds normal. No stridor. No respiratory distress. He has no wheezes. Abdominal: Soft. Bowel sounds are normal. He exhibits no distension. There is no tenderness. Patient has small ventral hernia   Musculoskeletal: He exhibits no edema, tenderness or deformity. Neurological: He is alert and oriented to person, place, and time. No cranial nerve deficit. Coordination normal.   Patient sways slightly during rhombergs test but does not fall. Patient has intact finger to nose, and benign cranial nerve examination for nerves 2-12. Intact rapid alternating movements. Intact sensation to light and deep palpation. No visual field deficits. Skin: Skin is warm and dry. No rash noted. No erythema. Psychiatric: He has a normal mood and affect. His behavior is normal.     Medical Decision Making   I am the first provider for this patient. I reviewed the vital signs, available nursing notes, past medical history, past surgical history, family history and social history. Old Medical Records:  Old medical records, PCP office visit today.    Provider Notes:   67year old male, with history of previous TIA presenting with visual disturbance. Patient's visual symptoms have resolved, however he feels a sensation like his vision is \"closing in.\" We'll get ekg, labs, head ct to evaluate for acute abnormality. After results of tests, we'll discuss the case with [facility]urology for recommendations. If they recommend admission for MRI we'll proceed with that. DDx  TIA  CVA  Occular Migraine  Conversion Disorder from recent stress. ED Course:  3:40 PM   Initial assessment performed. The patients presenting problems have been discussed, and they are in agreement with the care plan formulated and outlined with them. I have encouraged them to ask questions as they arise throughout their visit. Progress Notes:   CONSULT NOTE:   5:41 PM  Chuck Crespo MD spoke with Dr. Pat Raymond,   Specialty: Teleneurology  Discussed pt's hx, disposition, and available diagnostic and imaging results. Reviewed care plans. Consultant  Recommends admission for CVA work up with MRI and MRA. Written by Violet Saldaña ED Scribe, as dictated by Chuck Crespo MD.    CONSULT NOTE:   5:48 PM  Mally spoke with Manfred Grayson,   Specialty: Hospitalist  Discussed pt's hx, disposition, and available diagnostic and imaging results. Reviewed care plans. Consultant agrees with plans as outlined. TIA, discussed that we talked to teleneurology and they recommend MRI /MRA.     6:00 PM Patient and family updated on plan for observation admission.      Diagnostic Study Results   Labs -  Recent Results (from the past 12 hour(s))   EKG, 12 LEAD, INITIAL    Collection Time: 11/14/17  2:30 PM   Result Value Ref Range    Ventricular Rate 66 BPM    Atrial Rate 66 BPM    P-R Interval 198 ms    QRS Duration 94 ms    Q-T Interval 398 ms    QTC Calculation (Bezet) 417 ms    Calculated P Axis 36 degrees    Calculated R Axis 23 degrees    Calculated T Axis 24 degrees    Diagnosis Normal sinus rhythm  Normal ECG      CBC WITH AUTOMATED DIFF    Collection Time: 11/14/17  3:18 PM   Result Value Ref Range    WBC 6.5 4.1 - 11.1 K/uL    RBC 3.99 (L) 4.10 - 5.70 M/uL    HGB 12.2 12.1 - 17.0 g/dL    HCT 36.5 (L) 36.6 - 50.3 %    MCV 91.5 80.0 - 99.0 FL    MCH 30.6 26.0 - 34.0 PG    MCHC 33.4 30.0 - 36.5 g/dL    RDW 12.8 11.5 - 14.5 %    PLATELET 288 335 - 178 K/uL    NEUTROPHILS 74 32 - 75 %    LYMPHOCYTES 20 12 - 49 %    MONOCYTES 5 5 - 13 %    EOSINOPHILS 1 0 - 7 %    BASOPHILS 0 0 - 1 %    ABS. NEUTROPHILS 4.8 1.8 - 8.0 K/UL    ABS. LYMPHOCYTES 1.3 0.8 - 3.5 K/UL    ABS. MONOCYTES 0.3 0.0 - 1.0 K/UL    ABS. EOSINOPHILS 0.1 0.0 - 0.4 K/UL    ABS. BASOPHILS 0.0 0.0 - 0.1 K/UL   METABOLIC PANEL, COMPREHENSIVE    Collection Time: 11/14/17  3:18 PM   Result Value Ref Range    Sodium 141 136 - 145 mmol/L    Potassium 3.9 3.5 - 5.1 mmol/L    Chloride 106 97 - 108 mmol/L    CO2 27 21 - 32 mmol/L    Anion gap 8 5 - 15 mmol/L    Glucose 127 (H) 65 - 100 mg/dL    BUN 12 6 - 20 MG/DL    Creatinine 1.15 0.70 - 1.30 MG/DL    BUN/Creatinine ratio 10 (L) 12 - 20      GFR est AA >60 >60 ml/min/1.73m2    GFR est non-AA >60 >60 ml/min/1.73m2    Calcium 9.3 8.5 - 10.1 MG/DL    Bilirubin, total 1.1 (H) 0.2 - 1.0 MG/DL    ALT (SGPT) 31 12 - 78 U/L    AST (SGOT) 17 15 - 37 U/L    Alk.  phosphatase 89 45 - 117 U/L    Protein, total 7.3 6.4 - 8.2 g/dL    Albumin 3.7 3.5 - 5.0 g/dL    Globulin 3.6 2.0 - 4.0 g/dL    A-G Ratio 1.0 (L) 1.1 - 2.2     TROPONIN I    Collection Time: 11/14/17  3:18 PM   Result Value Ref Range    Troponin-I, Qt. <0.04 <0.05 ng/mL   CK W/ REFLX CKMB    Collection Time: 11/14/17  3:18 PM   Result Value Ref Range     39 - 308 U/L   URINALYSIS W/ REFLEX CULTURE    Collection Time: 11/14/17  3:49 PM   Result Value Ref Range    Color YELLOW/STRAW      Appearance CLEAR CLEAR      Specific gravity 1.005 1.003 - 1.030      pH (UA) 6.0 5.0 - 8.0      Protein NEGATIVE  NEG mg/dL    Glucose NEGATIVE  NEG mg/dL Ketone NEGATIVE  NEG mg/dL    Bilirubin NEGATIVE  NEG      Blood NEGATIVE  NEG      Urobilinogen 0.2 0.2 - 1.0 EU/dL    Nitrites NEGATIVE  NEG      Leukocyte Esterase NEGATIVE  NEG      WBC 0-4 0 - 4 /hpf    RBC 0-5 0 - 5 /hpf    Epithelial cells FEW FEW /lpf    Bacteria NEGATIVE  NEG /hpf    UA:UC IF INDICATED CULTURE NOT INDICATED BY UA RESULT CNI         Radiologic Studies -  The following have been ordered and reviewed:  CT Results  (Last 48 hours)               11/14/17 1625  CT HEAD WO CONT Final result    Impression:  IMPRESSION:       Mild atrophy. Mild deep white matter ischemic change. No confluent infarct or hemorrhage or mass effect. Narrative:  INDICATION: History of hypertension, TIA, visual disturbance this weekend. EXAM: Multiple contiguous helically acquired images were obtained through the   brain without intravenous contrast administration. CT dose reduction was achieved through the use of a standardized protocol   tailored for this examination and automatic exposure control for dose   modulation. FINDINGS:        Comparison exam:  5/2/2017, May 1, 2017. Images through the brain reveal mild prominence of the basilar cisterns and   cortical sulci and ventricles. Mild relatively symmetric hypoattenuation is seen to be present in the deep   white matter and centrum semiovale of both cerebral hemispheres. No confluent infarct or mass effect or shift of midline structures is   identified. There is no intracranial hemorrhage identified. Bone windows demonstrate no depressed skull fracture. CXR Results  (Last 48 hours)               11/14/17 1727  XR CHEST PA LAT Final result    Impression:  IMPRESSION:       No acute process. No change. Emphysema. Narrative:  EXAM:  XR CHEST PA LAT       INDICATION:  Dizziness and malaise. Hypertension, coronary artery disease.        COMPARISON: Chest and rib views on 3/17/2016       TECHNIQUE: PA and lateral chest views       FINDINGS: Cardiac monitoring wires overlie the thorax. The cardiomediastinal and   hilar contours are within normal limits. The pulmonary vasculature is within   normal limits. Heterogeneously lucent lungs are unchanged and compatible with emphysema. No   focal airspace opacity. Pleural spaces are clear. Osteopenia is unchanged. Vital Signs-Reviewed the patient's vital signs. Patient Vitals for the past 12 hrs:   Temp Pulse Resp BP SpO2   11/14/17 2244 98.3 °F (36.8 °C) 62 18 125/88 98 %   11/14/17 2145 - 64 15 109/68 96 %   11/14/17 1715 - 66 12 118/74 100 %   11/14/17 1700 - 67 14 129/83 100 %   11/14/17 1645 - 65 9 123/73 98 %   11/14/17 1600 - 72 16 134/75 99 %   11/14/17 1549 - 72 16 115/79 98 %   11/14/17 1548 - - - 119/72 -   11/14/17 1547 - 70 13 123/71 97 %   11/14/17 1545 - 71 11 121/84 98 %   11/14/17 1427 97.4 °F (36.3 °C) 75 16 152/90 100 %     Pulse Oximetry Analysis - Normal 99% on RA     Cardiac Monitor:   Rate: 73 bpm  Rhythm: Normal Sinus Rhythm      EKG interpretation: (Preliminary) 1430  Rhythm: normal sinus rhythm; and regular . Rate (approx.): 66 bpm; Axis: normal; FL interval: normal; QRS interval: normal ; ST/T wave: normal; Other findings: normal.  Written by NOBLE Cash, as dictated by Milli Porter MD    Diagnosis   Clinical Impression:   1. Visual disturbance         Plan:  1: Admission for TIA workup, Dr. Armando Tinoco. Disposition Note:  ADMISSION NOTE:  5:49 PM  Patient is being admitted to the hospital by Dr. Armando Tinoco. The results of their tests and reasons for their admission have been discussed with them and/or available family. They convey agreement and understanding for the need to be admitted and for their admission diagnosis.    _______________________________           ------------------------------------------  Begin Attending Documentation  ------------------------------------------    Attending Attestation: I was not present during the patient's evaluation by the resident. I personally evaluated the patient including the history and physical. I have read the resident's note and agree with their history, physical and plan. HPI: Sammy Hyman is a 67 y.o. male, pmhx significant for HTN, CAD, anemia, TIAx2, mitral regurgitation,  who presents ambulatory to the ED c/o single episode of visual disturbance with associated intermittent lightheadedness x 3 days. He endorses a hx of TIA x 2, with his most recent one being in May 2017. Pt had similar sxs with the TIA in May as he is experiencing now. Pt reports that he was driving when \"things turned yellow\", his vision went \"skrewy\" and everything was in \"slow motion\". He promptly pulled over and had full resolution of his sxs. He did not experience vision loss or blurred vision at that time. He called the nursing hotline for his insurance who recommended he call the on-call physician from his PCP's practice. He said that he wasn't concerned that the pt was having a TIA but noted that the pt should have an EKG at his appointment that he had scheduled this week. Pt was seen by Dr. Denice Wallis today who sent him to the ED for further evaluation of his sxs. Pt reports that he drove to and from [place] 2 weeks ago in the span of 2 days for a . He states that he and his wife both developed colds after returning with productive cough, fever and chills. He endorses taking mucinex D and tylenol and his sxs have resolved for the most part. Pt specifically denies having another episode of visual disturbance, dizziness, unilateral weakness, numbness or tingling, LOC, speech difficulty, fever, chills, HA, abd pain, nausea, vomiting, diarrhea or recent falls.       PCP: Zacarias Valle MD      Social Hx: -tobacco, +EtOH (12 beers and half a fifth/month), -Illicit Drugs   FHx: no pertinent family hx Medication Allergies: NKDA      There are no other complaints, changes, or physical findings at this time. This note is prepared by Latasha Cooper acting as scribe for MD Chantelle Wright MD : The scribe's documentation has been prepared under my direction and personally reviewed by me in its entirety. I confirm that the note above accurately reflects all work, treatment, procedures, and medical decision making performed by me. PE:  Gen: NAD, WD/WN   Heart: nl S1, S2, no m/r/g   Lungs: CTAB, no w/r/r   Abd: soft, nttp, ND   Ext: no swelling   Skin: no rashes   Neuro: grossly intact, no focal deficits, normal speech, facial symmetry, moving all extremities equally, FNF and HTS intact BL, gait stable    Assessment: Patient presents to ED with visual disturbance three days ago. Symptoms readily resolved and have not reoccurred since then. He is neurologically intact. Differential includes TIA, CVA, ICH, hypoglycemia, ACS, seizure.   - CBC, CMP, troponin  - HCT  - Discuss with tele-neurology    Diagnosis: Visual disturbance    Disposition: Ray Otero MD.    ------------------------------------------  End Attending Documentation  ------------------------------------------

## 2017-11-14 NOTE — PROGRESS NOTES
Chief Complaint   Patient presents with    Dizziness     C/o dizzy spell on Saturday, denies headache or trouble with speech at that time. Spoke with on call provider Dr Kalpana Sexton who advised him to see PCP. 1. Have you been to the ER, urgent care clinic since your last visit? Hospitalized since your last visit? No    2. Have you seen or consulted any other health care providers outside of the [address] [place] [address] since your last visit? Include any pap smears or colon screening.  No

## 2017-11-14 NOTE — IP AVS SNAPSHOT
[address] [facility] 
[phone] Patient: Pradip Hutchinson MRN: [id number] About your hospitalization You were admitted on:  November 14, 2017 You last received care in the:  [facility] 3 NEUROSCIENCE TELEMETRY You were discharged on:  November 16, 2017 Why you were hospitalized Your primary diagnosis was:  Not on File Your diagnoses also included:  Tia (Transient Ischemic Attack) Things You Need To Do (next 8 weeks) Tuesday Nov 21, 2017 TRANSITIONAL CARE MANAGEMENT with Ami Campos MD at 12:00 PM  
Where:  [address] [place] [address] ([address] [place] [address]) Friday Dec 15, 2017 New Patient with Rina Garcia NP at 11:00 AM  
Where:  Neurology Clinic at [address]) Tuesday Dec 26, 2017 Follow Up with Kassandra Hernandez MD at  9:20 AM  
Where: [address] [place] [address] Neurology Clinic ([address] [place] [address]) Thursday Jan 04, 2018 ANNUAL with Dex Anders MD at  1:45 PM  
Where:  [facility] [address] [place] [address]) Discharge Orders None A check yasmani indicates which time of day the medication should be taken. My Medications STOP taking these medications   
 aspirin 325 mg tablet Commonly known as:  ASPIRIN  
   
  
 mometasone 50 mcg/actuation nasal spray Commonly known as:  NASONEX  
   
  
 MUCINEX PO  
   
  
 TUSSIONEX PENNKINETIC ER 10-8 mg/5 mL suspension Generic drug:  chlorpheniramine-HYDROcodone TAKE these medications as instructed Instructions Each Dose to Equal  
 Morning Noon Evening Bedtime  
 acetaminophen 500 mg tablet Commonly known as:  TYLENOL Your last dose was: Your next dose is: Take 1,000 mg by mouth every six (6) hours as needed for Pain.   
 1000 mg  
    
   
   
 atorvastatin 40 mg tablet Commonly known as:  LIPITOR Your last dose was: Your next dose is:    
   
   
 take 1 tablet by mouth NIGHTLY  
     
   
   
   
  
 clopidogrel 75 mg Tab Commonly known as:  PLAVIX Your last dose was: Your next dose is: Take 1 Tab by mouth daily for 60 days. 75 mg  
    
   
   
   
  
 COLACE 50 mg capsule Generic drug:  docusate sodium Your last dose was: Your next dose is: Take 50 mg by mouth nightly. 50 mg  
    
   
   
   
  
 lisinopril 30 mg tablet Commonly known as:  Carina Loss Your last dose was: Your next dose is: Take 1 Tab by mouth daily. 30 mg  
    
   
   
   
  
 omeprazole 20 mg capsule Commonly known as:  PRILOSEC Your last dose was: Your next dose is: Take 1 Cap by mouth daily. 20 mg  
    
   
   
   
  
 tamsulosin 0.4 mg capsule Commonly known as:  FLOMAX Your last dose was: Your next dose is:    
   
   
 take 1 capsule by mouth once daily VICKS SINEX 12-HOUR 0.05 % nasal spray Generic drug:  oxymetazoline Your last dose was: Your next dose is:    
   
   
 1 Spray by Both Nostrils route two (2) times daily as needed for Congestion. 1 Spray Where to Get Your Medications Information on where to get these meds will be given to you by the nurse or doctor. ! Ask your nurse or doctor about these medications  
  clopidogrel 75 mg Tab Discharge Instructions HOSPITALIST DISCHARGE INSTRUCTIONS 
 
NAME: Darius Rebolledo :  1945 MRN:  [id number] Date/Time:  11/15/2017 1:30 PM 
 
ADMIT DATE: 2017 DISCHARGE DATE: 11/15/2017 · It is important that you take the medication exactly as they are prescribed. · Keep your medication in the bottles provided by the pharmacist and keep a list of the medication names, dosages, and times to be taken in your wallet. · Do not take other medications without consulting your doctor. What to do at [facility] Recommended diet:  Cardiac Diet Recommended activity: Activity as tolerated If you have questions regarding the hospital related prescriptions or hospital related issues please call [facility] [facility] at [phone]. You can always direct your questions to your primary care doctor if you are unable to reach your hospital physician; your PCP works as an extension of your hospital doctor just like your hospital doctor is an extension of your PCP for your time at the hospital [facility]) If you experience any of the following symptoms then please call your primary care physician or return to the emergency room if you cannot get hold of your doctor: 
 
Fever, chills, nausea, vomiting, or persistent diarrhea Worsening weakness or new problems with your speech or balance Dark stools or visible blood in your stools New Leg swelling or shortness of breath as these could be signs of a clot Additional Instructions: Follow up with your PCP to consider having a cardiac monitor or loop recorder as out patient. May need a cardiology referal  
 
 
 
 
Information obtained by : 
I understand that if any problems occur once I am at home I am to contact my physician. I understand and acknowledge receipt of the instructions indicated above. Physician's or R.N.'s Signature                                                                  Date/Time Patient or Representative Signature [employer] Announcement We are excited to announce that we are making your provider's discharge notes available to you in [employer]. You will see these notes when they are completed and signed by the physician that discharged you from your recent hospital stay. If you have any questions or concerns about any information you see in [employer], please call the Health Information Department where you were seen or reach out to your Primary Care Provider for more information about your plan of care. Introducing [facility]! Dear Ofe Hewitt: Thank you for requesting a [employer] account. Our records indicate that you already have an active [employer] account. You can access your account anytime at [email or web address] Did you know that you can access your hospital and ER discharge instructions at any time in [employer]? You can also review all of your test results from your hospital stay or ER visit. Additional Information If you have questions, please visit the Frequently Asked Questions section of the [employer] website at [email or web address] Remember, [employer] is NOT to be used for urgent needs. For medical emergencies, dial 911. Now available from your iPhone and Android! Providers Seen During Your Hospitalization Provider Specialty Primary office phone Sancho Reed MD Emergency Medicine [phone] Hoang Dias MD Internal Medicine [phone] Your Primary Care Physician (PCP) Primary Care Physician Office Phone Office Fax [facility] [phone] You are allergic to the following No active allergies Recent Documentation Height Weight BMI Smoking Status 1.88 m 100.6 kg 28.48 kg/m2 Never Smoker Emergency Contacts Name Discharge Info Relation Home Work Mobile [address] [facility] [address] CAREGIVER [3] Spouse [3] [phone] Patient Belongings The following personal items are in your possession at time of discharge: 
  Dental Appliances: None  Visual Aid: Glasses      Home Medications: None   Jewelry: Watch  Clothing: At bedside    Other Valuables: Cell Phone Please provide this summary of care documentation to your next provider. Signatures-by signing, you are acknowledging that this After Visit Summary has been reviewed with you and you have received a copy. Patient Signature:  ____________________________________________________________ Date:  ____________________________________________________________  
  
[facility] Provider Signature:  ____________________________________________________________ Date:  ____________________________________________________________

## 2017-11-15 ENCOUNTER — APPOINTMENT (OUTPATIENT)
Dept: MRI IMAGING | Age: 72
End: 2017-11-15
Attending: INTERNAL MEDICINE
Payer: MEDICARE

## 2017-11-15 LAB
ANION GAP SERPL CALC-SCNC: 9 MMOL/L (ref 5–15)
ATRIAL RATE: 66 BPM
BUN SERPL-MCNC: 13 MG/DL (ref 6–20)
BUN/CREAT SERPL: 13 (ref 12–20)
CALCIUM SERPL-MCNC: 9.4 MG/DL (ref 8.5–10.1)
CALCULATED P AXIS, ECG09: 36 DEGREES
CALCULATED R AXIS, ECG10: 23 DEGREES
CALCULATED T AXIS, ECG11: 24 DEGREES
CHLORIDE SERPL-SCNC: 108 MMOL/L (ref 97–108)
CHOLEST SERPL-MCNC: 119 MG/DL
CO2 SERPL-SCNC: 24 MMOL/L (ref 21–32)
CREAT SERPL-MCNC: 1.03 MG/DL (ref 0.7–1.3)
DIAGNOSIS, 93000: NORMAL
ERYTHROCYTE [DISTWIDTH] IN BLOOD BY AUTOMATED COUNT: 12.8 % (ref 11.5–14.5)
EST. AVERAGE GLUCOSE BLD GHB EST-MCNC: 114 MG/DL
GLUCOSE SERPL-MCNC: 83 MG/DL (ref 65–100)
HBA1C MFR BLD: 5.6 % (ref 4.2–6.3)
HCT VFR BLD AUTO: 36.3 % (ref 36.6–50.3)
HDLC SERPL-MCNC: 40 MG/DL
HDLC SERPL: 3 {RATIO} (ref 0–5)
HGB BLD-MCNC: 11.9 G/DL (ref 12.1–17)
LDLC SERPL CALC-MCNC: 58.6 MG/DL (ref 0–100)
LIPID PROFILE,FLP: NORMAL
MCH RBC QN AUTO: 30.1 PG (ref 26–34)
MCHC RBC AUTO-ENTMCNC: 32.8 G/DL (ref 30–36.5)
MCV RBC AUTO: 91.7 FL (ref 80–99)
P-R INTERVAL, ECG05: 198 MS
PLATELET # BLD AUTO: 241 K/UL (ref 150–400)
POTASSIUM SERPL-SCNC: 4 MMOL/L (ref 3.5–5.1)
Q-T INTERVAL, ECG07: 398 MS
QRS DURATION, ECG06: 94 MS
QTC CALCULATION (BEZET), ECG08: 417 MS
RBC # BLD AUTO: 3.96 M/UL (ref 4.1–5.7)
SODIUM SERPL-SCNC: 141 MMOL/L (ref 136–145)
TRIGL SERPL-MCNC: 102 MG/DL (ref ?–150)
TSH SERPL DL<=0.05 MIU/L-ACNC: 1.24 UIU/ML (ref 0.36–3.74)
VENTRICULAR RATE, ECG03: 66 BPM
VLDLC SERPL CALC-MCNC: 20.4 MG/DL
WBC # BLD AUTO: 6.1 K/UL (ref 4.1–11.1)

## 2017-11-15 PROCEDURE — 84443 ASSAY THYROID STIM HORMONE: CPT | Performed by: INTERNAL MEDICINE

## 2017-11-15 PROCEDURE — 70544 MR ANGIOGRAPHY HEAD W/O DYE: CPT

## 2017-11-15 PROCEDURE — 97165 OT EVAL LOW COMPLEX 30 MIN: CPT | Performed by: OCCUPATIONAL THERAPIST

## 2017-11-15 PROCEDURE — 99218 HC RM OBSERVATION: CPT

## 2017-11-15 PROCEDURE — 80048 BASIC METABOLIC PNL TOTAL CA: CPT | Performed by: INTERNAL MEDICINE

## 2017-11-15 PROCEDURE — G8989 SELF CARE D/C STATUS: HCPCS | Performed by: OCCUPATIONAL THERAPIST

## 2017-11-15 PROCEDURE — 83036 HEMOGLOBIN GLYCOSYLATED A1C: CPT | Performed by: INTERNAL MEDICINE

## 2017-11-15 PROCEDURE — 85027 COMPLETE CBC AUTOMATED: CPT | Performed by: INTERNAL MEDICINE

## 2017-11-15 PROCEDURE — 70551 MRI BRAIN STEM W/O DYE: CPT

## 2017-11-15 PROCEDURE — 74011250636 HC RX REV CODE- 250/636: Performed by: INTERNAL MEDICINE

## 2017-11-15 PROCEDURE — 80061 LIPID PANEL: CPT | Performed by: INTERNAL MEDICINE

## 2017-11-15 PROCEDURE — 97161 PT EVAL LOW COMPLEX 20 MIN: CPT

## 2017-11-15 PROCEDURE — 36415 COLL VENOUS BLD VENIPUNCTURE: CPT | Performed by: INTERNAL MEDICINE

## 2017-11-15 PROCEDURE — G8988 SELF CARE GOAL STATUS: HCPCS | Performed by: OCCUPATIONAL THERAPIST

## 2017-11-15 PROCEDURE — 96372 THER/PROPH/DIAG INJ SC/IM: CPT

## 2017-11-15 PROCEDURE — 74011250637 HC RX REV CODE- 250/637: Performed by: INTERNAL MEDICINE

## 2017-11-15 PROCEDURE — G8979 MOBILITY GOAL STATUS: HCPCS

## 2017-11-15 PROCEDURE — 93306 TTE W/DOPPLER COMPLETE: CPT

## 2017-11-15 PROCEDURE — G8980 MOBILITY D/C STATUS: HCPCS

## 2017-11-15 PROCEDURE — 70547 MR ANGIOGRAPHY NECK W/O DYE: CPT

## 2017-11-15 PROCEDURE — G8987 SELF CARE CURRENT STATUS: HCPCS | Performed by: OCCUPATIONAL THERAPIST

## 2017-11-15 PROCEDURE — G8978 MOBILITY CURRENT STATUS: HCPCS

## 2017-11-15 RX ORDER — ACETAMINOPHEN 650 MG/1
650 SUPPOSITORY RECTAL
Status: DISCONTINUED | OUTPATIENT
Start: 2017-11-15 | End: 2017-11-16 | Stop reason: HOSPADM

## 2017-11-15 RX ORDER — SODIUM CHLORIDE 0.9 % (FLUSH) 0.9 %
5-10 SYRINGE (ML) INJECTION AS NEEDED
Status: DISCONTINUED | OUTPATIENT
Start: 2017-11-15 | End: 2017-11-16 | Stop reason: HOSPADM

## 2017-11-15 RX ORDER — PANTOPRAZOLE SODIUM 40 MG/1
40 TABLET, DELAYED RELEASE ORAL
Status: DISCONTINUED | OUTPATIENT
Start: 2017-11-15 | End: 2017-11-16 | Stop reason: HOSPADM

## 2017-11-15 RX ORDER — ATORVASTATIN CALCIUM 40 MG/1
40 TABLET, FILM COATED ORAL
Status: DISCONTINUED | OUTPATIENT
Start: 2017-11-15 | End: 2017-11-16 | Stop reason: HOSPADM

## 2017-11-15 RX ORDER — SODIUM CHLORIDE 0.9 % (FLUSH) 0.9 %
5-10 SYRINGE (ML) INJECTION EVERY 8 HOURS
Status: DISCONTINUED | OUTPATIENT
Start: 2017-11-15 | End: 2017-11-16 | Stop reason: HOSPADM

## 2017-11-15 RX ORDER — TAMSULOSIN HYDROCHLORIDE 0.4 MG/1
0.4 CAPSULE ORAL DAILY
Status: DISCONTINUED | OUTPATIENT
Start: 2017-11-15 | End: 2017-11-16 | Stop reason: HOSPADM

## 2017-11-15 RX ORDER — ACETAMINOPHEN 325 MG/1
650 TABLET ORAL
Status: DISCONTINUED | OUTPATIENT
Start: 2017-11-15 | End: 2017-11-16 | Stop reason: HOSPADM

## 2017-11-15 RX ORDER — DOCUSATE SODIUM 100 MG/1
100 CAPSULE, LIQUID FILLED ORAL
Status: DISCONTINUED | OUTPATIENT
Start: 2017-11-15 | End: 2017-11-16 | Stop reason: HOSPADM

## 2017-11-15 RX ORDER — ASPIRIN 325 MG
325 TABLET ORAL DAILY
Status: DISCONTINUED | OUTPATIENT
Start: 2017-11-15 | End: 2017-11-16

## 2017-11-15 RX ORDER — CLOPIDOGREL BISULFATE 75 MG/1
75 TABLET ORAL DAILY
Qty: 30 TAB | Refills: 1 | Status: SHIPPED | OUTPATIENT
Start: 2017-11-15 | End: 2018-01-04 | Stop reason: SDUPTHER

## 2017-11-15 RX ORDER — ENOXAPARIN SODIUM 100 MG/ML
40 INJECTION SUBCUTANEOUS EVERY 24 HOURS
Status: DISCONTINUED | OUTPATIENT
Start: 2017-11-15 | End: 2017-11-16 | Stop reason: HOSPADM

## 2017-11-15 RX ORDER — LABETALOL HYDROCHLORIDE 5 MG/ML
5 INJECTION, SOLUTION INTRAVENOUS
Status: DISCONTINUED | OUTPATIENT
Start: 2017-11-15 | End: 2017-11-16 | Stop reason: HOSPADM

## 2017-11-15 RX ADMIN — DOCUSATE SODIUM 100 MG: 100 CAPSULE, LIQUID FILLED ORAL at 21:40

## 2017-11-15 RX ADMIN — ATORVASTATIN CALCIUM 40 MG: 40 TABLET, FILM COATED ORAL at 01:00

## 2017-11-15 RX ADMIN — ENOXAPARIN SODIUM 40 MG: 40 INJECTION SUBCUTANEOUS at 09:58

## 2017-11-15 RX ADMIN — TAMSULOSIN HYDROCHLORIDE 0.4 MG: 0.4 CAPSULE ORAL at 09:58

## 2017-11-15 RX ADMIN — Medication 10 ML: at 21:42

## 2017-11-15 RX ADMIN — Medication 10 ML: at 06:09

## 2017-11-15 RX ADMIN — ASPIRIN 325 MG: 325 TABLET ORAL at 09:58

## 2017-11-15 RX ADMIN — ACETAMINOPHEN 650 MG: 325 TABLET ORAL at 21:40

## 2017-11-15 RX ADMIN — ATORVASTATIN CALCIUM 40 MG: 40 TABLET, FILM COATED ORAL at 21:40

## 2017-11-15 RX ADMIN — DOCUSATE SODIUM 100 MG: 100 CAPSULE, LIQUID FILLED ORAL at 01:00

## 2017-11-15 RX ADMIN — Medication 10 ML: at 01:00

## 2017-11-15 RX ADMIN — PANTOPRAZOLE SODIUM 40 MG: 40 TABLET, DELAYED RELEASE ORAL at 09:58

## 2017-11-15 RX ADMIN — Medication 10 ML: at 15:20

## 2017-11-15 NOTE — PROGRESS NOTES
physical Therapy neuro EVALUATION/discharge     Patient: Wilmer Tapia (51 y.o. male)  Date: 11/15/2017  Primary Diagnosis: TIA (transient ischemic attack)        Precautions:        ASSESSMENT :  Based on the objective data described below, the patient presents with good strength, intact balance, and baseline independent functional mobility. Pt received supine in bed reporting resolution of previously noted vision impairments. Pt independently performed all aspects of functional mobility including bed mobility, sit<>stand transfers, ambulation of 200ft, and stair climbing. Pt with mildly antalgic gait pattern as a result of b/l knee DJD however gait steady and stable overall with no LOB/balance deficits noted. Gait stability remained intact as pt ascended/descended 12 steps w/ L hand rail use at supervision level. Pt scored 54/56 on Grover Balance Test, indicating that he is a low falls risk. At this time, pt reports feeling at his baseline level in terms of functional mobility with this author in agreement. Educated pt regarding BEFAST with pt voicing understanding. DC PT. Skilled physical therapy is not indicated at this time. PLAN :  Discharge Recommendations: None  Further Equipment Recommendations for Discharge: None       SUBJECTIVE:   Patient stated Yeah, I'm fine.     OBJECTIVE DATA SUMMARY:   HISTORY:    Past Medical History:   Diagnosis Date    Anemia 9/10/2014    Back pain 4/1/2010    Blurry vision, bilateral 11/14/2017    CAD (coronary artery disease)     Calf pain 3/11/2015    Chronic kidney disease     cyst on kidney    Chronic left shoulder pain 10/24/2016    Constipation 6/11/2014    Decreased vision 6/13/2011    Diverticulitis 3/11/2015    Dizziness 11/14/2017    Enlarged LA (left atrium)     Fall at home 3/17/2016    GERD (gastroesophageal reflux disease)     Hemorrhoids 9/10/2014    Hip pain, chronic, right 10/3/2017    HTN (hypertension) 8/24/2010    Hx-TIA (transient ischemic attack)     Hypercholesterolemia 4/1/2010    Hypertension     Irregular heart beat 9/23/2015    Kidney disease     Mitral regurgitation     Need for varicella vaccine 9/10/2014    Other ill-defined conditions(799.89)     heart murmur    Rash 8/24/2010    Rib pain on right side 3/17/2016    Right calf pain 3/11/2015    Risk for falls 4/4/2014    S/P knee replacement 6/11/2014    Screening for depression 4/4/2014    Tinea versicolor 9/10/2014    Urinary frequency 8/24/2010     Past Surgical History:   Procedure Laterality Date    CARDIAC SURG PROCEDURE UNLIST      heart cath    HX HEENT      nasal septum repair    HX HEENT      HX HERNIA REPAIR      HX KNEE REPLACEMENT Left     HX ORTHOPAEDIC      left knee surgery    HX ORTHOPAEDIC      HX TONSILLECTOMY  age 10    HX UROLOGICAL      AL COLONOSCOPY FLX DX W/COLLJ SPEC WHEN PFRMD  4/18/2011          Prior Level of Function/Home Situation: Pt reports independence w/ ambulation and ADLs. History of 2 falls over previous 6 months with both falls occurring as a result of patient carrying heavy things during stair climbing. Still driving.  Lives with wife  Personal factors and/or comorbidities impacting plan of care:     Home Situation  Home Environment: Private residence  # Steps to Enter: 5  Rails to Enter: Yes  Hand Rails : Bilateral  One/Two Story Residence: Two story  # of Interior Steps: 25  Interior Rails: Left  Lift Chair Available: No  Living Alone: No  Support Systems: Spouse/Significant Other/Partner, Family member(s)  Patient Expects to be Discharged to[de-identified] Private residence  Current DME Used/Available at Home: Cyrilla Crape, straight, Commode, bedside, Walker, rolling    EXAMINATION/PRESENTATION/DECISION MAKING:   Critical Behavior:  Neurologic State: Alert  Orientation Level: Oriented X4  Cognition: Appropriate decision making, Appropriate for age attention/concentration, Appropriate safety awareness, Follows commands Hearing: Auditory  Auditory Impairment: Hard of hearing, bilateral  Skin:  Intact  Edema: None noted   Range Of Motion:  AROM: Within functional limits                       Strength:    Strength:  Within functional limits                    Tone & Sensation:   Tone: Normal              Sensation: Intact               Coordination:  Coordination: Within functional limits  Vision:      Functional Mobility:  Bed Mobility:  Rolling: Independent  Supine to Sit: Independent     Scooting: Independent  Transfers:  Sit to Stand: Independent  Stand to Sit: Independent        Bed to Chair: Independent              Balance:   Sitting: Intact  Standing: Intact  Ambulation/Gait Training:  Distance (ft): 200 Feet (ft)  Assistive Device: Gait belt  Ambulation - Level of Assistance: Independent        Gait Abnormalities: Decreased step clearance        Base of Support: Widened     Speed/Annalisa: Pace decreased (<100 feet/min)  Step Length: Left shortened;Right shortened                  Pt independently ambulated 200ft exhibiting mildly antalgic gait pattern however steady, stable gait with no LOB/balance deficits noted (pt w/ hx of L TKA )    Functional Measure:  Grover Balance Test:    Sitting to Standin  Standing Unsupported: 4  Sitting with Back Unsupported: 4  Standing to Sittin  Transfers: 4  Standing Unsupported with Eyes Closed: 4  Standing Unsupported with Feet Together: 4  Reach Forward with Outstretched Arm: 4   Object: 4  Turn to Look Over Shoulders: 4  Turn 360 Degrees: 4  Alternate Foot on Step/Stool: 4  Standing Unsupported One Foot in Front: 3  Stand on One Leg: 3  Total: 54         56=Maximum possible score;   0-20=High fall risk  21-40=Moderate fall risk   41-56=Low fall risk     Grover Balance Test and G-code impairment scale:  Percentage of Impairment CH    0%   CI    1-19% CJ    20-39% CK    40-59% CL    60-79% CM    80-99% CN     100%   Grover   Score 0-56 56 45-55 34-44 23-33 12-22 1-11 0     G codes:  In compliance with CMSs Claims Based Outcome Reporting, the following G-code set was chosen for this patient based on their primary functional limitation being treated: The outcome measure chosen to determine the severity of the functional limitation was the [employer] with a score of 54/56 which was correlated with the impairment scale. ? Mobility - Walking and Moving Around:     - CURRENT STATUS: CI - 1%-19% impaired, limited or restricted    - GOAL STATUS: CI - 1%-19% impaired, limited or restricted    - D/C STATUS:  CI - 1%-19% impaired, limited or restricted        Physical Therapy Evaluation Charge Determination   History Examination Presentation Decision-Making   MEDIUM  Complexity : 1-2 comorbidities / personal factors will impact the outcome/ POC  MEDIUM Complexity : 3 Standardized tests and measures addressing body structure, function, activity limitation and / or participation in recreation  MEDIUM Complexity : Evolving with changing characteristics  MEDIUM Complexity : FOTO score of 26-74      Based on the above components, the patient evaluation is determined to be of the following complexity level: MEDIUM    Pain:Pain Scale 1: Numeric (0 - 10)  Pain Intensity 1: 0              Activity Tolerance:   VSS   Please refer to the flowsheet for vital signs taken during this treatment. After treatment:   [x]         Patient left in no apparent distress sitting up in chair  []         Patient left in no apparent distress in bed  [x]         Call bell left within reach  [x]         Nursing notified  [x]         Caregiver present  []         Bed alarm activated    COMMUNICATION/EDUCATION:   Patient was educated regarding His deficit(s) of impaired vision as this relates to His diagnosis of TIA. He demonstrated Excellent understanding as evidenced by verbalization. Patient and/or family was verbally educated on the BE FAST acronym for signs/symptoms of CVA and TIA.  BE FAST was written on patient's communication board  for visual education and reinforcement. All questions answered with patient indicating good understanding. [x]   Fall prevention education was provided and the patient/caregiver indicated understanding. [x]   Patient/family have participated as able and agree with findings and recommendations. []   Patient is unable to participate in plan of care at this time.     Findings and recommendations were discussed with: Registered Nurse    Thank you for this referral.  Torrie Garcia, PT, DPT   Time Calculation: 16 mins

## 2017-11-15 NOTE — ROUTINE PROCESS
Bedside shift change report given to Julio César Sanchez RN (oncoming nurse) by Bradford Boykin RN (offgoing nurse). Report included the following information SBAR, Kardex and Recent Results.     Zone Phone:   [phone]       Significant changes during shift:  Admitted to Neuro        Patient Information    Manuel Dubose  67 y.o.  11/14/2017  3:19 PM by Henry Driscoll MD. Manuel Dubose was admitted from Home    Problem List    Patient Active Problem List    Diagnosis Date Noted    Dizziness 11/14/2017    Blurry vision, bilateral 11/14/2017    Hip pain, chronic, right 10/03/2017    TIA (transient ischemic attack) 05/02/2017    Acute nasopharyngitis 02/21/2017    Anginal chest pain at rest [facility]) 10/24/2016    Chronic left shoulder pain 10/24/2016    Fall at home 03/17/2016    Rib pain on right side 03/17/2016    Enlarged LA (left atrium)     Mitral regurgitation     Irregular heart beat 09/23/2015    Diverticulitis 03/11/2015    Calf pain 03/11/2015    Right calf pain 03/11/2015    Need for varicella vaccine 09/10/2014    Anemia 09/10/2014    Tinea versicolor 09/10/2014    Hemorrhoids 09/10/2014    S/P knee replacement 06/11/2014    Constipation 06/11/2014    Risk for falls 04/04/2014    Screening for depression 04/04/2014    Osteopenia 04/04/2014    ED (erectile dysfunction) 07/16/2012    Otitis externa of right ear 04/11/2012    Acute bronchitis 02/03/2012    Knee pain, left 11/28/2011    Tick bite 06/13/2011    Decreased vision 06/13/2011    Foot pain, right 01/24/2011    Acute sinusitis 01/24/2011    Corn 01/24/2011    HTN (hypertension) 08/24/2010    Rash 08/24/2010    Urinary frequency 08/24/2010    Back pain 04/01/2010    Hypercholesterolemia 04/01/2010     Past Medical History:   Diagnosis Date    Anemia 9/10/2014    Back pain 4/1/2010    Blurry vision, bilateral 11/14/2017    CAD (coronary artery disease)     Calf pain 3/11/2015    Chronic kidney disease     cyst on kidney    Chronic left shoulder pain 10/24/2016    Constipation 6/11/2014    Decreased vision 6/13/2011    Diverticulitis 3/11/2015    Dizziness 11/14/2017    Enlarged LA (left atrium)     Fall at home 3/17/2016    GERD (gastroesophageal reflux disease)     Hemorrhoids 9/10/2014    Hip pain, chronic, right 10/3/2017    HTN (hypertension) 8/24/2010    Hx-TIA (transient ischemic attack)     Hypercholesterolemia 4/1/2010    Hypertension     Irregular heart beat 9/23/2015    Kidney disease     Mitral regurgitation     Need for varicella vaccine 9/10/2014    Other ill-defined conditions(799.89)     heart murmur    Rash 8/24/2010    Rib pain on right side 3/17/2016    Right calf pain 3/11/2015    Risk for falls 4/4/2014    S/P knee replacement 6/11/2014    Screening for depression 4/4/2014    Tinea versicolor 9/10/2014    Urinary frequency 8/24/2010     Activity Status:    OOB to Chair No  Ambulated this shift Yes   Bed Rest No      DVT prophylaxis:    DVT prophylaxis Med- Yes  DVT prophylaxis SCD or RAFA- No       Patient Safety:    Falls Score Total Score: 1  Safety Level_______  Bed Alarm On? No  Sitter?  No    Plan for upcoming shift: MRI        Discharge Plan: Yes     Active Consults:  IP CONSULT TO NEUROLOGY

## 2017-11-15 NOTE — PROGRESS NOTES
The patient is a 67year old male who was admitted to rule out TIA/CVA with a history of TIA, HTN, HLD and GERD. CM verified his address, contact numbers for him and his wife. He uses [address] [place] [address] on [employer].  He saw his PCP the day of admission. The patient was given the [facility] Observation letter and Sonam Mehta 130 letter, answered his questions and signed copies placed in the chart. Care Management Interventions  PCP Verified by CM: Yes  Last Visit to PCP: 11/14/17  Mode of Transport at Discharge: Other (see comment) (family by car)  Transition of Care Consult (CM Consult): Discharge Planning (pending PT and OT)  MyChart Signup: No  Discharge Durable Medical Equipment: No (no)  Physical Therapy Consult: Yes  Speech Therapy Consult: No  Current Support Network: Lives with Spouse, Own Home (one story home, 3 steps to enter, was independent and driving prior to admission)  Confirm Follow Up Transport: Family (by car)  Plan discussed with Pt/Family/Caregiver: Yes (he is pending MRI, MRA and neuro consult, plans to go home at discharge)   The patient plans to go home at discharge. CM will follow for discharge planning.  Karlo Wallace RN #[phone]

## 2017-11-15 NOTE — PROGRESS NOTES
Orders received, chart reviewed. Attempted to see pt this AM for PT evaluation however pt currently off the floor. Will defer however continue to follow. Juan Cao.  Lucia Dykes, MICHAELT

## 2017-11-15 NOTE — H&P
Hospitalist Admission Note    NAME: Reynold Bullock   :     MRN:  [id number]     Date/Time:  2017 9:04 PM    Patient PCP: Chance Fontaine MD  ________________________________________________________________________    My assessment of this patient's clinical condition and my plan of care is as follows. Assessment / Plan:  Transient visual disturbance/ unsteady gait/ lightheadedness  Hx of TIA with similar symptoms May 2017  -Reports brief visual disturbance on Saturday while driving similar to past episodes. Intermittent lightheadedness worse on standing and reported gait instability over the last couple weeks.  -Head CT- \"Mild atrophy. Mild deep white matter ischemic change. No confluent infarct or hemorrhage or mass effect. \" Unclear indication of findings as radiology read is changed from CT read in May which was wnl. Will consult to neurology.  -check MRI/MRA  -check ECHO  -check Stroke blood work: LDL, A1C, TSH  -Admit to telemetry on observation. -neuro checks per protocol  -Permissive HTN for first 24 hours. Prn IV labetalol for BP > 220/110. May already be beyond this window based on reported symptoms. BP goal should be changed to 140/90 by tomorrow.  -continue pta ASA 325mg and statin.  -Check orthostatics. Lightheadedness with standing. Holding BP med. ?flomax may be contributing.  -consult neurology  -PT/OT consult    HTN  -hold pta ACEi  -prn IV labetalol as above. HLD  -continue statin    BPH  -pta flowmax    GERD  -pta PPI      Code Status: full  Surrogate Decision Maker: wife  DVT Prophylaxis: lovenox  GI Prophylaxis: not indicated  Baseline: independent        Subjective:   CHIEF COMPLAINT: Transient visual disturbance/ unsteady gait/ light headedness    HISTORY OF PRESENT ILLNESS:     Reji Mayes is a 67 y.o.    male with PMH HTN, HLD, BPH, GERD, and TIA who presents with complaint of a transient visual disturbance, unsteady gait, and light headedness. Reports he had the visual disturbance on Saturday while driving. He reports feeling like things started to become \"slow motion\" with vision blurred in both eyes. Says symptoms resolved within 30 seconds of pulling off the road. Denies associated HA, change in strength or sensation. Similar symptoms in may of this year with negative neurological work up and diagnosis of TIA. Remains on 325mg asa and a statin. Also has noted an unsteady gait x 2 weeks and lightheadedness on standing. Denies falls or LOC. In the ED patient is afebrile with stable vitals. CT head shows \"\"Mild atrophy. Mild deep white matter ischemic change. No confluent infarct or hemorrhage or mass effect. \" ED spoke with teleneurology who recommend observation for further neurological work up. We were asked to admit for work up and evaluation of the above problems.      Past Medical History:   Diagnosis Date    Anemia 9/10/2014    Back pain 4/1/2010    Blurry vision, bilateral 11/14/2017    CAD (coronary artery disease)     Calf pain 3/11/2015    Chronic kidney disease     cyst on kidney    Chronic left shoulder pain 10/24/2016    Constipation 6/11/2014    Decreased vision 6/13/2011    Diverticulitis 3/11/2015    Dizziness 11/14/2017    Enlarged LA (left atrium)     Fall at home 3/17/2016    GERD (gastroesophageal reflux disease)     Hemorrhoids 9/10/2014    Hip pain, chronic, right 10/3/2017    HTN (hypertension) 8/24/2010    Hx-TIA (transient ischemic attack)     Hypercholesterolemia 4/1/2010    Hypertension     Irregular heart beat 9/23/2015    Kidney disease     Mitral regurgitation     Need for varicella vaccine 9/10/2014    Other ill-defined conditions(799.89)     heart murmur    Rash 8/24/2010    Rib pain on right side 3/17/2016    Right calf pain 3/11/2015    Risk for falls 4/4/2014    S/P knee replacement 6/11/2014    Screening for depression 4/4/2014    Tinea versicolor 9/10/2014    Urinary frequency 8/24/2010        Past Surgical History:   Procedure Laterality Date    CARDIAC SURG PROCEDURE UNLIST      heart cath    HX HEENT      nasal septum repair    HX HEENT      HX HERNIA REPAIR      HX KNEE REPLACEMENT Left     HX ORTHOPAEDIC      left knee surgery    HX ORTHOPAEDIC      HX TONSILLECTOMY  age 5   [de-identified] UROLOGICAL      SC COLONOSCOPY FLX DX W/COLLJ SPEC WHEN PFRMD  4/18/2011            Social History   Substance Use Topics    Smoking status: Never Smoker    Smokeless tobacco: Never Used    Alcohol use 0.0 oz/week     0 Standard drinks or equivalent per week      Comment: 12 beers, half a fifth a month        Family History   Problem Relation Age of Onset    COPD Mother     Heart Attack Mother     Hypertension Mother     Heart Disease Mother     COPD Father     Heart Attack Father     Hypertension Father     Heart Disease Father     Cancer Father     Cancer Maternal Grandmother      No Known Allergies     Prior to Admission medications    Medication Sig Start Date End Date Taking? Authorizing Provider   tamsulosin (FLOMAX) 0.4 mg capsule take 1 capsule by mouth once daily 11/14/17  Yes Beryle Clam, MD   mometasone (NASONEX) 50 mcg/actuation nasal spray 2 Sprays by Both Nostrils route daily as needed (allergies). Yes Historical Provider   oxymetazoline (VICKS SINEX 12-HOUR) 0.05 % nasal spray 1 Orrum by Both Nostrils route two (2) times daily as needed for Congestion. Yes Historical Provider   acetaminophen (TYLENOL) 500 mg tablet Take 1,000 mg by mouth every six (6) hours as needed for Pain. Yes Historical Provider   atorvastatin (LIPITOR) 40 mg tablet take 1 tablet by mouth NIGHTLY 10/3/17  Yes Beryle Clam, MD   omeprazole (PRILOSEC) 20 mg capsule Take 1 Cap by mouth daily. 8/2/17  Yes Beryle Clam, MD   lisinopril (PRINIVIL, ZESTRIL) 30 mg tablet Take 1 Tab by mouth daily.  8/2/17  Yes Beryle Clam, MD   docusate sodium (COLACE) 50 mg capsule Take 50 mg by mouth nightly. Yes Historical Provider   aspirin (ASPIRIN) 325 mg tablet Take 325 mg by mouth daily. Yes Historical Provider   chlorpheniramine-HYDROcodone [place] PENNKINETIC ER) 10-8 mg/5 mL suspension Take 5 mL by mouth every twelve (12) hours as needed for Cough. Historical Provider   GUAIFENESIN (MUCINEX PO) Take 1 Tab by mouth two (2) times daily as needed (congestion). Historical Provider       REVIEW OF SYSTEMS:     I am not able to complete the review of systems because:    The patient is intubated and sedated    The patient has altered mental status due to his acute medical problems    The patient has baseline aphasia from prior stroke(s)    The patient has baseline dementia and is not reliable historian    The patient is in acute medical distress and unable to provide information           Total of 12 systems reviewed as follows:       POSITIVE= underlined text  Negative = text not underlined  General:  fever, chills, sweats, generalized weakness, weight loss/gain,      loss of appetite   Eyes:    blurred vision, eye pain, loss of vision, double vision  ENT:    rhinorrhea, pharyngitis   Respiratory:   cough, sputum production, SOB, BEAR, wheezing, pleuritic pain   Cardiology:   chest pain, palpitations, orthopnea, PND, edema, syncope   Gastrointestinal:  abdominal pain , N/V, diarrhea, dysphagia, constipation, bleeding   Genitourinary:  frequency, urgency, dysuria, hematuria, incontinence   Muskuloskeletal :  arthralgia, myalgia, back pain  Hematology:  easy bruising, nose or gum bleeding, lymphadenopathy   Dermatological: rash, ulceration, pruritis, color change / jaundice  Endocrine:   hot flashes or polydipsia   Neurological:  headache, dizziness, confusion, focal weakness, paresthesia,     Speech difficulties, memory loss, gait difficulty  Psychological: Feelings of anxiety, depression, agitation    Objective:   VITALS:    Visit Vitals    /74    Pulse 66    Temp 97.4 °F (36.3 °C)    Resp 12    Ht 6' 2\" (1.88 m)    Wt 100.6 kg (221 lb 12.5 oz)    SpO2 100%    BMI 28.48 kg/m2       PHYSICAL EXAM:    General:    Alert, cooperative, no distress, appears stated age. HEENT: Atraumatic, anicteric sclerae, pink conjunctivae     No oral ulcers, mucosa moist, throat clear, dentition fair  Neck:  Supple, symmetrical,  thyroid: non tender  Lungs:   Clear to auscultation bilaterally. No Wheezing or Rhonchi. No rales. Chest wall:  No tenderness  No Accessory muscle use. Heart:   Regular  rhythm,  No  murmur   No edema  Abdomen:   Soft, non-tender. Not distended. Bowel sounds normal  Extremities: No cyanosis. No clubbing,      Skin turgor normal, Capillary refill normal, Radial dial pulse 2+  Skin:     Not pale. Not Jaundiced  No rashes   Psych:  Good insight. Not depressed. Not anxious or agitated. Neurologic: EOMs intact. No facial asymmetry. No aphasia or slurred speech. Symmetrical strength, Sensation grossly intact. Alert and oriented X 4.     _______________________________________________________________________  Care Plan discussed with:    Comments   Patient y    Family      RN y    Care Manager                    Consultant:      _______________________________________________________________________  Expected  Disposition:   Home with Family x   HH/PT/OT/RN    SNF/LTC    ISAEL    ________________________________________________________________________  TOTAL TIME:  61 Minutes    Critical Care Provided     Minutes non procedure based      Comments    x Reviewed previous records   >50% of visit spent in counseling and coordination of care  Discussion with patient and/or family and questions answered       ________________________________________________________________________  Signed: Gina Salas MD    Procedures: see electronic medical records for all procedures/Xrays and details which were not copied into this note but were reviewed prior to creation of Plan.     LAB DATA REVIEWED:    Recent Results (from the past 24 hour(s))   EKG, 12 LEAD, INITIAL    Collection Time: 11/14/17  2:30 PM   Result Value Ref Range    Ventricular Rate 66 BPM    Atrial Rate 66 BPM    P-R Interval 198 ms    QRS Duration 94 ms    Q-T Interval 398 ms    QTC Calculation (Bezet) 417 ms    Calculated P Axis 36 degrees    Calculated R Axis 23 degrees    Calculated T Axis 24 degrees    Diagnosis Normal sinus rhythm  Normal ECG      CBC WITH AUTOMATED DIFF    Collection Time: 11/14/17  3:18 PM   Result Value Ref Range    WBC 6.5 4.1 - 11.1 K/uL    RBC 3.99 (L) 4.10 - 5.70 M/uL    HGB 12.2 12.1 - 17.0 g/dL    HCT 36.5 (L) 36.6 - 50.3 %    MCV 91.5 80.0 - 99.0 FL    MCH 30.6 26.0 - 34.0 PG    MCHC 33.4 30.0 - 36.5 g/dL    RDW 12.8 11.5 - 14.5 %    PLATELET 704 923 - 620 K/uL    NEUTROPHILS 74 32 - 75 %    LYMPHOCYTES 20 12 - 49 %    MONOCYTES 5 5 - 13 %    EOSINOPHILS 1 0 - 7 %    BASOPHILS 0 0 - 1 %    ABS. NEUTROPHILS 4.8 1.8 - 8.0 K/UL    ABS. LYMPHOCYTES 1.3 0.8 - 3.5 K/UL    ABS. MONOCYTES 0.3 0.0 - 1.0 K/UL    ABS. EOSINOPHILS 0.1 0.0 - 0.4 K/UL    ABS. BASOPHILS 0.0 0.0 - 0.1 K/UL   METABOLIC PANEL, COMPREHENSIVE    Collection Time: 11/14/17  3:18 PM   Result Value Ref Range    Sodium 141 136 - 145 mmol/L    Potassium 3.9 3.5 - 5.1 mmol/L    Chloride 106 97 - 108 mmol/L    CO2 27 21 - 32 mmol/L    Anion gap 8 5 - 15 mmol/L    Glucose 127 (H) 65 - 100 mg/dL    BUN 12 6 - 20 MG/DL    Creatinine 1.15 0.70 - 1.30 MG/DL    BUN/Creatinine ratio 10 (L) 12 - 20      GFR est AA >60 >60 ml/min/1.73m2    GFR est non-AA >60 >60 ml/min/1.73m2    Calcium 9.3 8.5 - 10.1 MG/DL    Bilirubin, total 1.1 (H) 0.2 - 1.0 MG/DL    ALT (SGPT) 31 12 - 78 U/L    AST (SGOT) 17 15 - 37 U/L    Alk.  phosphatase 89 45 - 117 U/L    Protein, total 7.3 6.4 - 8.2 g/dL    Albumin 3.7 3.5 - 5.0 g/dL    Globulin 3.6 2.0 - 4.0 g/dL    A-G Ratio 1.0 (L) 1.1 - 2.2     TROPONIN I    Collection Time: 11/14/17  3:18 PM   Result Value Ref Range Troponin-I, Qt. <0.04 <0.05 ng/mL   CK W/ REFLX CKMB    Collection Time: 11/14/17  3:18 PM   Result Value Ref Range     39 - 308 U/L   URINALYSIS W/ REFLEX CULTURE    Collection Time: 11/14/17  3:49 PM   Result Value Ref Range    Color YELLOW/STRAW      Appearance CLEAR CLEAR      Specific gravity 1.005 1.003 - 1.030      pH (UA) 6.0 5.0 - 8.0      Protein NEGATIVE  NEG mg/dL    Glucose NEGATIVE  NEG mg/dL    Ketone NEGATIVE  NEG mg/dL    Bilirubin NEGATIVE  NEG      Blood NEGATIVE  NEG      Urobilinogen 0.2 0.2 - 1.0 EU/dL    Nitrites NEGATIVE  NEG      Leukocyte Esterase NEGATIVE  NEG      WBC 0-4 0 - 4 /hpf    RBC 0-5 0 - 5 /hpf    Epithelial cells FEW FEW /lpf    Bacteria NEGATIVE  NEG /hpf    UA:UC IF INDICATED CULTURE NOT INDICATED BY UA RESULT CNI

## 2017-11-15 NOTE — ED NOTES
TRANSFER - OUT REPORT:    Verbal report given to AGGIE Lerma(name) on Eugenio  [facility] of care       Report consisted of patients Situation, Background, Assessment and   Recommendations(SBAR). Information from the following report(s) SBAR, Kardex, ED Summary, MAR, Recent Results and Med Rec Status was reviewed with the receiving nurse. Lines:   Peripheral IV 11/14/17 Left Antecubital (Active)   Site Assessment Clean, dry, & intact 11/14/2017  7:38 PM   Phlebitis Assessment 0 11/14/2017  7:38 PM   Infiltration Assessment 0 11/14/2017  7:38 PM   Dressing Status Clean, dry, & intact 11/14/2017  7:38 PM   Dressing Type Transparent 11/14/2017  7:38 PM   Hub Color/Line Status Patent; Flushed 11/14/2017  7:38 PM   Action Taken Dressing reinforced 11/14/2017  7:38 PM   Alcohol Cap Used Yes 11/14/2017  7:38 PM        Opportunity for questions and clarification was provided.       Patient transported with:   [employer]

## 2017-11-15 NOTE — PROGRESS NOTES
Occupational Therapy neurological EVALUATION with discharge  Patient: Kuldip Olson (84 y.o. male)  Date: 11/15/2017  Primary Diagnosis: TIA (transient ischemic attack)        Precautions: none       ASSESSMENT:  Based on the objective data described below, the patient presents with intact vision, sensation, coordination and overall indpendence with ADLS. Pt was educated on the BEFAST scale and the importance of coming to the hospital via ambulance (and rationale behind this). Wife was present this session and was also educated. Both indicated understanding. Pt scored a 66/66 on the fugl paredes and further skilled acute occupational therapy is not indicated at this time.   Discharge Recommendations: home without services  Further Equipment Recommendations for Discharge: none needed     SUBJECTIVE:   Patient stated Everything is back to normal.    OBJECTIVE DATA SUMMARY:   HISTORY:   Past Medical History:   Diagnosis Date    Anemia 9/10/2014    Back pain 4/1/2010    Blurry vision, bilateral 11/14/2017    CAD (coronary artery disease)     Calf pain 3/11/2015    Chronic kidney disease     cyst on kidney    Chronic left shoulder pain 10/24/2016    Constipation 6/11/2014    Decreased vision 6/13/2011    Diverticulitis 3/11/2015    Dizziness 11/14/2017    Enlarged LA (left atrium)     Fall at home 3/17/2016    GERD (gastroesophageal reflux disease)     Hemorrhoids 9/10/2014    Hip pain, chronic, right 10/3/2017    HTN (hypertension) 8/24/2010    Hx-TIA (transient ischemic attack)     Hypercholesterolemia 4/1/2010    Hypertension     Irregular heart beat 9/23/2015    Kidney disease     Mitral regurgitation     Need for varicella vaccine 9/10/2014    Other ill-defined conditions(799.89)     heart murmur    Rash 8/24/2010    Rib pain on right side 3/17/2016    Right calf pain 3/11/2015    Risk for falls 4/4/2014    S/P knee replacement 6/11/2014    Screening for depression 4/4/2014    Tinea versicolor 9/10/2014    Urinary frequency 8/24/2010     Past Surgical History:   Procedure Laterality Date    CARDIAC SURG PROCEDURE UNLIST      heart cath    HX HEENT      nasal septum repair    HX HEENT      HX HERNIA REPAIR      HX KNEE REPLACEMENT Left     HX ORTHOPAEDIC      left knee surgery    HX ORTHOPAEDIC      HX TONSILLECTOMY  age 5   [facility]      MO COLONOSCOPY FLX DX W/COLLJ SPEC WHEN PFRMD  4/18/2011            Prior Level of Function/Environment/Context: independent without assist devices for ADLs/IADLS    Expanded or extensive additional review of patient history:     Home Situation  Home Environment: Private residence  # Steps to Enter: 5  Rails to Enter: Yes  Hand Rails : Bilateral  One/Two Story Residence: Two story  # of Interior Steps: 25  Interior Rails: Left  Lift Chair Available: No  Living Alone: No  Support Systems: Spouse/Significant Other/Partner, Family member(s)  Patient Expects to be Discharged to[de-identified] Private residence  Current DME Used/Available at Home: Cane, straight, Commode, bedside, Walker, rolling  Tub or Shower Type: Tub/Shower combination  []  Right hand dominant   []  Left hand dominant    EXAMINATION OF PERFORMANCE DEFICITS:  Cognitive/Behavioral Status:  Neurologic State: Alert  Orientation Level: Oriented X4  Cognition: Appropriate decision making; Appropriate for age attention/concentration; Appropriate safety awareness  Perception: Appears intact  Perseveration: No perseveration noted  Safety/Judgement: Awareness of environment; Fall prevention;Home safety      Hearing: Auditory  Auditory Impairment: Hard of hearing, bilateral    Vision/Perceptual:                      Diplopia: No    Acuity: Impaired near vision; Impaired far vision (at baseline w/ corrected with glasses)    Corrective Lenses: Glasses (bifocals)    Range of Motion:    AROM: Within functional limits                         Strength:    Strength:  Within functional limits Coordination:  Coordination: Within functional limits  Fine Motor Skills-Upper: Left Intact; Right Intact    Gross Motor Skills-Upper: Left Intact; Right Intact    Tone & Sensation  Tone: Normal  Sensation: Intact                      Balance:  Sitting: Intact  Standing: Intact    Functional Mobility and Transfers for ADLs:  Bed Mobility:  Rolling: Independent  Supine to Sit: Independent  Scooting: Independent    Transfers:  Sit to Stand: Independent  Functional Transfers  Toilet Transfer : Independent    ADL Assessment:  Feeding: Independent    Oral Facial Hygiene/Grooming: Independent    Bathing: Independent    Upper Body Dressing: Independent    Lower Body Dressing: Independent    Toileting: Independent       Cognitive Retraining  Safety/Judgement: Awareness of environment; Fall prevention;Home safety        Functional Measure:   Fugl-Merritt Assessment of Motor Recovery after Stroke:     Reflex Activity  Flexors/Biceps/Fingers: Can be elicited  Extensors/Triceps: Can be elicited  Reflex Subtotal: 4    Volitional Movement Within Synergies  Shoulder Retraction: Full  Shoulder Elevation: Full  Shoulder Abduction (90 degrees): Full  Shoulder External Rotation: Full  Elbow Flexion: Full  Forearm Supination: Full  Shoulder Adduction/Internal Rotation: Full  Elbow Extension: Full  Forearm Pronation: Full  Subtotal: 18    Volitional Movement Mixing Synergies  Hand to Lumbar Spine: Full  Shoulder Flexion (0-90 degrees): Full  Pronation-Supination: Full  Subtotal: 6    Volitional Movement With Little or No Synergy  Shoulder Abduction (0-90 degrees): Full  Shoulder Flexion ( degrees): Full  Pronation/Supination: Full  Subtotal : 6    Normal Reflex Activity  Biceps, Triceps, Finger Flexors:  Full  Subtotal : 2    Upper Extremity Total   Upper Extremity Total: 36    Wrist  Stability at 15 Degree Dorsiflexion: Full  Repeated Dorsiflexion/ Volar Flexion: Full  Stability at 15 Degree Dorsiflexion: Full  Repeated Dorsiflexion/ Volar Flexion: Full  Circumduction: Full  Wrist Total: 10    Hand  Mass Flexion: Full  Mass Extension: Full  Grasp A: Full  Grasp B: Full  Grasp C: Full  Grasp D: Full  Grasp E: Full  Hand Total: 14    Coordination/Speed  Tremor: None  Dysmetria: None  Time: <1s  Coordination/Speed Total : 6    Total A-D  Total A-D (Motor Function): 66/66     Percentage of impairment CH  0% CI  1-19% CJ  20-39% CK  40-59% CL  60-79% CM  80-99% CN  100%   Fugl-Merritt score: 0-66 66 53-65 39-52 26-38 13-25 1-12   0      This is a reliable/valid measure of arm function after a neurological event. It has established value to characterize functional status and for measuring spontaneous and therapy-induced recovery; tests proximal and distal motor functions. Fugl-Merritt Assessment  UE scores recorded between five and 30 days post neurologic event can be used to predict UE recovery at six months post neurologic event. Severe = 0-21 points   Moderately Severe = 22-33 points   Moderate = 34-47 points   Mild = 48-66 points  Brown NURA Cheema, ESTUARDO Mccormack, & RYAN Bryant (1992). Measurement of motor recovery after stroke: Outcome assessment and sample size requirements. Stroke, 23, pp. 8679-4071.   ------------------------------------------------------------------------------------------------------------------------------------------------------------------  MCID:  Stroke:   [facility] Brown, 2001; n = 171; mean age 79 (5) years; assessed within 16 (12) days of stroke, Acute Stroke)  FMA Motor Scores from Admission to Discharge   10 point increase in FMA Upper Extremity = 1.5 change in discharge FIM   10 point increase in FMA Lower Extremity = 1.9 change in discharge FIM  MDC:   Stroke:   Epimenio Libman et al, 2008, n = 14, mean age = 59.9 (14.6) years, assessed on average 14 (6.5) months post stroke, Chronic Stroke)   FMA = 5.2 points for the Upper Extremity portion of the assessment     G codes:   In compliance with CMSs Claims Based Outcome Reporting, the following G-code set was chosen for this patient based on their primary functional limitation being treated: The outcome measure chosen to determine the severity of the functional limitation was the fugl paredes with a score of 66/66 which was correlated with the impairment scale. ? Self Care:     - CURRENT STATUS: CH - 0% impaired, limited or restricted    - GOAL STATUS: CH - 0% impaired, limited or restricted    - D/C STATUS:  CH - 0% impaired, limited or restricted        Occupational Therapy Evaluation Charge Determination   History Examination Decision-Making   LOW Complexity : Brief history review  LOW Complexity : 1-3 performance deficits relating to physical, cognitive , or psychosocial skils that result in activity limitations and / or participation restrictions  LOW Complexity : No comorbidities that affect functional and no verbal or physical assistance needed to complete eval tasks       Based on the above components, the patient evaluation is determined to be of the following complexity level: LOW     Pain:Pain Scale 1: Numeric (0 - 10)  Pain Intensity 1: 0              Activity Tolerance:     Please refer to the flowsheet for vital signs taken during this treatment. After treatment:   [x]  Patient left in no apparent distress sitting up in chair  []  Patient left in no apparent distress in bed  [x]  Call bell left within reach  [x]  Nursing notified  [x]  Caregiver present  []  Bed alarm activated    COMMUNICATION/EDUCATION:   Findings and recommendations were discussed with: Physical Therapist, Registered Nurse and patient and his wife    Patient was educated regarding His deficit(s) of resolved deficits (visual disturbance) as this relates to His possible diagnosis of CVA. He demonstrated Excellent understanding as evidenced by teach back.     Patient and/or family was verbally educated on the BE FAST acronym for signs/symptoms of CVA and TIA. BE FAST was written on patient's communication board  for visual education and reinforcement. All questions answered with patient indicating excellent understanding. [x]      Home safety education was provided and the patient/caregiver indicated understanding. [x]      Patient/family have participated as able and agree with findings and recommendations. []      Patient is unable to participate in plan of care at this time.     Thank you for this referral.  Kassandra Nassar, OTR/L  Time Calculation: 13 mins\

## 2017-11-15 NOTE — CONSULTS
DATE OF CONSULTATION: 11/15/2017    CONSULTED BY: Chula Dominguez MD    Chief Complaint   Patient presents with    Dizziness       Reason for Consult  I have been asked to see the patient in neurological consultation to render advice and opinion regarding visual distortions. HISTORY OF PRESENT ILLNESS  Patient has had episode yesterday of visual distortion, vertical wavy lines, about 5 seconds duration, did not check whether monocular or binocular, no other symptoms, no headache , nausea or vomiting. He has had an episode of this in May of 2017, W/U including MRI, MRA , full stroke w/u negative He was taking ASA 325mg daily. He is hypertensive with hypercholesterolemia.     ROS  A ten system review of constitutional, cardiovascular, respiratory, musculoskeletal, endocrine, skin, SHEENT, genitourinary, psychiatric and neurologic systems was obtained and is unremarkable except as stated in HPI     PMH  Past Medical History:   Diagnosis Date    Anemia 9/10/2014    Back pain 4/1/2010    Blurry vision, bilateral 11/14/2017    CAD (coronary artery disease)     Calf pain 3/11/2015    Chronic kidney disease     cyst on kidney    Chronic left shoulder pain 10/24/2016    Constipation 6/11/2014    Decreased vision 6/13/2011    Diverticulitis 3/11/2015    Dizziness 11/14/2017    Enlarged LA (left atrium)     Fall at home 3/17/2016    GERD (gastroesophageal reflux disease)     Hemorrhoids 9/10/2014    Hip pain, chronic, right 10/3/2017    HTN (hypertension) 8/24/2010    Hx-TIA (transient ischemic attack)     Hypercholesterolemia 4/1/2010    Hypertension     Irregular heart beat 9/23/2015    Kidney disease     Mitral regurgitation     Need for varicella vaccine 9/10/2014    Other ill-defined conditions(799.89)     heart murmur    Rash 8/24/2010    Rib pain on right side 3/17/2016    Right calf pain 3/11/2015    Risk for falls 4/4/2014    S/P knee replacement 6/11/2014    Screening for depression 4/4/2014    Tinea versicolor 9/10/2014    Urinary frequency 8/24/2010       FH  Family History   Problem Relation Age of Onset    COPD Mother     Heart Attack Mother     Hypertension Mother     Heart Disease Mother     COPD Father     Heart Attack Father     Hypertension Father     Heart Disease Father     Cancer Father     Cancer Maternal Grandmother        [address] Hammonds  Social History     Social History    Marital status:      Spouse name: N/A    Number of children: N/A    Years of education: N/A     Social History Main Topics    Smoking status: Never Smoker    Smokeless tobacco: Never Used    Alcohol use 0.0 oz/week     0 Standard drinks or equivalent per week      Comment: 12 beers, half a fifth a month    Drug use: No    Sexual activity: No     Other Topics Concern    None     Social History Narrative    ** Merged History Encounter **            ALLERGIES  No Known Allergies    PHYSICAL EXAM  EXAMINATION:   Patient Vitals for the past 24 hrs:   Temp Pulse Resp BP SpO2   11/15/17 1233 98.2 °F (36.8 °C) 63 18 124/77 97 %   11/15/17 0748 - 66 - (!) 142/92 -   11/15/17 0745 - 67 - (!) 140/95 -   11/15/17 0742 98.2 °F (36.8 °C) (!) 57 18 130/82 96 %   11/15/17 0300 97.5 °F (36.4 °C) 66 18 114/73 99 %   11/14/17 2244 98.3 °F (36.8 °C) 62 18 125/88 98 %   11/14/17 2145 - 64 15 109/68 96 %   11/14/17 1715 - 66 12 118/74 100 %   11/14/17 1700 - 67 14 129/83 100 %   11/14/17 1645 - 65 9 123/73 98 %   11/14/17 1600 - 72 16 134/75 99 %   11/14/17 1549 - 72 16 115/79 98 %   11/14/17 1548 - - - 119/72 -   11/14/17 1547 - 70 13 123/71 97 %   11/14/17 1545 - 71 11 121/84 98 %   11/14/17 1427 97.4 °F (36.3 °C) 75 16 152/90 100 %        General:   Physical Exam   CONSTITUTIONAL: Oriented to person, place, and time, appears well-developed and well-nourished. No distress. Head: Normocephalic and atraumatic. ENT: Oropharynx is clear and moist. No oropharyngeal exudate.    EYES: Conjunctivae and EOM are normal. Pupils are equal, round, and reactive to light. Fundoscopic exam normal, No scleral icterus. NECK: Normal range of motion. Neck supple. No thyromegaly present. Carotids w/o bruit  CARDIOVASCULARr: Normal rate and regular rhythm. No murmur heard. LYMPH:   No Cervical or Cranial adenopathy. SKIN:  No significant bruising or lacerations. Neurological Examination:   Mental Status:  AAO x3. Speech is fluent. Follows commands, has normal fund of knowledge, attention, short term recall, comprehension and insight. Cranial Nerves: Visual fields are full. PERRL, Extraocular movements are full. Facial sensation intact V1- V3. Facial movement intact, symmetric. Hearing intact to conversation. Palate elevates symmetrically. Shoulder shrug symmetric. Tongue midline. Motor: Strength is 5/5 in all 4 ext. Normal tone. No atrophy. Sensation: Normal to light touch    Reflexes: DTRs 2+ throughout. Plantar responses downgoing. Coordination/Cerebellar: Intact to finger-nose-finger     Gait:NT        Imaging review:  CT 11/15/17    Comparison exam:  5/2/2017, May 1, 2017.     Images through the brain reveal mild prominence of the basilar cisterns and  cortical sulci and ventricles.     Mild relatively symmetric hypoattenuation is seen to be present in the deep  white matter and centrum semiovale of both cerebral hemispheres.       No confluent infarct or mass effect or shift of midline structures is  identified.     There is no intracranial hemorrhage identified.      Bone windows demonstrate no depressed skull fracture.     IMPRESSION  IMPRESSION:     Mild atrophy.      Mild deep white matter ischemic change.     No confluent infarct or hemorrhage or mass effect. HOME MEDS  Prior to Admission Medications   Prescriptions Last Dose Informant Patient Reported? Taking? GUAIFENESIN (MUCINEX PO) 11/9/2017 at Unknown time Self Yes No   Sig: Take 1 Tab by mouth two (2) times daily as needed (congestion). acetaminophen (TYLENOL) 500 mg tablet 2017 at Unknown time Self Yes Yes   Sig: Take 1,000 mg by mouth every six (6) hours as needed for Pain. aspirin (ASPIRIN) 325 mg tablet 2017 at Unknown time Self Yes Yes   Sig: Take 325 mg by mouth daily. atorvastatin (LIPITOR) 40 mg tablet 2017 at Unknown time Self No Yes   Sig: take 1 tablet by mouth NIGHTLY   chlorpheniramine-HYDROcodone (TUSSIONEX PENNKINETIC ER) 10-8 mg/5 mL suspension 2017 at Unknown time Self Yes No   Sig: Take 5 mL by mouth every twelve (12) hours as needed for Cough. docusate sodium (COLACE) 50 mg capsule 2017 at Unknown time Self Yes Yes   Sig: Take 50 mg by mouth nightly. lisinopril (PRINIVIL, ZESTRIL) 30 mg tablet 2017 at Unknown time Self No Yes   Sig: Take 1 Tab by mouth daily. mometasone (NASONEX) 50 mcg/actuation nasal spray 2017 at Unknown time Self Yes Yes   Si Sprays by Both Nostrils route daily as needed (allergies). omeprazole (PRILOSEC) 20 mg capsule 2017 at Unknown time Self No Yes   Sig: Take 1 Cap by mouth daily. oxymetazoline (VICKS SINEX 12-HOUR) 0.05 % nasal spray 2017 at Unknown time Self Yes Yes   Si [place] by Both Nostrils route two (2) times daily as needed for Congestion.    tamsulosin (FLOMAX) 0.4 mg capsule 2017 at Unknown time Self No Yes   Sig: take 1 capsule by mouth once daily      Facility-Administered Medications: None       CURRENT MEDS  Current Facility-Administered Medications   Medication Dose Route Frequency    aspirin (ASPIRIN) tablet 325 mg  325 mg Oral DAILY    atorvastatin (LIPITOR) tablet 40 mg  40 mg Oral QHS    docusate sodium (COLACE) capsule 100 mg  100 mg Oral QHS    pantoprazole (PROTONIX) tablet 40 mg  40 mg Oral ACB    tamsulosin (FLOMAX) capsule 0.4 mg  0.4 mg Oral DAILY    sodium chloride (NS) flush 5-10 mL  5-10 mL IntraVENous Q8H    enoxaparin (LOVENOX) injection 40 mg  40 mg SubCUTAneous Q24H IMPRESSION:RECOMMENDATIONS  If his MRI scan is normal I do not see the need to repeat his cardiac echo that was done 6 months ago and was normal. The episode was very short and may not have been cerebral ischemia, I agree with Plavix and wonder if a 30 day cardiac monitor would be worthwhile as symptoms were binocular and right and left visual fields, not usual for TIA. Thank you very much for this consultation. We will follow up on the above studies and give further recommendations as indicated. Chris Drummond. Dong Baig MD  Neurologist    This note will not be viewable in [address]

## 2017-11-15 NOTE — DISCHARGE SUMMARY
Hospitalist Discharge Summary     Patient ID:  Onofre Agustin  [id number]  48 y.o.  1945    PCP on record: Moo Ramirez MD    Admit date: 11/14/2017  Discharge date and time: 11/15/2017      DISCHARGE DIAGNOSIS:    TIA  HTN  BPH      CONSULTATIONS:  IP CONSULT TO NEUROLOGY    Excerpted HPI from H&P of Santana Maldonado MD:  CHIEF COMPLAINT: Transient visual disturbance/ unsteady gait/ light headedness     HISTORY OF PRESENT ILLNESS:     Chanel Anglin is a 67 y.o.  male with PMH HTN, HLD, BPH, GERD, and TIA who presents with complaint of a transient visual disturbance, unsteady gait, and light headedness. Reports he had the visual disturbance on Saturday while driving. He reports feeling like things started to become \"slow motion\" with vision blurred in both eyes. Says symptoms resolved within 30 seconds of pulling off the road. Denies associated HA, change in strength or sensation. Similar symptoms in may of this year with negative neurological work up and diagnosis of TIA. Remains on 325mg asa and a statin. Also has noted an unsteady gait x 2 weeks and lightheadedness on standing. Denies falls or LOC. In the ED patient is afebrile with stable vitals. CT head shows \"\"Mild atrophy. Mild deep white matter ischemic change. No confluent infarct or hemorrhage or mass effect. \" ED spoke with teleneurology who recommend observation for further neurological work up  ______________________________________________________________________  DISCHARGE SUMMARY/HOSPITAL COURSE:  for full details see H&P, daily progress notes, labs, consult notes. TIA  -presents with transient visual disturbance, lightheadedness and unsteady gait. Back to baseline  -MRI and MRA all negative  -Echo EF 60% no MR no AS  -not orthostatic (supine 130/82, standing 142/92)  -A1c 5.6, LDL 58 - continue statin  -telemetry monitoring to screen for afib. -neurology input appreciated.   Will stop ASA and switch to Plavix. Recommend 30 day cardiac monitor or loop recorder as OP. He will follow up with PCP for this.       _______________________________________________________________________  Patient seen and examined by me on discharge day. Pertinent Findings:  Gen:    Not in distress  Chest: Clear lungs  CVS:   Regular rhythm. No edema  Abd:  Soft, not distended, not tender  Neuro:  Alert, Oriented x 4, grossly non focal exam  _______________________________________________________________________  DISCHARGE MEDICATIONS:   Current Discharge Medication List      START taking these medications    Details   clopidogrel (PLAVIX) 75 mg tab Take 1 Tab by mouth daily for 60 days. Qty: 30 Tab, Refills: 1         CONTINUE these medications which have NOT CHANGED    Details   tamsulosin (FLOMAX) 0.4 mg capsule take 1 capsule by mouth once daily  Qty: 30 Cap, Refills: 2      oxymetazoline (VICKS SINEX 12-HOUR) 0.05 % nasal spray 1 [place] by Both Nostrils route two (2) times daily as needed for Congestion. acetaminophen (TYLENOL) 500 mg tablet Take 1,000 mg by mouth every six (6) hours as needed for Pain. atorvastatin (LIPITOR) 40 mg tablet take 1 tablet by mouth NIGHTLY  Qty: 90 Tab, Refills: 3      omeprazole (PRILOSEC) 20 mg capsule Take 1 Cap by mouth daily. Qty: 90 Cap, Refills: 1      lisinopril (PRINIVIL, ZESTRIL) 30 mg tablet Take 1 Tab by mouth daily. Qty: 90 Tab, Refills: 1      docusate sodium (COLACE) 50 mg capsule Take 50 mg by mouth nightly.          STOP taking these medications       mometasone (NASONEX) 50 mcg/actuation nasal spray Comments:   Reason for Stopping:         aspirin (ASPIRIN) 325 mg tablet Comments:   Reason for Stopping:         chlorpheniramine-HYDROcodone (TUSSIONEX PENNKINETIC ER) 10-8 mg/5 mL suspension Comments:   Reason for Stopping:         GUAIFENESIN (MUCINEX PO) Comments:   Reason for Stopping:         mometasone (NASONEX) 50 mcg/actuation nasal spray Comments:   Reason for Stopping:               My Recommended Diet, Activity, Wound Care, and follow-up labs are listed in the patient's Discharge Insturctions which I have personally completed and reviewed.     _______________________________________________________________________  DISPOSITION:    Home with Family: x   Home with HH/PT/OT/RN:    SNF/LTC:    ISAEL:    OTHER:        Condition at Discharge:  Stable  _______________________________________________________________________  Follow up with:   PCP : Tree Najera MD  Follow-up Information     Follow up With Details Ctra. Stephanie Zavala MD In 1 week  [address] [place] [address]                Total time in minutes spent coordinating this discharge (includes going over instructions, follow-up, prescriptions, and preparing report for sign off to her PCP) :  35 minutes    Signed:  Milagro Donis MD

## 2017-11-15 NOTE — PROGRESS NOTES
Pharmacy Clarification of Prior to Admission Medication Regimen     The patient was interviewed regarding clarification of the prior to admission medication regimen, and was questioned regarding use of any other inhalers, topical products, over the counter medications, herbal medications, vitamin products or ophthalmic/nasal/otic medication use. Information Obtained From: Patient, RX Query    Pertinent Pharmacy Findings:   Updated patients [facility] [place], [place] - [address]   tamsulosin (FLOMAX) 0.4 mg capsule: Patient stated that he has been out of this agent for 'about a week'. Patient stated that he has an appointment with his doctor this week to discuss if this agent was going to be continued.  chlorpheniramine-HYDROcodone (TUSSIONEX PENNKINETIC ER) 10-8 mg/5 mL suspension: Patient stated that he took this agent 'last week' when he had a bad cough. Patient stated that he had this agent 'left over' from when he was sick 'almost a year ago'.  GUAIFENESIN (MUCINEX PO): Patient stated that he took this agent 'last week' to help with his congestion. Patient stated that his doctor advised him to stop taking this agent on 11/11/2017.  atorvastatin (LIPITOR) 40 mg tablet: Patient stated that he forgot to take this agent last night (11/13/2017). Patient stated that he took this agent this morning (11/14/2017) since he had forgotten to take this agent last night (11/13/2017).  docusate sodium (COLACE) 50 mg capsule: Patient stated that he forgot to take this agent last night (11/13/2017). Patient stated that he took this agent this morning (11/14/2017) since he had forgotten to take this agent last night (11/13/2017). PTA medication list was corrected to the following:     Prior to Admission Medications   Prescriptions Last Dose Informant Patient Reported? Taking?    GUAIFENESIN (MUCINEX PO) 11/9/2017 at Unknown time Self Yes No   Sig: Take 1 Tab by mouth two (2) times daily as needed (congestion). acetaminophen (TYLENOL) 500 mg tablet 2017 at Unknown time Self Yes Yes   Sig: Take 1,000 mg by mouth every six (6) hours as needed for Pain. aspirin (ASPIRIN) 325 mg tablet 2017 at Unknown time Self Yes Yes   Sig: Take 325 mg by mouth daily. atorvastatin (LIPITOR) 40 mg tablet 2017 at Unknown time Self No Yes   Sig: take 1 tablet by mouth NIGHTLY   chlorpheniramine-HYDROcodone (TUSSIONEX PENNKINETIC ER) 10-8 mg/5 mL suspension 2017 at Unknown time Self Yes No   Sig: Take 5 mL by mouth every twelve (12) hours as needed for Cough. docusate sodium (COLACE) 50 mg capsule 2017 at Unknown time Self Yes Yes   Sig: Take 50 mg by mouth nightly. lisinopril (PRINIVIL, ZESTRIL) 30 mg tablet 2017 at Unknown time Self No Yes   Sig: Take 1 Tab by mouth daily. mometasone (NASONEX) 50 mcg/actuation nasal spray 2017 at Unknown time Self Yes Yes   Si Sprays by Both Nostrils route daily as needed (allergies). omeprazole (PRILOSEC) 20 mg capsule 2017 at Unknown time Self No Yes   Sig: Take 1 Cap by mouth daily. oxymetazoline (VICKS SINEX 12-HOUR) 0.05 % nasal spray 2017 at Unknown time Self Yes Yes   Si [place] by Both Nostrils route two (2) times daily as needed for Congestion.    tamsulosin (FLOMAX) 0.4 mg capsule 2017 at Unknown time Self No Yes   Sig: take 1 capsule by mouth once daily      Facility-Administered Medications: None          Thank you,  Pro Yañez Car  Medication History Pharmacy Technician

## 2017-11-15 NOTE — PROGRESS NOTES
Problem: Falls - Risk of  Goal: *Absence of Falls  Document Sonia Fall Risk and appropriate interventions in the flowsheet. Outcome: Progressing Towards Goal  Fall Risk Interventions:            Medication Interventions: Patient to call before getting OOB, Teach patient to arise slowly        Patient without falls at this time. Patient up ad ryan with steady gait. Call bell and bedside table within reach. Bed in lowest position and wheels locked at all times. Problem: TIA/CVA Stroke: 0-24 hours  Goal: *Neurologically stable  Absence of additional neurological deficits     Outcome: Progressing Towards Goal  Patient alert and oriented x4. Goal: *Absence of Signs of Aspiration on Current Diet  Outcome: Progressing Towards Goal  Patient tolerating meals and fluids well. Goal: *Ability to perform ADLs and demonstrates progressive mobility and function  Outcome: Progressing Towards Goal  Patient able to perform ADL's without assistance.

## 2017-11-15 NOTE — PROGRESS NOTES
Hospitalist Progress Note    NAME: Meet Yuan   :  [id number]   MRN:  [id number]     Interim Hospital Summary: 67 y.o. male whom presented on 2017 with      Assessment / Plan:    TIA?  -presents with transient visual disturbance, lightheadedness and unsteady gait  -MRI and MRA pending  -Echo EF 60% no MR no AS  -not orthostatic (supine 130/82, standing 142/92)  -A1c 5.6, LDL 58 - continue statin  -telemetry monitoring to screen for afib  -continue ASA for now. ? Change to plavix  -Neurology consult pending. TIA vs complicated migraines vs seizure. Home when okay with neurology    HTN  -hold ACEi     BPH   -continue flomax    Body mass index is 28.48 kg/(m^2). Code status: Full  Prophylaxis: Lovenox  Recommended Disposition: Home w/Family       Subjective:     Chief Complaint / Reason for Physician Visit  Follow up of possible TIA  Chart reviewed in detail. Discussed with RN events overnight. No further symptoms since admission. Review of Systems:  Symptom Y/N Comments  Symptom Y/N Comments   Fever/Chills    Chest Pain n    Poor Appetite    Edema     Cough    Abdominal Pain n    Sputum    Joint Pain     SOB/BEAR n   Pruritis/Rash     Nausea/vomit    Tolerating PT/OT     Diarrhea    Tolerating Diet     Constipation    Other       Could NOT obtain due to:      PO intake: No data found. Objective:     VITALS:   Last 24hrs VS reviewed since prior progress note.  Most recent are:  Patient Vitals for the past 24 hrs:   Temp Pulse Resp BP SpO2   11/15/17 1233 98.2 °F (36.8 °C) 63 18 124/77 97 %   11/15/17 0748 - 66 - (!) 142/92 -   11/15/17 0745 - 67 - (!) 140/95 -   11/15/17 0742 98.2 °F (36.8 °C) (!) 57 18 130/82 96 %   11/15/17 0300 97.5 °F (36.4 °C) 66 18 114/73 99 %   17 2244 98.3 °F (36.8 °C) 62 18 125/88 98 %   17 2145 - 64 15 109/68 96 %   17 1715 - 66 12 118/74 100 %   17 1700 - 67 14 129/83 100 %   17 6495 - 65 9 123/73 98 %   11/14/17 1600 - 72 16 134/75 99 %   11/14/17 1549 - 72 16 115/79 98 %   11/14/17 1548 - - - 119/72 -   11/14/17 1547 - 70 13 123/71 97 %   11/14/17 1545 - 71 11 121/84 98 %   11/14/17 1427 97.4 °F (36.3 °C) 75 16 152/90 100 %     No intake or output data in the 24 hours ending 11/15/17 1257     PHYSICAL EXAM:  General: WD, WN. Alert, cooperative, no acute distress    EENT:  EOMI. Anicteric sclerae. MMM  Resp:  CTA bilaterally, no wheezing or rales. No accessory muscle use  CV:  Regular  rhythm,  No edema  GI:  Soft, Non distended, Non tender.  +Bowel sounds  Neurologic:  Alert and oriented X 3, normal speech,   Psych:   Good insight. Not anxious nor agitated  Skin:  No rashes. No jaundice    Reviewed most current lab test results and cultures  YES  Reviewed most current radiology test results   YES  Review and summation of old records today    NO  Reviewed patient's current orders and MAR    YES  PMH/ reviewed - no change compared to H&P  ________________________________________________________________________  Care Plan discussed with:    Comments   Patient y    Family  y wife   RN     Care Manager     Consultant                        Multidiciplinary team rounds were held today with , nursing, pharmacist and clinical coordinator. Patient's plan of care was discussed; medications were reviewed and discharge planning was addressed.      ________________________________________________________________________  Total NON critical care TIME:  25   Minutes    Total CRITICAL CARE TIME Spent:   Minutes non procedure based      Comments   >50% of visit spent in counseling and coordination of care x     This includes time during multidisciplinary rounds if indicated above   ________________________________________________________________________  Day Sood MD     Procedures: see electronic medical records for all procedures/Xrays and details which were not copied into this note but were reviewed prior to creation of Plan. LABS:  I reviewed today's most current labs and imaging studies.   Pertinent labs include:  Recent Labs      11/15/17   0248  11/14/17   1518   WBC  6.1  6.5   HGB  11.9*  12.2   HCT  36.3*  36.5*   PLT  241  240     Recent Labs      11/15/17   0248  11/14/17   1518   NA  141  141   K  4.0  3.9   CL  108  106   CO2  24  27   GLU  83  127*   BUN  13  12   CREA  1.03  1.15   CA  9.4  9.3   ALB   --   3.7   TBILI   --   1.1*   SGOT   --   17   ALT   --   31

## 2017-11-15 NOTE — ED NOTES
Bedside and Verbal shift change report received from Nadeem Mejia RN (offgoing nurse) given to Robin Rivera Rn (oncoming nurse). Report included the following information SBAR, Kardex, ED Summary, MAR, Recent Results and Med Rec Status.

## 2017-11-15 NOTE — PROGRESS NOTES
Stroke Education provided to patient and the following topics were discussed    1. Patients personal risk factors for stroke are hypertension, family history and prior stroke    2. Warning signs of Stroke:        * Sudden numbness or weakness of the face, arm or leg, especially on one side of          The body            * Sudden confusion, trouble speaking or understanding        * Sudden trouble seeing in one or both eyes        * Sudden trouble walking, dizziness, loss of balance or coordination        * Sudden severe headache with no known cause      3. Importance of activation Emergency Medical Services ( 9-1-1 ) immediately if experience any warning signs of stroke. 4. Be sure and schedule a follow-up appointment with your primary care doctor or any specialists as instructed. 5. You must take medicine every day to treat your risk factors for stroke. Be sure to take your medicines exactly as your doctor tells you: no more, no less. Know what your medicines are for , what they do. Anti-thrombotics /anticoagulants can help prevent strokes. You are taking the following medicine(s)  asa     6. Smoking and second-hand smoke greatly increase your risk of stroke, cardiovascular disease and death. Smoking history never    7. Information provided was BS Stroke Education Binder, Stroke Handouts or Verbal Education    8. Documentation of teaching completed in Patient Education Activity and on Care Plan with teaching response noted?   no

## 2017-11-15 NOTE — DISCHARGE INSTRUCTIONS
HOSPITALIST DISCHARGE INSTRUCTIONS    NAME: Prosper Freedman   :     MRN:  [id number]     Date/Time:  11/15/2017 1:30 PM    ADMIT DATE: 2017   DISCHARGE DATE: 11/15/2017         · It is important that you take the medication exactly as they are prescribed. · Keep your medication in the bottles provided by the pharmacist and keep a list of the medication names, dosages, and times to be taken in your wallet. · Do not take other medications without consulting your doctor. What to do at [address]:  Cardiac Diet    Recommended activity: Activity as tolerated      If you have questions regarding the hospital related prescriptions or hospital related issues please call [facility] at [phone]. You can always direct your questions to your primary care doctor if you are unable to reach your hospital physician; your PCP works as an extension of your hospital doctor just like your hospital doctor is an extension of your PCP for your time at the hospital [facility])    If you experience any of the following symptoms then please call your primary care physician or return to the emergency room if you cannot get hold of your doctor:    Fever, chills, nausea, vomiting, or persistent diarrhea  Worsening weakness or new problems with your speech or balance  Dark stools or visible blood in your stools  New Leg swelling or shortness of breath as these could be signs of a clot    Additional Instructions: Follow up with your PCP to consider having a cardiac monitor or loop recorder as out patient. May need a cardiology referal           Information obtained by :  I understand that if any problems occur once I am at home I am to contact my physician. I understand and acknowledge receipt of the instructions indicated above. Physician's or R.N.'s Signature                                                                  Date/Time                                                                                                                                              Patient or Representative Signature

## 2017-11-16 ENCOUNTER — OFFICE VISIT (OUTPATIENT)
Dept: FAMILY MEDICINE CLINIC | Age: 72
End: 2017-11-16

## 2017-11-16 VITALS
HEART RATE: 80 BPM | RESPIRATION RATE: 18 BRPM | DIASTOLIC BLOOD PRESSURE: 66 MMHG | HEIGHT: 74 IN | WEIGHT: 221 LBS | TEMPERATURE: 96.1 F | OXYGEN SATURATION: 97 % | SYSTOLIC BLOOD PRESSURE: 99 MMHG | BODY MASS INDEX: 28.36 KG/M2

## 2017-11-16 VITALS
HEART RATE: 68 BPM | WEIGHT: 221.78 LBS | HEIGHT: 74 IN | TEMPERATURE: 98.8 F | RESPIRATION RATE: 16 BRPM | DIASTOLIC BLOOD PRESSURE: 77 MMHG | OXYGEN SATURATION: 95 % | BODY MASS INDEX: 28.46 KG/M2 | SYSTOLIC BLOOD PRESSURE: 118 MMHG

## 2017-11-16 DIAGNOSIS — I20.8 ANGINAL CHEST PAIN AT REST (HCC): ICD-10-CM

## 2017-11-16 DIAGNOSIS — K43.9 ABDOMINAL WALL HERNIA: Primary | ICD-10-CM

## 2017-11-16 PROCEDURE — 99218 HC RM OBSERVATION: CPT

## 2017-11-16 PROCEDURE — 74011250637 HC RX REV CODE- 250/637: Performed by: INTERNAL MEDICINE

## 2017-11-16 RX ORDER — CLOPIDOGREL BISULFATE 75 MG/1
75 TABLET ORAL DAILY
Status: DISCONTINUED | OUTPATIENT
Start: 2017-11-16 | End: 2017-11-16 | Stop reason: HOSPADM

## 2017-11-16 RX ORDER — LISINOPRIL 30 MG/1
15 TABLET ORAL DAILY
Qty: 90 TAB | Refills: 1 | Status: SHIPPED | OUTPATIENT
Start: 2017-11-16 | End: 2018-01-16 | Stop reason: DRUGHIGH

## 2017-11-16 RX ORDER — PROMETHAZINE HYDROCHLORIDE AND CODEINE PHOSPHATE 6.25; 1 MG/5ML; MG/5ML
5 SOLUTION ORAL
Qty: 120 ML | Refills: 0 | Status: SHIPPED | OUTPATIENT
Start: 2017-11-16 | End: 2018-01-31 | Stop reason: ALTCHOICE

## 2017-11-16 RX ORDER — LEVOFLOXACIN 500 MG/1
500 TABLET, FILM COATED ORAL DAILY
Qty: 7 TAB | Refills: 0 | Status: SHIPPED | OUTPATIENT
Start: 2017-11-16 | End: 2017-11-23

## 2017-11-16 RX ADMIN — PANTOPRAZOLE SODIUM 40 MG: 40 TABLET, DELAYED RELEASE ORAL at 06:36

## 2017-11-16 RX ADMIN — TAMSULOSIN HYDROCHLORIDE 0.4 MG: 0.4 CAPSULE ORAL at 08:01

## 2017-11-16 RX ADMIN — LISINOPRIL 30 MG: 5 TABLET ORAL at 08:01

## 2017-11-16 RX ADMIN — Medication 10 ML: at 06:37

## 2017-11-16 RX ADMIN — CLOPIDOGREL BISULFATE 75 MG: 75 TABLET ORAL at 08:01

## 2017-11-16 NOTE — PROGRESS NOTES
Care Management Interventions  PCP Verified by CM: Yes  Last Visit to PCP: 11/14/17  Mode of Transport at Discharge: Other (see comment) (family by car)  Transition of Care Consult (CM Consult): Discharge Planning (pending PT and OT)  MyChart Signup: No  Discharge Durable Medical Equipment: No (no)  Physical Therapy Consult: Yes  Speech Therapy Consult: No  Current Support Network: Lives with Spouse, Own Home (one story home, 3 steps to enter, was independent and driving prior to admission)  Confirm Follow Up Transport: Family (by car)  Plan discussed with Pt/Family/Caregiver: Yes (he is pending MRI, MRA and neuro consult, plans to go home at discharge)  Discharge Location  Discharge Placement: Home   The appointments were made. Jany Campos RN #[id number]

## 2017-11-16 NOTE — PROGRESS NOTES
HISTORY OF PRESENT ILLNESS  Grecia Tena is a 67 y.o. male. HPI   Was patient present for transitional care from the last office visit for which he was presented for dizziness and history of fall with past medical history of TIA hypertension hypercholesterolemia stating that recently has been stressed out unfortunately since patient was at high risk individual he was sent to emergency room for further workup he was seen by specialist ER doctor had negative workup and was sent after a day of admission home to be followed up with primary care physician for further care stating that his problem gone away stating that long trip 40 funerals could be a culprit of his conditions otherwise has no complaint today stating that he is feeling better with couple days of resting has no complaint at this time patient denies leg swelling chest pain has had no loss of consciousness and no history of fall since the discharge patient has had some, chest pain in 2016 stating that I was only one time last year for which he was evaluated by the cardiologist he was not stented he had cardiac catheterization and there was no sign of occlusion decision was made to treat him medically and there was no reason for him to be stented at that time today he denies any chest pain chest pain resolved   otherwise feeling better since the last visit except for the cough and sore throat which is not getting better denies any rash stating that he and his wife had to go out of states and are exposed to many people and could have had some sick person exposures productive yellowish in color did not respond to over-the-counter cold medication    Current Outpatient Prescriptions   Medication Sig Dispense Refill    promethazine-codeine (PHENERGAN WITH CODEINE) 6.25-10 mg/5 mL syrup Take 5 mL by mouth four (4) times daily as needed for Cough.  Max Daily Amount: 20 mL. 120 mL 0    lisinopril (PRINIVIL, ZESTRIL) 30 mg tablet Take 0.5 Tabs by mouth daily. 90 Tab 1    clopidogrel (PLAVIX) 75 mg tab Take 1 Tab by mouth daily for 60 days. 30 Tab 1    tamsulosin (FLOMAX) 0.4 mg capsule take 1 capsule by mouth once daily 30 Cap 2    oxymetazoline (VICKS SINEX 12-HOUR) 0.05 % nasal spray 1 [place] by Both Nostrils route two (2) times daily as needed for Congestion.  acetaminophen (TYLENOL) 500 mg tablet Take 1,000 mg by mouth every six (6) hours as needed for Pain.  atorvastatin (LIPITOR) 40 mg tablet take 1 tablet by mouth NIGHTLY 90 Tab 3    omeprazole (PRILOSEC) 20 mg capsule Take 1 Cap by mouth daily. 90 Cap 1    docusate sodium (COLACE) 50 mg capsule Take 50 mg by mouth nightly.        No Known Allergies  Past Medical History:   Diagnosis Date    Abdominal wall hernia 11/16/2017    Anemia 9/10/2014    Back pain 4/1/2010    Blurry vision, bilateral 11/14/2017    CAD (coronary artery disease)     Calf pain 3/11/2015    Chronic kidney disease     cyst on kidney    Chronic left shoulder pain 10/24/2016    Constipation 6/11/2014    Decreased vision 6/13/2011    Diastasis recti 11/16/2017    Diverticulitis 3/11/2015    Dizziness 11/14/2017    Enlarged LA (left atrium)     Fall at home 3/17/2016    GERD (gastroesophageal reflux disease)     Hemorrhoids 9/10/2014    Hip pain, chronic, right 10/3/2017    HTN (hypertension) 8/24/2010    Hx-TIA (transient ischemic attack)     Hypercholesterolemia 4/1/2010    Hypertension     Irregular heart beat 9/23/2015    Kidney disease     Mitral regurgitation     Need for varicella vaccine 9/10/2014    Other ill-defined conditions(799.89)     heart murmur    Rash 8/24/2010    Rib pain on right side 3/17/2016    Right calf pain 3/11/2015    Risk for falls 4/4/2014    S/P knee replacement 6/11/2014    Screening for depression 4/4/2014    Tinea versicolor 9/10/2014    Urinary frequency 8/24/2010     Past Surgical History:   Procedure Laterality Date    CARDIAC SURG PROCEDURE UNLIST heart cath    HX HEENT      nasal septum repair    HX HEENT      HX HERNIA REPAIR      HX KNEE REPLACEMENT Left     HX ORTHOPAEDIC      left knee surgery    HX ORTHOPAEDIC      HX TONSILLECTOMY  age 5   [de-identified] UROLOGICAL      FL COLONOSCOPY FLX DX W/COLLJ SPEC WHEN PFRMD  4/18/2011          Family History   Problem Relation Age of Onset    COPD Mother     Heart Attack Mother     Hypertension Mother     Heart Disease Mother     COPD Father     Heart Attack Father     Hypertension Father     Heart Disease Father     Cancer Father     Cancer Maternal Grandmother      Social History   Substance Use Topics    Smoking status: Never Smoker    Smokeless tobacco: Never Used    Alcohol use 0.0 oz/week     0 Standard drinks or equivalent per week      Comment: 12 beers, half a fifth a month      Lab Results  Component Value Date/Time   WBC 6.1 11/15/2017 02:48 AM   HGB 11.9 11/15/2017 02:48 AM   HCT 36.3 11/15/2017 02:48 AM   PLATELET 560 79/95/9137 02:48 AM   MCV 91.7 11/15/2017 02:48 AM     Lab Results  Component Value Date/Time   Hemoglobin A1c 5.6 11/15/2017 02:48 AM   Hemoglobin A1c 5.5 05/02/2017 03:56 AM   Hemoglobin A1c 5.9 05/13/2014 09:30 AM   Glucose 83 11/15/2017 02:48 AM   LDL, calculated 58.6 11/15/2017 02:48 AM   Creatinine (POC) 0.8 03/29/2011 09:23 AM   Creatinine 1.03 11/15/2017 02:48 AM      Lab Results  Component Value Date/Time   Cholesterol, total 119 11/15/2017 02:48 AM   HDL Cholesterol 40 11/15/2017 02:48 AM   LDL, calculated 58.6 11/15/2017 02:48 AM   Triglyceride 102 11/15/2017 02:48 AM   CHOL/HDL Ratio 3.0 11/15/2017 02:48 AM   Lab Results  Component Value Date/Time   ALT (SGPT) 31 11/14/2017 03:18 PM   AST (SGOT) 17 11/14/2017 03:18 PM   Alk.  phosphatase 89 11/14/2017 03:18 PM   Bilirubin, total 1.1 11/14/2017 03:18 PM   Albumin 3.7 11/14/2017 03:18 PM   Protein, total 7.3 11/14/2017 03:18 PM   INR 1.3 05/22/2014 03:30 AM   Prothrombin time 13.6 05/22/2014 03:30 AM   PLATELET 462 11/15/2017 02:48 AM       Lab Results  Component Value Date/Time   GFR est non-AA >60 11/15/2017 02:48 AM   GFRNA, POC >60 03/29/2011 09:23 AM   GFR est AA >60 11/15/2017 02:48 AM   GFRAA, POC >60 03/29/2011 09:23 AM   Creatinine 1.03 11/15/2017 02:48 AM   Creatinine (POC) 0.8 03/29/2011 09:23 AM   BUN 13 11/15/2017 02:48 AM   Sodium 141 11/15/2017 02:48 AM   Potassium 4.0 11/15/2017 02:48 AM   Chloride 108 11/15/2017 02:48 AM   CO2 24 11/15/2017 02:48 AM   Magnesium 1.9 01/10/2010 08:05 PM                Review of Systems   Constitutional: Positive for malaise/fatigue. Negative for chills and fever. HENT: Positive for congestion and sore throat. Negative for ear pain and nosebleeds. Eyes: Negative for blurred vision, pain and discharge. Respiratory: Positive for cough and sputum production. Negative for shortness of breath. Cardiovascular: Negative for chest pain and leg swelling. Gastrointestinal: Negative for constipation, diarrhea, nausea and vomiting. Genitourinary: Negative for frequency. Musculoskeletal: Negative for joint pain. Skin: Negative for itching and rash. Neurological: Negative for headaches. Psychiatric/Behavioral: Negative for depression. The patient is not nervous/anxious. Physical Exam   Constitutional: He is oriented to person, place, and time. He appears well-developed and well-nourished. HENT:   Head: Normocephalic and atraumatic. Mouth/Throat: No oropharyngeal exudate. Eyes: Conjunctivae and EOM are normal.   Neck: Normal range of motion. Neck supple. Cardiovascular: Normal rate, regular rhythm and normal heart sounds. No murmur heard. Pulmonary/Chest: Effort normal and breath sounds normal. No respiratory distress. Abdominal: Soft. Bowel sounds are normal. He exhibits no distension. There is no rebound. Non-reducible abdominal hernia not tender to touch   Musculoskeletal: He exhibits no edema or tenderness.    Neurological: He is alert and oriented to person, place, and time. Skin: Skin is warm. No erythema. Psychiatric: He has a normal mood and affect. His behavior is normal.   Nursing note and vitals reviewed. ASSESSMENT and PLAN  Diagnoses and all orders for this visit:    1. Abdominal wall hernia  -     REFERRAL TO OPTOMETRY  [facility] Reyes [facility]    2. Anginal chest pain at rest [facility])    Other orders  -     levoFLOXacin (LEVAQUIN) 500 mg tablet; Take 1 Tab by mouth daily for 7 days. -     promethazine-codeine (PHENERGAN WITH CODEINE) 6.25-10 mg/5 mL syrup; Take 5 mL by mouth four (4) times daily as needed for Cough. Max Daily Amount: 20 mL. -     lisinopril (PRINIVIL, ZESTRIL) 30 mg tablet; Take 0.5 Tabs by mouth daily.       Patient was told if any chest pain or any recurrency such as 2016 patient need to call immediately go to emergency room he agreed with today's recommendation,

## 2017-11-16 NOTE — PROGRESS NOTES
PCP f/u is scheduled with Dr Quinn Diego for Nov 21 at 12;00pm    A Neuro f/u is scheduled with Sandra Hussein NP for Dec 15 at 11;00am

## 2017-11-16 NOTE — PROGRESS NOTES
Problem: TIA/CVA Stroke: 0-24 hours  Goal: Activity/Safety  Outcome: Progressing Towards Goal  Pt up ad ryan. States dizziness, visual disturbances have resolved. Gait steady. MRI negative.

## 2017-11-16 NOTE — PROGRESS NOTES
DC instructions reviewed with patient. Discussed side effects of meds, f/u appointments all made. Pt verbalizes understanding of all information. Wife on way to transport patient home.

## 2017-11-16 NOTE — PATIENT INSTRUCTIONS
Transient Ischemic Attack: Care Instructions  Your Care Instructions    A transient ischemic attack (TIA) is when blood flow to a part of your brain is blocked for a short time. A TIA is like a stroke but usually lasts only a few minutes. A TIA does not cause lasting brain damage. Any vision problems, slurred speech, or other symptoms usually go away in 10 to 20 minutes. But they may last for up to 24 hours. TIAs are often warning signs of a stroke. Some people who have a TIA may have a stroke in the future. A stroke can cause symptoms like those of a TIA. But a stroke causes lasting damage to your brain. You can take steps to help prevent a stroke. One thing you can do is get early treatment. If you have other new symptoms, or if your symptoms do not get better, go back to the emergency room or call your doctor right away. Getting treatment right away may prevent long-term brain damage caused by a stroke. The doctor has checked you carefully, but problems can develop later. If you notice any problems or new symptoms, get medical treatment right away. Follow-up care is a key part of your treatment and safety. Be sure to make and go to all appointments, and call your doctor if you are having problems. It's also a good idea to know your test results and keep a list of the medicines you take. How can you care for yourself at home? Medicines  ? · Be safe with medicines. Take your medicines exactly as prescribed. Call your doctor if you think you are having a problem with your medicine. ? · If you take a blood thinner, such as aspirin, be sure you get instructions about how to take your medicine safely. Blood thinners can cause serious bleeding problems. ? · Call your doctor if you are not able to take your medicines for any reason.    ? · Do not take any over-the-counter medicines or herbal products without talking to your doctor first.   ? · If you take birth control pills or hormone therapy, talk to your doctor. Ask if these treatments are right for you. ? Lifestyle changes  ? · Do not smoke. If you need help quitting, talk to your doctor about stop-smoking programs and medicines. ? · Be active. If your doctor recommends it, get more exercise. Walking is a good choice. Bit by bit, increase the amount you walk every day. Try for at least 30 minutes on most days of the week. You also may want to swim, bike, or do other activities. ? · Eat heart-healthy foods. These include fruits, vegetables, high-fiber foods, fish, and foods that are low in sodium, saturated fat, and trans fat. ? · Stay at a healthy weight. Lose weight if you need to.   ? · Limit alcohol to 2 drinks a day for men and 1 drink a day for women. ?Staying healthy  ? · Manage other health problems such as diabetes, high blood pressure, and high cholesterol. ? · Get the flu vaccine every year. When should you call for help? Call 911 anytime you think you may need emergency care. For example, call if:  ? · You have new or worse symptoms of a stroke. These may include:  ¨ Sudden numbness, tingling, weakness, or loss of movement in your face, arm, or leg, especially on only one side of your body. ¨ Sudden vision changes. ¨ Sudden trouble speaking. ¨ Sudden confusion or trouble understanding simple statements. ¨ Sudden problems with walking or balance. ¨ A sudden, severe headache that is different from past headaches. Call 911 even if these symptoms go away in a few minutes. ? · You feel like you are having another TIA. ? Watch closely for changes in your health, and be sure to contact your doctor if you have any problems. Where can you learn more? Go to http://nico-millicent.info/. Enter (19) 1965 2661 in the search box to learn more about \"Transient Ischemic Attack: Care Instructions. \"  Current as of: March 20, 2017  Content Version: 11.4  © 6671-4968 [employer], [employer].  Care instructions adapted under license by [employer] (which disclaims liability or warranty for this information). If you have questions about a medical condition or this instruction, always ask your healthcare professional. [address] any warranty or liability for your use of this information.

## 2017-11-16 NOTE — ROUTINE PROCESS
* No surgery found *  * No surgery found *  Bedside shift change report given to Darrian Alan (oncoming nurse) by Rachael Piper (offgoing nurse). Report included the following information [facility] Phone:   [phone]      Significant changes during shift:  None.   Symptoms resolved        Patient Information    Nic Shabazz  67 y.o.  11/14/2017  3:19 PM by Jase Blank MD. Nic Shabazz was admitted from Home    Problem List    Patient Active Problem List    Diagnosis Date Noted    Dizziness 11/14/2017    Blurry vision, bilateral 11/14/2017    Hip pain, chronic, right 10/03/2017    TIA (transient ischemic attack) 05/02/2017    Acute nasopharyngitis 02/21/2017    Anginal chest pain at rest [facility]) 10/24/2016    Chronic left shoulder pain 10/24/2016    Fall at home 03/17/2016    Rib pain on right side 03/17/2016    Enlarged LA (left atrium)     Mitral regurgitation     Irregular heart beat 09/23/2015    Diverticulitis 03/11/2015    Calf pain 03/11/2015    Right calf pain 03/11/2015    Need for varicella vaccine 09/10/2014    Anemia 09/10/2014    Tinea versicolor 09/10/2014    Hemorrhoids 09/10/2014    S/P knee replacement 06/11/2014    Constipation 06/11/2014    Risk for falls 04/04/2014    Screening for depression 04/04/2014    Osteopenia 04/04/2014    ED (erectile dysfunction) 07/16/2012    Otitis externa of right ear 04/11/2012    Acute bronchitis 02/03/2012    Knee pain, left 11/28/2011    Tick bite 06/13/2011    Decreased vision 06/13/2011    Foot pain, right 01/24/2011    Acute sinusitis 01/24/2011    Corn 01/24/2011    HTN (hypertension) 08/24/2010    Rash 08/24/2010    Urinary frequency 08/24/2010    Back pain 04/01/2010    Hypercholesterolemia 04/01/2010     Past Medical History:   Diagnosis Date    Anemia 9/10/2014    Back pain 4/1/2010    Blurry vision, bilateral 11/14/2017    CAD (coronary artery disease)     Calf pain 3/11/2015    Chronic kidney disease cyst on kidney    Chronic left shoulder pain 10/24/2016    Constipation 6/11/2014    Decreased vision 6/13/2011    Diverticulitis 3/11/2015    Dizziness 11/14/2017    Enlarged LA (left atrium)     Fall at home 3/17/2016    GERD (gastroesophageal reflux disease)     Hemorrhoids 9/10/2014    Hip pain, chronic, right 10/3/2017    HTN (hypertension) 8/24/2010    Hx-TIA (transient ischemic attack)     Hypercholesterolemia 4/1/2010    Hypertension     Irregular heart beat 9/23/2015    Kidney disease     Mitral regurgitation     Need for varicella vaccine 9/10/2014    Other ill-defined conditions(799.89)     heart murmur    Rash 8/24/2010    Rib pain on right side 3/17/2016    Right calf pain 3/11/2015    Risk for falls 4/4/2014    S/P knee replacement 6/11/2014    Screening for depression 4/4/2014    Tinea versicolor 9/10/2014    Urinary frequency 8/24/2010         Core Measures:    CVA: Yes Yes  CHF:No No  PNA:No No    Post Op Surgical (If Applicable): Activity Status:    OOB to Chair No  Ambulated this shift Yes   Bed Rest No    Supplemental O2: (If Applicable)    NC No  NRB No  Venti-mask No      LINES AND DRAINS:    No lines or drains except peripheral IV    DVT prophylaxis:    DVT prophylaxis Med- Yes  DVT prophylaxis SCD or RAFA- No     Wounds: (If Applicable)    Wounds- No      Patient Safety:    Falls Score Total Score: 1  Safety Level_______  Bed Alarm On? No  Sitter?  No    Plan for upcoming shift: Possible discharge        Discharge Plan: Yes Home    Active Consults:  IP CONSULT TO NEUROLOGY

## 2017-11-16 NOTE — MR AVS SNAPSHOT
Visit Information Date & Time Provider Department Dept. Phone Encounter #  
 11/16/2017 12:00 PM Leonides Bradford MD [address] OFFICE-ANNEX [phone] [id number] Your Appointments 12/15/2017 11:00 AM  
New Patient with Ximena Mccauley, NP Neurology Clinic at [address]) Appt Note: Observation/[facility]/TIA/ d/c 11/16/2017  
 [facility], 
[address] [place] [address], [address] [address]  
[address] , [address], [address] [address]  
  
    
 12/26/2017  9:20 AM  
Follow Up with Hudson Stringer MD  
[address] [place] [address] Neurology Clinic [address] [place] [address]) Appt Note: f/up get medications adjusted [address] [address]  
[facility] [address] [facility] [address]  
  
    
 1/4/2018  1:45 PM  
ANNUAL with Afua Chan MD  
[facility] [address] [place] [address]) Appt Note: Per Dr. Enrike Metcalf [facility]  
[phone] [address]  
  
    
  
 11/21/2017 12:00 PM  
TRANSITIONAL CARE MANAGEMENT with Leonides Bradford MD  
[address] OFFICE-ANNEX ([facility]) Appt Note: raquel  TIA  
 [address] [id number] [phone] [address] [facility] [address] Upcoming Health Maintenance Date Due  
 GLAUCOMA SCREENING Q2Y 8/12/2016 Pneumococcal 65+ Low/Medium Risk (2 of 2 - PPSV23) 11/21/2017 MEDICARE YEARLY EXAM 4/25/2018 FOBT Q 1 YEAR AGE 50-75 4/26/2018 DTaP/Tdap/Td series (2 - Td) 10/1/2023 Allergies as of 11/16/2017  Review Complete On: 11/16/2017 By: Indu Serna LPN No Known Allergies Current Immunizations  Reviewed on 4/24/2017 Name Date Influenza High Dose Vaccine PF 9/14/2016, 9/23/2015, 9/10/2014 Influenza Vaccine 9/25/2017 Influenza Vaccine (Quadrivalent) 9/27/2017 Influenza Vaccine PF 10/1/2013 Influenza Vaccine Split 9/29/2011 PREVNAR 7 11/14/2012 Pneumococcal Conjugate (PCV-13) 11/21/2016 Tdap 10/1/2013 Zoster Vaccine, Live 11/5/2013 Not reviewed this visit You Were Diagnosed With   
  
 Codes Comments Abdominal wall hernia    -  Primary ICD-10-CM: K43.9 ICD-9-CM: 553.20 Vitals BP Pulse Temp Resp Height(growth percentile) Weight(growth percentile) 99/66 80 96.1 °F (35.6 °C) (Oral) 18 6' 2\" (1.88 m) 221 lb (100.2 kg) SpO2 BMI Smoking Status 97% 28.37 kg/m2 Never Smoker Vitals History BMI and BSA Data Body Mass Index Body Surface Area  
 28.37 kg/m 2 2.29 m 2 Preferred Pharmacy Pharmacy Name Phone [id number] [address]  [place] [phone] Your Updated Medication List  
  
   
This list is accurate as of: 11/16/17  1:44 PM.  Always use your most recent med list.  
  
  
  
  
 acetaminophen 500 mg tablet Commonly known as:  TYLENOL Take 1,000 mg by mouth every six (6) hours as needed for Pain. atorvastatin 40 mg tablet Commonly known as:  LIPITOR  
take 1 tablet by mouth NIGHTLY  
  
 clopidogrel 75 mg Tab Commonly known as:  PLAVIX Take 1 Tab by mouth daily for 60 days. COLACE 50 mg capsule Generic drug:  docusate sodium Take 50 mg by mouth nightly. levoFLOXacin 500 mg tablet Commonly known as:  Duaine Hurter Take 1 Tab by mouth daily for 7 days. lisinopril 30 mg tablet Commonly known as:  Carina Loss Take 0.5 Tabs by mouth daily. omeprazole 20 mg capsule Commonly known as:  PRILOSEC Take 1 Cap by mouth daily. promethazine-codeine 6.25-10 mg/5 mL syrup Commonly known as:  PHENERGAN with CODEINE Take 5 mL by mouth four (4) times daily as needed for Cough. Max Daily Amount: 20 mL. tamsulosin 0.4 mg capsule Commonly known as:  FLOMAX  
take 1 capsule by mouth once daily VICKS SINEX 12-HOUR 0.05 % nasal spray Generic drug:  oxymetazoline 1 [place] by Both Nostrils route two (2) times daily as needed for Congestion. Prescriptions Printed Refills  
 levoFLOXacin (LEVAQUIN) 500 mg tablet 0 Sig: Take 1 Tab by mouth daily for 7 days. Class: Print Route: Oral  
 promethazine-codeine (PHENERGAN WITH CODEINE) 6.25-10 mg/5 mL syrup 0 Sig: Take 5 mL by mouth four (4) times daily as needed for Cough. Max Daily Amount: 20 mL. Class: Print Route: Oral  
 lisinopril (PRINIVIL, ZESTRIL) 30 mg tablet 1 Sig: Take 0.5 Tabs by mouth daily. Class: Print Route: Oral  
  
We Performed the Following REFERRAL TO GENERAL SURGERY [REF27 Custom] REFERRAL TO OPTOMETRY [id number] Custom] Comments:  
 Please evaluate patient for glaucoma screening Referral Information Referral ID Referred By Referred To  
  
 [id number] LIZZIE HAMMONDS MD   
   [address] [place] [address] Phone: [phone] Fax: [phone] Visits Status Start Date End Date 1 New Request 11/16/17 11/16/18 If your referral has a status of pending review or denied, additional information will be sent to support the outcome of this decision. Referral ID Referred By Referred To  
 [id number] Uma Bates MD  
   [address] [place] [address] Phone: [phone] Fax: [phone] Visits Status Start Date End Date 1 New Request 11/16/17 11/16/18 If your referral has a status of pending review or denied, additional information will be sent to support the outcome of this decision. Patient Instructions Transient Ischemic Attack: Care Instructions Your Care Instructions A transient ischemic attack (TIA) is when blood flow to a part of your brain is blocked for a short time. A TIA is like a stroke but usually lasts only a few minutes. A TIA does not cause lasting brain damage. Any vision problems, slurred speech, or other symptoms usually go away in 10 to 20 minutes. But they may last for up to 24 hours. TIAs are often warning signs of a stroke. Some people who have a TIA may have a stroke in the future. A stroke can cause symptoms like those of a TIA. But a stroke causes lasting damage to your brain. You can take steps to help prevent a stroke. One thing you can do is get early treatment. If you have other new symptoms, or if your symptoms do not get better, go back to the emergency room or call your doctor right away. Getting treatment right away may prevent long-term brain damage caused by a stroke. The doctor has checked you carefully, but problems can develop later. If you notice any problems or new symptoms, get medical treatment right away. Follow-up care is a key part of your treatment and safety. Be sure to make and go to all appointments, and call your doctor if you are having problems. It's also a good idea to know your test results and keep a list of the medicines you take. How can you care for yourself at home? Medicines ? · Be safe with medicines. Take your medicines exactly as prescribed. Call your doctor if you think you are having a problem with your medicine. ? · If you take a blood thinner, such as aspirin, be sure you get instructions about how to take your medicine safely. Blood thinners can cause serious bleeding problems. ? · Call your doctor if you are not able to take your medicines for any reason. ? · Do not take any over-the-counter medicines or herbal products without talking to your doctor first.  
? · If you take birth control pills or hormone therapy, talk to your doctor. Ask if these treatments are right for you. ? Lifestyle changes ? · Do not smoke.  If you need help quitting, talk to your doctor about stop-smoking programs and medicines. ? · Be active. If your doctor recommends it, get more exercise. Walking is a good choice. Bit by bit, increase the amount you walk every day. Try for at least 30 minutes on most days of the week. You also may want to swim, bike, or do other activities. ? · Eat heart-healthy foods. These include fruits, vegetables, high-fiber foods, fish, and foods that are low in sodium, saturated fat, and trans fat. ? · Stay at a healthy weight. Lose weight if you need to.  
? · Limit alcohol to 2 drinks a day for men and 1 drink a day for women. ?Staying healthy ? · Manage other health problems such as diabetes, high blood pressure, and high cholesterol. ? · Get the flu vaccine every year. When should you call for help? Call 911 anytime you think you may need emergency care. For example, call if: 
? · You have new or worse symptoms of a stroke. These may include: 
¨ Sudden numbness, tingling, weakness, or loss of movement in your face, arm, or leg, especially on only one side of your body. ¨ Sudden vision changes. ¨ Sudden trouble speaking. ¨ Sudden confusion or trouble understanding simple statements. ¨ Sudden problems with walking or balance. ¨ A sudden, severe headache that is different from past headaches. Call 911 even if these symptoms go away in a few minutes. ? · You feel like you are having another TIA. ? Watch closely for changes in your health, and be sure to contact your doctor if you have any problems. Where can you learn more? Go to http://nico-millicent.info/. Enter ([phone] 7016 4606 in the search box to learn more about \"Transient Ischemic Attack: Care Instructions. \" Current as of: March 20, 2017 Content Version: 11.4 © 1335-1374 Pubster. Care instructions adapted under license by [employer] (which disclaims liability or warranty for this information).  If you have questions about a medical condition or this instruction, always ask your healthcare professional. [address] any warranty or liability for your use of this information. Introducing [facility] & HEALTH SERVICES! Dear Whitney Mccormick: Thank you for requesting a Last 2 Left account. Our records indicate that you already have an active Last 2 Left account. You can access your account anytime at [email or web address] Did you know that you can access your hospital and ER discharge instructions at any time in Last 2 Left? You can also review all of your test results from your hospital stay or ER visit. Additional Information If you have questions, please visit the Frequently Asked Questions section of the Last 2 Left website at [email or web address] Remember, Last 2 Left is NOT to be used for urgent needs. For medical emergencies, dial 911. Now available from your iPhone and Android! Please provide this summary of care documentation to your next provider. Your primary care clinician is listed as Mauricio Martinez. If you have any questions after today's visit, please call [phone].

## 2017-11-16 NOTE — PROGRESS NOTES
Chief Complaint   Patient presents with   [facility] Follow Up     TIA     1. Have you been to the ER, urgent care clinic since your last visit? Hospitalized since your last visit? Yes   ED [facility] 11/14/17  TIA  Discharged 11/16/17     2. Have you seen or consulted any other health care providers outside of the [address] [place] [address] since your last visit? Include any pap smears or colon screening.  No

## 2017-11-22 ENCOUNTER — OFFICE VISIT (OUTPATIENT)
Dept: SURGERY | Age: 72
End: 2017-11-22

## 2017-11-22 VITALS
DIASTOLIC BLOOD PRESSURE: 80 MMHG | SYSTOLIC BLOOD PRESSURE: 140 MMHG | RESPIRATION RATE: 18 BRPM | BODY MASS INDEX: 27.21 KG/M2 | TEMPERATURE: 97.5 F | HEIGHT: 74 IN | HEART RATE: 79 BPM | OXYGEN SATURATION: 98 % | WEIGHT: 212 LBS

## 2017-11-22 DIAGNOSIS — M62.08 DIASTASIS RECTI: Primary | ICD-10-CM

## 2017-11-22 NOTE — MR AVS SNAPSHOT
Visit Information Date & Time Provider Department Dept. Phone Encounter #  
 11/22/2017  9:00 AM Kevin Contreras Sharonailia 137 292 [phone] [id number] Your Appointments 12/26/2017  9:20 AM  
Follow Up with Cindi Kamara MD  
[facility] Neurology Clinic [address] [facility]) Appt Note: f/up get medications adjusted [address] [facility] [address] [address]  
[facility] [address] [facility] [address]  
  
    
 1/4/2018  1:45 PM  
ANNUAL with [address], MD  
[facility] [address] [facility]) Appt Note: Per Dr. Princess Holm [facility]  
[phone] [address] [facility] Upcoming Health Maintenance Date Due  
 GLAUCOMA SCREENING Q2Y 8/12/2016 Pneumococcal 65+ Low/Medium Risk (2 of 2 - PPSV23) 11/21/2017 MEDICARE YEARLY EXAM 4/25/2018 FOBT Q 1 YEAR AGE 50-75 4/26/2018 DTaP/Tdap/Td series (2 - Td) 10/1/2023 Allergies as of 11/22/2017  Review Complete On: 11/22/2017 By: Kevin Contreras MD  
 No Known Allergies Current Immunizations  Reviewed on 4/24/2017 Name Date Influenza High Dose Vaccine PF 9/14/2016, 9/23/2015, 9/10/2014 Influenza Vaccine 9/25/2017 Influenza Vaccine (Quadrivalent) 9/27/2017 Influenza Vaccine PF 10/1/2013 Influenza Vaccine Split 9/29/2011 PREVNAR 7 11/14/2012 Pneumococcal Conjugate (PCV-13) 11/21/2016 Tdap 10/1/2013 Zoster Vaccine, Live 11/5/2013 Not reviewed this visit You Were Diagnosed With   
  
 Codes Comments Diastasis recti    -  Primary ICD-10-CM: M62.08 
ICD-9-CM: 728.84 Vitals BP Pulse Temp Resp Height(growth percentile) Weight(growth percentile) 140/80 (BP 1 Location: Left arm, BP Patient Position: Sitting) 79 97.5 °F (36.4 °C) (Oral) 18 6' 2\" (1.88 m) 212 lb (96.2 kg) SpO2 BMI Smoking Status 98% 27.22 kg/m2 Never Smoker BMI and BSA Data Body Mass Index Body Surface Area  
 27.22 kg/m 2 2.24 m 2 Your Updated Medication List  
  
   
This list is accurate as of: 11/22/17  9:39 AM.  Always use your most recent med list.  
  
  
  
  
 acetaminophen 500 mg tablet Commonly known as:  TYLENOL Take 1,000 mg by mouth every six (6) hours as needed for Pain. atorvastatin 40 mg tablet Commonly known as:  LIPITOR  
take 1 tablet by mouth NIGHTLY  
  
 clopidogrel 75 mg Tab Commonly known as:  PLAVIX Take 1 Tab by mouth daily for 60 days. COLACE 50 mg capsule Generic drug:  docusate sodium Take 50 mg by mouth nightly. levoFLOXacin 500 mg tablet Commonly known as:  Michelle Oar Take 1 Tab by mouth daily for 7 days. lisinopril 30 mg tablet Commonly known as:  B[place] Economy Take 0.5 Tabs by mouth daily. omeprazole 20 mg capsule Commonly known as:  PRILOSEC Take 1 Cap by mouth daily. promethazine-codeine 6.25-10 mg/5 mL syrup Commonly known as:  PHENERGAN with CODEINE Take 5 mL by mouth four (4) times daily as needed for Cough. Max Daily Amount: 20 mL.  
  
 tamsulosin 0.4 mg capsule Commonly known as:  FLOMAX  
take 1 capsule by mouth once daily VICKS SINEX 12-HOUR 0.05 % nasal spray Generic drug:  oxymetazoline 1 [place] by Both Nostrils route two (2) times daily as needed for Congestion. Introducing [facility] & HEALTH SERVICES! Dear Marco Man: Thank you for requesting a Breakout Studios account. Our records indicate that you already have an active Breakout Studios account. You can access your account anytime at [email or web address] Did you know that you can access your hospital and ER discharge instructions at any time in Breakout Studios? You can also review all of your test results from your hospital stay or ER visit. Additional Information If you have questions, please visit the Frequently Asked Questions section of the SpotOnWayhart website at [email or web address] Remember, Eqvilibria is NOT to be used for urgent needs. For medical emergencies, dial 911. Now available from your iPhone and Android! Please provide this summary of care documentation to your next provider. Your primary care clinician is listed as Zacarias Valle. If you have any questions after today's visit, please call [phone].

## 2017-11-22 NOTE — PROGRESS NOTES
Surgery Consult    Subjective:      Grecia Tena is a 67 y.o.  male who was referred by Dr Devon Gomez for evaluation of a ventral hernia. The pt states that he was laying on the dock with his dog last month and noticed that his stomach was abnormally distended; he denies experiencing any pain from it and has been able to palpate it himself without any issue. He has noticed that it tends to protrude most when he sits up. Additionally, the pt states that he had a TIA a couple of weeks ago and wasn't sure whether it was a stroke or not. Dr Delaney Regalado is his neurologist (and was the neurologist for his father as well) and informed the pt that his brain was smaller now when compared to a previous brain scan. FMHx:  Father also had a history of TIAs and the pt states that Dr Delaney Regalado was his neurologist as well. PMHx:  Had hernias before and remembers how long it took him to recover before. The pt also claims that he caused himself to have an abdominal wall hernia previously; while he was hanging something up to suspend large deer, he kicked out the milk crate from under him which resulted in both a broken left shoulder and the hernia.       Chief Complaint   Patient presents with    Ventral Hernia       Patient Active Problem List    Diagnosis Date Noted    Diastasis recti 11/16/2017    Dizziness 11/14/2017    Blurry vision, bilateral 11/14/2017    Hip pain, chronic, right 10/03/2017    TIA (transient ischemic attack) 05/02/2017    Acute nasopharyngitis 02/21/2017    Anginal chest pain at rest [facility]) 10/24/2016    Chronic left shoulder pain 10/24/2016    Fall at home 03/17/2016    Rib pain on right side 03/17/2016    Enlarged LA (left atrium)     Mitral regurgitation     Irregular heart beat 09/23/2015    Diverticulitis 03/11/2015    Calf pain 03/11/2015    Right calf pain 03/11/2015    Need for varicella vaccine 09/10/2014    Anemia 09/10/2014    Tinea versicolor 09/10/2014    Hemorrhoids 09/10/2014    S/P knee replacement 06/11/2014    Constipation 06/11/2014    Risk for falls 04/04/2014    Screening for depression 04/04/2014    Osteopenia 04/04/2014    ED (erectile dysfunction) 07/16/2012    Otitis externa of right ear 04/11/2012    Acute bronchitis 02/03/2012    Knee pain, left 11/28/2011    Tick bite 06/13/2011    Decreased vision 06/13/2011    Foot pain, right 01/24/2011    Acute sinusitis 01/24/2011    Corn 01/24/2011    HTN (hypertension) 08/24/2010    Rash 08/24/2010    Urinary frequency 08/24/2010    Back pain 04/01/2010    Hypercholesterolemia 04/01/2010     Past Medical History:   Diagnosis Date    Abdominal wall hernia 11/16/2017    Anemia 9/10/2014    Back pain 4/1/2010    Blurry vision, bilateral 11/14/2017    CAD (coronary artery disease)     Calf pain 3/11/2015    Chronic kidney disease     cyst on kidney    Chronic left shoulder pain 10/24/2016    Constipation 6/11/2014    Decreased vision 6/13/2011    Diastasis recti 11/16/2017    Diverticulitis 3/11/2015    Dizziness 11/14/2017    Enlarged LA (left atrium)     Fall at home 3/17/2016    GERD (gastroesophageal reflux disease)     Hemorrhoids 9/10/2014    Hip pain, chronic, right 10/3/2017    HTN (hypertension) 8/24/2010    Hx-TIA (transient ischemic attack)     Hypercholesterolemia 4/1/2010    Hypertension     Irregular heart beat 9/23/2015    Kidney disease     Mitral regurgitation     Need for varicella vaccine 9/10/2014    Other ill-defined conditions(799.89)     heart murmur    Rash 8/24/2010    Rib pain on right side 3/17/2016    Right calf pain 3/11/2015    Risk for falls 4/4/2014    S/P knee replacement 6/11/2014    Screening for depression 4/4/2014    Tinea versicolor 9/10/2014    Urinary frequency 8/24/2010      Past Surgical History:   Procedure Laterality Date    CARDIAC SURG PROCEDURE UNLIST      heart cath    HX HEENT      nasal septum repair    HX HEENT      HX HERNIA REPAIR      HX KNEE REPLACEMENT Left     HX ORTHOPAEDIC      left knee surgery    HX ORTHOPAEDIC      HX TONSILLECTOMY  age 5   [de-identified] UROLOGICAL      MO COLONOSCOPY FLX DX W/RAS Thompson 1978 PFRMD  4/18/2011           Social History   Substance Use Topics    Smoking status: Never Smoker    Smokeless tobacco: Never Used    Alcohol use 0.0 oz/week     0 Standard drinks or equivalent per week      Comment: 12 beers, half a fifth a month      Family History   Problem Relation Age of Onset    COPD Mother     Heart Attack Mother     Hypertension Mother     Heart Disease Mother     COPD Father     Heart Attack Father     Hypertension Father     Heart Disease Father     Cancer Father     Cancer Maternal Grandmother       Current Outpatient Prescriptions   Medication Sig    promethazine-codeine (PHENERGAN WITH CODEINE) 6.25-10 mg/5 mL syrup Take 5 mL by mouth four (4) times daily as needed for Cough. Max Daily Amount: 20 mL.  lisinopril (PRINIVIL, ZESTRIL) 30 mg tablet Take 0.5 Tabs by mouth daily.  clopidogrel (PLAVIX) 75 mg tab Take 1 Tab by mouth daily for 60 days.  tamsulosin (FLOMAX) 0.4 mg capsule take 1 capsule by mouth once daily    oxymetazoline (VICKS SINEX 12-HOUR) 0.05 % nasal spray 1 [place] by Both Nostrils route two (2) times daily as needed for Congestion.  acetaminophen (TYLENOL) 500 mg tablet Take 1,000 mg by mouth every six (6) hours as needed for Pain.  atorvastatin (LIPITOR) 40 mg tablet take 1 tablet by mouth NIGHTLY    omeprazole (PRILOSEC) 20 mg capsule Take 1 Cap by mouth daily.  docusate sodium (COLACE) 50 mg capsule Take 50 mg by mouth nightly.  levoFLOXacin (LEVAQUIN) 500 mg tablet Take 1 Tab by mouth daily for 7 days. No current facility-administered medications for this visit.        No Known Allergies     Review of Systems:    A comprehensive review of systems was negative except for that written in the History of Present Illness. Objective:     Visit Vitals    /80 (BP 1 Location: Left arm, BP Patient Position: Sitting)    Pulse 79    Temp 97.5 °F (36.4 °C) (Oral)    Resp 18    Ht 6' 2\" (1.88 m)    Wt 212 lb (96.2 kg)    SpO2 98%    BMI 27.22 kg/m2         Physical Exam:  General appearance: alert, cooperative, no distress, appears stated age  Head: Normocephalic, without obvious abnormality, atraumatic  Neurologic: Grossly normal  Abdomen: Has pronounced diastasis recti with no palpable hernial defect in his abdominal wall. Assessment:       ICD-10-CM ICD-9-CM    1. Diastasis recti M62.08 728.84          Plan:     Pt will keep an eye on things and f/u PRN. Written by harry Peters, as dictated by Kash Bailey MD.    Total face to face time with patient: 12 minutes. Greater than 50% of the time was spent in counseling. Signed By: Kash Bailey MD     November 22, 2017

## 2017-11-22 NOTE — PROGRESS NOTES
1. Have you been to the ER, urgent care clinic since your last visit? Hospitalized since your last visit? Yes- [facility] for TIA - in [facility]    2. Have you seen or consulted any other health care providers outside of the [address] [place] [address] since your last visit? Include any pap smears or colon screening.  No

## 2017-12-21 ENCOUNTER — TELEPHONE (OUTPATIENT)
Dept: NEUROLOGY | Age: 72
End: 2017-12-21

## 2017-12-21 NOTE — TELEPHONE ENCOUNTER
Called to [facility]. appt.w/Dr. Jet Love for 12.26.17 stated that preferred to see Dr. Jet Love and No Nurse Practitioner, but she wants to come sooner. I still explained January 30th is next available I have. But she preferred a Call Back because she stated that they always see Dr. Jet Love instead.

## 2018-01-04 ENCOUNTER — OFFICE VISIT (OUTPATIENT)
Dept: CARDIOLOGY CLINIC | Age: 73
End: 2018-01-04

## 2018-01-04 VITALS
DIASTOLIC BLOOD PRESSURE: 70 MMHG | OXYGEN SATURATION: 97 % | HEIGHT: 74 IN | WEIGHT: 222.1 LBS | RESPIRATION RATE: 16 BRPM | HEART RATE: 70 BPM | BODY MASS INDEX: 28.5 KG/M2 | SYSTOLIC BLOOD PRESSURE: 120 MMHG

## 2018-01-04 DIAGNOSIS — I34.0 NON-RHEUMATIC MITRAL REGURGITATION: ICD-10-CM

## 2018-01-04 DIAGNOSIS — E78.00 HYPERCHOLESTEROLEMIA: ICD-10-CM

## 2018-01-04 DIAGNOSIS — G45.9 TRANSIENT CEREBRAL ISCHEMIA, UNSPECIFIED TYPE: ICD-10-CM

## 2018-01-04 DIAGNOSIS — I10 ESSENTIAL HYPERTENSION: Primary | ICD-10-CM

## 2018-01-04 RX ORDER — CLOPIDOGREL BISULFATE 75 MG/1
75 TABLET ORAL DAILY
Qty: 30 TAB | Refills: 0 | Status: SHIPPED | OUTPATIENT
Start: 2018-01-04 | End: 2018-01-30 | Stop reason: SDUPTHER

## 2018-01-04 NOTE — PROGRESS NOTES
1. Have you been to the ER, urgent care clinic since your last visit? Hospitalized since your last visit? Yes When: 11/2017 TIA/ [facility]    2. Have you seen or consulted any other health care providers outside of the [address] [place] [address] since your last visit? Include any pap smears or colon screening. No     Patient requesting refill on Plavix he has no cardiac complaints.

## 2018-01-04 NOTE — PROGRESS NOTES
Maxime Clayton DNP, ANP-BC  Subjective/HPI:     Ofe Gale is a 67 y.o. male is here for routine f/u. The patient denies chest pain/ shortness of breath, orthopnea, PND, LE edema, palpitations, syncope, presyncope or fatigue. Since last visit, patient had TIA, neurology following Dr. Angelo Thomson. Patient was able to move snow today without chest pain dyspnea on exertion, 3 months of hunting climbing tree stands without difficulty. SUMMARY:  Left ventricle: Systolic function was normal. Ejection fraction was  estimated in the range of 55 % to 60 %. No obvious wall motion  abnormalities identified in the views obtained. INDICATIONS: CVA/TIA. PROCEDURE: The study included complete 2D imaging, M-mode, complete  spectral Doppler, and color Doppler. Systolic blood pressure was 114 mmHg,  at the start of the study. Diastolic blood pressure was 73 mmHg, at the  start of the study. Image quality was adequate. LEFT VENTRICLE: Size was normal. Systolic function was normal. Ejection  fraction was estimated in the range of 55 % to 60 %. No obvious wall  motion abnormalities identified in the views obtained. Wall thickness was  normal. DOPPLER: There was an increased relative contribution of atrial  contraction to ventricular filling. RIGHT VENTRICLE: The size was normal. Systolic function was normal. Wall  thickness was normal.    LEFT ATRIUM: Size was normal.    RIGHT ATRIUM: Size was normal.    MITRAL VALVE: Normal valve structure. DOPPLER: There was no significant  regurgitation. AORTIC VALVE: The valve was trileaflet. Leaflets exhibited normal  thickness and normal cuspal separation. DOPPLER: Transaortic velocity was  within the normal range. There was no stenosis. There was no regurgitation. TRICUSPID VALVE: Normal valve structure. DOPPLER: There was no significant  regurgitation. Pulmonary artery systolic pressure was within the normal  range.  Pulmonary artery systolic pressure: 20 mmHg.    PULMONIC VALVE: Leaflets exhibited normal thickness, no calcification, and  normal cuspal separation. DOPPLER: The transpulmonic velocity was within  the normal range. There was no regurgitation. AORTA: The root exhibited normal size.       PCP Provider  Linda Brandt MD  Past Medical History:   Diagnosis Date    Abdominal wall hernia 11/16/2017    Anemia 9/10/2014    Back pain 4/1/2010    Blurry vision, bilateral 11/14/2017    CAD (coronary artery disease)     Calf pain 3/11/2015    Chronic kidney disease     cyst on kidney    Chronic left shoulder pain 10/24/2016    Constipation 6/11/2014    Decreased vision 6/13/2011    Diastasis recti 11/16/2017    Diverticulitis 3/11/2015    Dizziness 11/14/2017    Enlarged LA (left atrium)     Fall at home 3/17/2016    GERD (gastroesophageal reflux disease)     Hemorrhoids 9/10/2014    Hip pain, chronic, right 10/3/2017    HTN (hypertension) 8/24/2010    Hx-TIA (transient ischemic attack)     Hypercholesterolemia 4/1/2010    Hypertension     Irregular heart beat 9/23/2015    Kidney disease     Mitral regurgitation     Need for varicella vaccine 9/10/2014    Other ill-defined conditions(799.89)     heart murmur    Rash 8/24/2010    Rib pain on right side 3/17/2016    Right calf pain 3/11/2015    Risk for falls 4/4/2014    S/P knee replacement 6/11/2014    Screening for depression 4/4/2014    Tinea versicolor 9/10/2014    Urinary frequency 8/24/2010      Past Surgical History:   Procedure Laterality Date    CARDIAC SURG PROCEDURE UNLIST      heart cath    HX HEENT      nasal septum repair    HX HEENT      HX HERNIA REPAIR      HX KNEE REPLACEMENT Left     HX ORTHOPAEDIC      left knee surgery    HX ORTHOPAEDIC      HX TONSILLECTOMY  age 5    HX UROLOGICAL      OH COLONOSCOPY FLX DX W/COLLJ SPEC WHEN PFRMD  4/18/2011          No Known Allergies   Family History   Problem Relation Age of Onset    COPD Mother     Heart Attack Mother     Hypertension Mother     Heart Disease Mother     COPD Father     Heart Attack Father     Hypertension Father     Heart Disease Father     Cancer Father     Cancer Maternal Grandmother       Current Outpatient Prescriptions   Medication Sig    clopidogrel (PLAVIX) 75 mg tab Take 1 Tab by mouth daily for 30 days. Refills per Neurology Dr Aditi Rivera promethazine-codeine (PHENERGAN WITH CODEINE) 6.25-10 mg/5 mL syrup Take 5 mL by mouth four (4) times daily as needed for Cough. Max Daily Amount: 20 mL.  lisinopril (PRINIVIL, ZESTRIL) 30 mg tablet Take 0.5 Tabs by mouth daily.  tamsulosin (FLOMAX) 0.4 mg capsule take 1 capsule by mouth once daily    oxymetazoline (VICKS SINEX 12-HOUR) 0.05 % nasal spray 1 [place] by Both Nostrils route two (2) times daily as needed for Congestion.  acetaminophen (TYLENOL) 500 mg tablet Take 1,000 mg by mouth every six (6) hours as needed for Pain.  atorvastatin (LIPITOR) 40 mg tablet take 1 tablet by mouth NIGHTLY    omeprazole (PRILOSEC) 20 mg capsule Take 1 Cap by mouth daily.  docusate sodium (COLACE) 50 mg capsule Take 50 mg by mouth as needed. No current facility-administered medications for this visit. Vitals:    01/04/18 1323   BP: 120/70   Pulse: 70   Resp: 16   SpO2: 97%   Weight: 222 lb 1.6 oz (100.7 kg)   Height: 6' 2\" (1.88 m)     Social History     Social History    Marital status:      Spouse name: N/A    Number of children: N/A    Years of education: N/A     Occupational History    Not on file.      Social History Main Topics    Smoking status: Never Smoker    Smokeless tobacco: Never Used    Alcohol use 0.0 oz/week     0 Standard drinks or equivalent per week      Comment: 12 beers, half a fifth a month    Drug use: No    Sexual activity: No     Other Topics Concern    Not on file     Social History Narrative    ** Merged History Encounter **            I have reviewed the nurses notes, vitals, problem list, allergy list, medical history, family, social history and medications. Review of Symptoms:    General: Pt denies excessive weight gain or loss. Pt is able to conduct ADL's  HEENT: Denies blurred vision, headaches, epistaxis and difficulty swallowing. Respiratory: Denies shortness of breath, BEAR, wheezing or stridor. Cardiovascular: Denies precordial pain, palpitations, edema or PND  Gastrointestinal: Denies poor appetite, indigestion, abdominal pain or blood in stool  Musculoskeletal: Denies pain or swelling from muscles or joints  Neurologic: Denies tremor, paresthesias, or sensory motor disturbance  Skin: Denies rash, itching or texture change. Physical Exam:      General: Well developed, in no acute distress, cooperative and alert  HEENT: No carotid bruits, no JVD, trach is midline. Neck Supple, PEERL, EOM intact. Heart:  Normal S1/S2 negative S3 or S4. Regular, no murmur, gallop or rub.   Respiratory: Clear bilaterally x 4, no wheezing or rales  Abdomen:   Soft, non-tender, no masses, bowel sounds are active.   Extremities:  No edema, normal cap refill, no cyanosis, atraumatic. Neuro: A&Ox3, speech clear, gait stable. Skin: Skin color is normal. No rashes or lesions.  Non diaphoretic  Vascular: 2+ pulses symmetric in all extremities    Cardiographics    ECG: Sinus rhythm  Results for orders placed or performed during the hospital encounter of 11/14/17   EKG, 12 LEAD, INITIAL   Result Value Ref Range    Ventricular Rate 66 BPM    Atrial Rate 66 BPM    P-R Interval 198 ms    QRS Duration 94 ms    Q-T Interval 398 ms    QTC Calculation (Bezet) 417 ms    Calculated P Axis 36 degrees    Calculated R Axis 23 degrees    Calculated T Axis 24 degrees    Diagnosis       Normal sinus rhythm      Confirmed by Omayra Gauthier ([id number]) on 11/15/2017 4:27:41 PM           Cardiology Labs:  Lab Results   Component Value Date/Time    Cholesterol, total 119 11/15/2017 02:48 AM    HDL Cholesterol 40 11/15/2017 02:48 AM    LDL, calculated 58.6 11/15/2017 02:48 AM    Triglyceride 102 11/15/2017 02:48 AM    CHOL/HDL Ratio 3.0 11/15/2017 02:48 AM       Lab Results   Component Value Date/Time    Sodium 141 11/15/2017 02:48 AM    Potassium 4.0 11/15/2017 02:48 AM    Chloride 108 11/15/2017 02:48 AM    CO2 24 11/15/2017 02:48 AM    Anion gap 9 11/15/2017 02:48 AM    Glucose 83 11/15/2017 02:48 AM    BUN 13 11/15/2017 02:48 AM    Creatinine 1.03 11/15/2017 02:48 AM    BUN/Creatinine ratio 13 11/15/2017 02:48 AM    GFR est AA >60 11/15/2017 02:48 AM    GFR est non-AA >60 11/15/2017 02:48 AM    Calcium 9.4 11/15/2017 02:48 AM    Bilirubin, total 1.1 11/14/2017 03:18 PM    AST (SGOT) 17 11/14/2017 03:18 PM    Alk. phosphatase 89 11/14/2017 03:18 PM    Protein, total 7.3 11/14/2017 03:18 PM    Albumin 3.7 11/14/2017 03:18 PM    Globulin 3.6 11/14/2017 03:18 PM    A-G Ratio 1.0 11/14/2017 03:18 PM    ALT (SGPT) 31 11/14/2017 03:18 PM           Assessment:     Assessment:     Diagnoses and all orders for this visit:    1. Essential hypertension  -     AMB POC EKG ROUTINE W/ 12 LEADS, INTER & REP    2. Hypercholesterolemia    3. Transient cerebral ischemia, unspecified type    4. Non-rheumatic mitral regurgitation    Other orders  -     clopidogrel (PLAVIX) 75 mg tab; Take 1 Tab by mouth daily for 30 days. Refills per Neurology Dr Dolores Jones    1. Essential hypertension I10 401.9 AMB POC EKG ROUTINE W/ 12 LEADS, INTER & REP   2. Hypercholesterolemia E78.00 272.0    3. Transient cerebral ischemia, unspecified type G45.9 435.9    4. Non-rheumatic mitral regurgitation I34.0 424.0      Orders Placed This Encounter    AMB POC EKG ROUTINE W/ 12 LEADS, INTER & REP     Order Specific Question:   Reason for Exam:     Answer:   routine    clopidogrel (PLAVIX) 75 mg tab     Sig: Take 1 Tab by mouth daily for 30 days.  Refills per Neurology Dr Suni Calvillo:  30 Tab     Refill:  0        Plan:     Patient is a 67-year-old male with history of hypertension, hyperlipidemia recently admitted to [facility] [address] for TIA. 1.  Hypertension controlled continue current therapy  2. Hyperlipidemia: LDL at target continue atorvastatin  3. TIA: Refilled Plavix for 30 days as awaiting neurology follow-up. Normal valvular structure on echo with admission for TIA. Follow-up with cardiology in 6 months. Kayleigh Anthony NP    This note was created using voice recognition software. Despite editing, there may be syntax errors. [address] [facility]    1/4/2018         Agree with note as outlined by  NP. I confirm findings in history and physical exam. No additional findings noted. Agree with plan as outlined above.     Tacho Fisher MD

## 2018-01-04 NOTE — MR AVS SNAPSHOT
Visit Information Date & Time Provider Department Dept. Phone Encounter #  
 1/4/2018  1:45 PM Pierre Muelelr, [address] [facility] [phone] [id number] Your Appointments 1/30/2018 11:00 AM  
Follow Up with Dorian Nelson MD  
[address] [facility]) Appt Note: f/up get medications adjusted; f/up get medications adjusted [address] [facility] [id number] [address]  
[place] [address] [address] Upcoming Health Maintenance Date Due  
 GLAUCOMA SCREENING Q2Y 8/12/2016 Pneumococcal 65+ Low/Medium Risk (2 of 2 - PPSV23) 11/21/2017 MEDICARE YEARLY EXAM 4/25/2018 FOBT Q 1 YEAR AGE 50-75 4/26/2018 DTaP/Tdap/Td series (2 - Td) 10/1/2023 Allergies as of 1/4/2018  Review Complete On: 1/4/2018 By: Steven Barroso NP No Known Allergies Current Immunizations  Reviewed on 4/24/2017 Name Date Influenza High Dose Vaccine PF 9/14/2016, 9/23/2015, 9/10/2014 Influenza Vaccine 9/25/2017 Influenza Vaccine (Quadrivalent) 9/27/2017 Influenza Vaccine PF 10/1/2013 Influenza Vaccine Split 9/29/2011 PREVNAR 7 11/14/2012 Pneumococcal Conjugate (PCV-13) 11/21/2016 Tdap 10/1/2013 Zoster Vaccine, Live 11/5/2013 Not reviewed this visit You Were Diagnosed With   
  
 Codes Comments Essential hypertension    -  Primary ICD-10-CM: I10 
ICD-9-CM: 401.9 Hypercholesterolemia     ICD-10-CM: E78.00 ICD-9-CM: 272.0 Transient cerebral ischemia, unspecified type     ICD-10-CM: G45.9 ICD-9-CM: 435.9 Non-rheumatic mitral regurgitation     ICD-10-CM: I34.0 ICD-9-CM: 424.0 Vitals BP Pulse Resp Height(growth percentile) Weight(growth percentile) SpO2  
 120/70 (BP Patient Position: Sitting) 70 16 6' 2\" (1.88 m) 222 lb 1.6 oz (100.7 kg) 97% BMI Smoking Status 28.52 kg/m2 Never Smoker BMI and BSA Data Body Mass Index Body Surface Area 28.52 kg/m 2 2.29 m 2 Preferred Pharmacy Pharmacy Name Phone [id number] [address] [facility] [phone] Your Updated Medication List  
  
   
This list is accurate as of: 1/4/18  1:50 PM.  Always use your most recent med list.  
  
  
  
  
 acetaminophen 500 mg tablet Commonly known as:  TYLENOL Take 1,000 mg by mouth every six (6) hours as needed for Pain. atorvastatin 40 mg tablet Commonly known as:  LIPITOR  
take 1 tablet by mouth NIGHTLY  
  
 clopidogrel 75 mg Tab Commonly known as:  PLAVIX Take 1 Tab by mouth daily for 30 days. Refills per Neurology Dr Jory Byrne COLACE 50 mg capsule Generic drug:  docusate sodium Take 50 mg by mouth as needed. lisinopril 30 mg tablet Commonly known as:  [place]ly Take 0.5 Tabs by mouth daily. omeprazole 20 mg capsule Commonly known as:  PRILOSEC Take 1 Cap by mouth daily. promethazine-codeine 6.25-10 mg/5 mL syrup Commonly known as:  PHENERGAN with CODEINE Take 5 mL by mouth four (4) times daily as needed for Cough. Max Daily Amount: 20 mL.  
  
 tamsulosin 0.4 mg capsule Commonly known as:  FLOMAX  
take 1 capsule by mouth once daily VICKS SINEX 12-HOUR 0.05 % nasal spray Generic drug:  oxymetazoline 1 [place] by Both Nostrils route two (2) times daily as needed for Congestion. Prescriptions Sent to Pharmacy Refills  
 clopidogrel (PLAVIX) 75 mg tab 0 Sig: Take 1 Tab by mouth daily for 30 days. Refills per Neurology Dr Jory Byrne Class: Normal  
 Pharmacy: [id number] [address], [address] [place] [address] E  #: [phone] Route: Oral  
  
We Performed the Following AMB POC EKG ROUTINE W/ 12 LEADS, INTER & REP [03896 CPT(R)] Introducing [facility]! Dear Brigid Mancera: Thank you for requesting a [employer] account. Our records indicate that you already have an active [employer] account. You can access your account anytime at [email or web address] Did you know that you can access your hospital and ER discharge instructions at any time in [employer]? You can also review all of your test results from your hospital stay or ER visit. Additional Information If you have questions, please visit the Frequently Asked Questions section of the [employer] website at [email or web address] Remember, [employer] is NOT to be used for urgent needs. For medical emergencies, dial 911. Now available from your iPhone and Android! Please provide this summary of care documentation to your next provider. Your primary care clinician is listed as Fox Marquez. If you have any questions after today's visit, please call [phone].

## 2018-01-16 ENCOUNTER — OFFICE VISIT (OUTPATIENT)
Dept: FAMILY MEDICINE CLINIC | Age: 73
End: 2018-01-16

## 2018-01-16 VITALS
TEMPERATURE: 95.9 F | DIASTOLIC BLOOD PRESSURE: 66 MMHG | HEART RATE: 97 BPM | OXYGEN SATURATION: 98 % | WEIGHT: 223.2 LBS | RESPIRATION RATE: 14 BRPM | HEIGHT: 74 IN | BODY MASS INDEX: 28.64 KG/M2 | SYSTOLIC BLOOD PRESSURE: 108 MMHG

## 2018-01-16 DIAGNOSIS — H93.12 TINNITUS OF LEFT EAR: ICD-10-CM

## 2018-01-16 DIAGNOSIS — R42 ORTHOSTATIC LIGHTHEADEDNESS: ICD-10-CM

## 2018-01-16 DIAGNOSIS — I10 ESSENTIAL HYPERTENSION: Primary | ICD-10-CM

## 2018-01-16 RX ORDER — LISINOPRIL 5 MG/1
5 TABLET ORAL DAILY
Qty: 90 TAB | Refills: 3 | Status: SHIPPED | OUTPATIENT
Start: 2018-01-16 | End: 2018-01-31

## 2018-01-16 RX ORDER — MECLIZINE HCL 12.5 MG 12.5 MG/1
12.5 TABLET ORAL
Qty: 30 TAB | Refills: 4 | Status: SHIPPED | OUTPATIENT
Start: 2018-01-16 | End: 2018-01-26

## 2018-01-16 NOTE — PATIENT INSTRUCTIONS
Learning About High Blood Pressure  What is high blood pressure? Blood pressure is a measure of how hard the blood pushes against the walls of your arteries. It's normal for blood pressure to go up and down throughout the day, but if it stays up, you have high blood pressure. Another name for high blood pressure is hypertension. Two numbers tell you your blood pressure. The first number is the systolic pressure. It shows how hard the blood pushes when your heart is pumping. The second number is the diastolic pressure. It shows how hard the blood pushes between heartbeats, when your heart is relaxed and filling with blood. A blood pressure of less than 120/80 (say \"120 over 80\") is ideal for an adult. High blood pressure is 140/90 or higher. You have high blood pressure if your top number is 140 or higher or your bottom number is 90 or higher, or both. Many people fall into the category in between, called prehypertension. People with prehypertension need to make lifestyle changes to bring their blood pressure down and help prevent or delay high blood pressure. What happens when you have high blood pressure? · Blood flows through your arteries with too much force. Over time, this damages the walls of your arteries. But you can't feel it. High blood pressure usually doesn't cause symptoms. · Fat and calcium start to build up in your arteries. This buildup is called plaque. Plaque makes your arteries narrower and stiffer. Blood can't flow through them as easily. · This lack of good blood flow starts to damage some of the organs in your body. This can lead to problems such as coronary artery disease and heart attack, heart failure, stroke, kidney failure, and eye damage. How can you prevent high blood pressure? · Stay at a healthy weight. · Try to limit how much sodium you eat to less than 2,300 milligrams (mg) a day.  If you limit your sodium to 1,500 mg a day, you can lower your blood pressure even more.  ¨ Buy foods that are labeled \"unsalted,\" \"sodium-free,\" or \"low-sodium. \" Foods labeled \"reduced-sodium\" and \"light sodium\" may still have too much sodium. ¨ Flavor your food with garlic, lemon juice, onion, vinegar, herbs, and spices instead of salt. Do not use soy sauce, steak sauce, onion salt, garlic salt, mustard, or ketchup on your food. ¨ Use less salt (or none) when recipes call for it. You can often use half the salt a recipe calls for without losing flavor. · Be physically active. Get at least 30 minutes of exercise on most days of the week. Walking is a good choice. You also may want to do other activities, such as running, swimming, cycling, or playing tennis or team sports. · Limit alcohol to 2 drinks a day for men and 1 drink a day for women. · Eat plenty of fruits, vegetables, and low-fat dairy products. Eat less saturated and total fats. How is high blood pressure treated? · Your doctor will suggest making lifestyle changes. For example, your doctor may ask you to eat healthy foods, quit smoking, lose extra weight, and be more active. · If lifestyle changes don't help enough or your blood pressure is very high, you will have to take medicine every day. Follow-up care is a key part of your treatment and safety. Be sure to make and go to all appointments, and call your doctor if you are having problems. It's also a good idea to know your test results and keep a list of the medicines you take. Where can you learn more? Go to http://nico-millicent.info/. Enter P501 in the search box to learn more about \"Learning About High Blood Pressure. \"  Current as of: September 21, 2016  Content Version: 11.4  © 2641-8414 [employer]. Care instructions adapted under license by [employer] (which disclaims liability or warranty for this information).  If you have questions about a medical condition or this instruction, always ask your healthcare professional. [employer] disclaims any warranty or liability for your use of this information.

## 2018-01-16 NOTE — MR AVS SNAPSHOT
[address] [facility] [address] 
[phone] Patient: Sheryle Reed MRN: [id number] PAW:[birth year] Visit Information Date & Time Provider Department Dept. Phone Encounter #  
 1/16/2018  8:00 AM Martín Mchugh MD [address] OFFICE-ANNEX [phone] [id number] Follow-up Instructions Return in about 2 weeks (around 1/30/2018), or if symptoms worsen or fail to improve. Your Appointments 1/30/2018 11:00 AM  
Follow Up with Rachael Rodriguez MD  
[address] [facility] [address] [facility]) Appt Note: f/up get medications adjusted; f/up get medications adjusted [address] [facility] [address] [address]  
[facility] [address] [address] Upcoming Health Maintenance Date Due Pneumococcal 65+ Low/Medium Risk (2 of 2 - PPSV23) 11/21/2017 MEDICARE YEARLY EXAM 4/25/2018 FOBT Q 1 YEAR AGE 50-75 4/26/2018 GLAUCOMA SCREENING Q2Y 12/7/2019 DTaP/Tdap/Td series (2 - Td) 10/1/2023 Allergies as of 1/16/2018  Review Complete On: 1/16/2018 By: Gunjan Negrete LPN No Known Allergies Current Immunizations  Reviewed on 4/24/2017 Name Date Influenza High Dose Vaccine PF 9/14/2016, 9/23/2015, 9/10/2014 Influenza Vaccine 9/25/2017 Influenza Vaccine (Quadrivalent) 9/27/2017 Influenza Vaccine PF 10/1/2013 Influenza Vaccine Split 9/29/2011 PREVNAR 7 11/14/2012 Pneumococcal Conjugate (PCV-13) 11/21/2016 Tdap 10/1/2013 Zoster Vaccine, Live 11/5/2013 Not reviewed this visit You Were Diagnosed With   
  
 Codes Comments Essential hypertension    -  Primary ICD-10-CM: I10 
ICD-9-CM: 401.9 Tinnitus of left ear     ICD-10-CM: H93.12 
ICD-9-CM: 388.30 Orthostatic lightheadedness     ICD-10-CM: R42 ICD-9-CM: 780.4 Vitals BP Pulse Temp Resp Height(growth percentile) Weight(growth percentile) 108/66 (BP 1 Location: Left arm, BP Patient Position: At rest) 97 95.9 °F (35.5 °C) (Oral) 14 6' 2\" (1.88 m) 223 lb 3.2 oz (101.2 kg) SpO2 BMI Smoking Status 98% 28.66 kg/m2 Never Smoker Vitals History BMI and BSA Data Body Mass Index Body Surface Area  
 28.66 kg/m 2 2.3 m 2 Preferred Pharmacy Pharmacy Name Phone [facility] [phone] Your Updated Medication List  
  
   
This list is accurate as of: 1/16/18  8:55 AM.  Always use your most recent med list.  
  
  
  
  
 acetaminophen 500 mg tablet Commonly known as:  TYLENOL Take 1,000 mg by mouth every six (6) hours as needed for Pain. atorvastatin 40 mg tablet Commonly known as:  LIPITOR  
take 1 tablet by mouth NIGHTLY  
  
 clopidogrel 75 mg Tab Commonly known as:  PLAVIX Take 1 Tab by mouth daily for 30 days. Refills per Neurology Dr Eddie Bustos COLACE 50 mg capsule Generic drug:  docusate sodium Take 50 mg by mouth as needed. lisinopril 5 mg tablet Commonly known as:  Shirley Grebe Take 1 Tab by mouth daily. meclizine 12.5 mg tablet Commonly known as:  ANTIVERT Take 1 Tab by mouth three (3) times daily as needed for up to 10 days. omeprazole 20 mg capsule Commonly known as:  PRILOSEC Take 1 Cap by mouth daily. promethazine-codeine 6.25-10 mg/5 mL syrup Commonly known as:  PHENERGAN with CODEINE Take 5 mL by mouth four (4) times daily as needed for Cough. Max Daily Amount: 20 mL.  
  
 tamsulosin 0.4 mg capsule Commonly known as:  FLOMAX  
take 1 capsule by mouth once daily VICKS SINEX 12-HOUR 0.05 % nasal spray Generic drug:  oxymetazoline 1 [place] by Both Nostrils route two (2) times daily as needed for Congestion. Prescriptions Sent to Pharmacy Refills lisinopril (PRINIVIL, ZESTRIL) 5 mg tablet 3 Sig: Take 1 Tab by mouth daily. Class: Normal  
 Pharmacy: [id number], [address] [place] [address] E  #: [phone] Route: Oral  
 meclizine (ANTIVERT) 12.5 mg tablet 4 Sig: Take 1 Tab by mouth three (3) times daily as needed for up to 10 days. Class: Normal  
 Pharmacy: [id number] Sudh[address] Santos, [address] [place] [address] E  #: [phone] Route: Oral  
  
Follow-up Instructions Return in about 2 weeks (around 1/30/2018), or if symptoms worsen or fail to improve. Patient Instructions Learning About High Blood Pressure What is high blood pressure? Blood pressure is a measure of how hard the blood pushes against the walls of your arteries. It's normal for blood pressure to go up and down throughout the day, but if it stays up, you have high blood pressure. Another name for high blood pressure is hypertension. Two numbers tell you your blood pressure. The first number is the systolic pressure. It shows how hard the blood pushes when your heart is pumping. The second number is the diastolic pressure. It shows how hard the blood pushes between heartbeats, when your heart is relaxed and filling with blood. A blood pressure of less than 120/80 (say \"120 over 80\") is ideal for an adult. High blood pressure is 140/90 or higher. You have high blood pressure if your top number is 140 or higher or your bottom number is 90 or higher, or both. Many people fall into the category in between, called prehypertension. People with prehypertension need to make lifestyle changes to bring their blood pressure down and help prevent or delay high blood pressure. What happens when you have high blood pressure? · Blood flows through your arteries with too much force. Over time, this damages the walls of your arteries. But you can't feel it. High blood pressure usually doesn't cause symptoms. · Fat and calcium start to build up in your arteries. This buildup is called plaque. Plaque makes your arteries narrower and stiffer. Blood can't flow through them as easily. · This lack of good blood flow starts to damage some of the organs in your body. This can lead to problems such as coronary artery disease and heart attack, heart failure, stroke, kidney failure, and eye damage. How can you prevent high blood pressure? · Stay at a healthy weight. · Try to limit how much sodium you eat to less than 2,300 milligrams (mg) a day. If you limit your sodium to 1,500 mg a day, you can lower your blood pressure even more. ¨ Buy foods that are labeled \"unsalted,\" \"sodium-free,\" or \"low-sodium. \" Foods labeled \"reduced-sodium\" and \"light sodium\" may still have too much sodium. ¨ Flavor your food with garlic, lemon juice, onion, vinegar, herbs, and spices instead of salt. Do not use soy sauce, steak sauce, onion salt, garlic salt, mustard, or ketchup on your food. ¨ Use less salt (or none) when recipes call for it. You can often use half the salt a recipe calls for without losing flavor. · Be physically active. Get at least 30 minutes of exercise on most days of the week. Walking is a good choice. You also may want to do other activities, such as running, swimming, cycling, or playing tennis or team sports. · Limit alcohol to 2 drinks a day for men and 1 drink a day for women. · Eat plenty of fruits, vegetables, and low-fat dairy products. Eat less saturated and total fats. How is high blood pressure treated? · Your doctor will suggest making lifestyle changes. For example, your doctor may ask you to eat healthy foods, quit smoking, lose extra weight, and be more active. · If lifestyle changes don't help enough or your blood pressure is very high, you will have to take medicine every day. Follow-up care is a key part of your treatment and safety.  Be sure to make and go to all appointments, and call your doctor if you are having problems. It's also a good idea to know your test results and keep a list of the medicines you take. Where can you learn more? Go to http://nico-millicent.info/. Enter P501 in the search box to learn more about \"Learning About High Blood Pressure. \" Current as of: September 21, 2016 Content Version: 11.4 © 9065-4470 [employer]. Care instructions adapted under license by [employer] (which disclaims liability or warranty for this information). If you have questions about a medical condition or this instruction, always ask your healthcare professional. [address] any warranty or liability for your use of this information. Introducing [facility] & HEALTH SERVICES! Dear Jesse Speaks: Thank you for requesting a iMusicTweet account. Our records indicate that you already have an active iMusicTweet account. You can access your account anytime at [email or web address] Did you know that you can access your hospital and ER discharge instructions at any time in iMusicTweet? You can also review all of your test results from your hospital stay or ER visit. Additional Information If you have questions, please visit the Frequently Asked Questions section of the iMusicTweet website at [email or web address] Remember, iMusicTweet is NOT to be used for urgent needs. For medical emergencies, dial 911. Now available from your iPhone and Android! Please provide this summary of care documentation to your next provider. Your primary care clinician is listed as Thom Espinosa. If you have any questions after today's visit, please call [phone].

## 2018-01-16 NOTE — PROGRESS NOTES
Name and  verified        Chief Complaint   Patient presents with    Hypertension       Health Maintenance reviewed-discussed with patient. 1. Have you been to the ER, urgent care clinic since your last visit? Hospitalized since your last visit? No    2. Have you seen or consulted any other health care providers outside of the [address] [place] [address] since your last visit? Include any pap smears or colon screening.  No

## 2018-01-16 NOTE — PROGRESS NOTES
HISTORY OF PRESENT ILLNESS  Marj Preston is a 67 y.o. male. HPI   Lightheadedness on sudden standing, wobbling on sudden movement had also hearing sensation,     HTN  Today pt present for Bp check and the patient stating that so far there has been a Compliancy w/ the bp meds,  the patient has been active life,     pt states that patien does obtain the bp at home 11/60  today the pt denies Chest Pain, has no legs swelling ++ lightheadedness  the patient has not been feeling anxious, and  Has not been feeling stressed out,   otherwise feeling better since the last visit    Current Outpatient Prescriptions   Medication Sig Dispense Refill    clopidogrel (PLAVIX) 75 mg tab Take 1 Tab by mouth daily for 30 days. Refills per Neurology Dr Virgil Seay 30 Tab 0    lisinopril (PRINIVIL, ZESTRIL) 30 mg tablet Take 0.5 Tabs by mouth daily. 90 Tab 1    tamsulosin (FLOMAX) 0.4 mg capsule take 1 capsule by mouth once daily 30 Cap 2    oxymetazoline (VICKS SINEX 12-HOUR) 0.05 % nasal spray 1 [place] by Both Nostrils route two (2) times daily as needed for Congestion.  acetaminophen (TYLENOL) 500 mg tablet Take 1,000 mg by mouth every six (6) hours as needed for Pain.  atorvastatin (LIPITOR) 40 mg tablet take 1 tablet by mouth NIGHTLY 90 Tab 3    omeprazole (PRILOSEC) 20 mg capsule Take 1 Cap by mouth daily. 90 Cap 1    docusate sodium (COLACE) 50 mg capsule Take 50 mg by mouth as needed.  promethazine-codeine (PHENERGAN WITH CODEINE) 6.25-10 mg/5 mL syrup Take 5 mL by mouth four (4) times daily as needed for Cough. Max Daily Amount: 20 mL.  (Patient not taking: Reported on 1/16/2018) 120 mL 0     No Known Allergies  Past Medical History:   Diagnosis Date    Abdominal wall hernia 11/16/2017    Anemia 9/10/2014    Back pain 4/1/2010    Blurry vision, bilateral 11/14/2017    CAD (coronary artery disease)     Calf pain 3/11/2015    Chronic kidney disease     cyst on kidney    Chronic left shoulder pain 10/24/2016    Constipation 6/11/2014    Decreased vision 6/13/2011    Diastasis recti 11/16/2017    Diverticulitis 3/11/2015    Dizziness 11/14/2017    Enlarged LA (left atrium)     Fall at home 3/17/2016    GERD (gastroesophageal reflux disease)     Hemorrhoids 9/10/2014    Hip pain, chronic, right 10/3/2017    HTN (hypertension) 8/24/2010    Hx-TIA (transient ischemic attack)     Hypercholesterolemia 4/1/2010    Hypertension     Irregular heart beat 9/23/2015    Kidney disease     Mitral regurgitation     Need for varicella vaccine 9/10/2014    Other ill-defined conditions(799.89)     heart murmur    Rash 8/24/2010    Rib pain on right side 3/17/2016    Right calf pain 3/11/2015    Risk for falls 4/4/2014    S/P knee replacement 6/11/2014    Screening for depression 4/4/2014    Tinea versicolor 9/10/2014    Urinary frequency 8/24/2010     Past Surgical History:   Procedure Laterality Date    CARDIAC SURG PROCEDURE UNLIST      heart cath    HX HEENT      nasal septum repair    HX HEENT      HX HERNIA REPAIR      HX KNEE REPLACEMENT Left     HX ORTHOPAEDIC      left knee surgery    HX ORTHOPAEDIC      HX TONSILLECTOMY  age 5    HX UROLOGICAL      IN COLONOSCOPY FLX DX W/COLLJ SPEC WHEN PFRMD  4/18/2011          Family History   Problem Relation Age of Onset    COPD Mother     Heart Attack Mother     Hypertension Mother     Heart Disease Mother     COPD Father     Heart Attack Father     Hypertension Father     Heart Disease Father     Cancer Father     Cancer Maternal Grandmother      Social History   Substance Use Topics    Smoking status: Never Smoker    Smokeless tobacco: Never Used    Alcohol use 0.0 oz/week     0 Standard drinks or equivalent per week      Comment: 12 beers, half a fifth a month      Lab Results  Component Value Date/Time   WBC 6.1 11/15/2017 02:48 AM   HGB 11.9 11/15/2017 02:48 AM   HCT 36.3 11/15/2017 02:48 AM   PLATELET 289 97/60/2204 02:48 AM   MCV 91.7 11/15/2017 02:48 AM     Lab Results  Component Value Date/Time   GFR est non-AA >60 11/15/2017 02:48 AM   GFRNA, POC >60 03/29/2011 09:23 AM   GFR est AA >60 11/15/2017 02:48 AM   GFRAA, POC >60 03/29/2011 09:23 AM   Creatinine 1.03 11/15/2017 02:48 AM   Creatinine (POC) 0.8 03/29/2011 09:23 AM   BUN 13 11/15/2017 02:48 AM   Sodium 141 11/15/2017 02:48 AM   Potassium 4.0 11/15/2017 02:48 AM   Chloride 108 11/15/2017 02:48 AM   CO2 24 11/15/2017 02:48 AM   Magnesium 1.9 01/10/2010 08:05 PM        Review of Systems   Constitutional: Negative for chills and fever. HENT: Positive for tinnitus. Negative for nosebleeds. Decrease hearing from the     Eyes: Negative for blurred vision, pain and discharge. Respiratory: Negative for shortness of breath. Cardiovascular: Negative for chest pain and leg swelling. Gastrointestinal: Negative for constipation, diarrhea, nausea and vomiting. Genitourinary: Negative for frequency. Musculoskeletal: Negative for joint pain. Skin: Negative for itching and rash. Neurological: Negative for headaches. Psychiatric/Behavioral: Negative for depression. The patient is not nervous/anxious. Physical Exam   Constitutional: He is oriented to person, place, and time. He appears well-developed and well-nourished. HENT:   Head: Normocephalic and atraumatic. Mouth/Throat: No oropharyngeal exudate. Eyes: Conjunctivae and EOM are normal.   Neck: Normal range of motion. Neck supple. Cardiovascular: Normal rate, regular rhythm and normal heart sounds. No murmur heard. Pulmonary/Chest: Effort normal and breath sounds normal. No respiratory distress. Abdominal: Soft. Bowel sounds are normal. He exhibits no distension. There is no rebound. Musculoskeletal: He exhibits no edema or tenderness. Neurological: He is alert and oriented to person, place, and time. Skin: Skin is warm. No erythema.    Psychiatric: He has a normal mood and affect. His behavior is normal.   Nursing note and vitals reviewed. ASSESSMENT and PLAN  Diagnoses and all orders for this visit:    1. Essential hypertension  -     lisinopril (PRINIVIL, ZESTRIL) 5 mg tablet; Take 1 Tab by mouth daily. -     meclizine (ANTIVERT) 12.5 mg tablet; Take 1 Tab by mouth three (3) times daily as needed for up to 10 days. 2. Tinnitus of left ear  -     lisinopril (PRINIVIL, ZESTRIL) 5 mg tablet; Take 1 Tab by mouth daily. -     meclizine (ANTIVERT) 12.5 mg tablet; Take 1 Tab by mouth three (3) times daily as needed for up to 10 days. 3. Orthostatic lightheadedness  -     lisinopril (PRINIVIL, ZESTRIL) 5 mg tablet; Take 1 Tab by mouth daily. -     meclizine (ANTIVERT) 12.5 mg tablet; Take 1 Tab by mouth three (3) times daily as needed for up to 10 days.     At this time the lightheadedness could be secondary to blood pressure medication patient most likely would not need blood pressure meds at this time in addition could be a vertigo presentation regardless patient was told to stop blood pressure medication at this time if lightheadedness persists he was told to take medically design 3 times daily for next 3 days take as needed do not operate any machinery because of his dizziness as a side effect and sleepiness return to clinic in 2 weeks patient was told if condition worsens call us for any other concern so the plan would be meclizine on as needed and no blood pressure medication for next 2 weeks the hope would be progress and disappearance of the lightheadedness

## 2018-01-30 ENCOUNTER — OFFICE VISIT (OUTPATIENT)
Dept: NEUROLOGY | Age: 73
End: 2018-01-30

## 2018-01-30 VITALS
HEART RATE: 56 BPM | BODY MASS INDEX: 28.36 KG/M2 | OXYGEN SATURATION: 98 % | RESPIRATION RATE: 18 BRPM | HEIGHT: 74 IN | DIASTOLIC BLOOD PRESSURE: 68 MMHG | WEIGHT: 221 LBS | SYSTOLIC BLOOD PRESSURE: 120 MMHG | TEMPERATURE: 98 F

## 2018-01-30 DIAGNOSIS — G45.9 TRANSIENT CEREBRAL ISCHEMIA, UNSPECIFIED TYPE: Primary | ICD-10-CM

## 2018-01-30 DIAGNOSIS — R41.3 MEMORY LOSS: ICD-10-CM

## 2018-01-30 RX ORDER — CLOPIDOGREL BISULFATE 75 MG/1
75 TABLET ORAL DAILY
Qty: 90 TAB | Refills: 3 | Status: SHIPPED | OUTPATIENT
Start: 2018-01-30 | End: 2018-06-08 | Stop reason: SDUPTHER

## 2018-01-30 NOTE — MR AVS SNAPSHOT
[address] [address] 
[phone] Patient: Avtar Reyes MRN: [id number] DOC:3/82/4977 Visit Information Date & Time Provider Department Dept. Phone Encounter #  
 1/30/2018 11:00 AM Holden Truong MD [address] [place] [address] Neurology Clinic [phone] [id number] Follow-up Instructions Return for After memory testing. Your Appointments 1/31/2018  4:15 PM  
ACUTE CARE with Linda Brandt MD  
DEPARTMENT [facility] OFFICE-ANNEX ([address]) Appt Note: f/u  
 [address] [facility] [place] [place] [id number] [phone] [address] [place] [phone] Upcoming Health Maintenance Date Due Pneumococcal 65+ Low/Medium Risk (2 of 2 - PPSV23) 11/21/2017 MEDICARE YEARLY EXAM 4/25/2018 FOBT Q 1 YEAR AGE 50-75 4/26/2018 GLAUCOMA SCREENING Q2Y 12/7/2019 DTaP/Tdap/Td series (2 - Td) 10/1/2023 Allergies as of 1/30/2018  Review Complete On: 1/16/2018 By: Osmin Keller LPN No Known Allergies Current Immunizations  Reviewed on 4/24/2017 Name Date Influenza High Dose Vaccine PF 9/14/2016, 9/23/2015, 9/10/2014 Influenza Vaccine 9/25/2017 Influenza Vaccine (Quadrivalent) 9/27/2017 Influenza Vaccine PF 10/1/2013 Influenza Vaccine Split 9/29/2011 PREVNAR 7 11/14/2012 Pneumococcal Conjugate (PCV-13) 11/21/2016 Tdap 10/1/2013 Zoster Vaccine, Live 11/5/2013 Not reviewed this visit You Were Diagnosed With   
  
 Codes Comments Transient cerebral ischemia, unspecified type    -  Primary ICD-10-CM: G45.9 ICD-9-CM: 435.9 Memory loss     ICD-10-CM: R41.3 ICD-9-CM: 780.93 Vitals BP Pulse Temp Resp Height(growth percentile) Weight(growth percentile) 120/68 (!) 56 98 °F (36.7 °C) 18 6' 2\" (1.88 m) 221 lb (100.2 kg) SpO2 BMI Smoking Status 98% 28.37 kg/m2 Never Smoker Vitals History BMI and BSA Data Body Mass Index Body Surface Area  
 28.37 kg/m 2 2.29 m 2 Preferred Pharmacy Pharmacy Name Phone [facility] [phone] Your Updated Medication List  
  
   
This list is accurate as of: 1/30/18 12:00 PM.  Always use your most recent med list.  
  
  
  
  
 acetaminophen 500 mg tablet Commonly known as:  TYLENOL Take 1,000 mg by mouth every six (6) hours as needed for Pain. atorvastatin 40 mg tablet Commonly known as:  LIPITOR  
take 1 tablet by mouth NIGHTLY  
  
 clopidogrel 75 mg Tab Commonly known as:  PLAVIX Take 1 Tab by mouth daily. COLACE 50 mg capsule Generic drug:  docusate sodium Take 50 mg by mouth as needed. lisinopril 5 mg tablet Commonly known as:  Shirley Grebe Take 1 Tab by mouth daily. omeprazole 20 mg capsule Commonly known as:  PRILOSEC Take 1 Cap by mouth daily. promethazine-codeine 6.25-10 mg/5 mL syrup Commonly known as:  PHENERGAN with CODEINE Take 5 mL by mouth four (4) times daily as needed for Cough. Max Daily Amount: 20 mL.  
  
 tamsulosin 0.4 mg capsule Commonly known as:  FLOMAX  
take 1 capsule by mouth once daily VICKS SINEX 12-HOUR 0.05 % nasal spray Generic drug:  oxymetazoline 1 [place] by Both Nostrils route two (2) times daily as needed for Congestion. Prescriptions Sent to Pharmacy Refills  
 clopidogrel (PLAVIX) 75 mg tab 3 Sig: Take 1 Tab by mouth daily. Class: Normal  
 Pharmacy: [id number] [place], [address] [place] [address] #: [phone] Route: Oral  
  
We Performed the Following REFERRAL TO NEUROPSYCHOLOGY [DIT47 Custom] Follow-up Instructions Return for After memory testing. Referral Information Referral ID Referred By Referred To [id number] [facility] [address] [facility] [address] [facility] [address], [address] Ibeth Yepez  Phone: [phone] Fax: [phone] Visits Status Start Date End Date 1 New Request 1/30/18 1/30/19 If your referral has a status of pending review or denied, additional information will be sent to support the outcome of this decision. Introducing [facility] & HEALTH SERVICES! Dear Rut Galeano: Thank you for requesting a OptTown account. Our records indicate that you already have an active OptTown account. You can access your account anytime at [email or web address]. [email or web address] Did you know that you can access your hospital and ER discharge instructions at any time in OptTown? You can also review all of your test results from your hospital stay or ER visit. Additional Information If you have questions, please visit the Frequently Asked Questions section of the OptTown website at [email or web address] Remember, OptTown is NOT to be used for urgent needs. For medical emergencies, dial 911. Now available from your iPhone and Android! Please provide this summary of care documentation to your next provider. Your primary care clinician is listed as Ramiro Felix. If you have any questions after today's visit, please call [phone].

## 2018-01-30 NOTE — PROGRESS NOTES
Date:  18     Name:  Hope Wilson  :  [birth year]  MRN:  [id number]     PCP:  Amaury Weiss MD    Chief Complaint   Patient presents with    Medication Evaluation     follow up       HISTORY OF PRESENT ILLNESS:Follow up for hospitalization for Possible TIA. He was admitted to [facility] in March and 2016 because of transient visual disturbance, unsteady gait, and lightheadedness. Reports he had the visual disturbance (dizziness) while driving. Vision blurred in both eyes. Says symptoms resolved within 20 seconds of pulling off the road. Denies associated HA, change in strength or sensation. Both events he was a TIA work up. He was discharged from [facility] on Plavix. He was wondering if he needed to continue this medication or go back to ASA 325mg. He did see cardiology who refilled his Plavix. In addition, he complains of some memory issues. He is having trouble with recall, forgetfulness, and decrease executive function. Except as noted above, denies  fever, chills, cough. No CP or SOB. No dysuria, loss of bowel or bladder control. No Weight loss. Appetite good. Sleeping well. No sweats. No edema. No bruising or bleeding. No nausea or vomit. No diarrhea. No frequency, urgency, No depressive sxs. No anxiety. Denies sore throat, nasal congestion, nasal discharge, epistaxis, tinnitus, hearing loss, back pain, muscle pain, or joint pain. Current Outpatient Prescriptions   Medication Sig    clopidogrel (PLAVIX) 75 mg tab Take 1 Tab by mouth daily.  tamsulosin (FLOMAX) 0.4 mg capsule take 1 capsule by mouth once daily    atorvastatin (LIPITOR) 40 mg tablet take 1 tablet by mouth NIGHTLY    omeprazole (PRILOSEC) 20 mg capsule Take 1 Cap by mouth daily.  lisinopril (PRINIVIL, ZESTRIL) 5 mg tablet Take 1 Tab by mouth daily.  promethazine-codeine (PHENERGAN WITH CODEINE) 6.25-10 mg/5 mL syrup Take 5 mL by mouth four (4) times daily as needed for Cough.  Max Daily Amount: 20 mL. (Patient not taking: Reported on 1/16/2018)    oxymetazoline (VICKS SINEX 12-HOUR) 0.05 % nasal spray 1 [place] by Both Nostrils route two (2) times daily as needed for Congestion.  acetaminophen (TYLENOL) 500 mg tablet Take 1,000 mg by mouth every six (6) hours as needed for Pain.  docusate sodium (COLACE) 50 mg capsule Take 50 mg by mouth as needed. No current facility-administered medications for this visit.       No Known Allergies  Past Medical History:   Diagnosis Date    Abdominal wall hernia 11/16/2017    Anemia 9/10/2014    Back pain 4/1/2010    Blurry vision, bilateral 11/14/2017    CAD (coronary artery disease)     Calf pain 3/11/2015    Chronic kidney disease     cyst on kidney    Chronic left shoulder pain 10/24/2016    Constipation 6/11/2014    Decreased vision 6/13/2011    Diastasis recti 11/16/2017    Diverticulitis 3/11/2015    Dizziness 11/14/2017    Enlarged LA (left atrium)     Fall at home 3/17/2016    GERD (gastroesophageal reflux disease)     Hemorrhoids 9/10/2014    Hip pain, chronic, right 10/3/2017    HTN (hypertension) 8/24/2010    Hx-TIA (transient ischemic attack)     Hypercholesterolemia 4/1/2010    Hypertension     Irregular heart beat 9/23/2015    Kidney disease     Mitral regurgitation     Need for varicella vaccine 9/10/2014    Orthostatic lightheadedness 1/16/2018    Other ill-defined conditions(799.89)     heart murmur    Rash 8/24/2010    Rib pain on right side 3/17/2016    Right calf pain 3/11/2015    Risk for falls 4/4/2014    S/P knee replacement 6/11/2014    Screening for depression 4/4/2014    Tinea versicolor 9/10/2014    Tinnitus of left ear 1/16/2018    Urinary frequency 8/24/2010     Past Surgical History:   Procedure Laterality Date    CARDIAC SURG PROCEDURE UNLIST      heart cath    HX HEENT      nasal septum repair    HX HEENT      HX HERNIA REPAIR      HX KNEE REPLACEMENT Left     HX ORTHOPAEDIC      left knee surgery    HX ORTHOPAEDIC      HX TONSILLECTOMY  age 5    HX UROLOGICAL      CA COLONOSCOPY FLX DX W/COLLJ SPEC WHEN PFRMD  4/18/2011          Social History     Social History    Marital status:      Spouse name: N/A    Number of children: N/A    Years of education: N/A     Occupational History    Not on file. Social History Main Topics    Smoking status: Never Smoker    Smokeless tobacco: Never Used    Alcohol use 0.0 oz/week     0 Standard drinks or equivalent per week      Comment: 12 beers, half a fifth a month    Drug use: No    Sexual activity: No     Other Topics Concern    Not on file     Social History Narrative    ** Merged History Encounter **          Family History   Problem Relation Age of Onset    COPD Mother     Heart Attack Mother     Hypertension Mother     Heart Disease Mother     COPD Father     Heart Attack Father     Hypertension Father     Heart Disease Father     Cancer Father     Cancer Maternal Grandmother        PHYSICAL EXAMINATION:    Visit Vitals    /68    Pulse (!) 56    Temp 98 °F (36.7 °C)    Resp 18    Ht 6' 2\" (1.88 m)    Wt 221 lb (100.2 kg)    SpO2 98%    BMI 28.37 kg/m2     General: Well defined, nourished, and groomed individual in no acute distress. Neck: Supple, nontender, no bruits, no pain with resistance to active range of motion. Heart: Regular rate and rhythm, no murmurs, rub, or gallop. Normal S1S2. Lungs: Clear to auscultation bilaterally with equal chest expansion, no cough, no wheeze  Musculoskeletal: Extremities revealed no edema and had full range of motion of joints. Psych: Good mood and bright affect      NEUROLOGICAL EXAMINATION:   Mental Status: Alert and oriented to person, place, and time       Cranial Nerves:   II, III, IV, VI: Visual acuity grossly intact. Visual fields are normal.   Pupils are equal, round, and reactive to light and accommodation. Extra-ocular movements are full and fluid. Fundoscopic exam was benign, no ptosis or nystagmus. V-XII: Hearing is grossly intact. Facial features are symmetric, with normal sensation and strength. The palate rises symmetrically and the tongue protrudes midline. Sternocleidomastoids 5/5.       Motor Examination: Normal tone, bulk, and strength, 5/5 muscle strength throughout.       Coordination: No resting or intention tremor      Gait and Station: Steady while walking. Normal arm swing. No pronator drift. No muscle wasting or fasiculations noted.       Reflexes: DTRs 2+ throughout. ASSESSMENT AND PLAN    ICD-10-CM ICD-9-CM    1. Transient cerebral ischemia, unspecified type G45.9 435.9    2. Memory loss R41.3 780.93 REFERRAL TO NEUROPSYCHOLOGY      Nice gentleman with significant risk factors for CVA. We discussed risk factors and we discussed warning signs of stroke. If he has further events, he will call EMS. Continue Plavix 75mg daily for antiplatelet. Hypertension controlled continue current therapy. Hyperlipidemia is well controlled. His LDL is at target continue atorvastatin. We will proceed  an evaluation with differential diagnosis being dementing type process such as Alzheimer's versus age-related memory decline versus mild cognitive impairment versus emotional versus attention versus anatomic versus metabolic versus other. We will  get a formal neuropsychological evaluation. Follow after his testing. Georgi Fontanez NP    I have reviewed the documentation provided by the nurse practitioner, Ms. Naomi Graves, and we have discussed her findings and the clinical impression. I have formulated with her the proposed management plans for this patient. Additionally,  I have personally evaluated the patient to verify the history and to confirm physical findings. Below are my additional comments: The patient returns today for follow-up cerebrovascular disease.   He had another episode of transient cerebral ischemia and was hospitalized over [facility].  His wife accompanies him today. He notes that he was cared for well over at [facility].  He saw my partner Dr. Katie Gray. He was changed from aspirin back on the Plavix. We discussed this. We discussed the mechanism of action and the fact that he had a TIA while on aspirin. He notes Dr Moraima Fermin, his primary physician, also feels comfortable with the Plavix as well. His LDL is appropriate and under 70. He should continue his statin. Continue to modify modifiable risk factors for stroke including hypertension and he is on lisinopril. Stroke warning signs reviewed again today. He will call 911 should those occur. He has a new complaint today and that is of memory difficulty. He notes that he just is more forgetful. He relates a story of going out hunting and being confused about which way the deer ran. Wife is noticed that is more forgetful as well. No dangerous behaviors. Continues to be active. On exam he looks well. He is awake alert oriented and conversant. He has normal speech and language. Answers all medical history questions and current event questions. Full versions without nystagmus. Symmetric face. Tongue and palate midline. No pronation or drift. No abnormal movement. Full strength in the upper and lower extremities in all muscle groups to direct testing. No ataxia. Gait steady. We discussed getting a formal neuropsychological evaluation and he is open to that. We will order that. I will see him back after that is been completed. Kip Menezes MD    This note will not be viewable in [address]

## 2018-01-31 ENCOUNTER — OFFICE VISIT (OUTPATIENT)
Dept: FAMILY MEDICINE CLINIC | Age: 73
End: 2018-01-31

## 2018-01-31 VITALS
SYSTOLIC BLOOD PRESSURE: 120 MMHG | WEIGHT: 225.2 LBS | RESPIRATION RATE: 14 BRPM | HEART RATE: 82 BPM | HEIGHT: 74 IN | OXYGEN SATURATION: 98 % | TEMPERATURE: 97.6 F | DIASTOLIC BLOOD PRESSURE: 60 MMHG | BODY MASS INDEX: 28.9 KG/M2

## 2018-01-31 DIAGNOSIS — Z23 ENCOUNTER FOR IMMUNIZATION: ICD-10-CM

## 2018-01-31 DIAGNOSIS — R03.0 ELEVATED BP WITHOUT DIAGNOSIS OF HYPERTENSION: Primary | ICD-10-CM

## 2018-01-31 RX ORDER — BETAMETHASONE DIPROPIONATE 0.5 MG/G
CREAM TOPICAL 2 TIMES DAILY
Qty: 45 G | Refills: 0 | Status: SHIPPED | OUTPATIENT
Start: 2018-01-31 | End: 2018-06-04 | Stop reason: ALTCHOICE

## 2018-01-31 NOTE — PROGRESS NOTES
Chief Complaint   Patient presents with    Hypertension     follow up     1. Have you been to the ER, urgent care clinic since your last visit? Hospitalized since your last visit? No    2. Have you seen or consulted any other health care providers outside of the [address] [place] [address] since your last visit? Include any pap smears or colon screening. No      Pneumonia  vaccination given to left deltoid. Tolerated well, no reaction noted.

## 2018-01-31 NOTE — MR AVS SNAPSHOT
[address] [id number] [phone] Patient: Armani Silva MRN: [id number] MTU:[id number] Visit Information Date & Time Provider Department Dept. Phone Encounter #  
 1/31/2018  4:15 PM Meño Ball MD [facility] [phone] [id number] Follow-up Instructions Return in about 4 months (around 5/31/2018), or if symptoms worsen or fail to improve. Upcoming Health Maintenance Date Due Pneumococcal 65+ Low/Medium Risk (2 of 2 - PPSV23) 11/21/2017 MEDICARE YEARLY EXAM 4/25/2018 FOBT Q 1 YEAR AGE 50-75 4/26/2018 GLAUCOMA SCREENING Q2Y 12/7/2019 DTaP/Tdap/Td series (2 - Td) 10/1/2023 Allergies as of 1/31/2018  Review Complete On: 1/31/2018 By: Meño Ball MD  
 No Known Allergies Current Immunizations  Reviewed on 4/24/2017 Name Date Influenza High Dose Vaccine PF 9/14/2016, 9/23/2015, 9/10/2014 Influenza Vaccine 9/25/2017 Influenza Vaccine (Quadrivalent) 9/27/2017 Influenza Vaccine PF 10/1/2013 Influenza Vaccine Split 9/29/2011 PREVNAR 7 11/14/2012 Pneumococcal Conjugate (PCV-13) 11/21/2016 Pneumococcal Polysaccharide (PPSV-23)  Incomplete Tdap 10/1/2013 Zoster Vaccine, Live 11/5/2013 Not reviewed this visit You Were Diagnosed With   
  
 Codes Comments Elevated BP without diagnosis of hypertension    -  Primary ICD-10-CM: R03.0 ICD-9-CM: 796.2 Encounter for immunization     ICD-10-CM: P94 ICD-9-CM: V03.89 Vitals Height(growth percentile) Weight(growth percentile) BMI Smoking Status 6' 2\" (1.88 m) 225 lb 3.2 oz (102.2 kg) 28.91 kg/m2 Never Smoker BMI and BSA Data Body Mass Index Body Surface Area  
 28.91 kg/m 2 2.31 m 2 Preferred Pharmacy Pharmacy Name Phone [id number] [phone] Your Updated Medication List  
  
   
This list is accurate as of: 1/31/18  5:03 PM.  Always use your most recent med list.  
  
  
  
  
 acetaminophen 500 mg tablet Commonly known as:  TYLENOL Take 1,000 mg by mouth every six (6) hours as needed for Pain. atorvastatin 40 mg tablet Commonly known as:  LIPITOR  
take 1 tablet by mouth NIGHTLY  
  
 betamethasone dipropionate 0.05 % topical cream  
Commonly known as:  Jeanie Roddymiguel Apply  to affected area two (2) times a day. For contact dermatitis  
  
 clopidogrel 75 mg Tab Commonly known as:  PLAVIX Take 1 Tab by mouth daily. COLACE 50 mg capsule Generic drug:  docusate sodium Take 50 mg by mouth as needed. omeprazole 20 mg capsule Commonly known as:  PRILOSEC Take 1 Cap by mouth daily. tamsulosin 0.4 mg capsule Commonly known as:  FLOMAX  
take 1 capsule by mouth once daily VICKS SINEX 12-HOUR 0.05 % nasal spray Generic drug:  oxymetazoline 1 [place] by Both Nostrils route two (2) times daily as needed for Congestion. Prescriptions Sent to Pharmacy Refills  
 betamethasone dipropionate (DIPROSONE) 0.05 % topical cream 0 Sig: Apply  to affected area two (2) times a day. For contact dermatitis Class: Normal  
 Pharmacy: [id number], [address] [place] [address] #: [phone] Route: Topical  
  
We Performed the Following PNEUMOCOCCAL POLYSACCHARIDE VACCINE, 23-VALENT, ADULT OR IMMUNOSUPPRESSED PT DOSE, [47356 CPT(R)] SC IMMUNIZ ADMIN,1 SINGLE/COMB VAC/TOXOID [id number] CPT(R)] Follow-up Instructions Return in about 4 months (around 5/31/2018), or if symptoms worsen or fail to improve. Patient Instructions Vaccine Information Statement Pneumococcal Polysaccharide Vaccine: What You Need to Know Many Vaccine Information Statements are available in Faroese and other languages. See www.immunize.org/vis. Hojas de información Sobre Vacunas están disponibles en español y en muchos otros idiomas. Visite [email or web address]. 1. Why get vaccinated? Vaccination can protect older adults (and some children and younger adults) from pneumococcal disease. Pneumococcal disease is caused by bacteria that can spread from person to person through close contact. It can cause ear infections, and it can also lead to more serious infections of the: 
 Lungs (pneumonia),  Blood (bacteremia), and 
 Covering of the brain and spinal cord (meningitis). Meningitis can cause deafness and brain damage, and it can be fatal.   
 
Anyone can get pneumococcal disease, but children under 3years of age, people with certain medical conditions, adults over 72years of age, and cigarette smokers are at the highest risk. About 18,000 older adults die each year from pneumococcal disease in the [place]. Treatment of pneumococcal infections with penicillin and other drugs used to be more effective. But some strains of the disease have become resistant to these drugs. This makes prevention of the disease, through vaccination, even more important. 2. Pneumococcal polysaccharide vaccine (PPSV23) Pneumococcal polysaccharide vaccine (PPSV23) protects against 23 types of pneumococcal bacteria. It will not prevent all pneumococcal disease. PPSV23 is recommended for:  All adults 72years of age and older,  Anyone 2 through 59years of age with certain long-term health problems, 
 Anyone 2 through 59years of age with a weakened immune system, 
 Adults 23 through 59years of age who smoke cigarettes or have asthma. Most people need only one dose of PPSV. A second dose is recommended for certain high-risk groups. People 72 and older should get a dose even if they have gotten one or more doses of the vaccine before they turned 65.  
 
Your healthcare provider can give you more information about these recommendations. Most healthy adults develop protection within 2 to 3 weeks of getting the shot. 3. Some people should not get this vaccine  Anyone who has had a life-threatening allergic reaction to PPSV should not get another dose.  Anyone who has a severe allergy to any component of PPSV should not receive it. Tell your provider if you have any severe allergies.  Anyone who is moderately or severely ill when the shot is scheduled may be asked to wait until they recover before getting the vaccine. Someone with a mild illness can usually be vaccinated.  Children less than 3years of age should not receive this vaccine.  There is no evidence that PPSV is harmful to either a pregnant woman or to her fetus. However, as a precaution, women who need the vaccine should be vaccinated before becoming pregnant, if possible. 4. Risks of a vaccine reaction With any medicine, including vaccines, there is a chance of side effects. These are usually mild and go away on their own, but serious reactions are also possible. About half of people who get PPSV have mild side effects, such as redness or pain where the shot is given, which go away within about two days. Less than 1 out of 100 people develop a fever, muscle aches, or more severe local reactions. Problems that could happen after any vaccine:  People sometimes faint after a medical procedure, including vaccination. Sitting or lying down for about 15 minutes can help prevent fainting, and injuries caused by a fall. Tell your doctor if you feel dizzy, or have vision changes or ringing in the ears.  Some people get severe pain in the shoulder and have difficulty moving the arm where a shot was given. This happens very rarely.  Any medication can cause a severe allergic reaction.  Such reactions from a vaccine are very rare, estimated at about 1 in a million doses, and would happen within a few minutes to a few hours after the vaccination. As with any medicine, there is a very remote chance of a vaccine causing a serious injury or death. The safety of vaccines is always being monitored. For more information, visit: www.cdc.gov/vaccinesafety/  
 
5. What if there is a serious reaction? What should I look for? Look for anything that concerns you, such as signs of a severe allergic reaction, very high fever, or unusual behavior. Signs of a severe allergic reaction can include hives, swelling of the face and throat, difficulty breathing, a fast heartbeat, dizziness, and weakness. These would usually start a few minutes to a few hours after the vaccination. What should I do? If you think it is a severe allergic reaction or other emergency that cant wait, call 9-1-1 or get to the nearest hospital. Otherwise, call your doctor. Afterward, the reaction should be reported to the Vaccine Adverse Event Reporting System (VAERS). Your doctor might file this report, or you can do it yourself through the VAERS web site at www.vaers. [facility].gov, or by calling 0-863.889.3742. VAERS does not give medical advice. 6. How can I learn more?  Ask your doctor. He or she can give you the vaccine package insert or suggest other sources of information.  Call your local or state health department.  Contact the Centers for Disease Control and Prevention (CDC): 
- Call 8-850.485.5583 (1-800-CDC-INFO) or 
- Visit CDCs website at www.cdc.gov/vaccines Vaccine Information Statement PPSV  
04/24/2015 Department of [facility] and [employer] Centers for Disease Control and Prevention Office Use Only [facility]! Dear Adrian Spear: Thank you for requesting a [employer] account. Our records indicate that you already have an active [employer] account. You can access your account anytime at [email or web address] Did you know that you can access your hospital and ER discharge instructions at any time in Stylistpick? You can also review all of your test results from your hospital stay or ER visit. Additional Information If you have questions, please visit the Frequently Asked Questions section of the [employer] website at [email or web address] Remember, Stylistpick is NOT to be used for urgent needs. For medical emergencies, dial 911. Now available from your iPhone and Android! Please provide this summary of care documentation to your next provider. Your primary care clinician is listed as Terry Zohreh. If you have any questions after today's visit, please call [phone].

## 2018-01-31 NOTE — PROGRESS NOTES
HISTORY OF PRESENT ILLNESS  Shelley Hernandez is a 67 y.o. male. HPI       HTN  Today pt present for Bp check and the patient stating that so far there has been a Compliancy w/ the bp meds, last visit pt med changed , currently on no bp meds and pt feeling much better, more energetic w/out bp meds, he is having had the low salt diet   Has been doing a lot of painting and home renovation since been of bp meds, the patient has been active life style and does do the daily walking, in addition pt states that patien does obtain the bp at home few times a week and the average of 120/80 ,   today the pt denies Chest Pain, has no legs swelling no lightheadedness,the pat has not been feeling anxious, and  Has not been feeling stressed out, otherwise feeling better since the last visit    Current Outpatient Prescriptions   Medication Sig Dispense Refill    clopidogrel (PLAVIX) 75 mg tab Take 1 Tab by mouth daily. 90 Tab 3    tamsulosin (FLOMAX) 0.4 mg capsule take 1 capsule by mouth once daily 30 Cap 2    atorvastatin (LIPITOR) 40 mg tablet take 1 tablet by mouth NIGHTLY 90 Tab 3    omeprazole (PRILOSEC) 20 mg capsule Take 1 Cap by mouth daily. 90 Cap 1    lisinopril (PRINIVIL, ZESTRIL) 5 mg tablet Take 1 Tab by mouth daily. (Patient not taking: Reported on 1/31/2018) 90 Tab 3    oxymetazoline (VICKS SINEX 12-HOUR) 0.05 % nasal spray 1 [place] by Both Nostrils route two (2) times daily as needed for Congestion.  acetaminophen (TYLENOL) 500 mg tablet Take 1,000 mg by mouth every six (6) hours as needed for Pain.  docusate sodium (COLACE) 50 mg capsule Take 50 mg by mouth as needed.        No Known Allergies  Past Medical History:   Diagnosis Date    Abdominal wall hernia 11/16/2017    Anemia 9/10/2014    Back pain 4/1/2010    Blurry vision, bilateral 11/14/2017    CAD (coronary artery disease)     Calf pain 3/11/2015    Chronic kidney disease     cyst on kidney    Chronic left shoulder pain 10/24/2016  Constipation 6/11/2014    Decreased vision 6/13/2011    Diastasis recti 11/16/2017    Diverticulitis 3/11/2015    Dizziness 11/14/2017    Enlarged LA (left atrium)     Fall at home 3/17/2016    GERD (gastroesophageal reflux disease)     Hemorrhoids 9/10/2014    Hip pain, chronic, right 10/3/2017    HTN (hypertension) 8/24/2010    Hx-TIA (transient ischemic attack)     Hypercholesterolemia 4/1/2010    Hypertension     Irregular heart beat 9/23/2015    Kidney disease     Mitral regurgitation     Need for varicella vaccine 9/10/2014    Orthostatic lightheadedness 1/16/2018    Other ill-defined conditions(799.89)     heart murmur    Rash 8/24/2010    Rib pain on right side 3/17/2016    Right calf pain 3/11/2015    Risk for falls 4/4/2014    S/P knee replacement 6/11/2014    Screening for depression 4/4/2014    Tinea versicolor 9/10/2014    Tinnitus of left ear 1/16/2018    Urinary frequency 8/24/2010     Past Surgical History:   Procedure Laterality Date    CARDIAC SURG PROCEDURE UNLIST      heart cath    HX HEENT      nasal septum repair    HX HEENT      HX HERNIA REPAIR      HX KNEE REPLACEMENT Left     HX ORTHOPAEDIC      left knee surgery    HX ORTHOPAEDIC      HX TONSILLECTOMY  age 5    HX UROLOGICAL      SC COLONOSCOPY FLX DX W/COLLJ SPEC WHEN PFRMD  4/18/2011          Family History   Problem Relation Age of Onset    COPD Mother     Heart Attack Mother     Hypertension Mother     Heart Disease Mother     COPD Father     Heart Attack Father     Hypertension Father     Heart Disease Father     Cancer Father     Cancer Maternal Grandmother      Social History   Substance Use Topics    Smoking status: Never Smoker    Smokeless tobacco: Never Used    Alcohol use 0.0 oz/week     0 Standard drinks or equivalent per week      Comment: 12 beers, half a fifth a month      Lab Results  Component Value Date/Time   WBC 6.1 11/15/2017 02:48 AM   HGB 11.9 11/15/2017 02:48 AM   HCT 36.3 11/15/2017 02:48 AM   PLATELET 303 74/00/3288 02:48 AM   MCV 91.7 11/15/2017 02:48 AM     Lab Results  Component Value Date/Time   GFR est non-AA >60 11/15/2017 02:48 AM   GFRNA, POC >60 03/29/2011 09:23 AM   GFR est AA >60 11/15/2017 02:48 AM   GFRAA, POC >60 03/29/2011 09:23 AM   Creatinine 1.03 11/15/2017 02:48 AM   Creatinine (POC) 0.8 03/29/2011 09:23 AM   BUN 13 11/15/2017 02:48 AM   Sodium 141 11/15/2017 02:48 AM   Potassium 4.0 11/15/2017 02:48 AM   Chloride 108 11/15/2017 02:48 AM   CO2 24 11/15/2017 02:48 AM   Magnesium 1.9 01/10/2010 08:05 PM        Review of Systems   Constitutional: Negative for chills, fever and malaise/fatigue. HENT: Negative for congestion and nosebleeds. Eyes: Negative for blurred vision and pain. Respiratory: Negative for shortness of breath and wheezing. Cardiovascular: Negative for chest pain, palpitations and leg swelling. Gastrointestinal: Negative for constipation, diarrhea, nausea and vomiting. Genitourinary: Negative for dysuria and frequency. Musculoskeletal: Negative for joint pain and myalgias. Skin: Negative for itching and rash. Neurological: Negative for dizziness, loss of consciousness and headaches. Endo/Heme/Allergies: Does not bruise/bleed easily. Psychiatric/Behavioral: Negative for depression. The patient is not nervous/anxious and does not have insomnia. All other systems reviewed and are negative. Physical Exam   Constitutional: He is oriented to person, place, and time. He appears well-developed and well-nourished. HENT:   Head: Normocephalic and atraumatic. Mouth/Throat: No oropharyngeal exudate. Eyes: Conjunctivae and EOM are normal. Pupils are equal, round, and reactive to light. Neck: Normal range of motion. Neck supple. No JVD present. No thyromegaly present. Cardiovascular: Normal rate, regular rhythm, normal heart sounds and intact distal pulses. Exam reveals no friction rub.     No murmur heard.  Pulmonary/Chest: Effort normal and breath sounds normal. No respiratory distress. He has no wheezes. He has no rales. Abdominal: Soft. Bowel sounds are normal. He exhibits no distension. There is no tenderness. Musculoskeletal: He exhibits no edema or tenderness. Lymphadenopathy:     He has no cervical adenopathy. Neurological: He is alert and oriented to person, place, and time. He has normal reflexes. Skin: Skin is warm. No rash noted. No erythema. Psychiatric: He has a normal mood and affect. His behavior is normal.   Nursing note and vitals reviewed. ASSESSMENT and PLAN  Diagnoses and all orders for this visit:    1. Elevated BP without diagnosis of hypertension  -     Pneumococcal polysaccharide vaccine, 23-valent, adult or immunosuppressed pt dose  -     [place] IMMUNIZ ADMIN,1 SINGLE/COMB VAC/TOXOID    2. Encounter for immunization  -     Pneumococcal polysaccharide vaccine, 23-valent, adult or immunosuppressed pt dose  -     [place] IMMUNIZ ADMIN,1 SINGLE/COMB VAC/TOXOID    Other orders  -     betamethasone dipropionate (DIPROSONE) 0.05 % topical cream; Apply  to affected area two (2) times a day.  For contact dermatitis      HTN controlled, no need of bp meds at this time,  Discussed sodium restriction, high k rich diet,  maintaining ideal body weight and regular exercise program such as daily walking 30 min perday 4-5 times per week,  medication compliance advised, was told to call back for rfs or of any concern

## 2018-01-31 NOTE — PATIENT INSTRUCTIONS
Vaccine Information Statement    Pneumococcal Polysaccharide Vaccine: What You Need to Know    Many Vaccine Information Statements are available in Sami and other languages. See www.immunize.org/vis. Hojas de información Sobre Vacunas están disponibles en español y en muchos otros idiomas. Visite Sergei.si. 1. Why get vaccinated? Vaccination can protect older adults (and some children and younger adults) from pneumococcal disease. Pneumococcal disease is caused by bacteria that can spread from person to person through close contact. It can cause ear infections, and it can also lead to more serious infections of the:   Lungs (pneumonia),   Blood (bacteremia), and   Covering of the brain and spinal cord (meningitis). Meningitis can cause deafness and brain damage, and it can be fatal.      Anyone can get pneumococcal disease, but children under 3years of age, people with certain medical conditions, adults over 72years of age, and cigarette smokers are at the highest risk. About 18,000 older adults die each year from pneumococcal disease in the [place]. Treatment of pneumococcal infections with penicillin and other drugs used to be more effective. But some strains of the disease have become resistant to these drugs. This makes prevention of the disease, through vaccination, even more important. 2. Pneumococcal polysaccharide vaccine (PPSV23)    Pneumococcal polysaccharide vaccine (PPSV23) protects against 23 types of pneumococcal bacteria. It will not prevent all pneumococcal disease. PPSV23 is recommended for:   All adults 72years of age and older,   Anyone 2 through 59years of age with certain long-term health problems,   Anyone 2 through 59years of age with a weakened immune system,   Adults 23 through 59years of age who smoke cigarettes or have asthma. Most people need only one dose of PPSV.   A second dose is recommended for certain high-risk groups. People 72 and older should get a dose even if they have gotten one or more doses of the vaccine before they turned 65. Your healthcare provider can give you more information about these recommendations. Most healthy adults develop protection within 2 to 3 weeks of getting the shot. 3. Some people should not get this vaccine     Anyone who has had a life-threatening allergic reaction to PPSV should not get another dose.  Anyone who has a severe allergy to any component of PPSV should not receive it. Tell your provider if you have any severe allergies.  Anyone who is moderately or severely ill when the shot is scheduled may be asked to wait until they recover before getting the vaccine. Someone with a mild illness can usually be vaccinated.  Children less than 3years of age should not receive this vaccine.  There is no evidence that PPSV is harmful to either a pregnant woman or to her fetus. However, as a precaution, women who need the vaccine should be vaccinated before becoming pregnant, if possible. 4. Risks of a vaccine reaction    With any medicine, including vaccines, there is a chance of side effects. These are usually mild and go away on their own, but serious reactions are also possible. About half of people who get PPSV have mild side effects, such as redness or pain where the shot is given, which go away within about two days. Less than 1 out of 100 people develop a fever, muscle aches, or more severe local reactions. Problems that could happen after any vaccine:     People sometimes faint after a medical procedure, including vaccination. Sitting or lying down for about 15 minutes can help prevent fainting, and injuries caused by a fall. Tell your doctor if you feel dizzy, or have vision changes or ringing in the ears.  Some people get severe pain in the shoulder and have difficulty moving the arm where a shot was given.  This happens very rarely.  Any medication can cause a severe allergic reaction. Such reactions from a vaccine are very rare, estimated at about 1 in a million doses, and would happen within a few minutes to a few hours after the vaccination. As with any medicine, there is a very remote chance of a vaccine causing a serious injury or death. The safety of vaccines is always being monitored. For more information, visit: www.cdc.gov/vaccinesafety/     5. What if there is a serious reaction? What should I look for? Look for anything that concerns you, such as signs of a severe allergic reaction, very high fever, or unusual behavior. Signs of a severe allergic reaction can include hives, swelling of the face and throat, difficulty breathing, a fast heartbeat, dizziness, and weakness. These would usually start a few minutes to a few hours after the vaccination. What should I do? If you think it is a severe allergic reaction or other emergency that cant wait, call 9-1-1 or get to the nearest hospital. Otherwise, call your doctor. Afterward, the reaction should be reported to the Vaccine Adverse Event Reporting System (VAERS). Your doctor might file this report, or you can do it yourself through the VAERS web site at www.vaers. [facility].gov, or by calling 7-124.647.1218. [employer] does not give medical advice. 6. How can I learn more?  Ask your doctor. He or she can give you the vaccine package insert or suggest other sources of information.  Call your local or state health department.    Contact the Centers for Disease Control and Prevention (CDC):  - Call 2-603.754.4279 (1-800-CDC-INFO) or  - Visit CDCs website at [employer] 18 04/24/2015    [facility] for Disease Control and Prevention    Office Use Only

## 2018-02-01 ENCOUNTER — PATIENT MESSAGE (OUTPATIENT)
Dept: NEUROLOGY | Age: 73
End: 2018-02-01

## 2018-02-15 DIAGNOSIS — R03.0 ELEVATED BP WITHOUT DIAGNOSIS OF HYPERTENSION: Primary | ICD-10-CM

## 2018-02-15 RX ORDER — HYDROCHLOROTHIAZIDE 12.5 MG/1
12.5 TABLET ORAL DAILY
Qty: 30 TAB | Refills: 5 | Status: SHIPPED | OUTPATIENT
Start: 2018-02-15 | End: 2018-06-04 | Stop reason: ALTCHOICE

## 2018-02-28 RX ORDER — LISINOPRIL 30 MG/1
TABLET ORAL
Qty: 90 TAB | Refills: 1 | Status: SHIPPED | OUTPATIENT
Start: 2018-02-28 | End: 2018-11-02 | Stop reason: SDUPTHER

## 2018-03-15 RX ORDER — OMEPRAZOLE 20 MG/1
CAPSULE, DELAYED RELEASE ORAL
Qty: 30 CAP | Refills: 0 | Status: SHIPPED | OUTPATIENT
Start: 2018-03-15 | End: 2018-06-04 | Stop reason: SDUPTHER

## 2018-06-04 ENCOUNTER — OFFICE VISIT (OUTPATIENT)
Dept: FAMILY MEDICINE CLINIC | Age: 73
End: 2018-06-04

## 2018-06-04 VITALS
SYSTOLIC BLOOD PRESSURE: 126 MMHG | BODY MASS INDEX: 28.7 KG/M2 | HEIGHT: 74 IN | DIASTOLIC BLOOD PRESSURE: 72 MMHG | RESPIRATION RATE: 16 BRPM | HEART RATE: 55 BPM | OXYGEN SATURATION: 98 % | WEIGHT: 223.6 LBS | TEMPERATURE: 96.4 F

## 2018-06-04 DIAGNOSIS — Z13.39 SCREENING FOR ALCOHOLISM: ICD-10-CM

## 2018-06-04 DIAGNOSIS — Z13.31 SCREENING FOR DEPRESSION: ICD-10-CM

## 2018-06-04 DIAGNOSIS — Z12.11 SCREEN FOR COLON CANCER: Primary | ICD-10-CM

## 2018-06-04 DIAGNOSIS — Z78.9 PATIENT IS FULL CODE: ICD-10-CM

## 2018-06-04 DIAGNOSIS — Z13.820 SCREENING FOR OSTEOPOROSIS: ICD-10-CM

## 2018-06-04 DIAGNOSIS — Z71.89 ADVANCED DIRECTIVES, COUNSELING/DISCUSSION: ICD-10-CM

## 2018-06-04 LAB
BILIRUB UR QL STRIP: NEGATIVE
GLUCOSE UR-MCNC: NEGATIVE MG/DL
KETONES P FAST UR STRIP-MCNC: NEGATIVE MG/DL
PH UR STRIP: 6.5 [PH] (ref 4.6–8)
PROT UR QL STRIP: NEGATIVE
SP GR UR STRIP: 1.01 (ref 1–1.03)
UA UROBILINOGEN AMB POC: NORMAL (ref 0.2–1)
URINALYSIS CLARITY POC: CLEAR
URINALYSIS COLOR POC: YELLOW
URINE BLOOD POC: NEGATIVE
URINE LEUKOCYTES POC: NEGATIVE
URINE NITRITES POC: NEGATIVE

## 2018-06-04 RX ORDER — ACETAMINOPHEN 500 MG
1000 TABLET ORAL
COMMUNITY
End: 2018-06-04 | Stop reason: SDUPTHER

## 2018-06-04 RX ORDER — AMOXICILLIN 500 MG/1
CAPSULE ORAL
Refills: 0 | COMMUNITY
Start: 2018-06-01 | End: 2018-10-09 | Stop reason: ALTCHOICE

## 2018-06-04 RX ORDER — MULTIVITAMIN
1 TABLET ORAL 2 TIMES DAILY
Qty: 60 TAB | Refills: 11
Start: 2018-06-04 | End: 2020-12-29 | Stop reason: ALTCHOICE

## 2018-06-04 RX ORDER — OMEPRAZOLE 20 MG/1
CAPSULE, DELAYED RELEASE ORAL
Qty: 90 CAP | Refills: 3 | Status: SHIPPED | OUTPATIENT
Start: 2018-06-04 | End: 2018-10-09 | Stop reason: SDUPTHER

## 2018-06-04 RX ORDER — CLOPIDOGREL BISULFATE 75 MG/1
TABLET ORAL
COMMUNITY
Start: 2018-02-06 | End: 2018-06-04 | Stop reason: SDUPTHER

## 2018-06-04 RX ORDER — ATORVASTATIN CALCIUM 40 MG/1
TABLET, FILM COATED ORAL
COMMUNITY
Start: 2018-03-15 | End: 2018-06-04 | Stop reason: SDUPTHER

## 2018-06-04 RX ORDER — FLUCONAZOLE 200 MG/1
TABLET ORAL
Refills: 0 | COMMUNITY
Start: 2018-03-15 | End: 2018-06-04 | Stop reason: ALTCHOICE

## 2018-06-04 RX ORDER — LISINOPRIL 30 MG/1
30 TABLET ORAL
COMMUNITY
End: 2018-06-04 | Stop reason: SDUPTHER

## 2018-06-04 RX ORDER — OMEPRAZOLE 20 MG/1
CAPSULE, DELAYED RELEASE ORAL
COMMUNITY
Start: 2018-03-15 | End: 2018-06-04 | Stop reason: SDUPTHER

## 2018-06-04 NOTE — PROGRESS NOTES
Name and  verified      Chief Complaint   Patient presents with   [address] [place] [address] reviewed-discussed with patient. 1. Have you been to the ER, urgent care clinic since your last visit? Hospitalized since your last visit. No    2. Have you seen or consulted any other health care providers outside of the [address] [place] [address] since your last visit? Include any pap smears or colon screening. No      Advance Directives Care Planning Information given, patient acknowledged understanding.

## 2018-06-04 NOTE — PATIENT INSTRUCTIONS
Medicare Wellness Visit, Male    The best way to live healthy is to have a lifestyle where you eat a well-balanced diet, exercise regularly, limit alcohol use, and quit all forms of tobacco/nicotine, if applicable. Regular preventive services are another way to keep healthy. Preventive services (vaccines, screening tests, monitoring & exams) can help personalize your care plan, which helps you manage your own care. Screening tests can find health problems at the earliest stages, when they are easiest to treat. [facility] follows the current, evidence-based guidelines published by the [facility] Angel Varun ([facility]) when recommending preventive services for our patients. Because we follow these guidelines, sometimes recommendations change over time as research supports it. (For example, a prostate screening blood test is no longer routinely recommended for men with no symptoms.)    Of course, you and your provider may decide to screen more often for some diseases, based on your risk and co-morbidities (chronic disease you are already diagnosed with). Preventive services for you include:    - Medicare offers their members a free annual wellness visit, which is time for you and your primary care provider to discuss and plan for your preventive service needs. Take advantage of this benefit every year!    -All people over age 72 should receive the recommended pneumonia vaccines. Current USPSTF guidelines recommend a series of two vaccines for the best pneumonia protection.     -All adults should have a yearly flu vaccine and a tetanus vaccine every 10 years.  All adults age 61 years should receive a shingles vaccine once in their lifetime.      -All adults age 38-68 years who are overweight should have a diabetes screening test once every three years.     -Other screening tests & preventive services for persons with diabetes include: an eye exam to screen for diabetic retinopathy, a kidney function test, a foot exam, and stricter control over your cholesterol.     -Cardiovascular screening for adults with routine risk involves an electrocardiogram (ECG) at intervals determined by the provider.     -Colorectal cancer screenings should be done for adults age 54-65 years with normal risk. There are a number of acceptable methods of screening for this type of cancer. Each test has its own benefits and drawbacks. Discuss with your provider what is most appropriate for you during your annual wellness visit. The different tests include: colonoscopy (considered the best screening method), a fecal occult blood test, a fecal DNA test, and sigmoidoscopy.    -All adults born between [facility] should be screened once for Hepatitis C.    -An Abdominal Aortic Aneurysm (AAA) Screening is recommended for men age 73-68 who has ever smoked in their lifetime.      Here is a list of your current Health Maintenance items (your personalized list of preventive services) with a due date:  Health Maintenance Due   Topic Date Due    Annual Well Visit  04/25/2018    Stool testing for trace blood  04/26/2018

## 2018-06-04 NOTE — PROGRESS NOTES
This is the Subsequent Medicare Annual Wellness Exam, performed 12 months or more after the Initial AWV or the last Subsequent AWV    I have reviewed the patient's medical history in detail and updated the computerized patient record. History        Present for CPE, last Complete Physical exam was last yr ,  Up todate w/ all vaccination, last tetanus vaccine was in < 5yrs ago .      Last psa exam was normal and was in last yr,        last colonoscopy was normal and was >8 yrs ago,   No past surgical hx,  last bone dexa scan was normal and was     No family hx of breast cancer   no family hx of prostate cancer   no family hx of colon cancer, parent  and not sexaully active and uses [employer], ++physically active,  compliant w/ meds, ++Rf needed for today for his meds.    Screening for fall   Medications causing fall and the risk for future fall screened  the current meds r/w'd and addressed,        Past Medical History:   Diagnosis Date    Abdominal wall hernia 2017    Anemia 9/10/2014    Back pain 2010    Blurry vision, bilateral 2017    CAD (coronary artery disease)     Calf pain 3/11/2015    Chronic kidney disease     cyst on kidney    Chronic left shoulder pain 10/24/2016    Constipation 2014    Decreased vision 2011    Diastasis recti 2017    Diverticulitis 3/11/2015    Dizziness 2017    Enlarged LA (left atrium)     Fall at home 3/17/2016    GERD (gastroesophageal reflux disease)     Hemorrhoids 9/10/2014    Hip pain, chronic, right 10/3/2017    HTN (hypertension) 2010    Hx-TIA (transient ischemic attack)     Hypercholesterolemia 2010    Hypertension     Irregular heart beat 2015    Kidney disease     Mitral regurgitation     Need for varicella vaccine 9/10/2014    Orthostatic lightheadedness 2018    Other ill-defined conditions(799.89)     heart murmur    Rash 2010    Rib pain on right side 3/17/2016  Right calf pain 3/11/2015    Risk for falls 4/4/2014    S/P knee replacement 6/11/2014    Screening for depression 4/4/2014    Tinea versicolor 9/10/2014    Tinnitus of left ear 1/16/2018    Urinary frequency 8/24/2010      Past Surgical History:   Procedure Laterality Date    CARDIAC SURG PROCEDURE UNLIST      heart cath    HX HEENT      nasal septum repair    HX HEENT      HX HERNIA REPAIR      HX KNEE REPLACEMENT Left     HX ORTHOPAEDIC      left knee surgery    HX ORTHOPAEDIC      HX TONSILLECTOMY  age 5   [de-identified] UROLOGICAL      RI COLONOSCOPY FLX DX W/COLLJ SPEC WHEN PFRMD  4/18/2011          Current Outpatient Prescriptions   Medication Sig Dispense Refill    amoxicillin (AMOXIL) 500 mg capsule take 4 capsules by mouth 1 hour before APPOINTMENT  0    omeprazole (PRILOSEC) 20 mg capsule TAKE 1 CAPSULE DAILY 30 Cap 0    lisinopril (PRINIVIL, ZESTRIL) 30 mg tablet TAKE 1 TABLET DAILY 90 Tab 1    hydroCHLOROthiazide (HYDRODIURIL) 12.5 mg tablet Take 1 Tab by mouth daily. 30 Tab 5    clopidogrel (PLAVIX) 75 mg tab Take 1 Tab by mouth daily. 90 Tab 3    tamsulosin (FLOMAX) 0.4 mg capsule take 1 capsule by mouth once daily 30 Cap 2    oxymetazoline (VICKS SINEX 12-HOUR) 0.05 % nasal spray 1 [place]elope by Both Nostrils route two (2) times daily as needed for Congestion.  acetaminophen (TYLENOL) 500 mg tablet Take 1,000 mg by mouth every six (6) hours as needed for Pain.  atorvastatin (LIPITOR) 40 mg tablet take 1 tablet by mouth NIGHTLY 90 Tab 3    docusate sodium (COLACE) 50 mg capsule Take 50 mg by mouth as needed.        No Known Allergies  Family History   Problem Relation Age of Onset    COPD Mother     Heart Attack Mother     Hypertension Mother     Heart Disease Mother     COPD Father     Heart Attack Father     Hypertension Father     Heart Disease Father     Cancer Father     Cancer Maternal Grandmother      Social History   Substance Use Topics    Smoking status: Never Smoker    Smokeless tobacco: Never Used    Alcohol use 0.0 oz/week     0 Standard drinks or equivalent per week      Comment: 12 beers, half a fifth a month     Patient Active Problem List   Diagnosis Code    Back pain M54.9    Hypercholesterolemia E78.00    HTN (hypertension) I10    Rash R21    Urinary frequency R35.0    Foot pain, right M79.671    Acute sinusitis J01.90    Corn L84    Tick bite W57. XXXA    Decreased vision H54.7    Knee pain, left M25.562    Acute bronchitis J20.9    Otitis externa of right ear H60.91    ED (erectile dysfunction) N52.9    Risk for falls Z91.81    Screening for depression Z13.89    Osteopenia M85.80    S/P knee replacement Z96.659    Constipation K59.00    Need for varicella vaccine Z23    Anemia D64.9    Tinea versicolor B36.0    Hemorrhoids K64.9    Diverticulitis K57.92    Calf pain M79.669    Right calf pain M79.661    Irregular heart beat I49.9    Enlarged LA (left atrium) I51.7    Mitral regurgitation I34.0    Fall at home W19. Francy Jennifer, Y92.009    Rib pain on right side R07.81    Anginal chest pain at rest [facility]) I20.8    Chronic left shoulder pain M25.512, G89.29    Acute nasopharyngitis J00    TIA (transient ischemic attack) G45.9    Hip pain, chronic, right M25.551, G89.29    Dizziness R42    Blurry vision, bilateral H53.8    Diastasis recti M62.08    Tinnitus of left ear H93.12    Orthostatic lightheadedness R42    Elevated BP without diagnosis of hypertension R03.0       Depression Risk Factor Screening:     PHQ over the last two weeks 6/4/2018   Little interest or pleasure in doing things Not at all   Feeling down, depressed or hopeless Not at all   Total Score PHQ 2 0     Alcohol Risk Factor Screening: You do not drink alcohol or very rarely. Functional Ability and Level of Safety:   Hearing Loss  Hearing is good.     Activities of Daily Living  The home contains: handrails, grab bars and rugs  Patient does total self care    Fall Risk  Fall Risk Assessment, last 12 mths 6/4/2018   Able to walk? Yes   Fall in past 12 months? No   Fall with injury? -   Number of falls in past 12 months -   Fall Risk Score -       Abuse Screen  Patient is not abused    Cognitive Screening   Evaluation of Cognitive Function:  Has your family/caregiver stated any concerns about your memory: no  Normal    Patient Care Team   Patient Care Team:  Holden Beard MD as PCP - General  Judtih Paz MD (Dermatology)  Evelina Briseno MD (Cardiology)  Master Bowers MD (Plastic Surgery)  Nicolette Doan OD (Optometry)  Rojelio Carrasco MD as Physician (Cardiology)  Kun Saucedo MD as Physician (Sleep Medicine)  Anayeli Marie, RN as Ambulatory Care Navigator  Dorene Duenas MD (Gastroenterology)  Rjoelio Carrasco MD as Physician (Cardiology)    Assessment/Plan   Education and counseling provided:  Are appropriate based on today's review and evaluation  End-of-Life planning (with patient's consent)  Colorectal cancer screening tests    Diagnoses and all orders for this visit:    1. Screen for colon cancer  -     OCCULT BLOOD, IMMUNOASSAY (FIT)  -     CBC W/O DIFF  -     METABOLIC PANEL, COMPREHENSIVE  -     TSH 3RD GENERATION  -     LIPID PANEL  -     AMB POC URINALYSIS DIP STICK AUTO W/O MICRO    2.  Advanced directives, counseling/discussion  -     ADVANCE CARE PLANNING FIRST 30 MINS  -     CBC W/O DIFF  -     METABOLIC PANEL, COMPREHENSIVE  -     TSH 3RD GENERATION  -     LIPID PANEL  -     AMB POC URINALYSIS DIP STICK AUTO W/O MICRO    3. Screening for alcoholism  -     Annual  Alcohol Screen 15 min ()  -     CBC W/O DIFF  -     METABOLIC PANEL, COMPREHENSIVE  -     TSH 3RD GENERATION  -     LIPID PANEL  -     AMB POC URINALYSIS DIP STICK AUTO W/O MICRO    4. Screening for depression  -     Depression Screen Annual  -     CBC W/O DIFF  -     METABOLIC PANEL, COMPREHENSIVE  -     TSH 3RD GENERATION  -     LIPID PANEL  -     AMB POC URINALYSIS DIP STICK AUTO W/O MICRO    5. Patient is full code  -     CBC W/O DIFF  -     METABOLIC PANEL, COMPREHENSIVE  -     TSH 3RD GENERATION  -     LIPID PANEL  -     AMB POC URINALYSIS DIP STICK AUTO W/O MICRO    6. Screening for osteoporosis  -     DEXA BONE DENSITY STUDY AXIAL; Future    Other orders  -     calcium-cholecalciferol, D3, (CALTRATE 600+D) tablet; Take 1 Tab by mouth two (2) times a day.   -     omeprazole (PRILOSEC) 20 mg capsule; TAKE 1 CAPSULE DAILY        Health Maintenance Due   Topic Date Due    MEDICARE YEARLY EXAM  04/25/2018    FOBT Q 1 YEAR AGE 50-75  04/26/2018

## 2018-06-04 NOTE — MR AVS SNAPSHOT
[address] [facility] [id number] 
[phone] Patient: Selena Bonilla MRN: [id number] UIE:3/[birth year]32 Visit Information Date & Time Provider Department Dept. Phone Encounter #  
 6/4/2018  9:15 AM Craig Bland MD [address] [place] [address] OFFICE-ANNEX [phone] [id number] Follow-up Instructions Return in about 1 year (around 6/4/2019), or if symptoms worsen or fail to improve. Your Appointments 8/15/2018  2:20 PM  
New Patient with Michael [facility]Layne [facility] ([facility]) Appt Note: NP/Memory loss [place] [address] [place] [id number] [phone]  
  
   
 [place] [address] [id number] [address] Upcoming Health Maintenance Date Due  
 MEDICARE YEARLY EXAM 4/25/2018 FOBT Q 1 YEAR AGE 50-75 4/26/2018 Influenza Age 5 to Adult 8/1/2018 GLAUCOMA SCREENING Q2Y 12/7/2019 DTaP/Tdap/Td series (2 - Td) 10/1/2023 Allergies as of 6/4/2018  Review Complete On: 6/4/2018 By: Craig Bland MD  
 No Known Allergies Current Immunizations  Reviewed on 4/24/2017 Name Date Influenza High Dose Vaccine PF 9/14/2016, 9/23/2015, 9/10/2014 Influenza Vaccine 9/25/2017 Influenza Vaccine (Quadrivalent) 9/27/2017 Influenza Vaccine PF 10/1/2013 Influenza Vaccine Split 9/29/2011 PREVNAR 7 11/14/2012 Pneumococcal Conjugate (PCV-13) 11/21/2016 Pneumococcal Polysaccharide (PPSV-23) 1/31/2018 Tdap 10/1/2013 Zoster Vaccine, Live 11/5/2013 Not reviewed this visit You Were Diagnosed With   
  
 Codes Comments Screen for colon cancer    -  Primary ICD-10-CM: Z12.11 ICD-9-CM: V76.51 Advanced directives, counseling/discussion     ICD-10-CM: Z71.89 ICD-9-CM: V65.49 Screening for alcoholism     ICD-10-CM: Z13.89 ICD-9-CM: V79.1 Screening for depression     ICD-10-CM: Z13.89 ICD-9-CM: V79.0 Patient is full code     ICD-10-CM: Z78.9 ICD-9-CM: V49.89 Screening for osteoporosis     ICD-10-CM: Z13.820 ICD-9-CM: V82.81 Vitals BP Pulse Temp Resp Height(growth percentile) Weight(growth percentile) 126/72 (BP 1 Location: Left arm, BP Patient Position: At rest) (!) 55 96.4 °F (35.8 °C) (Oral) 16 6' 2\" (1.88 m) 223 lb 9.6 oz (101.4 kg) SpO2 BMI Smoking Status 98% 28.71 kg/m2 Never Smoker BMI and BSA Data Body Mass Index Body Surface Area 28.71 kg/m 2 2.3 m 2 Preferred Pharmacy Pharmacy Name Phone Lucinda Montero, [facility] [phone] Your Updated Medication List  
  
   
This list is accurate as of 6/4/18 10:15 AM.  Always use your most recent med list.  
  
  
  
  
 acetaminophen 500 mg tablet Commonly known as:  TYLENOL Take 1,000 mg by mouth every six (6) hours as needed for Pain.  
  
 amoxicillin 500 mg capsule Commonly known as:  AMOXIL  
take 4 capsules by mouth 1 hour before APPOINTMENT  
  
 atorvastatin 40 mg tablet Commonly known as:  LIPITOR  
take 1 tablet by mouth NIGHTLY  
  
 calcium-cholecalciferol (D3) tablet Commonly known as:  CALTRATE 600+D Take 1 Tab by mouth two (2) times a day. clopidogrel 75 mg Tab Commonly known as:  PLAVIX Take 1 Tab by mouth daily. COLACE 50 mg capsule Generic drug:  docusate sodium Take 50 mg by mouth as needed. lisinopril 30 mg tablet Commonly known as:  PRINIVIL, ZESTRIL  
TAKE 1 TABLET DAILY  
  
 omeprazole 20 mg capsule Commonly known as:  PRILOSEC  
TAKE 1 CAPSULE DAILY  
  
 tamsulosin 0.4 mg capsule Commonly known as:  FLOMAX  
take 1 capsule by mouth once daily VICKS SINEX 12-HOUR 0.05 % nasal spray Generic drug:  oxymetazoline 1 [place] by Both Nostrils route two (2) times daily as needed for Congestion. Prescriptions Sent to Pharmacy Refills  
 omeprazole (PRILOSEC) 20 mg capsule 3 Sig: TAKE 1 CAPSULE DAILY Class: Normal  
 Pharmacy: [address], [address] [facility] #: [phone] We Performed the Following ADVANCE CARE PLANNING FIRST 30 MINS [32947 CPT(R)] AMB POC URINALYSIS DIP STICK AUTO W/O MICRO [52716 CPT(R)] CBC W/O DIFF [40527 CPT(R)] [address] [[id number] [facility]] LIPID PANEL [30035 CPT(R)] METABOLIC PANEL, COMPREHENSIVE [95222 CPT(R)] OCCULT BLOOD, IMMUNOASSAY (FIT) F780207 CPT(R)] RI ANNUAL ALCOHOL SCREEN 15 MIN [id number] [facility]] TSH 3RD GENERATION [73699 CPT(R)] Follow-up Instructions Return in about 1 year (around 6/4/2019), or if symptoms worsen or fail to improve. To-Do List   
 06/04/2018 Imaging:  DEXA BONE DENSITY STUDY AXIAL Patient Instructions Medicare Wellness Visit, Male The best way to live healthy is to have a lifestyle where you eat a well-balanced diet, exercise regularly, limit alcohol use, and quit all forms of tobacco/nicotine, if applicable. Regular preventive services are another way to keep healthy. Preventive services (vaccines, screening tests, monitoring & exams) can help personalize your care plan, which helps you manage your own care. Screening tests can find health problems at the earliest stages, when they are easiest to treat. [facility] follows the current, evidence-based guidelines published by the [facility] [employer] (USPSTF) when recommending preventive services for our patients. Because we follow these guidelines, sometimes recommendations change over time as research supports it. (For example, a prostate screening blood test is no longer routinely recommended for men with no symptoms.) Of course, you and your provider may decide to screen more often for some diseases, based on your risk and co-morbidities (chronic disease you are already diagnosed with). Preventive services for you include: - Medicare offers their members a free annual wellness visit, which is time for you and your primary care provider to discuss and plan for your preventive service needs. Take advantage of this benefit every year! 
 
-All people over age 72 should receive the recommended pneumonia vaccines. Current USPSTF guidelines recommend a series of two vaccines for the best pneumonia protection.  
 
-All adults should have a yearly flu vaccine and a tetanus vaccine every 10 years. All adults age 61 years should receive a shingles vaccine once in their lifetime.   
 
-All adults age 38-68 years who are overweight should have a diabetes screening test once every three years.  
 
-Other screening tests & preventive services for persons with diabetes include: an eye exam to screen for diabetic retinopathy, a kidney function test, a foot exam, and stricter control over your cholesterol.  
 
-Cardiovascular screening for adults with routine risk involves an electrocardiogram (ECG) at intervals determined by the provider.  
 
-Colorectal cancer screenings should be done for adults age 54-65 years with normal risk. There are a number of acceptable methods of screening for this type of cancer. Each test has its own benefits and drawbacks. Discuss with your provider what is most appropriate for you during your annual wellness visit. The different tests include: colonoscopy (considered the best screening method), a fecal occult blood test, a fecal DNA test, and sigmoidoscopy. 
 
-All adults born between [facility] should be screened once for Hepatitis C. 
 
-An Abdominal Aortic Aneurysm (AAA) Screening is recommended for men age 73-68 who has ever smoked in their lifetime. Here is a list of your current Health Maintenance items (your personalized list of preventive services) with a due date: Health Maintenance Due Topic Date Due  
 Annual Well Visit  04/25/2018  Stool testing for trace blood  04/26/2018 Introducing [address]! Dear Ian Cortez: Thank you for requesting a [employer] account. Our records indicate that you already have an active [employer] account. You can access your account anytime at [email or web address] Did you know that you can access your hospital and ER discharge instructions at any time in [employer]? You can also review all of your test results from your hospital stay or ER visit. Additional Information If you have questions, please visit the Frequently Asked Questions section of the [employer] website at [email or web address] Remember, [employer] is NOT to be used for urgent needs. For medical emergencies, dial 911. Now available from your iPhone and Android! Please provide this summary of care documentation to your next provider. Your primary care clinician is listed as Sonia Smith. If you have any questions after today's visit, please call [phone].

## 2018-06-05 LAB
ALBUMIN SERPL-MCNC: 4.5 G/DL (ref 3.5–4.8)
ALBUMIN/GLOB SERPL: 1.9 {RATIO} (ref 1.2–2.2)
ALP SERPL-CCNC: 95 IU/L (ref 39–117)
ALT SERPL-CCNC: 22 IU/L (ref 0–44)
AST SERPL-CCNC: 17 IU/L (ref 0–40)
BILIRUB SERPL-MCNC: 1.2 MG/DL (ref 0–1.2)
BUN SERPL-MCNC: 14 MG/DL (ref 8–27)
BUN/CREAT SERPL: 16 (ref 10–24)
CALCIUM SERPL-MCNC: 9.3 MG/DL (ref 8.6–10.2)
CHLORIDE SERPL-SCNC: 102 MMOL/L (ref 96–106)
CHOLEST SERPL-MCNC: 130 MG/DL (ref 100–199)
CO2 SERPL-SCNC: 24 MMOL/L (ref 18–29)
CREAT SERPL-MCNC: 0.89 MG/DL (ref 0.76–1.27)
ERYTHROCYTE [DISTWIDTH] IN BLOOD BY AUTOMATED COUNT: 14.3 % (ref 12.3–15.4)
GFR SERPLBLD CREATININE-BSD FMLA CKD-EPI: 84 ML/MIN/1.73
GFR SERPLBLD CREATININE-BSD FMLA CKD-EPI: 97 ML/MIN/1.73
GLOBULIN SER CALC-MCNC: 2.4 G/DL (ref 1.5–4.5)
GLUCOSE SERPL-MCNC: 85 MG/DL (ref 65–99)
HCT VFR BLD AUTO: 38.5 % (ref 37.5–51)
HDLC SERPL-MCNC: 39 MG/DL
HGB BLD-MCNC: 13 G/DL (ref 13–17.7)
LDLC SERPL CALC-MCNC: 66 MG/DL (ref 0–99)
MCH RBC QN AUTO: 31.6 PG (ref 26.6–33)
MCHC RBC AUTO-ENTMCNC: 33.8 G/DL (ref 31.5–35.7)
MCV RBC AUTO: 94 FL (ref 79–97)
PLATELET # BLD AUTO: 222 X10E3/UL (ref 150–379)
POTASSIUM SERPL-SCNC: 4.5 MMOL/L (ref 3.5–5.2)
PROT SERPL-MCNC: 6.9 G/DL (ref 6–8.5)
RBC # BLD AUTO: 4.11 X10E6/UL (ref 4.14–5.8)
SODIUM SERPL-SCNC: 140 MMOL/L (ref 134–144)
TRIGL SERPL-MCNC: 123 MG/DL (ref 0–149)
TSH SERPL DL<=0.005 MIU/L-ACNC: 1.08 UIU/ML (ref 0.45–4.5)
VLDLC SERPL CALC-MCNC: 25 MG/DL (ref 5–40)
WBC # BLD AUTO: 5.3 X10E3/UL (ref 3.4–10.8)

## 2018-06-08 NOTE — TELEPHONE ENCOUNTER
[facility] Requests A 90 day supply on the patients behalf. Last Visit: 6/4/18-Ty  Next Appt: 12/4/18-Ty  Previous Refill Encounter: 1/30/18-90+3 refills    Requested Prescriptions     Pending Prescriptions Disp Refills    clopidogrel (PLAVIX) 75 mg tab 90 Tab 3     Sig: Take 1 Tab by mouth daily.

## 2018-06-11 RX ORDER — CLOPIDOGREL BISULFATE 75 MG/1
75 TABLET ORAL DAILY
Qty: 90 TAB | Refills: 3 | Status: SHIPPED | OUTPATIENT
Start: 2018-06-11 | End: 2018-10-09 | Stop reason: SDUPTHER

## 2018-06-13 LAB — HEMOCCULT STL QL IA: NEGATIVE

## 2018-06-13 NOTE — TELEPHONE ENCOUNTER
----- Message from Dennis Garrison sent at 6/12/2018  5:51 PM EDT -----  Regarding: Dr. Brenda Melendez, [facility] Pharmacist, stated that the drug interaction that was faxed on 06/11/18 was unclear. Best contact (x)[phone] (x)[phone].

## 2018-06-13 NOTE — TELEPHONE ENCOUNTER
Returned call to [place], [facility]. Stated pts omeprazole interacts with plavix,  Requesting to change to pantoprazole.    Message sent to Dr Kingsley Doan

## 2018-06-14 ENCOUNTER — DOCUMENTATION ONLY (OUTPATIENT)
Dept: FAMILY MEDICINE CLINIC | Age: 73
End: 2018-06-14

## 2018-06-14 RX ORDER — PANTOPRAZOLE SODIUM 40 MG/1
40 TABLET, DELAYED RELEASE ORAL DAILY
Qty: 90 TAB | Refills: 3 | Status: SHIPPED | OUTPATIENT
Start: 2018-06-14 | End: 2019-06-27 | Stop reason: SDUPTHER

## 2018-06-14 NOTE — PROGRESS NOTES
Spoke to patient and he was given [phone] to call and schedule dexa scan / he is not due until after June 20th. ps

## 2018-07-10 ENCOUNTER — HOSPITAL ENCOUNTER (OUTPATIENT)
Dept: BONE DENSITY | Age: 73
Discharge: HOME | End: 2018-07-10
Attending: FAMILY MEDICINE
Payer: MEDICARE

## 2018-07-10 DIAGNOSIS — Z13.820 SCREENING FOR OSTEOPOROSIS: ICD-10-CM

## 2018-07-10 PROCEDURE — 77080 DXA BONE DENSITY AXIAL: CPT

## 2018-08-15 ENCOUNTER — OFFICE VISIT (OUTPATIENT)
Dept: NEUROLOGY | Age: 73
End: 2018-08-15

## 2018-08-15 DIAGNOSIS — R47.89 WORD FINDING DIFFICULTY: ICD-10-CM

## 2018-08-15 DIAGNOSIS — Z86.73 HISTORY OF TIAS: ICD-10-CM

## 2018-08-15 DIAGNOSIS — R41.3 SHORT-TERM MEMORY LOSS: ICD-10-CM

## 2018-08-15 DIAGNOSIS — G31.84 MILD COGNITIVE IMPAIRMENT: Primary | ICD-10-CM

## 2018-08-15 DIAGNOSIS — F43.21 ADJUSTMENT DISORDER WITH DEPRESSED MOOD: ICD-10-CM

## 2018-08-15 NOTE — PROGRESS NOTES
[address] [facility],5Th [address]  [address]   [place] [address] [facility] [place] [address] [place] [address] Drive   [phone] Office   [phone] Fax      Neuropsychology    Initial Diagnostic Interview Note      Referral:  Ramiro Farfan MD, . Usha Hernandez is a 75 y.o. right handed   male who was unaccompanied to the initial clinical interview on 8/15/18 . Please refer to his medical records for details pertaining to his history. Briefly, the patient reported that he completed high school and went to [employer] in the [employer] and took [employer]. He served six months and then went to [facility] and had a five year contract with [employer] then went Inactive. He is retired. He was admitted for stroke/TIA workup. He has noticed a progressive decline in memory. Last year, about this time, he shot a deer, and the deer ran down the field. The one that he shot disappeared and he spent hours looking for the other deer. The next day, his wife made the comment that she had noticed some memory issues. The buzzards had gotten the deer. He couldn't comprehend that the one that he shot wasn't the one he was looking for. Forgets conversations. Misplaces things. Mild issues but a bit of a decline. If he goes to go to something, he will forget. Goes to [employer] and forgets what he went in there for. Things like that. Since he had the mini strokes, he has had increased word finding difficulties. Difficulties pronouncing certain words. No PT, OT, Speech, etc., currently. He is at age where all of his fishing partners have passed away and there is some depression associated with that. No counseling or psychiatrist.  When he first gets up, his balance is a bit screwy. Has to take a second to get his senses and stability together. No falls. No changes in sense of taste or smell.   Has to get up every 90 minutes at night to go to the bathroom. Appetite is okay. No previous testing. Wife with arthritis. His father had dementia. He may have started showing signs in his later 76s. [address] 29. House is 27 years old and so there is a lot of work there. No previous neuropsych. Neuropsychological Mental Status Exam (NMSE):  Historian: Good  Praxis: No UE apraxia  R/L Orientation: Intact to self and to other  Dress: within normal limits   Weight: within normal limits   Appearance/Hygiene: within normal limits   Gait: within normal limits   Assistive Devices: Glasses  Mood: within normal limits   Affect: within normal limits   Comprehension: within normal limits   Thought Process: within normal limits   Expressive Language: within normal limits   Receptive Language: within normal limits   Motor:  No cognitive or motor perseveration  ETOH: Maybe one beer a year.   Son has korsakoff's and won't quit drinking and he worries about it  Tobacco: Denied  Illicit: Denied  SI/HI: Denied  Psychosis: Denied  Insight: Within normal limits  Judgment: Within normal limits  Other Psych:      Past Medical History:   Diagnosis Date    Abdominal wall hernia 11/16/2017    Anemia 9/10/2014    Back pain 4/1/2010    Blurry vision, bilateral 11/14/2017    CAD (coronary artery disease)     Calf pain 3/11/2015    Chronic kidney disease     cyst on kidney    Chronic left shoulder pain 10/24/2016    Constipation 6/11/2014    Decreased vision 6/13/2011    Diastasis recti 11/16/2017    Diverticulitis 3/11/2015    Dizziness 11/14/2017    Enlarged LA (left atrium)     Fall at home 3/17/2016    GERD (gastroesophageal reflux disease)     Hemorrhoids 9/10/2014    Hip pain, chronic, right 10/3/2017    HTN (hypertension) 8/24/2010    Hx-TIA (transient ischemic attack)     Hypercholesterolemia 4/1/2010    Hypertension     Irregular heart beat 9/23/2015    Kidney disease     Mitral regurgitation     Need for varicella vaccine 9/10/2014    Orthostatic lightheadedness 1/16/2018    Other ill-defined conditions(799.89)     heart murmur    Patient is full code 6/4/2018    Rash 8/24/2010    Rib pain on right side 3/17/2016    Right calf pain 3/11/2015    Risk for falls 4/4/2014    S/P knee replacement 6/11/2014    Screening for depression 4/4/2014    Tinea versicolor 9/10/2014    Tinnitus of left ear 1/16/2018    Urinary frequency 8/24/2010       Past Surgical History:   Procedure Laterality Date    CARDIAC SURG PROCEDURE UNLIST      heart cath    HX HEENT      nasal septum repair    HX HEENT      HX HERNIA REPAIR      HX KNEE REPLACEMENT Left     HX ORTHOPAEDIC      left knee surgery    HX ORTHOPAEDIC      HX TONSILLECTOMY  age 5    HX UROLOGICAL      [place] COLONOSCOPY FLX DX W/COLLJ SPEC WHEN PFRMD  4/18/2011            No Known Allergies    Family History   Problem Relation Age of Onset    COPD Mother     Heart Attack Mother     Hypertension Mother     Heart Disease Mother     COPD Father     Heart Attack Father     Hypertension Father     Heart Disease Father     Cancer Father     Cancer Maternal Grandmother        Social History   Substance Use Topics    Smoking status: Never Smoker    Smokeless tobacco: Never Used    Alcohol use 0.0 oz/week     0 Standard drinks or equivalent per week      Comment: 12 beers, half a fifth a month       Current Outpatient Prescriptions   Medication Sig Dispense Refill    pantoprazole (PROTONIX) 40 mg tablet Take 1 Tab by mouth daily. 90 Tab 3    clopidogrel (PLAVIX) 75 mg tab Take 1 Tab by mouth daily. 90 Tab 3    amoxicillin (AMOXIL) 500 mg capsule take 4 capsules by mouth 1 hour before APPOINTMENT  0    calcium-cholecalciferol, D3, (CALTRATE 600+D) tablet Take 1 Tab by mouth two (2) times a day.  60 Tab 11    omeprazole (PRILOSEC) 20 mg capsule TAKE 1 CAPSULE DAILY 90 Cap 3    lisinopril (PRINIVIL, ZESTRIL) 30 mg tablet TAKE 1 TABLET DAILY 90 Tab 1    tamsulosin (FLOMAX) 0.4 mg capsule take 1 capsule by mouth once daily 30 Cap 2    oxymetazoline (VICKS SINEX 12-HOUR) 0.05 % nasal spray 1 [place] by Both Nostrils route two (2) times daily as needed for Congestion.  acetaminophen (TYLENOL) 500 mg tablet Take 1,000 mg by mouth every six (6) hours as needed for Pain.  atorvastatin (LIPITOR) 40 mg tablet take 1 tablet by mouth NIGHTLY 90 Tab 3    docusate sodium (COLACE) 50 mg capsule Take 50 mg by mouth as needed. Plan:  Obtain authorization for testing from insurance company. Report to follow once testing, scoring, and interpretation completed. ? Organic based neurocognitive issues versus mood disorder or combination of same. ? Problems organic, functional, or both? This note will not be viewable in [address] with mild decline in memory and history of TIAs with speech and cognitive issues and mild what appears to be depressive issues. ? Organic based cognitive decline versus mood versus combination of same.

## 2018-09-11 RX ORDER — ATORVASTATIN CALCIUM 40 MG/1
TABLET, FILM COATED ORAL
Qty: 90 TAB | Refills: 3 | Status: SHIPPED | OUTPATIENT
Start: 2018-09-11 | End: 2019-09-09 | Stop reason: SDUPTHER

## 2018-09-13 ENCOUNTER — OFFICE VISIT (OUTPATIENT)
Dept: NEUROLOGY | Age: 73
End: 2018-09-13

## 2018-09-13 DIAGNOSIS — G31.84 MILD COGNITIVE IMPAIRMENT: Primary | ICD-10-CM

## 2018-09-13 DIAGNOSIS — F43.22 ADJUSTMENT DISORDER WITH ANXIETY: ICD-10-CM

## 2018-09-16 NOTE — PROGRESS NOTES
[address] [facility],5Th Floor  [address]   [place] 1924 [facility] [address] [facility]    [phone] Office   [phone] Fax      Neuropsychological Evaluation Report      Referral:  Angelia Marie MD,  Andrey Guzman is a 75 y.o. right handed   male who was unaccompanied to the initial clinical interview on 8/15/18 . Please refer to his medical records for details pertaining to his history. Briefly, the patient reported that he completed high school and went to [employer] in the [employer] and took [employer]. He served six months and then went to [employer] and had a five year contract with [employer] then went Inactive. He is retired. He was admitted for stroke/TIA workup. He has noticed a progressive decline in memory. Last year, about this time, he shot a deer, and the deer ran down the field. The one that he shot disappeared and he spent hours looking for the other deer. The next day, his wife made the comment that she had noticed some memory issues. The buzzards had gotten the deer. He couldn't comprehend that the one that he shot wasn't the one he was looking for. Forgets conversations. Misplaces things. Mild issues but a bit of a decline. If he goes to go to something, he will forget. Goes to [employer] and forgets what he went in there for. Things like that. Since he had the mini strokes, he has had increased word finding difficulties. Difficulties pronouncing certain words. No PT, OT, Speech, etc., currently. He is at age where all of his fishing partners have passed away and there is some depression associated with that. No counseling or psychiatrist.  When he first gets up, his balance is a bit screwy. Has to take a second to get his senses and stability together. No falls. No changes in sense of taste or smell. Has to get up every 90 minutes at night to go to the bathroom.   Appetite is okay. No previous testing. Wife with arthritis. His father had dementia. He may have started showing signs in his later 76s. [address] 29. House is 27 years old and so there is a lot of work there. No previous neuropsych. Neuropsychological Mental Status Exam (NMSE):  Historian: Good  Praxis: No UE apraxia  R/L Orientation: Intact to self and to other  Dress: within normal limits   Weight: within normal limits   Appearance/Hygiene: within normal limits   Gait: within normal limits   Assistive Devices: Glasses  Mood: within normal limits   Affect: within normal limits   Comprehension: within normal limits   Thought Process: within normal limits   Expressive Language: within normal limits   Receptive Language: within normal limits   Motor:  No cognitive or motor perseveration  ETOH: Maybe one beer a year.   Son has korsakoff's and won't quit drinking and he worries about it  Tobacco: Denied  Illicit: Denied  SI/HI: Denied  Psychosis: Denied  Insight: Within normal limits  Judgment: Within normal limits  Other Psych:      Past Medical History:   Diagnosis Date    Abdominal wall hernia 11/16/2017    Anemia 9/10/2014    Back pain 4/1/2010    Blurry vision, bilateral 11/14/2017    CAD (coronary artery disease)     Calf pain 3/11/2015    Chronic kidney disease     cyst on kidney    Chronic left shoulder pain 10/24/2016    Constipation 6/11/2014    Decreased vision 6/13/2011    Diastasis recti 11/16/2017    Diverticulitis 3/11/2015    Dizziness 11/14/2017    Enlarged LA (left atrium)     Fall at home 3/17/2016    GERD (gastroesophageal reflux disease)     Hemorrhoids 9/10/2014    Hip pain, chronic, right 10/3/2017    HTN (hypertension) 8/24/2010    Hx-TIA (transient ischemic attack)     Hypercholesterolemia 4/1/2010    Hypertension     Irregular heart beat 9/23/2015    Kidney disease     Mitral regurgitation     Need for varicella vaccine 9/10/2014    Orthostatic lightheadedness 1/16/2018    Other ill-defined conditions(799.89)     heart murmur    Patient is full code 6/4/2018    Rash 8/24/2010    Rib pain on right side 3/17/2016    Right calf pain 3/11/2015    Risk for falls 4/4/2014    S/P knee replacement 6/11/2014    Screening for depression 4/4/2014    Tinea versicolor 9/10/2014    Tinnitus of left ear 1/16/2018    Urinary frequency 8/24/2010       Past Surgical History:   Procedure Laterality Date    CARDIAC SURG PROCEDURE UNLIST      heart cath    HX HEENT      nasal septum repair    HX HEENT      HX HERNIA REPAIR      HX KNEE REPLACEMENT Left     HX ORTHOPAEDIC      left knee surgery    HX ORTHOPAEDIC      HX TONSILLECTOMY  age 5    HX UROLOGICAL      [place] COLONOSCOPY FLX DX W/COLLJ SPEC WHEN PFRMD  4/18/2011            No Known Allergies    Family History   Problem Relation Age of Onset    COPD Mother     Heart Attack Mother     Hypertension Mother     Heart Disease Mother     COPD Father     Heart Attack Father     Hypertension Father     Heart Disease Father     Cancer Father     Cancer Maternal Grandmother        Social History   Substance Use Topics    Smoking status: Never Smoker    Smokeless tobacco: Never Used    Alcohol use 0.0 oz/week     0 Standard drinks or equivalent per week      Comment: 12 beers, half a fifth a month       Current Outpatient Prescriptions   Medication Sig Dispense Refill    atorvastatin (LIPITOR) 40 mg tablet TAKE 1 TABLET NIGHTLY 90 Tab 3    pantoprazole (PROTONIX) 40 mg tablet Take 1 Tab by mouth daily. 90 Tab 3    clopidogrel (PLAVIX) 75 mg tab Take 1 Tab by mouth daily. 90 Tab 3    amoxicillin (AMOXIL) 500 mg capsule take 4 capsules by mouth 1 hour before APPOINTMENT  0    calcium-cholecalciferol, D3, (CALTRATE 600+D) tablet Take 1 Tab by mouth two (2) times a day.  60 Tab 11    omeprazole (PRILOSEC) 20 mg capsule TAKE 1 CAPSULE DAILY 90 Cap 3    lisinopril (PRINIVIL, ZESTRIL) 30 mg tablet TAKE 1 TABLET DAILY 90 Tab 1    tamsulosin (FLOMAX) 0.4 mg capsule take 1 capsule by mouth once daily 30 Cap 2    oxymetazoline (VICKS SINEX 12-HOUR) 0.05 % nasal spray 1 [place] by Both Nostrils route two (2) times daily as needed for Congestion.  acetaminophen (TYLENOL) 500 mg tablet Take 1,000 mg by mouth every six (6) hours as needed for Pain.  docusate sodium (COLACE) 50 mg capsule Take 50 mg by mouth as needed. Plan:  Obtain authorization for testing from insurance company. Report to follow once testing, scoring, and interpretation completed. ? Organic based neurocognitive issues versus mood disorder or combination of same. ? Problems organic, functional, or both? This note will not be viewable in [address] with mild decline in memory and history of TIAs with speech and cognitive issues and mild what appears to be depressive issues. ? Organic based cognitive decline versus mood versus combination of same. Neuropsychological Test Results  Patient Testing 9/13/18 Report Completed 9/16/18  A Psychometrist Assisted w/ portions of this evaluation while under my direct  supervision    The following evaluation procedures/tests were administered:      Neuropsychologist Administered/Interpreted:  Neuropsychological Mental Status Exam, Revised Memory & Behavior Checklist,  Mini Mental Status Exam, Clock Drawing Test, Test Of Premorbid Functioning, Camilo-Melzack Pain Questionnaire, History Taking  & Clinical Interview With The Patient, Additional History Taking w/ The Patient's, ABAS-3, CASE, Review Of Available Records.     Psychometrist Administered under Neuropsychologist Supervision & Neuropsychologist Interpreted:  Verbal Fluency Tests, Diaz & Diaz - Revised, Trailmaking Test Parts A & B, Wechsler Adult Intelligence Scale - IV, Idania Continuous Performance Test - III, [employer] - 3, Grooved Pegboard, Beck Depression Inventory - II, Beck Anxiety Inventory, Personality Assessment Inventory. Test Findings:  Test Findings:  Note:  The patients raw data have been compared with currently available norms which include demographic corrections for age, gender, and/or education. Sometimes, the patients scores are compared to demographically similar individuals as close to the patients age, education level, etc., as possible. \"Average\" is viewed as being +/- 1 standard deviation (SD) from the stated mean for a particular test score. \"Low average\" is viewed as being between 1 and 2 SD below the mean, and above average is viewed as being 1 and 2 SD above the mean. Scores falling in the borderline range (between 1-1/2 and 2 SD below the mean) are viewed with particular attention as to whether they are normal or abnormal neurocognitive test scores. Other methods of inference in analyzing the test data are also utilized, including the pattern and range of scores in the profile, bilateral motor functions, and the presence, if any, of pathognomonic signs. Behaviorally, the patient was friendly and cooperative and appeared motivated to perform well during this examination. Within this context, the results of this evaluation are viewed as a valid reflection of the patients actual neurocognitive and emotional status. His MMSE score of 29/30 correct was normal.  In this regard, recall for three words after a brief delay was 2/3 correct. Clock drawing was normal.        His structured word list fluency, as assessed by the FAS Test, was within the average range with a T score of 45. Category fluency was within the above average range with a T score of 64. Confrontation naming ability, as assessed by the [facility] - Revised, was within the above average range at 58/60 correct (T = 68).   This pattern of performance is not indicative of a patient who is at increased risk for day-to-day problems with verbal fluency and confrontation naming was normal.       The patient was administered the [facility] Continuous Performance Test - III,a computer administered test of sustained attention, and review of the subscales within this instrument revealed mild concerns for inattentiveness without impulsivity. This pattern of performance is indicative of a patient who is at increased risk for day-to-day problems with sustained visual attention/concentration. The patient is not showing problems with working memory capacity (42nd %ile) or processing speed (30th %ile) on the WAIS-IV. His Verbal Comprehension Index score of 100 was average. His Perceptual Reasoning Index score of 98 was average. These scores do not reflect a decline in functioning based on an assessment of premorbid functioning. The patient was administered the [place] Verbal Learning Test  - 3 and generated a normal to superior range learning curve over five repeated auditory word list learning trials. An interference trial was within the normal range. Free and cued, short and long delayed recall were all within the high average to superior range. Recognition recall was normal.   This pattern of performance is not indicative of a patient who is at increased risk for day-to-day problems with auditory learning and memory. Simple timed visual motor sequencing (Trailmaking Test Part A) was within the below average range with a T score of 42  His performance on a similar, but more complex task of timed visual motor sequencing (Trailmaking Test Part B) was within the average range with a T score of 52. Taken together, this pattern of performance is not indicative of a patient who is at increased risk for day-to-day problems with executive functioning. Fine motor dexterity was within the normal range for his dominant hand and mildly impaired for his nondominant hand (T = 37).   This suggests a potentially lateralized versus physical issue for which neurologic correlation is indicated. The patient rated his current level of pain as \"0/5- No Pain\" on the Camilo-Melzack Pain Questionnaire. He reported pain in his right knee on a brief basis. His Beck Depression Inventory- II score of 12 was within the normal range. His Beck Anxiety Inventory score of 12 reflected mild anxiety. The patient was administered the Personality Assessment Inventory and generated a valid profile for interpretation. Within this context, this is a fairly normal profile. Impressions & Recommendations: This patient generated a predominantly normal range Neuropsychological Evaluation with respect to neurocognitive functioning. In this regard, he is showing mild problems with attention and mild problems with nondominant handed motor dexterity (for which neurologic correlation is indicated). Otherwise, his mental status, verbal fluency, confrontation naming, working memory, processing speed, perceptual reasoning, verbal comprehension, auditory learning, auditory memory (most memory scores are within the superior range) and executive functioning remain normal.  From an emotional standpoint, there is support for mild anxiety. Thankfully, I am not seeing evidence of a dementia type process. Some evidence suggests a possible TIA type issue for which neurologic correlation is indicated. The most salient concern relates to attention and focus, and thus I suggest consideration for an appropriate treatment for same if not medically contraindicated. Anxiety issues appear moreso functional than organic, so counseling is suggested as needed. The exam is otherwise quite reassuring. I am not concerned about competency, driving, day-to-day supervision, etc.   The patient should be encouraged to remain as mentally, socially, and physically active as possible. Baseline now established. Follow up prn. Clinical correlation is, of course, indicated.      I will discuss these findings with the patient and family when they follow up with me in the near future. A follow up Neuropsychological Evaluation is indicated on a prn basis. DIAGNOSES: Mild & Selected Cognitive Deficits    Adjustment Disorder w/ Anxiety - Mild    Rule out TIA    Rule Out Chronic ADHD- Inattentive     The above information is based upon information currently available to me. If there is any additional information of which I am currently unaware, I would be more than happy to review it upon having it made available to me. Thank you for the opportunity to see this interesting individual.     Sincerely,       Terrence Hermosillo. Ibrahima Arcos PsyD, EdS      Attachments:  IQ Test Results (In Media Section Of This EMR)    Cc: Daphney Orantes MD, Dr. Sanders    2 units -59146- 1.75 hours Record review. Review of history provided by patient. Review of collaborative information. Testing by Clinician. Review of raw data. Scoring. Report writing of individual tests administered by Clinician. Integration of individual tests administered by psychometrist (that were previously reported and billed under psychometry code below) with testing by clinician and review of records/history/collaborative information. Case Conceptualization, Report writing. Coordination Of Care. 4 units  -74413 - 3.75 hours Psychometrist test prep, administration, and scoring under clinician's direct supervision. Clinical interpretation of individual tests administered by psychometrist .  Clinician report of individual tests administered by psychometrist.    \"Unit\" is defined by CPT/National Guidelines (31 - 60 minutes). Integral services including scoring of raw data, data interpretation, case conceptualization, report writing etcetera were initiated after the patient finished testing/raw data collected and was completed on the date the report was signed.

## 2018-10-09 ENCOUNTER — OFFICE VISIT (OUTPATIENT)
Dept: NEUROLOGY | Age: 73
End: 2018-10-09

## 2018-10-09 VITALS
RESPIRATION RATE: 16 BRPM | WEIGHT: 227 LBS | OXYGEN SATURATION: 98 % | BODY MASS INDEX: 29.13 KG/M2 | DIASTOLIC BLOOD PRESSURE: 70 MMHG | HEIGHT: 74 IN | HEART RATE: 70 BPM | SYSTOLIC BLOOD PRESSURE: 110 MMHG

## 2018-10-09 DIAGNOSIS — Z86.73 HISTORY OF TIA (TRANSIENT ISCHEMIC ATTACK): ICD-10-CM

## 2018-10-09 DIAGNOSIS — G45.9 TIA (TRANSIENT ISCHEMIC ATTACK): Primary | ICD-10-CM

## 2018-10-09 RX ORDER — CETIRIZINE HCL 10 MG
10 TABLET ORAL
COMMUNITY
End: 2020-12-14 | Stop reason: ALTCHOICE

## 2018-10-09 RX ORDER — ASPIRIN 81 MG/1
81 TABLET ORAL DAILY
Qty: 30 TAB | Refills: 12 | Status: SHIPPED | OUTPATIENT
Start: 2018-10-09

## 2018-10-09 RX ORDER — CLOPIDOGREL BISULFATE 75 MG/1
75 TABLET ORAL DAILY
Qty: 90 TAB | Refills: 3 | Status: SHIPPED | OUTPATIENT
Start: 2018-10-09 | End: 2019-11-17 | Stop reason: SDUPTHER

## 2018-10-09 NOTE — MR AVS SNAPSHOT
[address] [address] [address] 
[phone] Patient: Nba Cordoba MRN: [id number] Visit Information Date & Time Provider Department Dept. Phone Encounter #  
 10/9/2018  3:20 PM Mary Foote MD [address] [facility] [phone] [id number] Follow-up Instructions Return if symptoms worsen or fail to improve. Your Appointments 12/4/2018  8:00 AM  
ROUTINE CARE with Antonia Hager MD  
[address] [place] [address] OFFICE-ANNEX ([facility]) Appt Note: 6 mo f/u  
 [address] [place] [id number] [phone] [facility] [id number]  
  
    
 12/11/2018 11:30 AM  
ESTABLISHED PATIENT with Germania Barron MD  
[facility] [address] [place] [address]) Appt Note: 1 year follow up [address]  
[phone] [address] Upcoming Health Maintenance Date Due Shingrix Vaccine Age 50> (1 of 2) 5/15/1994 MEDICARE YEARLY EXAM 6/5/2019 FOBT Q 1 YEAR AGE 50-75 6/11/2019 GLAUCOMA SCREENING Q2Y 12/7/2019 DTaP/Tdap/Td series (2 - Td) 10/1/2023 Allergies as of 10/9/2018  Review Complete On: 10/9/2018 By: Mariia Fernandes NP No Known Allergies Current Immunizations  Reviewed on 4/24/2017 Name Date Influenza High Dose Vaccine PF 9/14/2016, 9/23/2015, 9/10/2014 Influenza Vaccine 9/24/2018, 9/25/2017 Influenza Vaccine (Quadrivalent) 9/27/2017 Influenza Vaccine PF 10/1/2013 Influenza Vaccine Split 9/29/2011 PREVNAR 7 11/14/2012 Pneumococcal Conjugate (PCV-13) 11/21/2016 Pneumococcal Polysaccharide (PPSV-23) 1/31/2018 Tdap 10/1/2013 Zoster Vaccine, Live 11/5/2013 Not reviewed this visit Vitals BP Pulse Resp Height(growth percentile) Weight(growth percentile) SpO2  
 110/70 (BP 1 Location: Left arm, BP Patient Position: Sitting) 70 16 6' 2\" (1.88 m) 227 lb (103 kg) 98% BMI Smoking Status 29.15 kg/m2 Never Smoker BMI and BSA Data Body Mass Index Body Surface Area  
 29.15 kg/m 2 2.32 m 2 Preferred Pharmacy Pharmacy Name Phone Lucinda Montero, [facility] [phone] Your Updated Medication List  
  
   
This list is accurate as of 10/9/18  3:58 PM.  Always use your most recent med list.  
  
  
  
  
 acetaminophen 500 mg tablet Commonly known as:  TYLENOL Take 1,000 mg by mouth every six (6) hours as needed for Pain. atorvastatin 40 mg tablet Commonly known as:  LIPITOR  
TAKE 1 TABLET NIGHTLY  
  
 calcium-cholecalciferol (D3) tablet Commonly known as:  CALTRATE 600+D Take 1 Tab by mouth two (2) times a day. clopidogrel 75 mg Tab Commonly known as:  PLAVIX Take 1 Tab by mouth daily. lisinopril 30 mg tablet Commonly known as:  PRINIVIL, ZESTRIL  
TAKE 1 TABLET DAILY pantoprazole 40 mg tablet Commonly known as:  PROTONIX Take 1 Tab by mouth daily. tamsulosin 0.4 mg capsule Commonly known as:  FLOMAX  
take 1 capsule by mouth once daily VICKS SINEX 12-HOUR 0.05 % nasal spray Generic drug:  oxymetazoline 1 [place] by Both Nostrils route two (2) times daily as needed for Congestion. ZyrTEC 10 mg tablet Generic drug:  cetirizine Take 10 mg by mouth daily. Prescriptions Sent to Pharmacy Refills  
 clopidogrel (PLAVIX) 75 mg tab 3 Sig: Take 1 Tab by mouth daily. Class: Normal  
 Pharmacy: [facility] , [address] [place] [address] #: [phone] Route: Oral  
  
Follow-up Instructions Return if symptoms worsen or fail to improve. Introducing [facility]! Dear Omer Coffee: Thank you for requesting a [employer] account. Our records indicate that you already have an active [employer] account. You can access your account anytime at [email or web address] Did you know that you can access your hospital and ER discharge instructions at any time in [employer]? You can also review all of your test results from your hospital stay or ER visit. Additional Information If you have questions, please visit the Frequently Asked Questions section of the [employer] website at [email or web address] Remember, [employer] is NOT to be used for urgent needs. For medical emergencies, dial 911. Now available from your iPhone and Android! Please provide this summary of care documentation to your next provider. Your primary care clinician is listed as Leonides Grewal. If you have any questions after today's visit, please call [phone].

## 2018-10-09 NOTE — PROGRESS NOTES
1. Have you been to the ER, urgent care clinic since your last visit? Hospitalized since your last visit? No 
 
2. Have you seen or consulted any other health care providers outside of the [address] [place] [address] since your last visit? Include any pap smears or colon screening. Neuropsychological Test Results test result review.

## 2018-10-09 NOTE — PROGRESS NOTES
Date:  10/09/18 Name:  Gavin Delgado 
:   MRN:  [id number] PCP:  Dani Alexis MD 
 
Chief Complaint Patient presents with  Follow-up CC- Patient presents with his wife for Neuropsychological  testing  test results. HISTORY OF PRESENT ILLNESS: 
 
Neuropsychological Testing was done on 18 and showed, no evidence of dementia type process. The patient has a history of two TIAs - one is  and one in  2017. He currently takes Plavix and Lipitor. He denies anxiety. He stay physically and mentally active. The patient reports he has been well since his last visit. He states he had a hearing test recently, and has decreased hearing for some tones. The person selling hearing aids, told him he needed to have a hearing aid to correct his hearing to prevent dementia, and he asks if this is true. His hearing loss does not bother him or his wife. He has to turn the TV up a little louder. He states he sees any eye doctor regularly. He denies any current vision changes. His last lipid panel on 18 was:  
 
Component Results Component Value Flag Ref Range Units Status Cholesterol, total 130  100 - 199 mg/dL Final  
Triglyceride 123  0 - 149 mg/dL Final  
HDL Cholesterol 39 (L) >39 mg/dL Final  
VLDL, calculated 25  5 - 40 mg/dL Final  
LDL, calculated 66  0 - 99 mg/dL Final  
 
  
ROS-  
Complete review of systems negative except as noted in HPI above. Patient denies any current acute illness or infection. Current Outpatient Prescriptions Medication Sig  cetirizine (ZYRTEC) 10 mg tablet Take 10 mg by mouth daily.  clopidogrel (PLAVIX) 75 mg tab Take 1 Tab by mouth daily.  atorvastatin (LIPITOR) 40 mg tablet TAKE 1 TABLET NIGHTLY  pantoprazole (PROTONIX) 40 mg tablet Take 1 Tab by mouth daily.  calcium-cholecalciferol, D3, (CALTRATE 600+D) tablet Take 1 Tab by mouth two (2) times a day.  lisinopril (PRINIVIL, ZESTRIL) 30 mg tablet TAKE 1 TABLET DAILY  tamsulosin (FLOMAX) 0.4 mg capsule take 1 capsule by mouth once daily  oxymetazoline (VICKS SINEX 12-HOUR) 0.05 % nasal spray 1 [place] by Both Nostrils route two (2) times daily as needed for Congestion.  acetaminophen (TYLENOL) 500 mg tablet Take 1,000 mg by mouth every six (6) hours as needed for Pain. No current facility-administered medications for this visit. No Known Allergies Past Medical History:  
Diagnosis Date  Abdominal wall hernia 11/16/2017  Anemia 9/10/2014  Back pain 4/1/2010  Blurry vision, bilateral 11/14/2017  CAD (coronary artery disease)  Calf pain 3/11/2015  Chronic kidney disease   
 cyst on kidney  Chronic left shoulder pain 10/24/2016  Constipation 6/11/2014  Decreased vision 6/13/2011  Diastasis recti 11/16/2017  Diverticulitis 3/11/2015  Dizziness 11/14/2017  Enlarged LA (left atrium)  Fall at home 3/17/2016  GERD (gastroesophageal reflux disease)  Hemorrhoids 9/10/2014  Hip pain, chronic, right 10/3/2017  
 HTN (hypertension) 8/24/2010  
 Hx-TIA (transient ischemic attack)  Hypercholesterolemia 4/1/2010  Hypertension  Irregular heart beat 9/23/2015  Kidney disease  Mitral regurgitation  Need for varicella vaccine 9/10/2014  Orthostatic lightheadedness 1/16/2018  Other ill-defined conditions(799.89)   
 heart murmur  Patient is full code 6/4/2018  Rash 8/24/2010  Rib pain on right side 3/17/2016  Right calf pain 3/11/2015  Risk for falls 4/4/2014  S/P knee replacement 6/11/2014  Screening for depression 4/4/2014  Tinea versicolor 9/10/2014  Tinnitus of left ear 1/16/2018  Urinary frequency 8/24/2010 Past Surgical History:  
Procedure Laterality Date  CARDIAC SURG PROCEDURE UNLIST    
 heart cath  HX HEENT    
 nasal septum repair  HX HEENT    
 HX HERNIA REPAIR    
  HX KNEE REPLACEMENT Left  HX ORTHOPAEDIC    
 left knee surgery  HX ORTHOPAEDIC    
 HX TONSILLECTOMY  age 4  
 HX UROLOGICAL  [place] COLONOSCOPY FLX DX W/COLLJ SPEC WHEN PFRMD  4/18/2011 Social History Social History  Marital status:  Spouse name: N/A  
 Number of children: N/A  
 Years of education: N/A Occupational History  Not on file. Social History Main Topics  Smoking status: Never Smoker  Smokeless tobacco: Never Used  Alcohol use 0.0 oz/week  
  0 Standard drinks or equivalent per week Comment: 12 beers, half a fifth a month  Drug use: No  
 Sexual activity: No  
 
Other Topics Concern  Not on file Social History Narrative ** Merged History Encounter ** Family History Problem Relation Age of Onset  COPD Mother  Heart Attack Mother  Hypertension Mother  Heart Disease Mother  COPD Father  Heart Attack Father  Hypertension Father  Heart Disease Father  Cancer Father  Cancer Maternal Grandmother PHYSICAL EXAMINATION:   
Visit Vitals  /70 (BP 1 Location: Left arm, BP Patient Position: Sitting)  Pulse 70  Resp 16  
 Ht 6' 2\" (1.88 m)  Wt 103 kg (227 lb)  SpO2 98%  BMI 29.15 kg/m2 Physical Exam -  
  
General - Patient is alert in NAD, well nourished and well groomed and color normal.  
HEENT- head is normocephalic, sclera clear,  nose and throat are clear Neck- supple, no carotid bruit Chest - CTA A/P/L, full breath sounds bilaterally Heart - RRR, no murmur Abdomen- not distended Musculoskeletal- normal posture, FROM of joints Extremities - warm and no edema Skin- no rashes or lesions Psychiatric- normal mood and bright affect 
  
Neurological- Alert and oriented to person, place and time. Speech is clear- no aphasia or dysarthria. Recent and remote memory appear intact. Cranial nerves II-XII are intact- visual acuity is grossly intact, visual fields are full to confrontation,  Fundoscopic- no papilledema, PERRLA, EOMS intact, no nystagmus or ptosis, facial sensation normal and masseter strength intact, no facial asymmetry, facial movements are symmetrical and normal strength, hearing intact, palate rises symmetrically, sternocleidomastoid and trapezius strength 5/5 bilaterally, tongue midline. Motor System- strength 5/5 to upper and lower extremities bilaterally, normal muscle bulk and tone, no tremor. Sensation- intact to light touch and temperature throughout. Gait- normal and steady; toe, heel and tandem gait intact. Coordination - finger to nose accurate, JOSIAH intact, Romberg negative, no pronator drift. Reflexes- 2+ to upper and lower extremities bilaterally.  
  
ASSESSMENT AND PLAN 
  ICD-10-CM ICD-9-CM 1. History of TIA (transient ischemic attack) Z86.73 V12.54 aspirin delayed-release 81 mg tablet Above history and exam findings reviewed with Dr. Nga Crawford who also spoke with patient and confirmed exam findings. Plan below approved by Dr. Nga Crawford. 1.  Dr. Nga Crawford advised patient to also start taking a daily 81 mg aspirin and to continue Plavix and Lipitor as prescribed. 2. Patient was advised he only needs to get a hearing aid if his mild hearing loss bothers him or interferes with this daily life 3. Reviewed signs and symptoms of stroke and when to call 911 with patient and his wife. 4. Dr. Nga Crawford advised patient to follow as needed. His Plavix was refilled for one year and he was advised he can transition to having his cardiologist refill his Plavix in one year. Thai Guerrero. Sasha, [facility] This note will not be viewable in [address] I have reviewed the documentation provided by the nurse practitioner,  Devonte Hawa, and we have discussed her findings and the clinical impression. I have formulated with her the proposed management plans for this patient. Additionally,  I have personally evaluated the patient to verify the history and to confirm physical findings. Below are my additional comments: 
78-year-old gentleman with TIA as noted. Examination today is nonfocal.  Continue aspirin and Plavix. Stroke teaching done today. All questions answered today. Follow-up with me as needed and I did refill his Plavix for a year and certainly as long as his cardiologist does not object that can be filled by cardiology or even by his primary. Forest Courtney MD 
 
Total time: 25 min Counseling / coordination time: 17 min  
> 50% counseling / coordination?: Yes re: stroke/TIA, warning signs, calling 911, antiplatelet agents and their potential side effects. This note will not be viewable in [address]

## 2018-10-17 ENCOUNTER — OFFICE VISIT (OUTPATIENT)
Dept: FAMILY MEDICINE CLINIC | Age: 73
End: 2018-10-17

## 2018-10-17 VITALS
OXYGEN SATURATION: 97 % | RESPIRATION RATE: 18 BRPM | TEMPERATURE: 95.9 F | HEIGHT: 74 IN | DIASTOLIC BLOOD PRESSURE: 69 MMHG | BODY MASS INDEX: 28.8 KG/M2 | SYSTOLIC BLOOD PRESSURE: 110 MMHG | HEART RATE: 72 BPM | WEIGHT: 224.4 LBS

## 2018-10-17 DIAGNOSIS — L02.415 CUTANEOUS ABSCESS OF RIGHT LOWER EXTREMITY: ICD-10-CM

## 2018-10-17 DIAGNOSIS — Z23 ENCOUNTER FOR IMMUNIZATION: Primary | ICD-10-CM

## 2018-10-17 RX ORDER — ACETAMINOPHEN 500 MG
1000 TABLET ORAL
COMMUNITY
End: 2018-12-04 | Stop reason: SDUPTHER

## 2018-10-17 RX ORDER — TRAMADOL HYDROCHLORIDE 50 MG/1
50 TABLET ORAL
Qty: 60 TAB | Refills: 0 | Status: SHIPPED | OUTPATIENT
Start: 2018-10-17 | End: 2018-12-04 | Stop reason: SDUPTHER

## 2018-10-17 RX ORDER — LISINOPRIL 30 MG/1
30 TABLET ORAL
COMMUNITY
End: 2018-10-17 | Stop reason: SDUPTHER

## 2018-10-17 NOTE — PROGRESS NOTES
HISTORY OF PRESENT ILLNESS Samantha Abreu is a 75 y.o. male. HPI Rash on the rt leg, got bit by the black wasp with yellow stripes not like a regular wasp, got hit on the porch, bad enough to erupt his daily napping period from the pain, now quarter sized yellowish necrotic tissue covers the entire area of the damage,started 3 wks ago or greater, is asx pt just notice it, it is 2mm raised, no fever no chills, no tick bite, [place], tried to open it couple times, but nothing came out, Started few days ago not better, the patient has tried alcohol washing and OTC antibiotic ointments,  no family member have the same problem, it does tingles and it is pain full, states that is expanding red, and is swelled up, like a lump Just seen the Neurologist and he was told that he needs erica on the combo of plavix and daily aspirin, for which he had small  CVA in March of 2018, in 2017 he was only on aspirin, Current Outpatient Medications Medication Sig Dispense Refill  cetirizine (ZYRTEC) 10 mg tablet Take 10 mg by mouth daily.  clopidogrel (PLAVIX) 75 mg tab Take 1 Tab by mouth daily. 90 Tab 3  
 aspirin delayed-release 81 mg tablet Take 1 Tab by mouth daily. 30 Tab 12  
 atorvastatin (LIPITOR) 40 mg tablet TAKE 1 TABLET NIGHTLY 90 Tab 3  pantoprazole (PROTONIX) 40 mg tablet Take 1 Tab by mouth daily. 90 Tab 3  
 calcium-cholecalciferol, D3, (CALTRATE 600+D) tablet Take 1 Tab by mouth two (2) times a day. 60 Tab 11  
 lisinopril (PRINIVIL, ZESTRIL) 30 mg tablet TAKE 1 TABLET DAILY 90 Tab 1  
 tamsulosin (FLOMAX) 0.4 mg capsule take 1 capsule by mouth once daily 30 Cap 2  
 oxymetazoline (VICKS SINEX 12-HOUR) 0.05 % nasal spray 1 [place] by Both Nostrils route two (2) times daily as needed for Congestion.  acetaminophen (TYLENOL) 500 mg tablet Take 1,000 mg by mouth every six (6) hours as needed for Pain. No Known Allergies Past Medical History:  
Diagnosis Date  Abdominal wall hernia 11/16/2017  Anemia 9/10/2014  Back pain 4/1/2010  Blurry vision, bilateral 11/14/2017  CAD (coronary artery disease)  Calf pain 3/11/2015  Chronic kidney disease   
 cyst on kidney  Chronic left shoulder pain 10/24/2016  Constipation 6/11/2014  Decreased vision 6/13/2011  Diastasis recti 11/16/2017  Diverticulitis 3/11/2015  Dizziness 11/14/2017  Enlarged LA (left atrium)  Fall at home 3/17/2016  GERD (gastroesophageal reflux disease)  Hemorrhoids 9/10/2014  Hip pain, chronic, right 10/3/2017  
 HTN (hypertension) 8/24/2010  
 Hx-TIA (transient ischemic attack)  Hypercholesterolemia 4/1/2010  Hypertension  Irregular heart beat 9/23/2015  Kidney disease  Mitral regurgitation  Need for varicella vaccine 9/10/2014  Orthostatic lightheadedness 1/16/2018  Other ill-defined conditions(799.89)   
 heart murmur  Patient is full code 6/4/2018  Rash 8/24/2010  Rib pain on right side 3/17/2016  Right calf pain 3/11/2015  Risk for falls 4/4/2014  S/P knee replacement 6/11/2014  Screening for depression 4/4/2014  Tinea versicolor 9/10/2014  Tinnitus of left ear 1/16/2018  Urinary frequency 8/24/2010 Past Surgical History:  
Procedure Laterality Date  CARDIAC SURG PROCEDURE UNLIST    
 heart cath  HX HEENT    
 nasal septum repair  HX HEENT    
 HX HERNIA REPAIR    
 HX KNEE REPLACEMENT Left  HX ORTHOPAEDIC    
 left knee surgery  HX ORTHOPAEDIC    
 HX TONSILLECTOMY  age 4  
 HX UROLOGICAL  [place] COLONOSCOPY FLX DX W/COLLJ SPEC WHEN PFRMD  4/18/2011 Family History Problem Relation Age of Onset  COPD Mother  Heart Attack Mother  Hypertension Mother  Heart Disease Mother  COPD Father  Heart Attack Father  Hypertension Father  Heart Disease Father  Cancer Father  Cancer Maternal Grandmother Social History Tobacco Use  Smoking status: Never Smoker  Smokeless tobacco: Never Used Substance Use Topics  Alcohol use: Yes Alcohol/week: 0.0 oz  
  Comment: 12 beers, half a fifth a month Lab Results Component Value Date/Time WBC 5.3 06/04/2018 10:22 AM  
 HGB 13.0 06/04/2018 10:22 AM  
 HCT 38.5 06/04/2018 10:22 AM  
 PLATELET 856 81/63/3044 10:22 AM  
 MCV 94 06/04/2018 10:22 AM  
 
Lab Results Component Value Date/Time GFR est non-AA 84 06/04/2018 10:22 AM  
 GFRNA, POC >60 03/29/2011 09:23 AM  
 GFR est AA 97 06/04/2018 10:22 AM  
 GFRAA, POC >60 03/29/2011 09:23 AM  
 Creatinine 0.89 06/04/2018 10:22 AM  
 Creatinine (POC) 0.8 03/29/2011 09:23 AM  
 BUN 14 06/04/2018 10:22 AM  
 Sodium 140 06/04/2018 10:22 AM  
 Potassium 4.5 06/04/2018 10:22 AM  
 Chloride 102 06/04/2018 10:22 AM  
 CO2 24 06/04/2018 10:22 AM  
 Magnesium 1.9 01/10/2010 08:05 PM  
  
Review of Systems Constitutional: Negative for chills and fever. HENT: Negative for nosebleeds. Eyes: Negative for pain. Respiratory: Negative for cough and wheezing. Cardiovascular: Negative for chest pain and leg swelling. Gastrointestinal: Negative for constipation, diarrhea and nausea. Genitourinary: Negative for frequency. Musculoskeletal: Positive for joint pain and myalgias. Skin: Negative for rash. Neurological: Positive for headaches. Negative for loss of consciousness. Endo/Heme/Allergies: Does not bruise/bleed easily. Psychiatric/Behavioral: Negative for depression. The patient is not nervous/anxious and does not have insomnia. All other systems reviewed and are negative. Physical Exam  
Constitutional: He is oriented to person, place, and time. He appears well-developed and well-nourished. HENT:  
Head: Normocephalic and atraumatic. Mouth/Throat: No oropharyngeal exudate. Eyes: Conjunctivae and EOM are normal.  
Neck: Normal range of motion. Neck supple. Cardiovascular: Normal rate, regular rhythm and normal heart sounds. No murmur heard. Pulmonary/Chest: Effort normal and breath sounds normal. No respiratory distress. Abdominal: Soft. Bowel sounds are normal. He exhibits no distension. There is no rebound. Musculoskeletal: He exhibits no edema or tenderness. Left foot: There is normal range of motion, no tenderness, no bony tenderness, no swelling and normal capillary refill. Nl pulses, nl visual inspection nl monoF Neurological: He is alert and oriented to person, place, and time. Skin: Skin is warm. No erythema. Psychiatric: He has a normal mood and affect. His behavior is normal.  
Nursing note and vitals reviewed. ASSESSMENT and PLAN Diagnoses and all orders for this visit: 1. Encounter for immunization 2. Cutaneous abscess of right lower extremity -     DRAIN SKIN ABSCESS SIMPLE 
-     traMADol (ULTRAM) 50 mg tablet; Take 1 Tab by mouth two (2) times daily as needed for Pain. Max Daily Amount: 100 mg. 
-     AEROBIC BACTERIAL CULTURE Discussed the patient's BMI with him. The BMI follow up plan is as follows:  
 
dietary management education, guidance, and counseling 
encourage exercise 
monitor weight 
prescribed dietary intake An After Visit Summary was printed and given to the patient. Subjective:  
 Magalys Vora is a 75 y.o. male who presents for lesion removal. We have discussed this procedure, including option of not performing surgery, technique of surgery and potential for scarring at an earlier visit. Oubjective:  
Patient appears well. Visit Vitals /69 (BP 1 Location: Left arm, BP Patient Position: At rest) Pulse 72 Temp 95.9 °F (35.5 °C) (Oral) Resp 18 Ht 6' 2\" (1.88 m) Wt 224 lb 6.4 oz (101.8 kg) SpO2 97% BMI 28.81 kg/m² Skin: as in HPI Assessment:  
skin abscess Procedure Note: After informed consent was obtained, using betadine for cleansing and 0.1% lidocaine for anesthetic, with sterile technique, incision and drainage of abscess and curettage was performed. Antibiotic dressing is applied, and wound care instructions provided. Be alert for any signs of cutaneous infection. The procedure was well tolerated without complications. Follow up: the patient may return prn.

## 2018-10-17 NOTE — PATIENT INSTRUCTIONS
Body Mass Index: Care Instructions Your Care Instructions Body mass index (BMI) can help you see if your weight is raising your risk for health problems. It uses a formula to compare how much you weigh with how tall you are. · A BMI lower than 18.5 is considered underweight. · A BMI between 18.5 and 24.9 is considered healthy. · A BMI between 25 and 29.9 is considered overweight. A BMI of 30 or higher is considered obese. If your BMI is in the normal range, it means that you have a lower risk for weight-related health problems. If your BMI is in the overweight or obese range, you may be at increased risk for weight-related health problems, such as high blood pressure, heart disease, stroke, arthritis or joint pain, and diabetes. If your BMI is in the underweight range, you may be at increased risk for health problems such as fatigue, lower protection (immunity) against illness, muscle loss, bone loss, hair loss, and hormone problems. BMI is just one measure of your risk for weight-related health problems. You may be at higher risk for health problems if you are not active, you eat an unhealthy diet, or you drink too much alcohol or use tobacco products. Follow-up care is a key part of your treatment and safety. Be sure to make and go to all appointments, and call your doctor if you are having problems. It's also a good idea to know your test results and keep a list of the medicines you take. How can you care for yourself at home? · Practice healthy eating habits. This includes eating plenty of fruits, vegetables, whole grains, lean protein, and low-fat dairy. · If your doctor recommends it, get more exercise. Walking is a good choice. Bit by bit, increase the amount you walk every day. Try for at least 30 minutes on most days of the week. · Do not smoke. Smoking can increase your risk for health problems.  If you need help quitting, talk to your doctor about stop-smoking programs and medicines. These can increase your chances of quitting for good. · Limit alcohol to 2 drinks a day for men and 1 drink a day for women. Too much alcohol can cause health problems. If you have a BMI higher than 25 · Your doctor may do other tests to check your risk for weight-related health problems. This may include measuring the distance around your waist. A waist measurement of more than 40 inches in men or 35 inches in women can increase the risk of weight-related health problems. · Talk with your doctor about steps you can take to stay healthy or improve your health. You may need to make lifestyle changes to lose weight and stay healthy, such as changing your diet and getting regular exercise. If you have a BMI lower than 18.5 · Your doctor may do other tests to check your risk for health problems. · Talk with your doctor about steps you can take to stay healthy or improve your health. You may need to make lifestyle changes to gain or maintain weight and stay healthy, such as getting more healthy foods in your diet and doing exercises to build muscle. Where can you learn more? Go to http://nico-millicent.info/. Enter S176 in the search box to learn more about \"Body Mass Index: Care Instructions. \" Current as of: October 13, 2016 Content Version: 11.4 © 1954-5429 [employer]. Care instructions adapted under license by [employer] (which disclaims liability or warranty for this information). If you have questions about a medical condition or this instruction, always ask your healthcare professional. [place] [address] any warranty or liability for your use of this information. Skin Lesions: Care Instructions Your Care Instructions A skin lesion is a general term used for the different types of bumps, spots, moles or other growths that may appear on your skin.  Most skin lesions are harmless, but sometimes they can be a sign of skin cancer or other health problems. Depending on what type of lesion you have, your doctor may cut out all or a small area of the skin tissue and send it to a lab to be looked at under a microscope. This is called a biopsy. A biopsy may be done to figure out what the lesion is or to make sure it is not skin cancer. Follow-up care is a key part of your treatment and safety. Be sure to make and go to all appointments, and call your doctor if you are having problems. It's also a good idea to know your test results and keep a list of the medicines you take. How can you care for yourself at home? · If your doctor told you how to care for your wound, follow your doctor's instructions. If you did not get instructions, follow this general advice: 
? Keep the wound bandaged and dry for the first day. ? After the first day, wash around the wound with clean water 2 times a day. Don't use hydrogen peroxide or alcohol, which can slow healing. ? You may cover the wound with a thin layer of petroleum jelly, such as Vaseline, and a nonstick bandage. ? Apply more petroleum jelly and replace the bandage as needed. · If you have stitches, you may get other instructions. You will have to return to have the stitches removed. · If a scab forms, do not pull it off. Let it fall off on its own. Wounds heal faster if no scab forms. Washing the area every day and using petroleum jelly will help keep a scab from forming. · If the wound bleeds, put direct pressure on it with a clean cloth until the bleeding stops. · Take an over-the-counter pain medicine, such as acetaminophen (Tylenol), ibuprofen (Advil, Motrin), or naproxen (Aleve). Read and follow all instructions on the label. · Do not take two or more pain medicines at the same time unless the doctor told you to. Many pain medicines have acetaminophen, which is Tylenol. Too much acetaminophen (Tylenol) can be harmful. · If you had a growth \"frozen\" off with liquid nitrogen, you may get a blister. Do not break it. Let it dry up on its own. It is common for the blister to fill with blood. You do not need to do anything about this, but if it becomes too painful, call your doctor. When should you call for help? Call your doctor now or seek immediate medical care if: 
  · You have signs of infection, such as: 
? Increased pain, swelling, warmth, or redness. ? Red streaks leading from the wound. ? Pus draining from the wound. ? A fever.  
 Watch closely for changes in your health, and be sure to contact your doctor if: 
  · The wound changes, bleeds, or gets worse.  
  · You do not get better after 2 weeks of home care. Where can you learn more? Go to http://nico-millicent.info/. Enter I058 in the search box to learn more about \"Skin Lesions: Care Instructions. \" Current as of: April 18, 2018 Content Version: 11.8 © 1861-6333 [employer]. Care instructions adapted under license by [employer] (which disclaims liability or warranty for this information). If you have questions about a medical condition or this instruction, always ask your healthcare professional. [address] any warranty or liability for your use of this information.

## 2018-10-22 LAB — BACTERIA SPEC AEROBE CULT: ABNORMAL

## 2018-11-06 RX ORDER — LISINOPRIL 30 MG/1
TABLET ORAL
Qty: 90 TAB | Refills: 1 | Status: SHIPPED | OUTPATIENT
Start: 2018-11-06 | End: 2019-05-05 | Stop reason: SDUPTHER

## 2018-12-03 ENCOUNTER — DOCUMENTATION ONLY (OUTPATIENT)
Dept: FAMILY MEDICINE CLINIC | Age: 73
End: 2018-12-03

## 2018-12-04 ENCOUNTER — OFFICE VISIT (OUTPATIENT)
Dept: FAMILY MEDICINE CLINIC | Age: 73
End: 2018-12-04

## 2018-12-04 VITALS
OXYGEN SATURATION: 98 % | RESPIRATION RATE: 18 BRPM | SYSTOLIC BLOOD PRESSURE: 106 MMHG | BODY MASS INDEX: 29 KG/M2 | HEIGHT: 74 IN | DIASTOLIC BLOOD PRESSURE: 68 MMHG | TEMPERATURE: 96 F | HEART RATE: 61 BPM | WEIGHT: 226 LBS

## 2018-12-04 DIAGNOSIS — Z23 ENCOUNTER FOR IMMUNIZATION: ICD-10-CM

## 2018-12-04 DIAGNOSIS — L02.415 CUTANEOUS ABSCESS OF RIGHT LOWER EXTREMITY: ICD-10-CM

## 2018-12-04 DIAGNOSIS — I10 ESSENTIAL HYPERTENSION: Primary | ICD-10-CM

## 2018-12-04 DIAGNOSIS — E78.00 HYPERCHOLESTEROLEMIA: ICD-10-CM

## 2018-12-04 RX ORDER — GUAIFENESIN 100 MG/5ML
81 LIQUID (ML) ORAL
COMMUNITY
End: 2018-12-11 | Stop reason: SDUPTHER

## 2018-12-04 RX ORDER — TRAMADOL HYDROCHLORIDE 50 MG/1
50 TABLET ORAL
Qty: 60 TAB | Refills: 0 | Status: SHIPPED | OUTPATIENT
Start: 2018-12-04 | End: 2018-12-11

## 2018-12-04 RX ORDER — LISINOPRIL 30 MG/1
30 TABLET ORAL
COMMUNITY
End: 2018-12-04 | Stop reason: SDUPTHER

## 2018-12-04 RX ORDER — ATORVASTATIN CALCIUM 40 MG/1
TABLET, FILM COATED ORAL
COMMUNITY
Start: 2018-03-15 | End: 2018-12-04 | Stop reason: SDUPTHER

## 2018-12-04 RX ORDER — OMEPRAZOLE 20 MG/1
CAPSULE, DELAYED RELEASE ORAL
COMMUNITY
Start: 2018-03-15 | End: 2018-12-04 | Stop reason: ALTCHOICE

## 2018-12-04 RX ORDER — CLOPIDOGREL BISULFATE 75 MG/1
TABLET ORAL
COMMUNITY
Start: 2018-02-06 | End: 2018-12-04 | Stop reason: SDUPTHER

## 2018-12-04 NOTE — PROGRESS NOTES
Name and  verified Chief Complaint Patient presents with  Cholesterol Problem Health Maintenance reviewed-discussed with patient. 1. Have you been to the ER, urgent care clinic since your last visit? Hospitalized since your last visit? Yes, Neurology office visit . 2. Have you seen or consulted any other health care providers outside of the [address] [place] [address] since your last visit? Include any pap smears or colon screening.

## 2018-12-04 NOTE — PROGRESS NOTES
HISTORY OF PRESENT ILLNESS Nusrat Menendez is a 75 y.o. male. HPI Hypercholestremia Patient has no personal no family history of early heart disease with the patient's parents and siblings, aware of patient's last lipoprotein profile, blood sugar, TSH, liver and renal function tests, 
the patient eats out but not more than average,   and currently, there is no muscle nor abdominal pain, And patient fasting today, the patient is compliant w/ meds, in addition to the intake of daily baby aspirin Chronic low back, and knees pain The patient's pain has been an ongoing struggling concerning problem, present for refills, never abused any prescribed meds, no hx of illicit nor etoh abuse, the pain has been bothering the pt for many yrs, pt aware that there are no other alternative approach for the current pain that exist except for the available opioid based meds with their huge addictive properties, pill count is zero pt last RX was filled on oct of 2018,  
they all Started few yrs ago with hx of worsening months after months and not responding to other offered therapy by the specialists. the pt's wt started and the current BMI is not a helpfull contributor to the pt current joints pain, Patient states that the current dosage of the meds given, not strong enough, but it is helping,    
 with the current medications,  the pt capable of doing things specially the activity of the daily living, and also they are improving the quality of the pt's life,  
this pt has tried all the other Non- Narcotic meds including surgical repairs and procedures, stating that none have been able to ease down the pain, Based on the [place] PM report, the pt is not 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 Current Outpatient Medications Medication Sig Dispense Refill  lisinopril (PRINIVIL, ZESTRIL) 30 mg tablet TAKE 1 TABLET DAILY 90 Tab 1  
 traMADol (ULTRAM) 50 mg tablet Take 1 Tab by mouth two (2) times daily as needed for Pain. Max Daily Amount: 100 mg. 60 Tab 0  
 cetirizine (ZYRTEC) 10 mg tablet Take 10 mg by mouth daily as needed.  clopidogrel (PLAVIX) 75 mg tab Take 1 Tab by mouth daily. 90 Tab 3  
 aspirin delayed-release 81 mg tablet Take 1 Tab by mouth daily. 30 Tab 12  
 atorvastatin (LIPITOR) 40 mg tablet TAKE 1 TABLET NIGHTLY 90 Tab 3  pantoprazole (PROTONIX) 40 mg tablet Take 1 Tab by mouth daily. 90 Tab 3  
 calcium-cholecalciferol, D3, (CALTRATE 600+D) tablet Take 1 Tab by mouth two (2) times a day. (Patient taking differently: Take 1 Tab by mouth daily.) 60 Tab 11  tamsulosin (FLOMAX) 0.4 mg capsule take 1 capsule by mouth once daily 30 Cap 2  
 oxymetazoline (VICKS SINEX 12-HOUR) 0.05 % nasal spray 1 Bruce by Both Nostrils route two (2) times daily as needed for Congestion.  acetaminophen (TYLENOL) 500 mg tablet Take 1,000 mg by mouth every six (6) hours as needed for Pain.  esomeprazole magnesium (NEXIUM PO) Take by Mouth  aspirin 81 mg chewable tablet 81 mg.    
 simvastatin (ZOCOR PO) Take by Mouth. No Known Allergies Past Medical History:  
Diagnosis Date  Abdominal wall hernia 11/16/2017  Anemia 9/10/2014  Back pain 4/1/2010  Blurry vision, bilateral 11/14/2017  CAD (coronary artery disease)  Calf pain 3/11/2015  Chronic kidney disease   
 cyst on kidney  Chronic left shoulder pain 10/24/2016  Constipation 6/11/2014  Decreased vision 6/13/2011  Diastasis recti 11/16/2017  Diverticulitis 3/11/2015  Dizziness 11/14/2017  Enlarged LA (left atrium)  Fall at home 3/17/2016  GERD (gastroesophageal reflux disease)  Hemorrhoids 9/10/2014  Hip pain, chronic, right 10/3/2017  
 HTN (hypertension) 8/24/2010  
 Hx-TIA (transient ischemic attack)  Hypercholesterolemia 4/1/2010  Hypertension  Irregular heart beat 9/23/2015  Kidney disease  Mitral regurgitation  Need for varicella vaccine 9/10/2014  Orthostatic lightheadedness 1/16/2018  Other ill-defined conditions(799.89)   
 heart murmur  Patient is full code 6/4/2018  Rash 8/24/2010  Rib pain on right side 3/17/2016  Right calf pain 3/11/2015  Risk for falls 4/4/2014  S/P knee replacement 6/11/2014  Screening for depression 4/4/2014  Tinea versicolor 9/10/2014  Tinnitus of left ear 1/16/2018  Urinary frequency 8/24/2010 Past Surgical History:  
Procedure Laterality Date  CARDIAC SURG PROCEDURE UNLIST    
 heart cath  HX HEENT    
 nasal septum repair  HX HEENT    
 HX HERNIA REPAIR    
 HX KNEE REPLACEMENT Left  HX ORTHOPAEDIC    
 left knee surgery  HX ORTHOPAEDIC    
 HX TONSILLECTOMY  age 4  
 HX UROLOGICAL  [place] COLONOSCOPY FLX DX W/COLLJ SPEC WHEN PFRMD  4/18/2011 Family History Problem Relation Age of Onset  COPD Mother  Heart Attack Mother  Hypertension Mother  Heart Disease Mother  COPD Father  Heart Attack Father  Hypertension Father  Heart Disease Father  Cancer Father  Cancer Maternal Grandmother Social History Tobacco Use  Smoking status: Never Smoker  Smokeless tobacco: Never Used Substance Use Topics  Alcohol use: Yes Alcohol/week: 0.0 oz  
  Comment: 12 beers, half a fifth a month Lab Results Component Value Date/Time  WBC 5.3 06/04/2018 10:22 AM  
 HGB 13.0 06/04/2018 10:22 AM  
 HCT 38.5 06/04/2018 10:22 AM  
 PLATELET 027 92/65/6109 10:22 AM  
 MCV 94 06/04/2018 10:22 AM  
 
Lab Results Component Value Date/Time GFR est non-AA 84 06/04/2018 10:22 AM  
 GFRNA, POC >60 03/29/2011 09:23 AM  
 GFR est AA 97 06/04/2018 10:22 AM  
 GFRAA, POC >60 03/29/2011 09:23 AM  
 Creatinine 0.89 06/04/2018 10:22 AM  
 Creatinine (POC) 0.8 03/29/2011 09:23 AM  
 BUN 14 06/04/2018 10:22 AM  
 Sodium 140 06/04/2018 10:22 AM  
 Potassium 4.5 06/04/2018 10:22 AM  
 Chloride 102 06/04/2018 10:22 AM  
 CO2 24 06/04/2018 10:22 AM  
 Magnesium 1.9 01/10/2010 08:05 PM  
  
Review of Systems Constitutional: Negative for chills and fever. HENT: Negative for ear pain and nosebleeds. Eyes: Negative for blurred vision, pain and discharge. Respiratory: Negative for shortness of breath. Cardiovascular: Negative for chest pain and leg swelling. Gastrointestinal: Negative for constipation, diarrhea, nausea and vomiting. Genitourinary: Negative for frequency. Musculoskeletal: Positive for back pain, joint pain and myalgias. Skin: Negative for itching and rash. Neurological: Negative for headaches. Psychiatric/Behavioral: Negative for depression. The patient is not nervous/anxious. Physical Exam  
Constitutional: He is oriented to person, place, and time. He appears well-developed and well-nourished. HENT:  
Head: Normocephalic and atraumatic. Mouth/Throat: No oropharyngeal exudate. Eyes: Conjunctivae and EOM are normal.  
Neck: Normal range of motion. Neck supple. Cardiovascular: Normal rate, regular rhythm and normal heart sounds. No murmur heard. Pulmonary/Chest: Effort normal and breath sounds normal. No respiratory distress. Abdominal: Soft. Bowel sounds are normal. He exhibits no distension. There is no rebound. Musculoskeletal: He exhibits tenderness and deformity. He exhibits no edema. Right knee: He exhibits decreased range of motion. Tenderness found. Left knee: He exhibits decreased range of motion. Tenderness found. Cervical back: He exhibits decreased range of motion, tenderness, pain and spasm. Neurological: He is alert and oriented to person, place, and time. Skin: Skin is warm. No erythema. Psychiatric: He has a normal mood and affect. His behavior is normal.  
Nursing note and vitals reviewed. ASSESSMENT and PLAN Diagnoses and all orders for this visit: 1. Essential hypertension 
-     CBC W/O DIFF 
-     METABOLIC PANEL, COMPREHENSIVE; Future -     VITAMIN B12 & FOLATE 
-     VITAMIN B1, WHOLE BLOOD 
-     VITAMIN B6 
-     VITAMIN D, 25 HYDROXY 
-     CHOLESTEROL, TOTAL 
-     HDL CHOLESTEROL 2. Encounter for immunization 
-     varicella-zoster recombinant, PF, (SHINGRIX) 50 mcg/0.5 mL susr injection; 0.5 mL by IntraMUSCular route once for 1 dose. 3. Hypercholesterolemia 
-     CBC W/O DIFF 
-     METABOLIC PANEL, COMPREHENSIVE; Future -     VITAMIN B12 & FOLATE 
-     VITAMIN B1, WHOLE BLOOD 
-     VITAMIN B6 
-     VITAMIN D, 25 HYDROXY 
-     CHOLESTEROL, TOTAL 
-     HDL CHOLESTEROL 4. Cutaneous abscess of right lower extremity 
-     traMADol (ULTRAM) 50 mg tablet; Take 1 Tab by mouth two (2) times daily as needed for Pain. Max Daily Amount: 100 mg.

## 2018-12-04 NOTE — PATIENT INSTRUCTIONS
Back Pain: Care Instructions Your Care Instructions Back pain has many possible causes. It is often related to problems with muscles and ligaments of the back. It may also be related to problems with the nerves, discs, or bones of the back. Moving, lifting, standing, sitting, or sleeping in an awkward way can strain the back. Sometimes you don't notice the injury until later. Arthritis is another common cause of back pain. Although it may hurt a lot, back pain usually improves on its own within several weeks. Most people recover in 12 weeks or less. Using good home treatment and being careful not to stress your back can help you feel better sooner. Follow-up care is a key part of your treatment and safety. Be sure to make and go to all appointments, and call your doctor if you are having problems. It's also a good idea to know your test results and keep a list of the medicines you take. How can you care for yourself at home? · Sit or lie in positions that are most comfortable and reduce your pain. Try one of these positions when you lie down: ? Lie on your back with your knees bent and supported by large pillows. ? Lie on the floor with your legs on the seat of a sofa or chair. ? Lie on your side with your knees and hips bent and a pillow between your legs. ? Lie on your stomach if it does not make pain worse. · Do not sit up in bed, and avoid soft couches and twisted positions. Bed rest can help relieve pain at first, but it delays healing. Avoid bed rest after the first day of back pain. · Change positions every 30 minutes. If you must sit for long periods of time, take breaks from sitting. Get up and walk around, or lie in a comfortable position. · Try using a heating pad on a low or medium setting for 15 to 20 minutes every 2 or 3 hours. Try a warm shower in place of one session with the heating pad. · You can also try an ice pack for 10 to 15 minutes every 2 to 3 hours. Put a thin cloth between the ice pack and your skin. · Take pain medicines exactly as directed. ? If the doctor gave you a prescription medicine for pain, take it as prescribed. ? If you are not taking a prescription pain medicine, ask your doctor if you can take an over-the-counter medicine. · Take short walks several times a day. You can start with 5 to 10 minutes, 3 or 4 times a day, and work up to longer walks. Walk on level surfaces and avoid hills and stairs until your back is better. · Return to work and other activities as soon as you can. Continued rest without activity is usually not good for your back. · To prevent future back pain, do exercises to stretch and strengthen your back and stomach. Learn how to use good posture, safe lifting techniques, and proper body mechanics. When should you call for help? Call your doctor now or seek immediate medical care if: 
  · You have new or worsening numbness in your legs.  
  · You have new or worsening weakness in your legs. (This could make it hard to stand up.)  
  · You lose control of your bladder or bowels.  
 Watch closely for changes in your health, and be sure to contact your doctor if: 
  · You have a fever, lose weight, or don't feel well.  
  · You do not get better as expected. Where can you learn more? Go to http://nico-millicent.info/. Enter J342 in the search box to learn more about \"Back Pain: Care Instructions. \" Current as of: November 29, 2017 Content Version: 11.8 © 2730-9300 [employer]. Care instructions adapted under license by [employer] (which disclaims liability or warranty for this information). If you have questions about a medical condition or this instruction, always ask your healthcare professional. [place] [address] any warranty or liability for your use of this information.

## 2018-12-07 LAB
25(OH)D3+25(OH)D2 SERPL-MCNC: 36.9 NG/ML (ref 30–100)
ALBUMIN SERPL-MCNC: 4.3 G/DL (ref 3.5–4.8)
ALBUMIN/GLOB SERPL: 1.7 {RATIO} (ref 1.2–2.2)
ALP SERPL-CCNC: 88 IU/L (ref 39–117)
ALT SERPL-CCNC: 28 IU/L (ref 0–44)
AST SERPL-CCNC: 21 IU/L (ref 0–40)
BILIRUB SERPL-MCNC: 0.9 MG/DL (ref 0–1.2)
BUN SERPL-MCNC: 16 MG/DL (ref 8–27)
BUN/CREAT SERPL: 14 (ref 10–24)
CALCIUM SERPL-MCNC: 10 MG/DL (ref 8.6–10.2)
CHLORIDE SERPL-SCNC: 103 MMOL/L (ref 96–106)
CHOLEST SERPL-MCNC: 124 MG/DL (ref 100–199)
CO2 SERPL-SCNC: 25 MMOL/L (ref 20–29)
CREAT SERPL-MCNC: 1.11 MG/DL (ref 0.76–1.27)
ERYTHROCYTE [DISTWIDTH] IN BLOOD BY AUTOMATED COUNT: 13 % (ref 12.3–15.4)
FOLATE SERPL-MCNC: 14.1 NG/ML
GLOBULIN SER CALC-MCNC: 2.5 G/DL (ref 1.5–4.5)
GLUCOSE SERPL-MCNC: 86 MG/DL (ref 65–99)
HCT VFR BLD AUTO: 38 % (ref 37.5–51)
HDLC SERPL-MCNC: 44 MG/DL
HGB BLD-MCNC: 12.6 G/DL (ref 13–17.7)
MCH RBC QN AUTO: 31.4 PG (ref 26.6–33)
MCHC RBC AUTO-ENTMCNC: 33.2 G/DL (ref 31.5–35.7)
MCV RBC AUTO: 95 FL (ref 79–97)
PLATELET # BLD AUTO: 242 X10E3/UL (ref 150–379)
POTASSIUM SERPL-SCNC: 4.8 MMOL/L (ref 3.5–5.2)
PROT SERPL-MCNC: 6.8 G/DL (ref 6–8.5)
RBC # BLD AUTO: 4.01 X10E6/UL (ref 4.14–5.8)
SODIUM SERPL-SCNC: 142 MMOL/L (ref 134–144)
VIT B1 BLD-SCNC: 114.2 NMOL/L (ref 66.5–200)
VIT B12 SERPL-MCNC: 360 PG/ML (ref 232–1245)
VIT B6 SERPL-MCNC: 9.2 UG/L (ref 5.3–46.7)
WBC # BLD AUTO: 5.5 X10E3/UL (ref 3.4–10.8)

## 2018-12-11 ENCOUNTER — OFFICE VISIT (OUTPATIENT)
Dept: CARDIOLOGY CLINIC | Age: 73
End: 2018-12-11

## 2018-12-11 ENCOUNTER — DOCUMENTATION ONLY (OUTPATIENT)
Dept: INTERNAL MEDICINE CLINIC | Age: 73
End: 2018-12-11

## 2018-12-11 VITALS
RESPIRATION RATE: 16 BRPM | HEART RATE: 54 BPM | HEIGHT: 74 IN | OXYGEN SATURATION: 96 % | BODY MASS INDEX: 29.39 KG/M2 | DIASTOLIC BLOOD PRESSURE: 80 MMHG | WEIGHT: 229 LBS | SYSTOLIC BLOOD PRESSURE: 136 MMHG

## 2018-12-11 DIAGNOSIS — I10 ESSENTIAL HYPERTENSION: Primary | ICD-10-CM

## 2018-12-11 DIAGNOSIS — G45.9 TRANSIENT CEREBRAL ISCHEMIA, UNSPECIFIED TYPE: ICD-10-CM

## 2018-12-11 DIAGNOSIS — E78.00 HYPERCHOLESTEROLEMIA: ICD-10-CM

## 2018-12-11 DIAGNOSIS — I34.0 NON-RHEUMATIC MITRAL REGURGITATION: ICD-10-CM

## 2018-12-11 NOTE — PROGRESS NOTES
[address] [place] [address] [address] [facility]  [phone]     Subjective:      Mercy Freeman is a 75 y.o. male is here for routine f/u. The patient denies chest pain/ shortness of breath, orthopnea, PND, LE edema, palpitations, syncope, or presyncope.        Patient Active Problem List    Diagnosis Date Noted    Transient cerebral ischemia 12/11/2018    Non-rheumatic mitral regurgitation 12/11/2018    Patient is full code 06/04/2018    Elevated BP without diagnosis of hypertension 01/31/2018    Tinnitus of left ear 01/16/2018    Orthostatic lightheadedness 01/16/2018    Diastasis recti 11/16/2017    Dizziness 11/14/2017    Blurry vision, bilateral 11/14/2017    Hip pain, chronic, right 10/03/2017    TIA (transient ischemic attack) 05/02/2017    Acute nasopharyngitis 02/21/2017    Anginal chest pain at rest [facility]) 10/24/2016    Chronic left shoulder pain 10/24/2016    Fall at home 03/17/2016    Rib pain on right side 03/17/2016    Enlarged LA (left atrium)     Mitral regurgitation     Irregular heart beat 09/23/2015    Diverticulitis 03/11/2015    Calf pain 03/11/2015    Right calf pain 03/11/2015    Need for varicella vaccine 09/10/2014    Anemia 09/10/2014    Tinea versicolor 09/10/2014    Hemorrhoids 09/10/2014    S/P knee replacement 06/11/2014    Constipation 06/11/2014    Risk for falls 04/04/2014    Screening for depression 04/04/2014    Osteopenia 04/04/2014    ED (erectile dysfunction) 07/16/2012    Otitis externa of right ear 04/11/2012    Acute bronchitis 02/03/2012    Knee pain, left 11/28/2011    Tick bite 06/13/2011    Decreased vision 06/13/2011    Foot pain, right 01/24/2011    Acute sinusitis 01/24/2011    Corn 01/24/2011    HTN (hypertension) 08/24/2010    Rash 08/24/2010    Urinary frequency 08/24/2010    Back pain 04/01/2010    Hypercholesterolemia 04/01/2010      Hossein Cheatham MD  Past Medical History:   Diagnosis Date    Abdominal wall hernia 11/16/2017    Anemia 9/10/2014    Back pain 4/1/2010    Blurry vision, bilateral 11/14/2017    CAD (coronary artery disease)     Calf pain 3/11/2015    Chronic kidney disease     cyst on kidney    Chronic left shoulder pain 10/24/2016    Constipation 6/11/2014    Decreased vision 6/13/2011    Diastasis recti 11/16/2017    Diverticulitis 3/11/2015    Dizziness 11/14/2017    Enlarged LA (left atrium)     Fall at home 3/17/2016    GERD (gastroesophageal reflux disease)     Hemorrhoids 9/10/2014    Hip pain, chronic, right 10/3/2017    HTN (hypertension) 8/24/2010    Hx-TIA (transient ischemic attack)     Hypercholesterolemia 4/1/2010    Hypertension     Irregular heart beat 9/23/2015    Kidney disease     Mitral regurgitation     Need for varicella vaccine 9/10/2014    Orthostatic lightheadedness 1/16/2018    Other ill-defined conditions(799.89)     heart murmur    Patient is full code 6/4/2018    Rash 8/24/2010    Rib pain on right side 3/17/2016    Right calf pain 3/11/2015    Risk for falls 4/4/2014    S/P knee replacement 6/11/2014    Screening for depression 4/4/2014    Tinea versicolor 9/10/2014    Tinnitus of left ear 1/16/2018    Urinary frequency 8/24/2010      Past Surgical History:   Procedure Laterality Date    CARDIAC SURG PROCEDURE UNLIST      heart cath    HX HEENT      nasal septum repair    HX HEENT      HX HERNIA REPAIR      HX KNEE REPLACEMENT Left     HX ORTHOPAEDIC      left knee surgery    HX ORTHOPAEDIC      HX TONSILLECTOMY  age 5    HX UROLOGICAL      MN COLONOSCOPY FLX DX W/COLLJ SPEC WHEN PFRMD  4/18/2011          No Known Allergies   Family History   Problem Relation Age of Onset    COPD Mother     Heart Attack Mother     Hypertension Mother     Heart Disease Mother     COPD Father     Heart Attack Father     Hypertension Father     Heart Disease Father     Cancer Father     Cancer Maternal Grandmother Social History     Socioeconomic History    Marital status:      Spouse name: Not on file    Number of children: Not on file    Years of education: Not on file    Highest education level: Not on file   Social Needs    Financial resource strain: Not on file    Food insecurity - worry: Not on file    Food insecurity - inability: Not on file    Transportation needs - medical: Not on file   [employer] needs - non-medical: Not on file   Occupational History    Not on file   Tobacco Use    Smoking status: Never Smoker    Smokeless tobacco: Never Used   Substance and Sexual Activity    Alcohol use: Yes     Alcohol/week: 0.0 oz     Comment: 12 beers, half a fifth a month    Drug use: No    Sexual activity: No   Other Topics Concern    Not on file   Social History Narrative    ** Merged History Encounter **           Current Outpatient Medications   Medication Sig    lisinopril (PRINIVIL, ZESTRIL) 30 mg tablet TAKE 1 TABLET DAILY    cetirizine (ZYRTEC) 10 mg tablet Take 10 mg by mouth daily as needed.  clopidogrel (PLAVIX) 75 mg tab Take 1 Tab by mouth daily.  aspirin delayed-release 81 mg tablet Take 1 Tab by mouth daily.  atorvastatin (LIPITOR) 40 mg tablet TAKE 1 TABLET NIGHTLY    pantoprazole (PROTONIX) 40 mg tablet Take 1 Tab by mouth daily. (Patient taking differently: Take 40 mg by mouth as needed.)    calcium-cholecalciferol, D3, (CALTRATE 600+D) tablet Take 1 Tab by mouth two (2) times a day. (Patient taking differently: Take 1 Tab by mouth daily.)    tamsulosin (FLOMAX) 0.4 mg capsule take 1 capsule by mouth once daily    oxymetazoline (VICKS SINEX 12-HOUR) 0.05 % nasal spray 1 [place] by Both Nostrils route two (2) times daily as needed for Congestion.  acetaminophen (TYLENOL) 500 mg tablet Take 1,000 mg by mouth every six (6) hours as needed for Pain. No current facility-administered medications for this visit.           Review of Symptoms:  11 systems reviewed, negative other than as stated in the HPI    Physical ExamPhysical Exam:    Vitals:    12/11/18 1106 12/11/18 1120   BP: 138/80 136/80   Pulse: (!) 54    Resp: 16    SpO2: 96%    Weight: 229 lb (103.9 kg)    Height: 6' 2\" (1.88 m)      Body mass index is 29.4 kg/m². General PE   Gen:  NAD  Mental Status - Alert. General Appearance - Not in acute distress. Chest and Lung Exam   Inspection: Accessory muscles - No use of accessory muscles in breathing. Auscultation:   Breath sounds: - Normal.   Cardiovascular   Inspection: Jugular vein - Bilateral - Inspection Normal.   Palpation/Percussion:   Apical Impulse: - Normal.   Auscultation: Rhythm - Regular. Heart Sounds - S1 WNL and S2 WNL. No S3 or S4. Murmurs & Other Heart Sounds: Auscultation of the heart reveals - No Murmurs. Peripheral Vascular   Upper Extremity: Inspection - Bilateral - No Cyanotic nailbeds or Digital clubbing. Lower Extremity:   Palpation: Edema - Bilateral - No edema. Abdomen:   Soft, non-tender, bowel sounds are active.   Neuro: A&O times 3, CN and motor grossly WNL    Labs:   Lab Results   Component Value Date/Time    Cholesterol, total 124 12/04/2018 08:55 AM    Cholesterol, total 130 06/04/2018 10:22 AM    Cholesterol, total 119 11/15/2017 02:48 AM    Cholesterol, total 119 05/02/2017 03:56 AM    Cholesterol, total 145 04/24/2017 11:48 AM    HDL Cholesterol 44 12/04/2018 08:55 AM    HDL Cholesterol 39 (L) 06/04/2018 10:22 AM    HDL Cholesterol 40 11/15/2017 02:48 AM    HDL Cholesterol 45 05/02/2017 03:56 AM    HDL Cholesterol 46 04/24/2017 11:48 AM    LDL, calculated 66 06/04/2018 10:22 AM    LDL, calculated 58.6 11/15/2017 02:48 AM    LDL, calculated 50.4 05/02/2017 03:56 AM    LDL, calculated 83 10/24/2016 02:19 PM    LDL, calculated 59 04/01/2016 08:28 AM    Triglyceride 123 06/04/2018 10:22 AM    Triglyceride 102 11/15/2017 02:48 AM    Triglyceride 118 05/02/2017 03:56 AM    Triglyceride 130 10/24/2016 02:19 PM    Triglyceride 97 04/01/2016 08:28 AM    CHOL/HDL Ratio 3.0 11/15/2017 02:48 AM    CHOL/HDL Ratio 2.6 05/02/2017 03:56 AM    CHOL/HDL Ratio 3.4 02/03/2010 01:29 PM    CHOL/HDL Ratio 3.9 08/03/2009 04:29 PM     Lab Results   Component Value Date/Time     01/10/2010 08:05 PM     Lab Results   Component Value Date/Time    Sodium 142 12/04/2018 08:55 AM    Potassium 4.8 12/04/2018 08:55 AM    Chloride 103 12/04/2018 08:55 AM    CO2 25 12/04/2018 08:55 AM    Anion gap 9 11/15/2017 02:48 AM    Glucose 86 12/04/2018 08:55 AM    BUN 16 12/04/2018 08:55 AM    Creatinine 1.11 12/04/2018 08:55 AM    BUN/Creatinine ratio 14 12/04/2018 08:55 AM    GFR est AA 75 12/04/2018 08:55 AM    GFR est non-AA 65 12/04/2018 08:55 AM    Calcium 10.0 12/04/2018 08:55 AM    Bilirubin, total 0.9 12/04/2018 08:55 AM    AST (SGOT) 21 12/04/2018 08:55 AM    Alk. phosphatase 88 12/04/2018 08:55 AM    Protein, total 6.8 12/04/2018 08:55 AM    Albumin 4.3 12/04/2018 08:55 AM    Globulin 3.6 11/14/2017 03:18 PM    A-G Ratio 1.7 12/04/2018 08:55 AM    ALT (SGPT) 28 12/04/2018 08:55 AM       EKG:  SB     Assessment:     Assessment:      1. Essential hypertension    2. Hypercholesterolemia    3. Transient cerebral ischemia, unspecified type    4. Non-rheumatic mitral regurgitation        Orders Placed This Encounter    AMB POC EKG ROUTINE W/ 12 LEADS, INTER & REP     Order Specific Question:   Reason for Exam:     Answer:   ROUTINE        Plan:     Pt presents for f/u, doing well and stable from cardiac standpoint. He continues to stay physically active, hunts 2-4 hrs everyday during hunting season with no problem. Normal valvular structure on echo 11/17    1. Hypertension: Controlled with Lisinopril  2. Hyperlipidemia: 6/18 LDL at 66. On statin. Labs and lipids followed by PCP. 3. Hx TIA 11/17, 2011. Continue ASA, Plavix, statin. He is followed by Dr Virgie Hakwins with neurology  4. Hx Mild MR per echo 5/17.      Continue current care and f/u in 1 yr    Gianfranco HERBERT [place] [address] [facility]    12/11/2018         Patient seen, examined by me personally. Plan discussed as detailed. Agree with note as outlined by  NP. I confirm findings in history and physical exam. No additional findings noted. Agree with plan as outlined above. Encouraged exercise.     Delfino Cervantes MD

## 2018-12-11 NOTE — PROGRESS NOTES
1. Have you been to the ER, urgent care clinic since your last visit? Hospitalized since your last visit? YES, ER MAY 2018 MIN[facility] STROKE. 2. Have you seen or consulted any other health care providers outside of the [address] [place] [address] since your last visit? Include any pap smears or colon screening. NO. 
 
C/O SLIGHT SWELLING IN BLE,.

## 2019-03-22 ENCOUNTER — DOCUMENTATION ONLY (OUTPATIENT)
Dept: INTERNAL MEDICINE CLINIC | Age: 74
End: 2019-03-22

## 2019-05-06 RX ORDER — LISINOPRIL 30 MG/1
TABLET ORAL
Qty: 90 TAB | Refills: 1 | Status: SHIPPED | OUTPATIENT
Start: 2019-05-06 | End: 2019-06-04 | Stop reason: SDUPTHER

## 2019-06-04 ENCOUNTER — OFFICE VISIT (OUTPATIENT)
Dept: FAMILY MEDICINE CLINIC | Age: 74
End: 2019-06-04

## 2019-06-04 VITALS
OXYGEN SATURATION: 97 % | WEIGHT: 226 LBS | SYSTOLIC BLOOD PRESSURE: 111 MMHG | BODY MASS INDEX: 29 KG/M2 | HEIGHT: 74 IN | RESPIRATION RATE: 20 BRPM | HEART RATE: 56 BPM | TEMPERATURE: 96 F | DIASTOLIC BLOOD PRESSURE: 63 MMHG

## 2019-06-04 DIAGNOSIS — M65.331 TRIGGER FINGER OF ALL DIGITS OF RIGHT HAND: ICD-10-CM

## 2019-06-04 DIAGNOSIS — M65.311 TRIGGER FINGER OF ALL DIGITS OF RIGHT HAND: ICD-10-CM

## 2019-06-04 DIAGNOSIS — M65.321 TRIGGER FINGER OF ALL DIGITS OF RIGHT HAND: ICD-10-CM

## 2019-06-04 DIAGNOSIS — Z00.00 MEDICARE ANNUAL WELLNESS VISIT, SUBSEQUENT: Primary | ICD-10-CM

## 2019-06-04 DIAGNOSIS — E78.00 HYPERCHOLESTEROLEMIA: ICD-10-CM

## 2019-06-04 DIAGNOSIS — Z13.39 SCREENING FOR ALCOHOLISM: ICD-10-CM

## 2019-06-04 DIAGNOSIS — I10 ESSENTIAL HYPERTENSION: ICD-10-CM

## 2019-06-04 DIAGNOSIS — Z13.31 SCREENING FOR DEPRESSION: ICD-10-CM

## 2019-06-04 DIAGNOSIS — Z12.11 SCREEN FOR COLON CANCER: ICD-10-CM

## 2019-06-04 DIAGNOSIS — I20.8 ANGINAL CHEST PAIN AT REST (HCC): ICD-10-CM

## 2019-06-04 DIAGNOSIS — M65.341 TRIGGER FINGER OF ALL DIGITS OF RIGHT HAND: ICD-10-CM

## 2019-06-04 DIAGNOSIS — Z23 ENCOUNTER FOR IMMUNIZATION: ICD-10-CM

## 2019-06-04 DIAGNOSIS — M65.351 TRIGGER FINGER OF ALL DIGITS OF RIGHT HAND: ICD-10-CM

## 2019-06-04 DIAGNOSIS — Z71.89 ADVANCED DIRECTIVES, COUNSELING/DISCUSSION: ICD-10-CM

## 2019-06-04 PROBLEM — I25.10 CORONARY ARTERY DISEASE INVOLVING NATIVE CORONARY ARTERY OF NATIVE HEART WITHOUT ANGINA PECTORIS: Status: ACTIVE | Noted: 2019-06-04

## 2019-06-04 RX ORDER — DICLOFENAC SODIUM 10 MG/G
2 GEL TOPICAL 4 TIMES DAILY
Qty: 100 G | Refills: 4 | Status: SHIPPED | OUTPATIENT
Start: 2019-06-04 | End: 2020-12-14 | Stop reason: SDUPTHER

## 2019-06-04 RX ORDER — LISINOPRIL 30 MG/1
TABLET ORAL
Qty: 90 TAB | Refills: 1
Start: 2019-06-04 | End: 2019-07-16 | Stop reason: SDUPTHER

## 2019-06-04 NOTE — PATIENT INSTRUCTIONS
Medicare Wellness Visit, Male    The best way to live healthy is to have a lifestyle where you eat a well-balanced diet, exercise regularly, limit alcohol use, and quit all forms of tobacco/nicotine, if applicable. Regular preventive services are another way to keep healthy. Preventive services (vaccines, screening tests, monitoring & exams) can help personalize your care plan, which helps you manage your own care. Screening tests can find health problems at the earliest stages, when they are easiest to treat. [address] [facility] follows the current, evidence-based guidelines published by the [facility] Varun ([facility]) when recommending preventive services for our patients. Because we follow these guidelines, sometimes recommendations change over time as research supports it. (For example, a prostate screening blood test is no longer routinely recommended for men with no symptoms.)  Of course, you and your doctor may decide to screen more often for some diseases, based on your risk and co-morbidities (chronic disease you are already diagnosed with). Preventive services for you include:  - Medicare offers their members a free annual wellness visit, which is time for you and your primary care provider to discuss and plan for your preventive service needs. Take advantage of this benefit every year!  -All adults over age 72 should receive the recommended pneumonia vaccines. Current USPSTF guidelines recommend a series of two vaccines for the best pneumonia protection.   -All adults should have a flu vaccine yearly and an ECG.  All adults age 61 and older should receive a shingles vaccine once in their lifetime.    -All adults age 38-68 who are overweight should have a diabetes screening test once every three years.   -Other screening tests & preventive services for persons with diabetes include: an eye exam to screen for diabetic retinopathy, a kidney function test, a foot exam, and stricter control over your cholesterol.   -Cardiovascular screening for adults with routine risk involves an electrocardiogram (ECG) at intervals determined by the provider.   -Colorectal cancer screening should be done for adults age 54-65 with no increased risk factors for colorectal cancer. There are a number of acceptable methods of screening for this type of cancer. Each test has its own benefits and drawbacks. Discuss with your provider what is most appropriate for you during your annual wellness visit. The different tests include: colonoscopy (considered the best screening method), a fecal occult blood test, a fecal DNA test, and sigmoidoscopy.  -All adults born between [facility] should be screened once for Hepatitis C.  -An Abdominal Aortic Aneurysm (AAA) Screening is recommended for men age 73-68 who has ever smoked in their lifetime. Here is a list of your current Health Maintenance items (your personalized list of preventive services) with a due date:  Health Maintenance Due   Topic Date Due    Shingles Vaccine (1 of 2) 05/15/1994    Annual Well Visit  06/05/2019    Stool testing for trace blood  06/11/2019          Learning About Stephan Soto  What is a living will? A living will is a legal form you use to write down the kind of care you want at the end of your life. It is used by the health professionals who will treat you if you aren't able to decide for yourself. If you put your wishes in writing, your loved ones and others will know what kind of care you want. They won't need to guess. This can ease your mind and be helpful to others. A living will is not the same as an estate or property will. An estate will explains what you want to happen with your money and property after you die. Is a living will a legal document? A living will is a legal document. Each state has its own laws about living faria.  If you move to another state, make sure that your living will is legal in the state where you now live. Or you might use a universal form that has been approved by many states. This kind of form can sometimes be completed and stored online. Your electronic copy will then be available wherever you have a connection to the Internet. In most cases, doctors will respect your wishes even if you have a form from a different state. · You don't need an  to complete a living will. But legal advice can be helpful if your state's laws are unclear, your health history is complicated, or your family can't agree on what should be in your living will. · You can change your living will at any time. Some people find that their wishes about end-of-life care change as their health changes. · In addition to making a living will, think about completing a medical power of  form. This form lets you name the person you want to make end-of-life treatment decisions for you (your \"health care agent\") if you're not able to. Many hospitals and nursing homes will give you the forms you need to complete a living will and a medical power of . · Your living will is used only if you can't make or communicate decisions for yourself anymore. If you become able to make decisions again, you can accept or refuse any treatment, no matter what you wrote in your living will. · Your state may offer an online registry. This is a place where you can store your living will online so the doctors and nurses who need to treat you can find it right away. What should you think about when creating a living will? Talk about your end-of-life wishes with your family members and your doctor. Let them know what you want. That way the people making decisions for you won't be surprised by your choices. Think about these questions as you make your living will:  · Do you know enough about life support methods that might be used?  If not, talk to your doctor so you know what might be done if you can't breathe on your own, your heart stops, or you're unable to swallow. · What things would you still want to be able to do after you receive life-support methods? Would you want to be able to walk? To speak? To eat on your own? To live without the help of machines? · If you have a choice, where do you want to be cared for? In your home? At a hospital or nursing home? · Do you want certain Alevism practices performed if you become very ill? · If you have a choice at the end of your life, where would you prefer to die? At home? In a hospital or nursing home? Somewhere else? · Would you prefer to be buried or cremated? · Do you want your organs to be donated after you die? What should you do with your living will? · Make sure that your family members and your health care agent have copies of your living will. · Give your doctor a copy of your living will to keep in your medical record. If you have more than one doctor, make sure that each one has a copy. · You may want to put a copy of your living will where it can be easily found. Where can you learn more? Go to http://nicoLakoomillicent.info/. Enter K646 in the search box to learn more about \"Learning About Living Tooñ More. \"  Current as of: August 8, 2016  Content Version: 11.3  © 7457-8288 "[employer]". Care instructions adapted under license by [employer] (which disclaims liability or warranty for this information). If you have questions about a medical condition or this instruction, always ask your healthcare professional. [place] [address] any warranty or liability for your use of this information. Preventing Falls: Care Instructions  Your Care Instructions    Getting around your home safely can be a challenge if you have injuries or health problems that make it easy for you to fall.  Loose rugs and furniture in walkways are among the dangers for many older people who have problems walking or who have poor eyesight. People who have conditions such as arthritis, osteoporosis, or dementia also have to be careful not to fall. You can make your home safer with a few simple measures. Follow-up care is a key part of your treatment and safety. Be sure to make and go to all appointments, and call your doctor if you are having problems. It's also a good idea to know your test results and keep a list of the medicines you take. How can you care for yourself at home? Taking care of yourself  · You may get dizzy if you do not drink enough water. To prevent dehydration, drink plenty of fluids, enough so that your urine is light yellow or clear like water. Choose water and other caffeine-free clear liquids. If you have kidney, heart, or liver disease and have to limit fluids, talk with your doctor before you increase the amount of fluids you drink. · Exercise regularly to improve your strength, muscle tone, and balance. Walk if you can. Swimming may be a good choice if you cannot walk easily. · Have your vision and hearing checked each year or any time you notice a change. If you have trouble seeing and hearing, you might not be able to avoid objects and could lose your balance. · Know the side effects of the medicines you take. Ask your doctor or pharmacist whether the medicines you take can affect your balance. Sleeping pills or sedatives can affect your balance. · Limit the amount of alcohol you drink. Alcohol can impair your balance and other senses. · Ask your doctor whether calluses or corns on your feet need to be removed. If you wear loose-fitting shoes because of calluses or corns, you can lose your balance and fall. · Talk to your doctor if you have numbness in your feet. Preventing falls at home  · Remove raised doorway thresholds, throw rugs, and clutter. Repair loose carpet or raised areas in the floor. · Move furniture and electrical cords to keep them out of walking paths.   · Use nonskid floor wax, and wipe up spills right away, especially on ceramic tile floors. · If you use a walker or cane, put rubber tips on it. If you use crutches, clean the bottoms of them regularly with an abrasive pad, such as steel wool. · Keep your house well lit, especially [place], and outside walkways. Use night-lights in areas such as hallways and bathrooms. Add extra light switches or use remote switches (such as switches that go on or off when you clap your hands) to make it easier to turn lights on if you have to get up during the night. · Install sturdy handrails on stairways. · Move items in your cabinets so that the things you use a lot are on the lower shelves (about waist level). · Keep a cordless phone and a flashlight with new batteries by your bed. If possible, put a phone in each of the main rooms of your house, or carry a cell phone in case you fall and cannot reach a phone. Or, you can wear a device around your neck or wrist. You push a button that sends a signal for help. · Wear low-heeled shoes that fit well and give your feet good support. Use footwear with nonskid soles. Check the heels and soles of your shoes for wear. Repair or replace worn heels or soles. · Do not wear socks without shoes on wood floors. · Walk on the grass when the sidewalks are slippery. If you live in an area that gets snow and ice in the winter, sprinkle salt on slippery steps and sidewalks. Preventing falls in the bath  · Install grab bars and nonskid mats inside and outside your shower or tub and near the toilet and sinks. · Use shower chairs and bath benches. · Use a hand-held shower head that will allow you to sit while showering. · Get into a tub or shower by putting the weaker leg in first. Get out of a tub or shower with your strong side first.  · Repair loose toilet seats and consider installing a raised toilet seat to make getting on and off the toilet easier.   · Keep your bathroom door unlocked while you are in the shower. Where can you learn more? Go to http://nico-millicent.info/. Enter [id number] [id number] in the search box to learn more about \"Preventing Falls: Care Instructions. \"  Current as of: March 16, 2018  Content Version: 11.8     Trigger Finger Release: Before Your Surgery  What is trigger finger release? Trigger finger release is surgery to make it easier to bend and straighten your finger. Your doctor will make a cut in the tissue over the tendon that helps bend your finger. This cut is called an incision. It will allow the tendon to move freely without pain. This surgery will probably be done while you are awake. The doctor will give you a shot (injection) to numb your hand and prevent pain. You also may get medicine to help you relax. The doctor will make an incision in the skin of your finger or palm. He or she will make a cut to open the tissue over the swollen part of the tendon. The doctor will close the skin incision with stitches. After surgery, you will have a small scar on your finger or palm. This will fade with time. It will probably take about 6 weeks for your hand to heal. After it heals, your finger may move easily without pain. You will go home the same day as the surgery. How soon you can return to work depends on your job. If you can do your job without using your hand, you may be able to go back in 1 or 2 days. But if your job requires you to do repeated finger or hand movements, put pressure on your hand, or lift things, you may need to take more time off work. Follow-up care is a key part of your treatment and safety. Be sure to make and go to all appointments, and call your doctor if you are having problems. It's also a good idea to know your test results and keep a list of the medicines you take. What happens before surgery?   Surgery can be stressful. This information will help you understand what you can expect.  And it will help you safely prepare for surgery.   Preparing for surgery    · Understand exactly what surgery is planned, along with the risks, benefits, and other options. · Tell your doctors ALL the medicines, vitamins, supplements, and herbal remedies you take. Some of these can increase the risk of bleeding or interact with anesthesia.     · If you take blood thinners, such as warfarin (Coumadin), clopidogrel (Plavix), or aspirin, be sure to talk to your doctor. He or she will tell you if you should stop taking these medicines before your surgery. Make sure that you understand exactly what your doctor wants you to do.     · Your doctor will tell you which medicines to take or stop before your surgery. You may need to stop taking certain medicines a week or more before surgery. So talk to your doctor as soon as you can.     · If you have an advance directive, let your doctor know. It may include a living will and a durable power of  for health care. Bring a copy to the hospital. If you don't have one, you may want to prepare one. It lets your doctor and loved ones know your health care wishes. Doctors advise that everyone prepare these papers before any type of surgery or procedure. What happens on the day of surgery? · Follow the instructions exactly about when to stop eating and drinking. If you don't, your surgery may be canceled. If your doctor told you to take your medicines on the day of surgery, take them with only a sip of water.     · Take a bath or shower before you come in for your surgery. Do not apply lotions, perfumes, deodorants, or nail polish.     · Do not shave the surgical site yourself.     · Take off all jewelry and piercings. And take out contact lenses, if you wear them.    At the hospital or surgery center   · Bring a picture ID.     · The area for surgery is often marked to make sure there are no errors.     · You will be kept comfortable and safe by your anesthesia provider.  You may get medicine that relaxes you or puts you in a light sleep. The area being worked on will be numb.     · The surgery will take less than 1 hour. Going home   · Be sure you have someone to drive you home. Anesthesia and pain medicine make it unsafe for you to drive.     · You will be given more specific instructions about recovering from your surgery. They will cover things like diet, wound care, follow-up care, driving, and getting back to your normal routine. When should you call your doctor? · You have questions or concerns.     · You don't understand how to prepare for your surgery.     · You become ill before the surgery (such as fever, flu, or a cold).     · You need to reschedule or have changed your mind about having the surgery. Where can you learn more? Go to http://nico-millicent.info/. Enter F208 in the search box to learn more about \"Trigger Finger Release: Before Your Surgery. \"  Current as of: September 20, 2018  Content Version: 11.9  © 2562-2473 [employer]. Care instructions adapted under license by [employer] (which disclaims liability or warranty for this information). If you have questions about a medical condition or this instruction, always ask your healthcare professional. [place] [address] any warranty or liability for your use of this information. © 5967-9466 [employer]. Care instructions adapted under license by [employer] (which disclaims liability or warranty for this information). If you have questions about a medical condition or this instruction, always ask your healthcare professi     Trigger Finger: Care Instructions  Your Care Instructions  A trigger finger is a finger stuck in a bent position. The bent finger usually straightens out on its own. A trigger finger can be painful, but it normally is not a serious problem. Trigger fingers seem to occur more in some groups of people.  These include people who have diabetes or arthritis or who have injured their hands in the past. This problem also occurs in musicians and people who  tools often. Rest, exercises, and other things you can do at home may help your trigger finger relax so that it can bend as it should. You may get a corticosteroid shot. This can reduce swelling and pain. Your doctor may put a splint on your finger. This will give your finger some rest and avoid irritating the joint. You may need surgery if the finger keeps locking in a bent position. Follow-up care is a key part of your treatment and safety. Be sure to make and go to all appointments, and call your doctor if you are having problems. It's also a good idea to know your test results and keep a list of the medicines you take. How can you care for yourself at home? · If your doctor put a splint on your finger, wear the splint as directed. Do not remove it until your doctor says you can. · You may need to change your activities to avoid movements that irritate the finger. · If your finger is swollen, put ice or a cold pack on your finger for 10 to 20 minutes at a time. Try to do this every 1 to 2 hours for the next 3 days (when you are awake) or until the swelling goes down. Put a thin cloth between the ice and your skin. · Prop up your hand on a pillow when you ice it or anytime you sit or lie down during the next 3 days. Try to keep it above the level of your heart. This will help reduce swelling. · Take your medicines exactly as prescribed. Call your doctor if you think you are having a problem with your medicine. · Ask your doctor if you can take an over-the-counter pain medicine, such as acetaminophen (Tylenol), ibuprofen (Advil, Motrin), or naproxen (Aleve). Be safe with medicines. Read and follow all instructions on the label. · If your doctor recommends exercises, do them as directed. When should you call for help?   Call your doctor now or seek immediate medical care if:    · Your finger locks in a bent position and will not straighten.    Watch closely for changes in your health, and be sure to contact your doctor if:    · You do not get better as expected. Where can you learn more? Go to http://nico-millicent.info/. Enter M826 in the search box to learn more about \"Trigger Finger: Care Instructions. \"  Current as of: September 20, 2018  Content Version: 11.9  © 2006-2018 [employer]. Care instructions adapted under license by [employer] (which disclaims liability or warranty for this information). If you have questions about a medical condition or this instruction, always ask your healthcare professional. [address] any warranty or liability for your use of this information. onal. [employer] disclaims any warranty or liability for your use of this information. Body Mass Index: Care Instructions  Your Care Instructions    Body mass index (BMI) can help you see if your weight is raising your risk for health problems. It uses a formula to compare how much you weigh with how tall you are. · A BMI lower than 18.5 is considered underweight. · A BMI between 18.5 and 24.9 is considered healthy. · A BMI between 25 and 29.9 is considered overweight. A BMI of 30 or higher is considered obese. If your BMI is in the normal range, it means that you have a lower risk for weight-related health problems. If your BMI is in the overweight or obese range, you may be at increased risk for weight-related health problems, such as high blood pressure, heart disease, stroke, arthritis or joint pain, and diabetes. If your BMI is in the underweight range, you may be at increased risk for health problems such as fatigue, lower protection (immunity) against illness, muscle loss, bone loss, hair loss, and hormone problems. BMI is just one measure of your risk for weight-related health problems.  You may be at higher risk for health problems if you are not active, you eat an unhealthy diet, or you drink too much alcohol or use tobacco products. Follow-up care is a key part of your treatment and safety. Be sure to make and go to all appointments, and call your doctor if you are having problems. It's also a good idea to know your test results and keep a list of the medicines you take. How can you care for yourself at home? · Practice healthy eating habits. This includes eating plenty of fruits, vegetables, whole grains, lean protein, and low-fat dairy. · If your doctor recommends it, get more exercise. Walking is a good choice. Bit by bit, increase the amount you walk every day. Try for at least 30 minutes on most days of the week. · Do not smoke. Smoking can increase your risk for health problems. If you need help quitting, talk to your doctor about stop-smoking programs and medicines. These can increase your chances of quitting for good. · Limit alcohol to 2 drinks a day for men and 1 drink a day for women. Too much alcohol can cause health problems. If you have a BMI higher than 25  · Your doctor may do other tests to check your risk for weight-related health problems. This may include measuring the distance around your waist. A waist measurement of more than 40 inches in men or 35 inches in women can increase the risk of weight-related health problems. · Talk with your doctor about steps you can take to stay healthy or improve your health. You may need to make lifestyle changes to lose weight and stay healthy, such as changing your diet and getting regular exercise. If you have a BMI lower than 18.5  · Your doctor may do other tests to check your risk for health problems. · Talk with your doctor about steps you can take to stay healthy or improve your health.  You may need to make lifestyle changes to gain or maintain weight and stay healthy, such as getting more healthy foods in your diet and doing exercises to build muscle. Where can you learn more? Go to http://nico-millicent.info/. Enter S176 in the search box to learn more about \"Body Mass Index: Care Instructions. \"  Current as of: October 13, 2016  Content Version: 11.4  © 9814-0608 [employer]. Care instructions adapted under license by [employer] (which disclaims liability or warranty for this information). If you have questions about a medical condition or this instruction, always ask your healthcare professional. [address] any warranty or liability for your use of this information.

## 2019-06-04 NOTE — PROGRESS NOTES
This is the Subsequent Medicare Annual Wellness Exam, performed 12 months or more after the Initial AWV or the last Subsequent AWV    I have reviewed the patient's medical history in detail and updated the computerized patient record. History   Present for CPE, last Complete Physical exam was last yr ,  Up todate w/ all vaccination, last tetanus vaccine was in < 5yrs ago .      Last psa exam was normal and was in last yr,        last colonoscopy was normal and was >8 yrs ago,   No past surgical hx,  last bone dexa scan was normal and was     No family hx of breast cancer   no family hx of prostate cancer   no family hx of colon cancer, parent  and not sexaully active and uses Safe sex, ++physically active,  compliant w/ meds, ++Rf needed for today for his meds.    Screening for fall   Medications causing fall and the risk for future fall screened  the current meds r/w'd and addressed,     HTN  Today pt present for Bp check and++ Compliancy w/ the bp meds, having had the low salt diet ,  has been active, patien does not obtain the bp at home ,, today the pt denies Chest Pain, has no legs swelling no lightheadedness,    In addition patient has history of hypercholesterolemia has been compliant with the statin therapy denies any muscle ache, currently taking the medication nightly last lipid panel was reviewed and was normal ++++refills needed at this time has been trying to have a low-fat low-cholesterol diet sometimes likes the fast foods weight has been stable  Trigger finger  Rt hand does a lot paint brch          Past Medical History:   Diagnosis Date    Abdominal wall hernia 2017    Anemia 9/10/2014    Back pain 2010    Blurry vision, bilateral 2017    CAD (coronary artery disease)     Calf pain 3/11/2015    Chronic kidney disease     cyst on kidney    Chronic left shoulder pain 10/24/2016    Constipation 2014    Decreased vision 2011    Diastasis recti 11/16/2017    Diverticulitis 3/11/2015    Dizziness 11/14/2017    Enlarged LA (left atrium)     Fall at home 3/17/2016    GERD (gastroesophageal reflux disease)     Hemorrhoids 9/10/2014    Hip pain, chronic, right 10/3/2017    HTN (hypertension) 8/24/2010    Hx-TIA (transient ischemic attack)     Hypercholesterolemia 4/1/2010    Hypertension     Irregular heart beat 9/23/2015    Kidney disease     Mitral regurgitation     Need for varicella vaccine 9/10/2014    Orthostatic lightheadedness 1/16/2018    Other ill-defined conditions(799.89)     heart murmur    Patient is full code 6/4/2018    Rash 8/24/2010    Rib pain on right side 3/17/2016    Right calf pain 3/11/2015    Risk for falls 4/4/2014    S/P knee replacement 6/11/2014    Screening for depression 4/4/2014    Tinea versicolor 9/10/2014    Tinnitus of left ear 1/16/2018    Urinary frequency 8/24/2010      Past Surgical History:   Procedure Laterality Date    CARDIAC SURG PROCEDURE UNLIST      heart cath    HX HEENT      nasal septum repair    HX HEENT      HX HERNIA REPAIR      HX KNEE REPLACEMENT Left     HX ORTHOPAEDIC      left knee surgery    HX ORTHOPAEDIC      HX TONSILLECTOMY  age 5    HX UROLOGICAL      TN COLONOSCOPY FLX DX W/COLLJ SPEC WHEN PFRMD  4/18/2011          Current Outpatient Medications   Medication Sig Dispense Refill    lisinopril (PRINIVIL, ZESTRIL) 30 mg tablet TAKE 1 TABLET DAILY 90 Tab 1    clopidogrel (PLAVIX) 75 mg tab Take 1 Tab by mouth daily. 90 Tab 3    aspirin delayed-release 81 mg tablet Take 1 Tab by mouth daily. 30 Tab 12    atorvastatin (LIPITOR) 40 mg tablet TAKE 1 TABLET NIGHTLY 90 Tab 3    pantoprazole (PROTONIX) 40 mg tablet Take 1 Tab by mouth daily. (Patient taking differently: Take 40 mg by mouth as needed.) 90 Tab 3    calcium-cholecalciferol, D3, (CALTRATE 600+D) tablet Take 1 Tab by mouth two (2) times a day.  (Patient taking differently: Take 1 Tab by mouth daily.) 60 Tab 11    tamsulosin (FLOMAX) 0.4 mg capsule take 1 capsule by mouth once daily 30 Cap 2    oxymetazoline (VICKS SINEX 12-HOUR) 0.05 % nasal spray 1 [place] by Both Nostrils route two (2) times daily as needed for Congestion.  acetaminophen (TYLENOL) 500 mg tablet Take 1,000 mg by mouth every six (6) hours as needed for Pain.  cetirizine (ZYRTEC) 10 mg tablet Take 10 mg by mouth daily as needed. No Known Allergies  Family History   Problem Relation Age of Onset   Loreta Diaz COPD Mother     Heart Attack Mother     Hypertension Mother     Heart Disease Mother     COPD Father     Heart Attack Father     Hypertension Father     Heart Disease Father     Cancer Father     Cancer Maternal Grandmother      Social History     Tobacco Use    Smoking status: Never Smoker    Smokeless tobacco: Never Used   Substance Use Topics    Alcohol use: Yes     Alcohol/week: 0.0 oz     Comment: 12 beers, half a fifth a month     Patient Active Problem List   Diagnosis Code    Back pain M54.9    Hypercholesterolemia E78.00    HTN (hypertension) I10    Rash R21    Urinary frequency R35.0    Foot pain, right M79.671    Acute sinusitis J01.90    Corn L84    Tick bite W57. XXXA    Decreased vision H54.7    Knee pain, left M25.562    Acute bronchitis J20.9    Otitis externa of right ear H60.91    ED (erectile dysfunction) N52.9    Risk for falls Z91.81    Screening for depression Z13.31    Osteopenia M85.80    S/P knee replacement Z96.659    Constipation K59.00    Need for varicella vaccine Z23    Anemia D64.9    Tinea versicolor B36.0    Hemorrhoids K64.9    Diverticulitis K57.92    Calf pain M79.669    Right calf pain M79.661    Irregular heart beat I49.9    Enlarged LA (left atrium) I51.7    Mitral regurgitation I34.0    Fall at home W19. Don Suarez, Y92.009    Rib pain on right side R07.81    Anginal chest pain at rest [facility]) I20.8    Chronic left shoulder pain M25.512, G89.29    Acute nasopharyngitis J00    TIA (transient ischemic attack) G45.9    Hip pain, chronic, right M25.551, G89.29    Dizziness R42    Blurry vision, bilateral H53.8    Diastasis recti M62.08    Tinnitus of left ear H93.12    Orthostatic lightheadedness R42    Elevated BP without diagnosis of hypertension R03.0    Patient is full code Z78.9    Transient cerebral ischemia G45.9    Non-rheumatic mitral regurgitation I34.0       Depression Risk Factor Screening:     3 most recent PHQ Screens 6/4/2019   Little interest or pleasure in doing things Not at all   Feeling down, depressed, irritable, or hopeless Not at all   Total Score PHQ 2 0     Alcohol Risk Factor Screening: You do not drink alcohol or very rarely. Functional Ability and Level of Safety:   Hearing Loss  Hearing is good. Activities of Daily Living  The home contains: handrails, grab bars and rugs  Patient does total self care    Fall Risk  Fall Risk Assessment, last 12 mths 6/4/2019   Able to walk? Yes   Fall in past 12 months?  No   Fall with injury? -   Number of falls in past 12 months -   Fall Risk Score -       Abuse Screen  Patient is not abused    Cognitive Screening   Evaluation of Cognitive Function:  Has your family/caregiver stated any concerns about your memory: no  Normal    Patient Care Team   Patient Care Team:  Lucero Burt MD as PCP - General  Ricardo Montalvo MD (Dermatology)  Shanti Bowers MD (Cardiology)  Marbella Muhammad MD (Plastic Surgery)  Inga Hernandez, [address] (Optometry)  Teresa Stover MD as Physician (Cardiology)  Yefri Davey MD as Physician (Sleep Medicine)  Aminta Rehman RN as Ambulatory Care Navigator  Beth Monahan MD (Gastroenterology)  Teresa Stover MD as Physician (Cardiology)    Assessment/Plan   Education and counseling provided:  Are appropriate based on today's review and evaluation  End-of-Life planning (with patient's consent)    Diagnoses and all orders for this visit:    1. Medicare annual wellness visit, subsequent    2. Encounter for immunization  -     varicella-zoster recombinant, PF, (SHINGRIX) 50 mcg/0.5 mL susr injection; 0.5 mL by IntraMUSCular route once for 1 dose. 3. Advanced directives, counseling/discussion  -     ADVANCE CARE PLANNING FIRST 30 MINS  -     FULL CODE    4. Screening for alcoholism  -     [place] ANNUAL ALCOHOL SCREEN 15 MIN    5. Screening for depression  -     DEPRESSION SCREEN ANNUAL    6. Screen for colon cancer    7. Essential hypertension  -     REFERRAL TO GASTROENTEROLOGY  -     CBC W/O DIFF  -     LIPID PANEL  -     VITAMIN B12 & FOLATE  -     METABOLIC PANEL, COMPREHENSIVE  -     TSH 3RD GENERATION  -     VITAMIN B1, WHOLE BLOOD  -     VITAMIN B6  -     COLLECTION VENOUS BLOOD,VENIPUNCTURE    8. Hypercholesterolemia  -     REFERRAL TO GASTROENTEROLOGY  -     CBC W/O DIFF  -     LIPID PANEL  -     VITAMIN B12 & FOLATE  -     METABOLIC PANEL, COMPREHENSIVE  -     TSH 3RD GENERATION  -     VITAMIN B1, WHOLE BLOOD  -     VITAMIN B6  -     COLLECTION VENOUS BLOOD,VENIPUNCTURE    9. Anginal chest pain at rest [facility])    Other orders  -     diclofenac (VOLTAREN) 1 % gel; Apply 2 g to affected area four (4) times daily. -     lidocaine (ZTLIDO) 1.8 % ptmd; 1 Patch by Apply Externally route two (2) times daily as needed for Pain.  -     lisinopril (PRINIVIL, ZESTRIL) 30 mg tablet; TAKE 1/2 TABLET DAILY    Patient with history of angina in the past currently today patient was evaluated and has no chest pain at this time    [facility] education and counseling provided:  Age appropriate evidence-based preventive care recommendations based on today's review and evaluation; including relevant cancer screening guidelines, and vaccination recommendations. An After Visit Summary was printed and given to the patient with information about these guidelines, and a personalized schedule for health maintenance items.  When appropriate and with patient agreement, orders noted below were placed to complete missing health maintenance items. Health Maintenance Due   Topic Date Due    Shingrix Vaccine Age 49> (1 of 2) 05/15/1994    MEDICARE YEARLY EXAM  06/05/2019    FOBT Q 1 YEAR AGE 50-75  06/11/2019       Discussed the patient's BMI with him. The BMI follow up plan is as follows:     dietary management education, guidance, and counseling  encourage exercise  monitor weight  prescribed dietary intake    An After Visit Summary was printed and given to the patient.

## 2019-06-04 NOTE — ACP (ADVANCE CARE PLANNING)
Advance Care Planning      Other Legally Authorized Decision Maker would be Ryan Wilson Spouse as SDM     For My patients who has currently great Decision Making Capacity:     Patient is on presence of family member wife stated that the pt wants to be not DNR at this time,  The pt likes to be a full code individual,  The patient states that there is a lot of will to live,  Pt was given the form,   will sign and bring us a copy on the later date.

## 2019-06-06 ENCOUNTER — TELEPHONE (OUTPATIENT)
Dept: FAMILY MEDICINE CLINIC | Age: 74
End: 2019-06-06

## 2019-06-06 NOTE — TELEPHONE ENCOUNTER
PA requested for Ztlido patch from [employer]. Contacted [facility] at [phone] for PA. PA in review and will contact us back with approval/denial.  Case ID [id number].

## 2019-06-07 LAB
ALBUMIN SERPL-MCNC: 4.5 G/DL (ref 3.5–4.8)
ALBUMIN/GLOB SERPL: 1.8 {RATIO} (ref 1.2–2.2)
ALP SERPL-CCNC: 84 IU/L (ref 39–117)
ALT SERPL-CCNC: 28 IU/L (ref 0–44)
AST SERPL-CCNC: 25 IU/L (ref 0–40)
BILIRUB SERPL-MCNC: 1 MG/DL (ref 0–1.2)
BUN SERPL-MCNC: 17 MG/DL (ref 8–27)
BUN/CREAT SERPL: 18 (ref 10–24)
CALCIUM SERPL-MCNC: 10 MG/DL (ref 8.6–10.2)
CHLORIDE SERPL-SCNC: 102 MMOL/L (ref 96–106)
CHOLEST SERPL-MCNC: 135 MG/DL (ref 100–199)
CO2 SERPL-SCNC: 24 MMOL/L (ref 20–29)
CREAT SERPL-MCNC: 0.96 MG/DL (ref 0.76–1.27)
ERYTHROCYTE [DISTWIDTH] IN BLOOD BY AUTOMATED COUNT: 13.5 % (ref 12.3–15.4)
FOLATE SERPL-MCNC: 15.6 NG/ML
GLOBULIN SER CALC-MCNC: 2.5 G/DL (ref 1.5–4.5)
GLUCOSE SERPL-MCNC: 90 MG/DL (ref 65–99)
HCT VFR BLD AUTO: 38.6 % (ref 37.5–51)
HDLC SERPL-MCNC: 42 MG/DL
HGB BLD-MCNC: 12.4 G/DL (ref 13–17.7)
LDLC SERPL CALC-MCNC: 72 MG/DL (ref 0–99)
MCH RBC QN AUTO: 30.8 PG (ref 26.6–33)
MCHC RBC AUTO-ENTMCNC: 32.1 G/DL (ref 31.5–35.7)
MCV RBC AUTO: 96 FL (ref 79–97)
PLATELET # BLD AUTO: 210 X10E3/UL (ref 150–450)
POTASSIUM SERPL-SCNC: 4.8 MMOL/L (ref 3.5–5.2)
PROT SERPL-MCNC: 7 G/DL (ref 6–8.5)
RBC # BLD AUTO: 4.03 X10E6/UL (ref 4.14–5.8)
SODIUM SERPL-SCNC: 140 MMOL/L (ref 134–144)
TRIGL SERPL-MCNC: 105 MG/DL (ref 0–149)
TSH SERPL DL<=0.005 MIU/L-ACNC: 1.61 UIU/ML (ref 0.45–4.5)
VIT B1 BLD-SCNC: 104.1 NMOL/L (ref 66.5–200)
VIT B12 SERPL-MCNC: 357 PG/ML (ref 232–1245)
VIT B6 SERPL-MCNC: 7.3 UG/L (ref 5.3–46.7)
VLDLC SERPL CALC-MCNC: 21 MG/DL (ref 5–40)
WBC # BLD AUTO: 5.3 X10E3/UL (ref 3.4–10.8)

## 2019-06-17 NOTE — TELEPHONE ENCOUNTER
PA for Ztlido patch was denied by [facility]. Need to change script. Will check with MD and send new script into pharmacy after contacting patient.

## 2019-06-27 RX ORDER — PANTOPRAZOLE SODIUM 40 MG/1
40 TABLET, DELAYED RELEASE ORAL AS NEEDED
Qty: 30 TAB | Refills: 6 | Status: SHIPPED | OUTPATIENT
Start: 2019-06-27 | End: 2020-04-08 | Stop reason: SDUPTHER

## 2019-07-03 RX ORDER — LIDOCAINE 50 MG/G
PATCH TOPICAL
Qty: 30 EACH | Refills: 2 | Status: SHIPPED | OUTPATIENT
Start: 2019-07-03 | End: 2019-12-04 | Stop reason: ALTCHOICE

## 2019-07-16 ENCOUNTER — OFFICE VISIT (OUTPATIENT)
Dept: FAMILY MEDICINE CLINIC | Age: 74
End: 2019-07-16

## 2019-07-16 VITALS
HEIGHT: 74 IN | BODY MASS INDEX: 28.95 KG/M2 | SYSTOLIC BLOOD PRESSURE: 114 MMHG | RESPIRATION RATE: 18 BRPM | WEIGHT: 225.6 LBS | DIASTOLIC BLOOD PRESSURE: 61 MMHG | OXYGEN SATURATION: 97 %

## 2019-07-16 DIAGNOSIS — T45.2X5A VITAMIN B6 INDUCED NEUROPATHY (HCC): ICD-10-CM

## 2019-07-16 DIAGNOSIS — I10 ESSENTIAL HYPERTENSION: Primary | ICD-10-CM

## 2019-07-16 DIAGNOSIS — G62.0 VITAMIN B6 INDUCED NEUROPATHY (HCC): ICD-10-CM

## 2019-07-16 DIAGNOSIS — G63 VITAMIN B12 DEFICIENCY NEUROPATHY (HCC): ICD-10-CM

## 2019-07-16 DIAGNOSIS — E53.8 B12 DEFICIENCY: ICD-10-CM

## 2019-07-16 DIAGNOSIS — E53.8 VITAMIN B12 DEFICIENCY NEUROPATHY (HCC): ICD-10-CM

## 2019-07-16 RX ORDER — CYANOCOBALAMIN 1000 UG/ML
1000 INJECTION, SOLUTION INTRAMUSCULAR; SUBCUTANEOUS ONCE
Qty: 1 ML | Refills: 0
Start: 2019-07-16 | End: 2019-07-16

## 2019-07-16 RX ORDER — PYRIDOXINE HCL (VITAMIN B6) 25 MG
25 TABLET ORAL DAILY
Qty: 30 TAB | Refills: 3 | Status: SHIPPED | OUTPATIENT
Start: 2019-07-16 | End: 2019-11-07 | Stop reason: SDUPTHER

## 2019-07-16 RX ORDER — LISINOPRIL 30 MG/1
TABLET ORAL
Qty: 90 TAB | Refills: 1
Start: 2019-07-16 | End: 2019-12-04 | Stop reason: SDUPTHER

## 2019-07-16 NOTE — PATIENT INSTRUCTIONS
DASH Diet: Care Instructions Your Care Instructions The DASH diet is an eating plan that can help lower your blood pressure. DASH stands for Dietary Approaches to Stop Hypertension. Hypertension is high blood pressure. The DASH diet focuses on eating foods that are high in calcium, potassium, and magnesium. These nutrients can lower blood pressure. The foods that are highest in these nutrients are fruits, vegetables, low-fat dairy products, nuts, seeds, and legumes. But taking calcium, potassium, and magnesium supplements instead of eating foods that are high in those nutrients does not have the same effect. The DASH diet also includes whole grains, fish, and poultry. The DASH diet is one of several lifestyle changes your doctor may recommend to lower your high blood pressure. Your doctor may also want you to decrease the amount of sodium in your diet. Lowering sodium while following the DASH diet can lower blood pressure even further than just the DASH diet alone. Follow-up care is a key part of your treatment and safety. Be sure to make and go to all appointments, and call your doctor if you are having problems. It's also a good idea to know your test results and keep a list of the medicines you take. How can you care for yourself at home? Following the DASH diet · Eat 4 to 5 servings of fruit each day. A serving is 1 medium-sized piece of fruit, ½ cup chopped or canned fruit, 1/4 cup dried fruit, or 4 ounces (½ cup) of fruit juice. Choose fruit more often than fruit juice. · Eat 4 to 5 servings of vegetables each day. A serving is 1 cup of lettuce or raw leafy vegetables, ½ cup of chopped or cooked vegetables, or 4 ounces (½ cup) of vegetable juice. Choose vegetables more often than vegetable juice. · Get 2 to 3 servings of low-fat and fat-free dairy each day. A serving is 8 ounces of milk, 1 cup of yogurt, or 1 ½ ounces of cheese. · Eat 6 to 8 servings of grains each day. A serving is 1 slice of bread, 1 ounce of dry cereal, or ½ cup of cooked rice, pasta, or cooked cereal. Try to choose whole-grain products as much as possible. · Limit lean meat, poultry, and fish to 2 servings each day. A serving is 3 ounces, about the size of a deck of cards. · Eat 4 to 5 servings of nuts, seeds, and legumes (cooked dried beans, lentils, and split peas) each week. A serving is 1/3 cup of nuts, 2 tablespoons of seeds, or ½ cup of cooked beans or peas. · Limit fats and oils to 2 to 3 servings each day. A serving is 1 teaspoon of vegetable oil or 2 tablespoons of salad dressing. · Limit sweets and added sugars to 5 servings or less a week. A serving is 1 tablespoon jelly or jam, ½ cup sorbet, or 1 cup of lemonade. · Eat less than 2,300 milligrams (mg) of sodium a day. If you limit your sodium to 1,500 mg a day, you can lower your blood pressure even more. Tips for success · Start small. Do not try to make dramatic changes to your diet all at once. You might feel that you are missing out on your favorite foods and then be more likely to not follow the plan. Make small changes, and stick with them. Once those changes become habit, add a few more changes. · Try some of the following: ? Make it a goal to eat a fruit or vegetable at every meal and at snacks. This will make it easy to get the recommended amount of fruits and vegetables each day. ? Try yogurt topped with fruit and nuts for a snack or healthy dessert. ? Add lettuce, tomato, cucumber, and onion to sandwiches. ? Combine a ready-made pizza crust with low-fat mozzarella cheese and lots of vegetable toppings. Try using tomatoes, squash, spinach, broccoli, carrots, cauliflower, and onions. ? Have a variety of cut-up vegetables with a low-fat dip as an appetizer instead of chips and dip. ? Sprinkle sunflower seeds or chopped almonds over salads.  Or try adding chopped walnuts or almonds to cooked vegetables. ? Try some vegetarian meals using beans and peas. Add garbanzo or kidney beans to salads. Make burritos and tacos with mashed mendez beans or black beans. Where can you learn more? Go to http://nico-millicent.info/. Enter E824 in the search box to learn more about \"DASH Diet: Care Instructions. \" Current as of: 2018 Content Version: 11.9 © 6935-7713 [employer]. Care instructions adapted under license by [employer] (which disclaims liability or warranty for this information). If you have questions about a medical condition or this instruction, always ask your healthcare professional. [address] any warranty or liability for your use of this information. Pyridoxine (Vitamin B-6) (By mouth) Pyridoxine ([id number]) Helps patients who do not have enough vitamin B-6 in the body. Brand Name(s): B-, CitraNatal B-Calm, PharmAssure Vitamin B-6, [facility] Vitamin B-6, Rodex, Chava-Bc, Vinacal B There may be other brand names for this medicine. When This Medicine Should Not Be Used: You should not use this medicine if you have ever had an allergic reaction to pyridoxine (vitamin B-6) How to Use This Medicine:  
Tablet, Lozenge, Capsule, Liquid · Your doctor will tell you how much to take and how often. · Swallow whole. Do not break, crush, or chew before swallowing. If a dose is missed: · Take the missed dose as soon as possible. · Skip the missed dose if it is almost time for your next dose. · You should not use two doses at the same time. · Missing one dose is generally not a cause for concern. How to Store and Dispose of This Medicine: · Store at room temperature in a closed container. Keep away from heat, moisture, and direct light. · Keep all medicine out of the reach of children. Drugs and Foods to Avoid: Ask your doctor or pharmacist before using any other medicine, including over-the-counter medicines, vitamins, and herbal products. · Make sure your doctor knows if you are taking levodopa. · Follow your doctor's orders if he or she has given you a special diet. Whole-grain breads and cereals, fish, vegetables, beans, and red meat are all good sources of vitamin B-6 in the diet. Warnings While Using This Medicine: · Make sure your doctor knows if you are pregnant (or become pregnant while taking this medicine) or are breastfeeding. Possible Side Effects While Using This Medicine:  
Call your doctor right away if you notice any of these side effects: · Clumsiness · Numbness of hands and feet · Upset stomach If you notice other side effects that you think are caused by this medicine, tell your doctor. Call your doctor for medical advice about side effects. You may report side effects to FDA at 3-577-FDA-0865 © 2017 [facility] Information is for End User's use only and may not be sold, redistributed or otherwise used for commercial purposes. The above information is an  only. It is not intended as medical advice for individual conditions or treatments. Talk to your doctor, nurse or pharmacist before following any medical regimen to see if it is safe and effective for you. Vitamin B12: About This Test 
What is it? This blood test measures the amount of vitamin B12 in your blood. Your body needs this B vitamin to make blood cells and to keep your nervous system healthy. Why is this test done? This test is used to: · Check for vitamin B12 deficiency anemia, especially in those who have had stomach or intestinal surgery or who have small intestine problems or a family history of this anemia. · Diagnose the cause of certain types of anemia.  
· Help find the cause of a decrease in mental abilities or other nervous system symptoms, such as tingling or numbness of the arms or legs. How can you prepare for the test? 
· In general, you don't need to prepare before having this test. Your doctor may give you some specific instructions. What happens during the test? 
· A health professional takes a sample of your blood. What else should you know about the test? 
· Your results will include an explanation of what a \"normal\" result is. This is called a \"reference range. \" It is just a guide. Your doctor will evaluate your results based on your health and other factors. This means that a value that falls outside the normal values listed may still be normal for you. · Vitamin B12 is usually measured at the same time as folic acid is tested, because a lack of either one can lead to a form of anemia called megaloblastic anemia. How long does the test take? · The test will take a few minutes. What happens after the test? 
· You will probably be able to go home right away. · You can go back to your usual activities right away. Follow-up care is a key part of your treatment and safety. Be sure to make and go to all appointments, and call your doctor if you are having problems. It's also a good idea to keep a list of the medicines you take. Ask your doctor when you can expect to have your test results. Where can you learn more? Go to http://nico-millicent.info/. Enter Q213 in the search box to learn more about \"Vitamin B12: About This Test.\" Current as of: May 6, 2018 Content Version: 11.9 © 0088-1224 "[employer]", [facility]. Care instructions adapted under license by [employer] (which disclaims liability or warranty for this information). If you have questions about a medical condition or this instruction, always ask your healthcare professional. [place] [address] any warranty or liability for your use of this information. Cyanocobalamin (Vitamin B-12) (By injection) Cyanocobalamin (geg-ri-ic-koe-BAL-a-min) Treats vitamin B-12 deficiency. Brand Name(s): B-12 Compliance Injection Kit, B-12 Kit, Physicians EZ Use B-12 Compliance, Vitamin Deficiency Injectable System - B12 There may be other brand names for this medicine. When This Medicine Should Not Be Used: This medicine is not right for everyone. Do not use it if you had an allergic reaction to vitamin B-12. How to Use This Medicine:  
Injectable · Your doctor will prescribe your exact dose and tell you how often it should be given. This medicine is given as a shot into one of your muscles. · Missed dose: Call your doctor or pharmacist for instructions. Drugs and Foods to Avoid: Ask your doctor or pharmacist before using any other medicine, including over-the-counter medicines, vitamins, and herbal products. · Do not use folic acid supplements unless your doctor says it is okay. Warnings While Using This Medicine: · Tell your doctor if you are pregnant or breastfeeding, or if you have kidney disease. · Keep all medicine out of the reach of children. Never share your medicine with anyone. Possible Side Effects While Using This Medicine:  
Call your doctor right away if you notice any of these side effects: · Allergic reaction: Itching or hives, swelling in your face or hands, swelling or tingling in your mouth or throat, chest tightness, trouble breathing If you notice other side effects that you think are caused by this medicine, tell your doctor. Call your doctor for medical advice about side effects. You may report side effects to FDA at 7-352-FDA-7544 © 2017 [facility] Information is for End User's use only and may not be sold, redistributed or otherwise used for commercial purposes. The above information is an  only. It is not intended as medical advice for individual conditions or treatments.  Talk to your doctor, nurse or pharmacist before following any medical regimen to see if it is safe and effective for you.

## 2019-07-16 NOTE — PROGRESS NOTES
Name and  verified      Chief Complaint   Patient presents with    Hypertension         Health Maintenance reviewed-discussed with patient. 1. Have you been to the ER, urgent care clinic since your last visit? Hospitalized since your last visit? no    2. Have you seen or consulted any other health care providers outside of the [address] [place] [address] since your last visit? Include any pap smears or colon screening.   no

## 2019-07-16 NOTE — PROGRESS NOTES
HISTORY OF PRESENT ILLNESS  Katt Alcocer is a 75 y.o. male. HPI   HTN  Today pt present for Bp check and++ Compliancy w/ the bp meds, patient medication dosages were changed from lisinopril 30 mg to 50 mg once daily secondary to tiredness and fatigue today patient states that he is feeling less fatigue and less tired since the change of medication dosages and he is happy with the progress so far having had the low salt diet ,  has been active, patien does not obtain the bp at home ,, today the pt denies Chest Pain, has no legs swelling no lightheadedness,  b12 def   Currently on oral tablet daily, feeling fatigued but active, no metformin no etoh intake, on no multivitamin daily      Current Outpatient Medications   Medication Sig Dispense Refill    pyridoxine, vitamin B6, (VITAMIN B-6) 25 mg tablet Take 1 Tab by mouth daily. 30 Tab 3    cyanocobalamin (VITAMIN B-12) 1,000 mcg/mL injection 1 mL by IntraMUSCular route once for 1 dose. 1 mL 0    lidocaine (LIDODERM) 5 % Apply patch to the affected area for 12 hours a day and remove for 12 hours a day. 30 Each 2    pantoprazole (PROTONIX) 40 mg tablet Take 1 Tab by mouth as needed for Nausea. 30 Tab 6    diclofenac (VOLTAREN) 1 % gel Apply 2 g to affected area four (4) times daily. 100 g 4    lidocaine (ZTLIDO) 1.8 % ptmd 1 Patch by Apply Externally route two (2) times daily as needed for Pain. 30 Patch 6    lisinopril (PRINIVIL, ZESTRIL) 30 mg tablet TAKE 1/2 TABLET DAILY 90 Tab 1    cetirizine (ZYRTEC) 10 mg tablet Take 10 mg by mouth daily as needed.  clopidogrel (PLAVIX) 75 mg tab Take 1 Tab by mouth daily. 90 Tab 3    aspirin delayed-release 81 mg tablet Take 1 Tab by mouth daily. 30 Tab 12    atorvastatin (LIPITOR) 40 mg tablet TAKE 1 TABLET NIGHTLY 90 Tab 3    calcium-cholecalciferol, D3, (CALTRATE 600+D) tablet Take 1 Tab by mouth two (2) times a day.  (Patient taking differently: Take 1 Tab by mouth daily.) 60 Tab 11    tamsulosin (FLOMAX) 0.4 mg capsule take 1 capsule by mouth once daily 30 Cap 2    oxymetazoline (VICKS SINEX 12-HOUR) 0.05 % nasal spray 1 [place] by Both Nostrils route two (2) times daily as needed for Congestion.  acetaminophen (TYLENOL) 500 mg tablet Take 1,000 mg by mouth every six (6) hours as needed for Pain.        No Known Allergies  Past Medical History:   Diagnosis Date    Abdominal wall hernia 11/16/2017    Anemia 9/10/2014    Back pain 4/1/2010    Blurry vision, bilateral 11/14/2017    CAD (coronary artery disease)     Calf pain 3/11/2015    Chronic kidney disease     cyst on kidney    Chronic left shoulder pain 10/24/2016    Constipation 6/11/2014    Coronary artery disease involving native coronary artery of native heart without angina pectoris 6/4/2019    Decreased vision 6/13/2011    Diastasis recti 11/16/2017    Diverticulitis 3/11/2015    Dizziness 11/14/2017    Enlarged LA (left atrium)     Fall at home 3/17/2016    GERD (gastroesophageal reflux disease)     Hemorrhoids 9/10/2014    Hip pain, chronic, right 10/3/2017    HTN (hypertension) 8/24/2010    Hx-TIA (transient ischemic attack)     Hypercholesterolemia 4/1/2010    Hypertension     Irregular heart beat 9/23/2015    Kidney disease     Mitral regurgitation     Need for varicella vaccine 9/10/2014    Orthostatic lightheadedness 1/16/2018    Other ill-defined conditions(799.89)     heart murmur    Patient is full code 6/4/2018    Rash 8/24/2010    Rib pain on right side 3/17/2016    Right calf pain 3/11/2015    Risk for falls 4/4/2014    S/P knee replacement 6/11/2014    Screening for depression 4/4/2014    Tinea versicolor 9/10/2014    Tinnitus of left ear 1/16/2018    Urinary frequency 8/24/2010     Past Surgical History:   Procedure Laterality Date    CARDIAC SURG PROCEDURE UNLIST      heart cath    HX HEENT      nasal septum repair    HX HEENT      HX HERNIA REPAIR      HX KNEE REPLACEMENT Left     HX ORTHOPAEDIC      left knee surgery    HX ORTHOPAEDIC      HX TONSILLECTOMY  age 5    HX UROLOGICAL      GA COLONOSCOPY FLX DX W/COLLJ SPEC WHEN PFRMD  4/18/2011          Family History   Problem Relation Age of Onset    COPD Mother     Heart Attack Mother     Hypertension Mother     Heart Disease Mother     COPD Father     Heart Attack Father     Hypertension Father     Heart Disease Father     Cancer Father     Cancer Maternal Grandmother      Social History     Tobacco Use    Smoking status: Never Smoker    Smokeless tobacco: Never Used   Substance Use Topics    Alcohol use: Yes     Alcohol/week: 0.0 oz     Comment: 12 beers, half a fifth a month      Lab Results   Component Value Date/Time    WBC 5.3 06/04/2019 09:26 AM    HGB 12.4 (L) 06/04/2019 09:26 AM    HCT 38.6 06/04/2019 09:26 AM    PLATELET 245 07/88/9272 09:26 AM    MCV 96 06/04/2019 09:26 AM     Lab Results   Component Value Date/Time    Hemoglobin A1c 5.6 11/15/2017 02:48 AM    Hemoglobin A1c 5.5 05/02/2017 03:56 AM    Hemoglobin A1c 5.9 05/13/2014 09:30 AM    Glucose 90 06/04/2019 09:26 AM    LDL, calculated 72 06/04/2019 09:26 AM    Creatinine (POC) 0.8 03/29/2011 09:23 AM    Creatinine 0.96 06/04/2019 09:26 AM      Lab Results   Component Value Date/Time    Cholesterol, total 135 06/04/2019 09:26 AM    HDL Cholesterol 42 06/04/2019 09:26 AM    LDL, calculated 72 06/04/2019 09:26 AM    Triglyceride 105 06/04/2019 09:26 AM    CHOL/HDL Ratio 3.0 11/15/2017 02:48 AM     Lab Results   Component Value Date/Time    TSH 1.610 06/04/2019 09:26 AM         Review of Systems   Constitutional: Negative for chills and fever. HENT: Negative for congestion and nosebleeds. Eyes: Negative for blurred vision and pain. Respiratory: Negative for cough, shortness of breath and wheezing. Cardiovascular: Negative for chest pain and leg swelling. Gastrointestinal: Negative for constipation, diarrhea, nausea and vomiting.    Genitourinary: Negative for dysuria and frequency. Musculoskeletal: Negative for joint pain and myalgias. Skin: Negative for itching and rash. Neurological: Negative for dizziness, loss of consciousness and headaches. Psychiatric/Behavioral: Negative for depression. The patient is not nervous/anxious and does not have insomnia. Physical Exam   Constitutional: He is oriented to person, place, and time. He appears well-developed and well-nourished. HENT:   Head: Normocephalic and atraumatic. Mouth/Throat: No oropharyngeal exudate. Eyes: Conjunctivae and EOM are normal.   Neck: Normal range of motion. Neck supple. Cardiovascular: Normal rate, regular rhythm and normal heart sounds. No murmur heard. Pulmonary/Chest: Effort normal and breath sounds normal. No respiratory distress. Abdominal: Soft. Bowel sounds are normal. He exhibits no distension. There is no rebound. Musculoskeletal: He exhibits no edema or tenderness. Neurological: He is alert and oriented to person, place, and time. Skin: Skin is warm. No erythema. Psychiatric: He has a normal mood and affect. His behavior is normal.   Nursing note and vitals reviewed. ASSESSMENT and PLAN  Diagnoses and all orders for this visit:    1. Essential hypertension  -     REFERRAL TO GASTROENTEROLOGY    2. B12 deficiency  -     VITAMIN B12 INJECTION  -     THER/PROPH/DIAG INJECTION, SUBCUT/IM  -     cyanocobalamin (VITAMIN B-12) 1,000 mcg/mL injection; 1 mL by IntraMUSCular route once for 1 dose.  -     REFERRAL TO GASTROENTEROLOGY    3. Vitamin B6 induced neuropathy (HCC)  -     pyridoxine, vitamin B6, (VITAMIN B-6) 25 mg tablet;  Take 1 Tab by mouth daily.  -     REFERRAL TO GASTROENTEROLOGY    4. Vitamin B12 deficiency neuropathy (HCC)    Other orders  -     lisinopril (PRINIVIL, ZESTRIL) 30 mg tablet; TAKE 1/2 TABLET DAILY

## 2019-07-17 PROBLEM — T45.2X5A VITAMIN B6 INDUCED NEUROPATHY (HCC): Status: ACTIVE | Noted: 2019-07-17

## 2019-07-17 PROBLEM — E53.8 VITAMIN B12 DEFICIENCY NEUROPATHY (HCC): Status: ACTIVE | Noted: 2019-07-17

## 2019-07-17 PROBLEM — G63 VITAMIN B12 DEFICIENCY NEUROPATHY (HCC): Status: ACTIVE | Noted: 2019-07-17

## 2019-07-17 PROBLEM — G62.0 VITAMIN B6 INDUCED NEUROPATHY (HCC): Status: ACTIVE | Noted: 2019-07-17

## 2019-08-06 RX ORDER — TAMSULOSIN HYDROCHLORIDE 0.4 MG/1
CAPSULE ORAL
Qty: 30 CAP | Refills: 2 | Status: SHIPPED | OUTPATIENT
Start: 2019-08-06 | End: 2019-10-14 | Stop reason: SDUPTHER

## 2019-08-13 ENCOUNTER — CLINICAL SUPPORT (OUTPATIENT)
Dept: FAMILY MEDICINE CLINIC | Age: 74
End: 2019-08-13

## 2019-08-13 DIAGNOSIS — T45.2X5A VITAMIN B6 INDUCED NEUROPATHY (HCC): Primary | ICD-10-CM

## 2019-08-13 DIAGNOSIS — E53.8 VITAMIN B12 DEFICIENCY NEUROPATHY (HCC): ICD-10-CM

## 2019-08-13 DIAGNOSIS — G63 VITAMIN B12 DEFICIENCY NEUROPATHY (HCC): ICD-10-CM

## 2019-08-13 DIAGNOSIS — G62.0 VITAMIN B6 INDUCED NEUROPATHY (HCC): Primary | ICD-10-CM

## 2019-08-13 RX ORDER — CYANOCOBALAMIN 1000 UG/ML
1000 INJECTION, SOLUTION INTRAMUSCULAR; SUBCUTANEOUS ONCE
Qty: 1 ML | Refills: 0
Start: 2019-08-13 | End: 2019-08-13

## 2019-08-13 NOTE — PROGRESS NOTES
Chief Complaint   Patient presents with    Immunization/Injection     After obtaining consent, and per orders of , b12 1000mcg/ml  given to  right deltoid IM  . Patient instructed to remain in clinic for 15 minutes afterwards, and to report any adverse reaction to me immediately.  Patient did not have any adverse reactions during this office visit

## 2019-08-13 NOTE — PATIENT INSTRUCTIONS
Cyanocobalamin (Vitamin B-12) (By injection)   Cyanocobalamin (hce-je-vs-koe-BAL-a-min)  Treats vitamin B-12 deficiency. Brand Name(s): B-12 Compliance Injection Kit, B-12 Kit, Physicians EZ Use B-12 Compliance, Vitamin Deficiency Injectable System - B12   There may be other brand names for this medicine. When This Medicine Should Not Be Used: This medicine is not right for everyone. Do not use it if you had an allergic reaction to vitamin B-12. How to Use This Medicine:   Injectable  · Your doctor will prescribe your exact dose and tell you how often it should be given. This medicine is given as a shot into one of your muscles. · Missed dose: Call your doctor or pharmacist for instructions. Drugs and Foods to Avoid:   Ask your doctor or pharmacist before using any other medicine, including over-the-counter medicines, vitamins, and herbal products. · Do not use folic acid supplements unless your doctor says it is okay. Warnings While Using This Medicine:   · Tell your doctor if you are pregnant or breastfeeding, or if you have kidney disease. · Keep all medicine out of the reach of children. Never share your medicine with anyone. Possible Side Effects While Using This Medicine:   Call your doctor right away if you notice any of these side effects:  · Allergic reaction: Itching or hives, swelling in your face or hands, swelling or tingling in your mouth or throat, chest tightness, trouble breathing  If you notice other side effects that you think are caused by this medicine, tell your doctor. Call your doctor for medical advice about side effects. You may report side effects to FDA at 4-039-FDA-4406  © 2017 [facility] Information is for End User's use only and may not be sold, redistributed or otherwise used for commercial purposes. The above information is an  only. It is not intended as medical advice for individual conditions or treatments.  Talk to your doctor, nurse or pharmacist before following any medical regimen to see if it is safe and effective for you.

## 2019-09-09 RX ORDER — ATORVASTATIN CALCIUM 40 MG/1
TABLET, FILM COATED ORAL
Qty: 90 TAB | Refills: 4 | Status: SHIPPED | OUTPATIENT
Start: 2019-09-09 | End: 2020-12-04

## 2019-09-11 ENCOUNTER — CLINICAL SUPPORT (OUTPATIENT)
Dept: FAMILY MEDICINE CLINIC | Age: 74
End: 2019-09-11

## 2019-09-11 VITALS
TEMPERATURE: 96 F | SYSTOLIC BLOOD PRESSURE: 121 MMHG | RESPIRATION RATE: 20 BRPM | OXYGEN SATURATION: 96 % | DIASTOLIC BLOOD PRESSURE: 75 MMHG | HEART RATE: 63 BPM

## 2019-09-11 DIAGNOSIS — E53.8 VITAMIN B 12 DEFICIENCY: Primary | ICD-10-CM

## 2019-09-11 DIAGNOSIS — Z23 ENCOUNTER FOR IMMUNIZATION: ICD-10-CM

## 2019-09-11 RX ORDER — CYANOCOBALAMIN 1000 UG/ML
1000 INJECTION, SOLUTION INTRAMUSCULAR; SUBCUTANEOUS ONCE
Qty: 1 ML | Refills: 0
Start: 2019-09-11 | End: 2019-09-11

## 2019-09-11 NOTE — PATIENT INSTRUCTIONS
Cyanocobalamin (Vitamin B-12) (By injection)   Cyanocobalamin (xwp-vh-jp-koe-BAL-a-min)  Treats vitamin B-12 deficiency. Brand Name(s): B-12 Compliance Injection Kit, B-12 Kit, Physicians EZ Use B-12 Compliance, Vitamin Deficiency Injectable System - B12   There may be other brand names for this medicine. When This Medicine Should Not Be Used: This medicine is not right for everyone. Do not use it if you had an allergic reaction to vitamin B-12. How to Use This Medicine:   Injectable  · Your doctor will prescribe your exact dose and tell you how often it should be given. This medicine is given as a shot into one of your muscles. · Missed dose: Call your doctor or pharmacist for instructions. Drugs and Foods to Avoid:   Ask your doctor or pharmacist before using any other medicine, including over-the-counter medicines, vitamins, and herbal products. · Do not use folic acid supplements unless your doctor says it is okay. Warnings While Using This Medicine:   · Tell your doctor if you are pregnant or breastfeeding, or if you have kidney disease. · Keep all medicine out of the reach of children. Never share your medicine with anyone. Possible Side Effects While Using This Medicine:   Call your doctor right away if you notice any of these side effects:  · Allergic reaction: Itching or hives, swelling in your face or hands, swelling or tingling in your mouth or throat, chest tightness, trouble breathing  If you notice other side effects that you think are caused by this medicine, tell your doctor. Call your doctor for medical advice about side effects. You may report side effects to FDA at 3-367-FDA-9829  © 2017 [facility] Information is for End User's use only and may not be sold, redistributed or otherwise used for commercial purposes. The above information is an  only. It is not intended as medical advice for individual conditions or treatments.  Talk to your doctor, nurse or pharmacist before following any medical regimen to see if it is safe and effective for you. Vaccine Information Statement    Influenza (Flu) Vaccine (Inactivated or Recombinant): What You Need to Know    Many Vaccine Information Statements are available in [place] and other languages. See www.immunize.org/vis  Hojas de información sobre vacunas están disponibles en español y en muchos otros idiomas. Visite www.immunize.org/vis    1. Why get vaccinated? Influenza vaccine can prevent influenza (flu). Flu is a contagious disease that spreads around the [place] every year, usually between October and May. Anyone can get the flu, but it is more dangerous for some people. Infants and young children, people 72years of age and older, pregnant women, and people with certain health conditions or a weakened immune system are at greatest risk of flu complications. Pneumonia, bronchitis, sinus infections and ear infections are examples of flu-related complications. If you have a medical condition, such as heart disease, cancer or diabetes, flu can make it worse. Flu can cause fever and chills, sore throat, muscle aches, fatigue, cough, headache, and runny or stuffy nose. Some people may have vomiting and diarrhea, though this is more common in children than adults. Each year thousands of people in the [facility] die from flu, and many more are hospitalized. Flu vaccine prevents millions of illnesses and flu-related visits to the doctor each year. 2. Influenza vaccines     CDC recommends everyone 10months of age and older get vaccinated every flu season. Children 6 months through 6years of age may need 2 doses during a single flu season. Everyone else needs only 1 dose each flu season. It takes about 2 weeks for protection to develop after vaccination. There are many flu viruses, and they are always changing.  Each year a new flu vaccine is made to protect against three or four viruses that are likely to cause disease in the upcoming flu season. Even when the vaccine doesnt exactly match these viruses, it may still provide some protection. Influenza vaccine does not cause flu. Influenza vaccine may be given at the same time as other vaccines. 3. Talk with your health care provider    Tell your vaccine provider if the person getting the vaccine:   Has had an allergic reaction after a previous dose of influenza vaccine, or has any severe, life-threatening allergies.  Has ever had Guillain-Barré Syndrome (also called GBS). In some cases, your health care provider may decide to postpone influenza vaccination to a future visit. People with minor illnesses, such as a cold, may be vaccinated. People who are moderately or severely ill should usually wait until they recover before getting influenza vaccine. Your health care provider can give you more information. 4. Risks of a reaction     Soreness, redness, and swelling where shot is given, fever, muscle aches, and headache can happen after influenza vaccine.  There may be a very small increased risk of Guillain-Barré Syndrome (GBS) after inactivated influenza vaccine (the flu shot). Marcelina Lopez children who get the flu shot along with pneumococcal vaccine (PCV13), and/or DTaP vaccine at the same time might be slightly more likely to have a seizure caused by fever. Tell your health care provider if a child who is getting flu vaccine has ever had a seizure. People sometimes faint after medical procedures, including vaccination. Tell your provider if you feel dizzy or have vision changes or ringing in the ears. As with any medicine, there is a very remote chance of a vaccine causing a severe allergic reaction, other serious injury, or death. 5. What if there is a serious problem? An allergic reaction could occur after the vaccinated person leaves the clinic.  If you see signs of a severe allergic reaction (hives, swelling of the face and throat, difficulty breathing, a fast heartbeat, dizziness, or weakness), call 9-1-1 and get the person to the nearest hospital.    For other signs that concern you, call your health care provider. Adverse reactions should be reported to the Vaccine Adverse Event Reporting System (VAERS). Your health care provider will usually file this report, or you can do it yourself. Visit the VAERS website at www.vaers. [facility].gov or call 6-493.298.7834. VAERS is only for reporting reactions, and VAERS staff do not give medical advice. 6. The National Vaccine Injury Compensation Program    The [facility] Vaccine Injury Compensation Program (VICP) is a federal program that was created to compensate people who may have been injured by certain vaccines. Visit the VICP website at www.[facility].gov/vaccinecompensation or call 6-493.814.1958 to learn about the program and about filing a claim. There is a time limit to file a claim for compensation. 7. How can I learn more?  Ask your health care provider.  Call your local or state health department.  Contact the Centers for Disease Control and Prevention (CDC):  - Call 8-580.541.9424 (3-590-MVA-INFO) or  - Visit CDCs influenza website at www.cdc.gov/flu    Vaccine Information Statement (Interim)  Inactivated Influenza Vaccine   8/15/2019  42 GINNA Hogan [id number]   Department of Health and Human Services  Centers for Disease Control and Prevention    Office Use Only

## 2019-09-11 NOTE — PROGRESS NOTES
After obtaining consent, and per orders of , flu vaccine and b12 injection given to  left deltoid IM  . Patient instructed to remain in clinic for 15 minutes afterwards, and to report any adverse reaction to me immediately.  Patient did not have any adverse reactions during this office visit

## 2019-10-09 ENCOUNTER — CLINICAL SUPPORT (OUTPATIENT)
Dept: FAMILY MEDICINE CLINIC | Age: 74
End: 2019-10-09

## 2019-10-09 VITALS
OXYGEN SATURATION: 99 % | HEIGHT: 74 IN | RESPIRATION RATE: 16 BRPM | BODY MASS INDEX: 28.8 KG/M2 | WEIGHT: 224.4 LBS | SYSTOLIC BLOOD PRESSURE: 124 MMHG | DIASTOLIC BLOOD PRESSURE: 74 MMHG | TEMPERATURE: 97 F | HEART RATE: 76 BPM

## 2019-10-09 DIAGNOSIS — E53.8 VITAMIN B 12 DEFICIENCY: Primary | ICD-10-CM

## 2019-10-09 RX ORDER — CYANOCOBALAMIN 1000 UG/ML
1000 INJECTION, SOLUTION INTRAMUSCULAR; SUBCUTANEOUS ONCE
Qty: 1 ML | Refills: 0
Start: 2019-10-09 | End: 2019-10-09

## 2019-10-09 NOTE — PROGRESS NOTES
After obtaining consent, and per orders of , b12 1000mcg/ml given to  Right deltoid IM  . Patient instructed to remain in clinic for 15 minutes afterwards, and to report any adverse reaction to me immediately.  Patient did not have any adverse reactions during this office visit

## 2019-10-09 NOTE — PATIENT INSTRUCTIONS
Cyanocobalamin (Vitamin B-12) (By injection)   Cyanocobalamin (kqv-yh-ag-koe-BAL-a-min)  Treats vitamin B-12 deficiency. Brand Name(s): B-12 Compliance Injection Kit, B-12 Kit, Physicians EZ Use B-12 Compliance, Vitamin Deficiency Injectable System - B12   There may be other brand names for this medicine. When This Medicine Should Not Be Used: This medicine is not right for everyone. Do not use it if you had an allergic reaction to vitamin B-12. How to Use This Medicine:   Injectable  · Your doctor will prescribe your exact dose and tell you how often it should be given. This medicine is given as a shot into one of your muscles. · Missed dose: Call your doctor or pharmacist for instructions. Drugs and Foods to Avoid:   Ask your doctor or pharmacist before using any other medicine, including over-the-counter medicines, vitamins, and herbal products. · Do not use folic acid supplements unless your doctor says it is okay. Warnings While Using This Medicine:   · Tell your doctor if you are pregnant or breastfeeding, or if you have kidney disease. · Keep all medicine out of the reach of children. Never share your medicine with anyone. Possible Side Effects While Using This Medicine:   Call your doctor right away if you notice any of these side effects:  · Allergic reaction: Itching or hives, swelling in your face or hands, swelling or tingling in your mouth or throat, chest tightness, trouble breathing  If you notice other side effects that you think are caused by this medicine, tell your doctor. Call your doctor for medical advice about side effects. You may report side effects to FDA at 0-408-FDA-7573  © 2017 [facility] Information is for End User's use only and may not be sold, redistributed or otherwise used for commercial purposes. The above information is an  only. It is not intended as medical advice for individual conditions or treatments.  Talk to your doctor, nurse or pharmacist before following any medical regimen to see if it is safe and effective for you.

## 2019-10-14 RX ORDER — TAMSULOSIN HYDROCHLORIDE 0.4 MG/1
CAPSULE ORAL
Qty: 30 CAP | Refills: 12 | Status: SHIPPED | OUTPATIENT
Start: 2019-10-14 | End: 2019-12-04 | Stop reason: SDUPTHER

## 2019-11-04 RX ORDER — LISINOPRIL 30 MG/1
TABLET ORAL
Qty: 90 TAB | Refills: 4 | Status: SHIPPED | OUTPATIENT
Start: 2019-11-04 | End: 2021-01-27

## 2019-11-06 ENCOUNTER — CLINICAL SUPPORT (OUTPATIENT)
Dept: FAMILY MEDICINE CLINIC | Age: 74
End: 2019-11-06

## 2019-11-06 VITALS
DIASTOLIC BLOOD PRESSURE: 63 MMHG | SYSTOLIC BLOOD PRESSURE: 123 MMHG | HEART RATE: 71 BPM | OXYGEN SATURATION: 99 % | RESPIRATION RATE: 16 BRPM | TEMPERATURE: 97.6 F

## 2019-11-06 DIAGNOSIS — E53.8 VITAMIN B 12 DEFICIENCY: Primary | ICD-10-CM

## 2019-11-06 DIAGNOSIS — R79.89 LOW TESTOSTERONE: ICD-10-CM

## 2019-11-06 RX ORDER — TESTOSTERONE CYPIONATE 200 MG/ML
200 INJECTION INTRAMUSCULAR ONCE
Qty: 1 ML | Refills: 0 | Status: CANCELLED
Start: 2019-11-06 | End: 2019-11-06

## 2019-11-06 NOTE — PATIENT INSTRUCTIONS
Cyanocobalamin (Vitamin B-12) (By injection)   Cyanocobalamin (rej-zc-kv-koe-BAL-a-min)  Treats vitamin B-12 deficiency. Brand Name(s): B-12 Compliance Injection Kit, B-12 Kit, Physicians EZ Use B-12 Compliance, Vitamin Deficiency Injectable System - B12   There may be other brand names for this medicine. When This Medicine Should Not Be Used: This medicine is not right for everyone. Do not use it if you had an allergic reaction to vitamin B-12. How to Use This Medicine:   Injectable  · Your doctor will prescribe your exact dose and tell you how often it should be given. This medicine is given as a shot into one of your muscles. · Missed dose: Call your doctor or pharmacist for instructions. Drugs and Foods to Avoid:   Ask your doctor or pharmacist before using any other medicine, including over-the-counter medicines, vitamins, and herbal products. · Do not use folic acid supplements unless your doctor says it is okay. Warnings While Using This Medicine:   · Tell your doctor if you are pregnant or breastfeeding, or if you have kidney disease. · Keep all medicine out of the reach of children. Never share your medicine with anyone. Possible Side Effects While Using This Medicine:   Call your doctor right away if you notice any of these side effects:  · Allergic reaction: Itching or hives, swelling in your face or hands, swelling or tingling in your mouth or throat, chest tightness, trouble breathing  If you notice other side effects that you think are caused by this medicine, tell your doctor. Call your doctor for medical advice about side effects. You may report side effects to FDA at 6-154-FDA-6064  © 2017 [facility] Information is for End User's use only and may not be sold, redistributed or otherwise used for commercial purposes. The above information is an  only. It is not intended as medical advice for individual conditions or treatments.  Talk to your doctor, nurse or pharmacist before following any medical regimen to see if it is safe and effective for you.

## 2019-11-06 NOTE — PROGRESS NOTES
Chief Complaint   Patient presents with    Immunization/Injection     b12     After obtaining consent, and per orders of , b12 1000 mcg/ml  given to  Left deltoid IM  . Patient instructed to remain in clinic for 15 minutes afterwards, and to report any adverse reaction to me immediately.  Patient did not have any adverse reactions during this office visit

## 2019-11-07 DIAGNOSIS — G62.0 VITAMIN B6 INDUCED NEUROPATHY (HCC): ICD-10-CM

## 2019-11-07 DIAGNOSIS — T45.2X5A VITAMIN B6 INDUCED NEUROPATHY (HCC): ICD-10-CM

## 2019-11-08 RX ORDER — PYRIDOXINE HCL (VITAMIN B6) 25 MG
TABLET ORAL
Qty: 120 TAB | Refills: 0 | Status: SHIPPED | OUTPATIENT
Start: 2019-11-08 | End: 2020-02-26

## 2019-11-17 RX ORDER — CLOPIDOGREL BISULFATE 75 MG/1
TABLET ORAL
Qty: 90 TAB | Refills: 4 | Status: SHIPPED | OUTPATIENT
Start: 2019-11-17 | End: 2021-03-01 | Stop reason: SDUPTHER

## 2019-11-20 RX ORDER — CYANOCOBALAMIN 1000 UG/ML
1000 INJECTION, SOLUTION INTRAMUSCULAR; SUBCUTANEOUS ONCE
Qty: 1 ML | Refills: 0
Start: 2019-11-20 | End: 2019-11-20

## 2019-12-04 ENCOUNTER — OFFICE VISIT (OUTPATIENT)
Dept: FAMILY MEDICINE CLINIC | Age: 74
End: 2019-12-04

## 2019-12-04 VITALS
RESPIRATION RATE: 18 BRPM | HEIGHT: 74 IN | OXYGEN SATURATION: 97 % | DIASTOLIC BLOOD PRESSURE: 76 MMHG | SYSTOLIC BLOOD PRESSURE: 111 MMHG | HEART RATE: 60 BPM | BODY MASS INDEX: 28.95 KG/M2 | TEMPERATURE: 96 F | WEIGHT: 225.6 LBS

## 2019-12-04 DIAGNOSIS — E53.8 VITAMIN B12 DEFICIENCY NEUROPATHY (HCC): ICD-10-CM

## 2019-12-04 DIAGNOSIS — G62.0 VITAMIN B6 INDUCED NEUROPATHY (HCC): ICD-10-CM

## 2019-12-04 DIAGNOSIS — G63 VITAMIN B12 DEFICIENCY NEUROPATHY (HCC): ICD-10-CM

## 2019-12-04 DIAGNOSIS — Z23 ENCOUNTER FOR IMMUNIZATION: ICD-10-CM

## 2019-12-04 DIAGNOSIS — T45.2X5A VITAMIN B6 INDUCED NEUROPATHY (HCC): ICD-10-CM

## 2019-12-04 DIAGNOSIS — R35.0 URINARY FREQUENCY: Primary | ICD-10-CM

## 2019-12-04 RX ORDER — CYANOCOBALAMIN 1000 UG/ML
1000 INJECTION, SOLUTION INTRAMUSCULAR; SUBCUTANEOUS ONCE
Qty: 1 ML | Refills: 0
Start: 2019-12-04 | End: 2019-12-04

## 2019-12-04 RX ORDER — TAMSULOSIN HYDROCHLORIDE 0.4 MG/1
0.8 CAPSULE ORAL DAILY
Qty: 180 CAP | Refills: 3 | Status: SHIPPED | OUTPATIENT
Start: 2019-12-04 | End: 2021-02-24 | Stop reason: SDUPTHER

## 2019-12-04 NOTE — PROGRESS NOTES
After obtaining consent, and per orders of , b12 1000mcg/ml given to  Left deltoid IM  . Patient instructed to remain in clinic for 15 minutes afterwards, and to report any adverse reaction to me immediately.  Patient did not have any adverse reactions during this office visit

## 2019-12-04 NOTE — PATIENT INSTRUCTIONS
Cyanocobalamin (Vitamin B-12) (By injection) Cyanocobalamin (jzy-ff-dz-koe-BAL-a-min) Treats vitamin B-12 deficiency. Brand Name(s): B-12 Compliance Injection Kit, B-12 Kit, Physicians EZ Use B-12 Compliance, Vitamin Deficiency Injectable System - B12 There may be other brand names for this medicine. When This Medicine Should Not Be Used: This medicine is not right for everyone. Do not use it if you had an allergic reaction to vitamin B-12. How to Use This Medicine:  
Injectable · Your doctor will prescribe your exact dose and tell you how often it should be given. This medicine is given as a shot into one of your muscles. · Missed dose: Call your doctor or pharmacist for instructions. Drugs and Foods to Avoid: Ask your doctor or pharmacist before using any other medicine, including over-the-counter medicines, vitamins, and herbal products. · Do not use folic acid supplements unless your doctor says it is okay. Warnings While Using This Medicine: · Tell your doctor if you are pregnant or breastfeeding, or if you have kidney disease. · Keep all medicine out of the reach of children. Never share your medicine with anyone. Possible Side Effects While Using This Medicine:  
Call your doctor right away if you notice any of these side effects: · Allergic reaction: Itching or hives, swelling in your face or hands, swelling or tingling in your mouth or throat, chest tightness, trouble breathing If you notice other side effects that you think are caused by this medicine, tell your doctor. Call your doctor for medical advice about side effects. You may report side effects to FDA at 3-544-FDA-9614 © 2017 [facility] Information is for End User's use only and may not be sold, redistributed or otherwise used for commercial purposes. The above information is an  only. It is not intended as medical advice for individual conditions or treatments.  Talk to your doctor, nurse or pharmacist before following any medical regimen to see if it is safe and effective for you.

## 2019-12-04 NOTE — PROGRESS NOTES
Name and  verified        Chief Complaint   Patient presents with    Hypertension     Follow Up         Health Maintenance reviewed-discussed with patient. 1. Have you been to the ER, urgent care clinic since your last visit? Hospitalized since your last visit? no    2. Have you seen or consulted any other health care providers outside of the [address] [place] [address] since your last visit? Include any pap smears or colon screening.   no

## 2019-12-08 LAB — VIT B6 SERPL-MCNC: 72.9 UG/L (ref 5.3–46.7)

## 2019-12-17 ENCOUNTER — OFFICE VISIT (OUTPATIENT)
Dept: CARDIOLOGY CLINIC | Age: 74
End: 2019-12-17

## 2019-12-17 VITALS
DIASTOLIC BLOOD PRESSURE: 70 MMHG | SYSTOLIC BLOOD PRESSURE: 110 MMHG | HEART RATE: 74 BPM | OXYGEN SATURATION: 96 % | RESPIRATION RATE: 16 BRPM | HEIGHT: 74 IN | BODY MASS INDEX: 28.77 KG/M2 | WEIGHT: 224.2 LBS

## 2019-12-17 DIAGNOSIS — I10 ESSENTIAL HYPERTENSION: ICD-10-CM

## 2019-12-17 DIAGNOSIS — I34.0 NONRHEUMATIC MITRAL VALVE REGURGITATION: Primary | ICD-10-CM

## 2019-12-17 DIAGNOSIS — E78.00 HYPERCHOLESTEROLEMIA: ICD-10-CM

## 2019-12-17 RX ORDER — CYANOCOBALAMIN 1000 UG/ML
1000 INJECTION, SOLUTION INTRAMUSCULAR; SUBCUTANEOUS
COMMUNITY
End: 2020-12-14 | Stop reason: ALTCHOICE

## 2019-12-17 NOTE — PROGRESS NOTES
1. Have you been to the ER, urgent care clinic since your last visit? Hospitalized since your last visit? Yes 10/2019 Left elbow discomfort    2. Have you seen or consulted any other health care providers outside of the [address] [place] [address] since your last visit? Include any pap smears or colon screening. Yes Dr Jannie mar due to left elbow.     Chief Complaint   Patient presents with    Follow-up    Hypertension

## 2019-12-17 NOTE — PROGRESS NOTES
Star Awad, ULYSSES-BC    Subjective/HPI:     Mr. Alok Castillo is a 75 y.o. male is here for routine f/u. He has a PMHx of mitral regurgitation, HTN and HLD. He is doing well. He denies complaints of chest pains, dizziness, orthopnea, PND or edema. He denies palpitation symptoms. He reports shortness of breath which is at baseline. He denies worsening symptoms in the past year. He gets out of breath when carrying rucksack when he goes hunting, but has noted he has been able to walk farther through the woods than he did last year.          PCP Provider  James Kerns MD  Past Medical History:   Diagnosis Date    Abdominal wall hernia 11/16/2017    Anemia 9/10/2014    Back pain 4/1/2010    Blurry vision, bilateral 11/14/2017    CAD (coronary artery disease)     Calf pain 3/11/2015    Chronic kidney disease     cyst on kidney    Chronic left shoulder pain 10/24/2016    Constipation 6/11/2014    Coronary artery disease involving native coronary artery of native heart without angina pectoris 6/4/2019    Decreased vision 6/13/2011    Diastasis recti 11/16/2017    Diverticulitis 3/11/2015    Dizziness 11/14/2017    Enlarged LA (left atrium)     Fall at home 3/17/2016    GERD (gastroesophageal reflux disease)     Hemorrhoids 9/10/2014    Hip pain, chronic, right 10/3/2017    HTN (hypertension) 8/24/2010    Hx-TIA (transient ischemic attack)     Hypercholesterolemia 4/1/2010    Hypertension     Irregular heart beat 9/23/2015    Kidney disease     Mitral regurgitation     Need for varicella vaccine 9/10/2014    Orthostatic lightheadedness 1/16/2018    Other ill-defined conditions(799.89)     heart murmur    Patient is full code 6/4/2018    Rash 8/24/2010    Rib pain on right side 3/17/2016    Right calf pain 3/11/2015    Risk for falls 4/4/2014    S/P knee replacement 6/11/2014    Screening for depression 4/4/2014    Tinea versicolor 9/10/2014    Tinnitus of left ear 1/16/2018    Urinary frequency 8/24/2010    Vitamin B12 deficiency neuropathy ([facility] [address] 7/17/2019      Past Surgical History:   Procedure Laterality Date    CARDIAC SURG PROCEDURE UNLIST      heart cath    HX HEENT      nasal septum repair    HX HEENT      HX HERNIA REPAIR      HX KNEE REPLACEMENT Left     HX ORTHOPAEDIC      left knee surgery    HX ORTHOPAEDIC      HX TONSILLECTOMY  age 5   [de-identified] UROLOGICAL      NC COLONOSCOPY FLX DX W/COLLJ Donna 1979 PFRMD  4/18/2011          Family History   Problem Relation Age of Onset    COPD Mother     Heart Attack Mother     Hypertension Mother     Heart Disease Mother     COPD Father     Heart Attack Father     Hypertension Father     Heart Disease Father     Cancer Father     Cancer Maternal Grandmother      Social History     Socioeconomic History    Marital status:      Spouse name: Not on file    Number of children: Not on file    Years of education: Not on file    Highest education level: Not on file   Occupational History    Not on file   Social Needs    Financial resource strain: Not on file    Food insecurity:     Worry: Not on file     Inability: Not on file    Transportation needs:     Medical: Not on file     Non-medical: Not on file   Tobacco Use    Smoking status: Never Smoker    Smokeless tobacco: Never Used   Substance and Sexual Activity    Alcohol use:  Yes     Alcohol/week: 0.0 standard drinks     Comment: 12 beers, half a fifth a month    Drug use: No    Sexual activity: Not Currently   Lifestyle    Physical activity:     Days per week: Not on file     Minutes per session: Not on file    Stress: Not on file   Relationships    Social connections:     Talks on phone: Not on file     Gets together: Not on file     Attends [employer] service: Not on file     Active member of club or organization: Not on file     Attends meetings of clubs or organizations: Not on file     Relationship status: Not on file   Norma Intimate partner violence:     Fear of current or ex partner: Not on file     Emotionally abused: Not on file     Physically abused: Not on file     Forced sexual activity: Not on file   Other Topics Concern    Not on file   Social History Narrative    ** Merged History Encounter **            No Known Allergies     Current Outpatient Medications   Medication Sig    cyanocobalamin (VITAMIN B-12) 1,000 mcg/mL injection 1,000 mcg by IntraMUSCular route every thirty (30) days.  tamsulosin (FLOMAX) 0.4 mg capsule Take 2 Caps by mouth daily. TAKE 1 CAPSULE DAILY    clopidogrel (PLAVIX) 75 mg tab TAKE 1 TABLET DAILY    VITAMIN B-6 25 mg tablet take 1 tablet by mouth once daily    lisinopril (PRINIVIL, ZESTRIL) 30 mg tablet TAKE 1 TABLET DAILY (Patient taking differently: 15 mg.)    atorvastatin (LIPITOR) 40 mg tablet TAKE 1 TABLET NIGHTLY    pantoprazole (PROTONIX) 40 mg tablet Take 1 Tab by mouth as needed for Nausea.  diclofenac (VOLTAREN) 1 % gel Apply 2 g to affected area four (4) times daily. (Patient taking differently: Apply 2 g to affected area as needed.)    cetirizine (ZYRTEC) 10 mg tablet Take 10 mg by mouth daily as needed.  aspirin delayed-release 81 mg tablet Take 1 Tab by mouth daily.  calcium-cholecalciferol, D3, (CALTRATE 600+D) tablet Take 1 Tab by mouth two (2) times a day. (Patient taking differently: Take 1 Tab by mouth daily.)    oxymetazoline (VICKS SINEX 12-HOUR) 0.05 % nasal spray 1 [place] by Both Nostrils route two (2) times daily as needed for Congestion.  acetaminophen (TYLENOL) 500 mg tablet Take 1,000 mg by mouth every six (6) hours as needed for Pain. No current facility-administered medications for this visit. I have reviewed the problem list, allergy list, medical history, family, social history and medications. Review of Symptoms:    Review of Systems   Constitutional: Negative for chills, fever and weight loss. HENT: Negative for nosebleeds. Eyes: Negative for blurred vision and double vision. Respiratory: Negative for cough, shortness of breath and wheezing. Cardiovascular: Negative for chest pain, palpitations, orthopnea, leg swelling and PND. Gastrointestinal: Negative for abdominal pain, blood in stool, diarrhea, nausea and vomiting. Musculoskeletal: Negative for joint pain. Skin: Negative for rash. Neurological: Negative for dizziness, tingling and loss of consciousness. Endo/Heme/Allergies: Does not bruise/bleed easily. Physical Exam:      General: Well developed, in no acute distress, cooperative and alert  HEENT: No carotid bruits, no JVD, trach is midline. Neck Supple, PEERL, EOM intact. Heart:  reg rate and rhythm; normal S1/S2; no murmurs, gallops or rubs. Respiratory: Clear bilaterally x 4, no wheezing or rales  Abdomen:   Soft, non-tender, no distention, no masses. + BS. Extremities:  Normal cap refill, no cyanosis, atraumatic. No edema. Neuro: A&Ox3, speech clear, gait stable. Skin: Skin color is normal. No rashes or lesions.  Non diaphoretic  Vascular: 2+ pulses symmetric in all extremities    Vitals:    12/17/19 0847 12/17/19 0858   BP: 120/70 110/70   Pulse: 74    Resp: 16    SpO2: 96%    Weight: 224 lb 3.2 oz (101.7 kg)    Height: 6' 2\" (1.88 m)        Cardiographics    ECG: sinus rhythm  Results for orders placed or performed during the hospital encounter of 11/14/17   EKG, 12 LEAD, INITIAL   Result Value Ref Range    Ventricular Rate 66 BPM    Atrial Rate 66 BPM    P-R Interval 198 ms    QRS Duration 94 ms    Q-T Interval 398 ms    QTC Calculation (Bezet) 417 ms    Calculated P Axis 36 degrees    Calculated R Axis 23 degrees    Calculated T Axis 24 degrees    Diagnosis       Normal sinus rhythm      Confirmed by Rosendo Hernandez ([id number]) on 11/15/2017 4:27:41 PM         Cardiology Labs:  Lab Results   Component Value Date/Time    Cholesterol, total 135 06/04/2019 09:26 AM    HDL Cholesterol 42 06/04/2019 09:26 AM    LDL, calculated 72 06/04/2019 09:26 AM    Triglyceride 105 06/04/2019 09:26 AM    CHOL/HDL Ratio 3.0 11/15/2017 02:48 AM       Lab Results   Component Value Date/Time    Sodium 140 06/04/2019 09:26 AM    Potassium 4.8 06/04/2019 09:26 AM    Chloride 102 06/04/2019 09:26 AM    CO2 24 06/04/2019 09:26 AM    Anion gap 9 11/15/2017 02:48 AM    Glucose 90 06/04/2019 09:26 AM    BUN 17 06/04/2019 09:26 AM    Creatinine 0.96 06/04/2019 09:26 AM    BUN/Creatinine ratio 18 06/04/2019 09:26 AM    GFR est AA 89 06/04/2019 09:26 AM    GFR est non-AA 77 06/04/2019 09:26 AM    Calcium 10.0 06/04/2019 09:26 AM    Bilirubin, total 1.0 06/04/2019 09:26 AM    AST (SGOT) 25 06/04/2019 09:26 AM    Alk. phosphatase 84 06/04/2019 09:26 AM    Protein, total 7.0 06/04/2019 09:26 AM    Albumin 4.5 06/04/2019 09:26 AM    Globulin 3.6 11/14/2017 03:18 PM    A-G Ratio 1.8 06/04/2019 09:26 AM    ALT (SGPT) 28 06/04/2019 09:26 AM           Assessment:     Assessment:       ICD-10-CM ICD-9-CM    1. Nonrheumatic mitral valve regurgitation I34.0 424.0    2. Essential hypertension I10 401.9 AMB POC EKG ROUTINE W/ 12 LEADS, INTER & REP   3. Hypercholesterolemia E78.00 272.0         Plan:     1. Nonrheumatic mitral valve regurgitation  No MR noted on echo in 11/2017, preserved ejection fraction 55-60%  Continue present medical management; remains asymptomatic. 2. Essential hypertension  BP controlled. Continue anti-hypertensive therapy and low sodium diet    3.  Hypercholesterolemia  LDL 72 in 6/2019  Continue statin therapy and low fat, low cholesterol diet  Lipids managed by PCP    F/u with Dr. Georgia Spaulding in 1 year    Kofi Mendes NP

## 2019-12-19 RX ORDER — AMOXICILLIN 500 MG/1
500 CAPSULE ORAL 3 TIMES DAILY
Qty: 30 CAP | Refills: 0 | Status: SHIPPED | OUTPATIENT
Start: 2019-12-19 | End: 2019-12-29

## 2019-12-29 NOTE — PROGRESS NOTES
HISTORY OF PRESENT ILLNESS  Maria Isabel Ibarra is a 75 y.o. male. HPI   HTN  Today pt present for Bp check and++= Compliancy w/ the bp meds, having had the low salt diet ,  has been active, patien does not obtain the bp at home ,, today the pt denies Chest Pain, has no legs swelling no lightheadedness,      Current Outpatient Medications   Medication Sig Dispense Refill    tamsulosin (FLOMAX) 0.4 mg capsule Take 2 Caps by mouth daily. TAKE 1 CAPSULE DAILY 180 Cap 3    clopidogrel (PLAVIX) 75 mg tab TAKE 1 TABLET DAILY 90 Tab 4    VITAMIN B-6 25 mg tablet take 1 tablet by mouth once daily 120 Tab 0    atorvastatin (LIPITOR) 40 mg tablet TAKE 1 TABLET NIGHTLY 90 Tab 4    pantoprazole (PROTONIX) 40 mg tablet Take 1 Tab by mouth as needed for Nausea. 30 Tab 6    diclofenac (VOLTAREN) 1 % gel Apply 2 g to affected area four (4) times daily. (Patient taking differently: Apply 2 g to affected area as needed.) 100 g 4    cetirizine (ZYRTEC) 10 mg tablet Take 10 mg by mouth daily as needed.  aspirin delayed-release 81 mg tablet Take 1 Tab by mouth daily. 30 Tab 12    calcium-cholecalciferol, D3, (CALTRATE 600+D) tablet Take 1 Tab by mouth two (2) times a day. (Patient taking differently: Take 1 Tab by mouth daily.) 60 Tab 11    oxymetazoline (VICKS SINEX 12-HOUR) 0.05 % nasal spray 1 [place] by Both Nostrils route two (2) times daily as needed for Congestion.  acetaminophen (TYLENOL) 500 mg tablet Take 1,000 mg by mouth every six (6) hours as needed for Pain.  amoxicillin (AMOXIL) 500 mg capsule Take 1 Cap by mouth three (3) times daily for 10 days. 30 Cap 0    cyanocobalamin (VITAMIN B-12) 1,000 mcg/mL injection 1,000 mcg by IntraMUSCular route every thirty (30) days.       lisinopril (PRINIVIL, ZESTRIL) 30 mg tablet TAKE 1 TABLET DAILY (Patient taking differently: 15 mg.) 90 Tab 4     No Known Allergies  Past Medical History:   Diagnosis Date    Abdominal wall hernia 11/16/2017    Anemia 9/10/2014    Back pain 4/1/2010    Blurry vision, bilateral 11/14/2017    CAD (coronary artery disease)     Calf pain 3/11/2015    Chronic kidney disease     cyst on kidney    Chronic left shoulder pain 10/24/2016    Constipation 6/11/2014    Coronary artery disease involving native coronary artery of native heart without angina pectoris 6/4/2019    Decreased vision 6/13/2011    Diastasis recti 11/16/2017    Diverticulitis 3/11/2015    Dizziness 11/14/2017    Enlarged LA (left atrium)     Fall at home 3/17/2016    GERD (gastroesophageal reflux disease)     Hemorrhoids 9/10/2014    Hip pain, chronic, right 10/3/2017    HTN (hypertension) 8/24/2010    Hx-TIA (transient ischemic attack)     Hypercholesterolemia 4/1/2010    Hypertension     Irregular heart beat 9/23/2015    Kidney disease     Mitral regurgitation     Need for varicella vaccine 9/10/2014    Orthostatic lightheadedness 1/16/2018    Other ill-defined conditions(799.89)     heart murmur    Patient is full code 6/4/2018    Rash 8/24/2010    Rib pain on right side 3/17/2016    Right calf pain 3/11/2015    Risk for falls 4/4/2014    S/P knee replacement 6/11/2014    Screening for depression 4/4/2014    Tinea versicolor 9/10/2014    Tinnitus of left ear 1/16/2018    Urinary frequency 8/24/2010    Vitamin B12 deficiency neuropathy ([facility] [address] 7/17/2019     Past Surgical History:   Procedure Laterality Date    CARDIAC SURG PROCEDURE UNLIST      heart cath    HX HEENT      nasal septum repair    HX HEENT      HX HERNIA REPAIR      HX KNEE REPLACEMENT Left     HX ORTHOPAEDIC      left knee surgery    HX ORTHOPAEDIC      HX TONSILLECTOMY  age 5    HX UROLOGICAL      VT COLONOSCOPY FLX DX W/COLLJ SPEC WHEN PFRMD  4/18/2011          Family History   Problem Relation Age of Onset    COPD Mother     Heart Attack Mother     Hypertension Mother     Heart Disease Mother     COPD Father     Heart Attack Father    [address] [place] [employer] Hypertension Father     Heart Disease Father     Cancer Father     Cancer Maternal Grandmother      Social History     Tobacco Use    Smoking status: Never Smoker    Smokeless tobacco: Never Used   Substance Use Topics    Alcohol use: Yes     Alcohol/week: 0.0 standard drinks     Comment: 12 beers, half a fifth a month      Lab Results   Component Value Date/Time    WBC 5.3 06/04/2019 09:26 AM    HGB 12.4 (L) 06/04/2019 09:26 AM    HCT 38.6 06/04/2019 09:26 AM    PLATELET 003 52/29/9595 09:26 AM    MCV 96 06/04/2019 09:26 AM     Lab Results   Component Value Date/Time    Hemoglobin A1c 5.6 11/15/2017 02:48 AM    Hemoglobin A1c 5.5 05/02/2017 03:56 AM    Hemoglobin A1c 5.9 05/13/2014 09:30 AM    Glucose 90 06/04/2019 09:26 AM    LDL, calculated 72 06/04/2019 09:26 AM    Creatinine (POC) 0.8 03/29/2011 09:23 AM    Creatinine 0.96 06/04/2019 09:26 AM         Review of Systems   Constitutional: Negative for chills and fever. HENT: Negative for ear pain and nosebleeds. Eyes: Negative for blurred vision, pain and discharge. Respiratory: Negative for shortness of breath. Cardiovascular: Negative for chest pain and leg swelling. Gastrointestinal: Negative for constipation, diarrhea, nausea and vomiting. Genitourinary: Negative for frequency. Musculoskeletal: Negative for joint pain. Skin: Negative for itching and rash. Neurological: Negative for headaches. Psychiatric/Behavioral: Negative for depression. The patient is not nervous/anxious. Physical Exam  Vitals signs and nursing note reviewed. Constitutional:       Appearance: He is well-developed. HENT:      Head: Normocephalic and atraumatic. Mouth/Throat:      Pharynx: No oropharyngeal exudate. Eyes:      Conjunctiva/sclera: Conjunctivae normal.      Pupils: Pupils are equal, round, and reactive to light. Neck:      Musculoskeletal: Normal range of motion and neck supple. Thyroid: No thyromegaly. Vascular: No JVD. Cardiovascular:      Rate and Rhythm: Normal rate and regular rhythm. Heart sounds: Normal heart sounds. No murmur. No friction rub. Pulmonary:      Effort: Pulmonary effort is normal. No respiratory distress. Breath sounds: Normal breath sounds. No wheezing or rales. Abdominal:      General: Bowel sounds are normal. There is no distension. Palpations: Abdomen is soft. Tenderness: There is no tenderness. Musculoskeletal:         General: No tenderness. Lymphadenopathy:      Cervical: No cervical adenopathy. Skin:     General: Skin is warm. Findings: No erythema or rash. Neurological:      Mental Status: He is alert and oriented to person, place, and time. Deep Tendon Reflexes: Reflexes are normal and symmetric. Psychiatric:         Behavior: Behavior normal.         ASSESSMENT and PLAN  Diagnoses and all orders for this visit:    1. Urinary frequency  -     tamsulosin (FLOMAX) 0.4 mg capsule; Take 2 Caps by mouth daily. TAKE 1 CAPSULE DAILY    2. Encounter for immunization  -     varicella-zoster recombinant, PF, (SHINGRIX) 50 mcg/0.5 mL susr injection; 0.5 mL by IntraMUSCular route once for 1 dose. 3. Vitamin B6 induced neuropathy (HCC)  -     VITAMIN B12 INJECTION  -     THER/PROPH/DIAG INJECTION, SUBCUT/IM  -     cyanocobalamin (VITAMIN B-12) 1,000 mcg/mL injection; 1 mL by IntraMUSCular route once for 1 dose. -     VITAMIN B6    4. Vitamin B12 deficiency neuropathy (HCC)  -     varicella-zoster recombinant, PF, (SHINGRIX) 50 mcg/0.5 mL susr injection; 0.5 mL by IntraMUSCular route once for 1 dose. -     VITAMIN B12 INJECTION  -     THER/PROPH/DIAG INJECTION, SUBCUT/IM  -     cyanocobalamin (VITAMIN B-12) 1,000 mcg/mL injection; 1 mL by IntraMUSCular route once for 1 dose.     HTN controlled, no change of bp meds at this time,  Discussed sodium restriction, high k rich diet,  maintaining ideal body weight and regular exercise program such as daily walking 30 min perday 4-5 times per week,  medication compliance advised, was told to call back for rfs or of any concern, pt was told to obtain bp daily if bp >150/90 or ,90/60 pt was told to call for further advise pt agreed

## 2020-01-06 ENCOUNTER — CLINICAL SUPPORT (OUTPATIENT)
Dept: FAMILY MEDICINE CLINIC | Age: 75
End: 2020-01-06

## 2020-01-06 VITALS
SYSTOLIC BLOOD PRESSURE: 122 MMHG | DIASTOLIC BLOOD PRESSURE: 74 MMHG | OXYGEN SATURATION: 96 % | RESPIRATION RATE: 18 BRPM | HEART RATE: 78 BPM | TEMPERATURE: 97.4 F

## 2020-01-06 DIAGNOSIS — E53.8 VITAMIN B12 DEFICIENCY NEUROPATHY (HCC): Primary | ICD-10-CM

## 2020-01-06 DIAGNOSIS — G63 VITAMIN B12 DEFICIENCY NEUROPATHY (HCC): Primary | ICD-10-CM

## 2020-01-06 RX ORDER — CYANOCOBALAMIN 1000 UG/ML
1000 INJECTION, SOLUTION INTRAMUSCULAR; SUBCUTANEOUS ONCE
Qty: 1 ML | Refills: 0
Start: 2020-01-06 | End: 2020-01-06

## 2020-01-06 RX ORDER — DOXYCYCLINE 100 MG/1
100 CAPSULE ORAL 2 TIMES DAILY
Qty: 20 CAP | Refills: 0 | Status: SHIPPED | OUTPATIENT
Start: 2020-01-06 | End: 2020-01-16

## 2020-01-06 RX ORDER — METHYLPREDNISOLONE 4 MG/1
TABLET ORAL
Qty: 1 DOSE PACK | Refills: 0 | Status: SHIPPED | OUTPATIENT
Start: 2020-01-06 | End: 2020-11-10 | Stop reason: ALTCHOICE

## 2020-01-06 NOTE — PATIENT INSTRUCTIONS
Cyanocobalamin (Vitamin B-12) (By injection)   Cyanocobalamin (nbz-jb-zr-koe-BAL-a-min)  Treats vitamin B-12 deficiency. Brand Name(s): B-12 Compliance Injection Kit, B-12 Kit, Physicians EZ Use B-12 Compliance, Vitamin Deficiency Injectable System - B12   There may be other brand names for this medicine. When This Medicine Should Not Be Used: This medicine is not right for everyone. Do not use it if you had an allergic reaction to vitamin B-12. How to Use This Medicine:   Injectable  · Your doctor will prescribe your exact dose and tell you how often it should be given. This medicine is given as a shot into one of your muscles. · Missed dose: Call your doctor or pharmacist for instructions. Drugs and Foods to Avoid:   Ask your doctor or pharmacist before using any other medicine, including over-the-counter medicines, vitamins, and herbal products. · Do not use folic acid supplements unless your doctor says it is okay. Warnings While Using This Medicine:   · Tell your doctor if you are pregnant or breastfeeding, or if you have kidney disease. · Keep all medicine out of the reach of children. Never share your medicine with anyone. Possible Side Effects While Using This Medicine:   Call your doctor right away if you notice any of these side effects:  · Allergic reaction: Itching or hives, swelling in your face or hands, swelling or tingling in your mouth or throat, chest tightness, trouble breathing  If you notice other side effects that you think are caused by this medicine, tell your doctor. Call your doctor for medical advice about side effects. You may report side effects to FDA at 8-054-FDA-6307  © 2017 [address] Information is for End User's use only and may not be sold, redistributed or otherwise used for commercial purposes. The above information is an  only. It is not intended as medical advice for individual conditions or treatments.  Talk to your doctor, nurse or pharmacist before following any medical regimen to see if it is safe and effective for you.

## 2020-02-07 ENCOUNTER — CLINICAL SUPPORT (OUTPATIENT)
Dept: FAMILY MEDICINE CLINIC | Age: 75
End: 2020-02-07

## 2020-02-07 VITALS
OXYGEN SATURATION: 98 % | HEART RATE: 54 BPM | SYSTOLIC BLOOD PRESSURE: 105 MMHG | DIASTOLIC BLOOD PRESSURE: 64 MMHG | RESPIRATION RATE: 20 BRPM | TEMPERATURE: 96.9 F

## 2020-02-07 DIAGNOSIS — E53.8 VITAMIN B12 DEFICIENCY NEUROPATHY (HCC): Primary | ICD-10-CM

## 2020-02-07 DIAGNOSIS — G63 VITAMIN B12 DEFICIENCY NEUROPATHY (HCC): Primary | ICD-10-CM

## 2020-02-07 RX ORDER — CYANOCOBALAMIN 1000 UG/ML
1000 INJECTION, SOLUTION INTRAMUSCULAR; SUBCUTANEOUS ONCE
Qty: 1 ML | Refills: 0
Start: 2020-02-07 | End: 2020-02-07

## 2020-02-07 NOTE — PROGRESS NOTES
Patient tolerated B-12 injection in right deltoid well informed to wait 15 minutes for adverse reactions patient acknowledged understanding and left office without any distress.

## 2020-02-26 DIAGNOSIS — T45.2X5A VITAMIN B6 INDUCED NEUROPATHY (HCC): ICD-10-CM

## 2020-02-26 DIAGNOSIS — G62.0 VITAMIN B6 INDUCED NEUROPATHY (HCC): ICD-10-CM

## 2020-02-26 RX ORDER — PYRIDOXINE HCL (VITAMIN B6) 25 MG
TABLET ORAL
Qty: 120 TAB | Refills: 0 | Status: SHIPPED | OUTPATIENT
Start: 2020-02-26 | End: 2020-06-24 | Stop reason: SDUPTHER

## 2020-03-06 ENCOUNTER — CLINICAL SUPPORT (OUTPATIENT)
Dept: FAMILY MEDICINE CLINIC | Age: 75
End: 2020-03-06

## 2020-03-06 VITALS
HEART RATE: 53 BPM | SYSTOLIC BLOOD PRESSURE: 114 MMHG | RESPIRATION RATE: 20 BRPM | TEMPERATURE: 95.6 F | OXYGEN SATURATION: 97 % | DIASTOLIC BLOOD PRESSURE: 66 MMHG

## 2020-03-06 DIAGNOSIS — E53.8 VITAMIN B12 DEFICIENCY NEUROPATHY (HCC): Primary | ICD-10-CM

## 2020-03-06 DIAGNOSIS — G63 VITAMIN B12 DEFICIENCY NEUROPATHY (HCC): Primary | ICD-10-CM

## 2020-03-06 RX ORDER — CYANOCOBALAMIN 1000 UG/ML
1000 INJECTION, SOLUTION INTRAMUSCULAR; SUBCUTANEOUS ONCE
Qty: 1 ML | Refills: 0
Start: 2020-03-06 | End: 2020-03-06

## 2020-03-06 NOTE — PROGRESS NOTES
Chief Complaint   Patient presents with    Injection B12     After obtaining consent, and per orders of , b12 1000mcg/ml  given to  Left deltoid IM  . Patient instructed to remain in clinic for 15 minutes afterwards, and to report any adverse reaction to me immediately.  Patient did not have any adverse reactions during this office visit

## 2020-03-06 NOTE — PATIENT INSTRUCTIONS
Cyanocobalamin (Vitamin B-12) (By injection)   Cyanocobalamin (ekw-kl-us-koe-BAL-a-min)  Treats vitamin B-12 deficiency. Brand Name(s): B-12 Compliance Injection Kit, B-12 Kit, Physicians EZ Use B-12 Compliance, Vitamin Deficiency Injectable System - B12   There may be other brand names for this medicine. When This Medicine Should Not Be Used: This medicine is not right for everyone. Do not use it if you had an allergic reaction to vitamin B-12. How to Use This Medicine:   Injectable  · Your doctor will prescribe your exact dose and tell you how often it should be given. This medicine is given as a shot into one of your muscles. · Missed dose: Call your doctor or pharmacist for instructions. Drugs and Foods to Avoid:   Ask your doctor or pharmacist before using any other medicine, including over-the-counter medicines, vitamins, and herbal products. · Do not use folic acid supplements unless your doctor says it is okay. Warnings While Using This Medicine:   · Tell your doctor if you are pregnant or breastfeeding, or if you have kidney disease. · Keep all medicine out of the reach of children. Never share your medicine with anyone. Possible Side Effects While Using This Medicine:   Call your doctor right away if you notice any of these side effects:  · Allergic reaction: Itching or hives, swelling in your face or hands, swelling or tingling in your mouth or throat, chest tightness, trouble breathing  If you notice other side effects that you think are caused by this medicine, tell your doctor. Call your doctor for medical advice about side effects. You may report side effects to FDA at 0-533-FDA-2931  © 2017 [facility] Information is for End User's use only and may not be sold, redistributed or otherwise used for commercial purposes. The above information is an  only. It is not intended as medical advice for individual conditions or treatments.  Talk to your doctor, nurse or pharmacist before following any medical regimen to see if it is safe and effective for you.

## 2020-04-09 RX ORDER — PANTOPRAZOLE SODIUM 40 MG/1
40 TABLET, DELAYED RELEASE ORAL AS NEEDED
Qty: 90 TAB | Refills: 3 | Status: SHIPPED | OUTPATIENT
Start: 2020-04-09 | End: 2020-12-14 | Stop reason: SDUPTHER

## 2020-06-24 DIAGNOSIS — G62.0 VITAMIN B6 INDUCED NEUROPATHY (HCC): ICD-10-CM

## 2020-06-24 DIAGNOSIS — T45.2X5A VITAMIN B6 INDUCED NEUROPATHY (HCC): ICD-10-CM

## 2020-06-24 RX ORDER — PYRIDOXINE HCL (VITAMIN B6) 25 MG
25 TABLET ORAL DAILY
Qty: 90 TAB | Refills: 2 | Status: SHIPPED | OUTPATIENT
Start: 2020-06-24 | End: 2020-12-14 | Stop reason: SDUPTHER

## 2020-11-04 ENCOUNTER — TELEPHONE (OUTPATIENT)
Dept: CARDIOLOGY CLINIC | Age: 75
End: 2020-11-04

## 2020-11-04 DIAGNOSIS — R06.02 SOB (SHORTNESS OF BREATH): ICD-10-CM

## 2020-11-04 DIAGNOSIS — I34.0 NONRHEUMATIC MITRAL VALVE REGURGITATION: ICD-10-CM

## 2020-11-04 DIAGNOSIS — I34.0 NONRHEUMATIC MITRAL VALVE REGURGITATION: Primary | ICD-10-CM

## 2020-11-04 NOTE — TELEPHONE ENCOUNTER
Spoke to patient's wife, Annie Garcia on HIPAA using 2 identifiers. Per Felisha Vences NP, she was informed echo has been ordered. If he has been having congestion then the SOB and dizziness can be associated with this. Advise he can seek care from PCP to treat congestion.  Vicks nasal spray may not be enough. Will touch base after echo results done. Patient's wife verbalized understanding.

## 2020-11-04 NOTE — TELEPHONE ENCOUNTER
Pt calling to speak to a nurse pt is having SOB and weakness. Pt has upcoming appt on 12/29/2020 with Dr. Jamia Stanley. Please give pt a call back.       Thanks

## 2020-11-04 NOTE — TELEPHONE ENCOUNTER
Spoke to patient using 2 identifiers. Per Lucia Wang NP, patient was made aware that dizziness when bending over is not concerning -- that is most likely an age related change and can be common, especially if you are on BP medication.  His blood pressure is not too low, so I don't think the dizziness is coming from low BP.  Heart rates look normal.     If he says his symptoms are worse from last year, then we can get an echocardiogram.  Otherwise, if he believes his symptoms are the same, then I would not recommend cardiac workup and should see PCP. Pt stated that dizziness and SOB is not horrible, but has worsened in the past 2-3 wks. He agreed to another echo. Had one done in 2017. Pt also added that he has had some congestion too and takes Long's nasal spray but was not sure if this has anything to do with his symptoms. He recently went to his provider and mentioned SOB and dizziness. Per pt, was advised by provider to notify cardiology.

## 2020-11-04 NOTE — TELEPHONE ENCOUNTER
Spoke to patient using 2 identifiers. Pt called with c/o dizziness and SOB x 2-3 wks. Dizziness occurs when bending over. Lasts a few seconds. No falls. Drinks plenty of water but could not quantify. SOB occurs when climbing stairs. Denies chest tightness/pressure. This is nothing new. Does not smoke. Denies hx of asthma, COPD. Denies any other cardiac sx. Denies fatigue. BP today: 121/75 HR: 66  Reports no readings above 140/90 in previous days. Takes lisinopril 30 mg, half tab daily. Next appt with cardiology on 12/29/20. Please advise.

## 2020-11-10 ENCOUNTER — VIRTUAL VISIT (OUTPATIENT)
Dept: FAMILY MEDICINE CLINIC | Age: 75
End: 2020-11-10
Payer: MEDICARE

## 2020-11-10 DIAGNOSIS — Z71.89 ADVICE GIVEN ABOUT COVID-19 VIRUS INFECTION: ICD-10-CM

## 2020-11-10 DIAGNOSIS — R06.09 DOE (DYSPNEA ON EXERTION): Primary | ICD-10-CM

## 2020-11-10 DIAGNOSIS — M54.40 BILATERAL LOW BACK PAIN WITH SCIATICA, SCIATICA LATERALITY UNSPECIFIED, UNSPECIFIED CHRONICITY: ICD-10-CM

## 2020-11-10 DIAGNOSIS — Z96.659 STATUS POST KNEE REPLACEMENT, UNSPECIFIED LATERALITY: ICD-10-CM

## 2020-11-10 DIAGNOSIS — N41.0 ACUTE PROSTATITIS: ICD-10-CM

## 2020-11-10 DIAGNOSIS — M25.562 CHRONIC PAIN OF LEFT KNEE: ICD-10-CM

## 2020-11-10 DIAGNOSIS — I20.8 ANGINAL CHEST PAIN AT REST (HCC): ICD-10-CM

## 2020-11-10 DIAGNOSIS — G89.29 CHRONIC PAIN OF LEFT KNEE: ICD-10-CM

## 2020-11-10 PROCEDURE — G8432 DEP SCR NOT DOC, RNG: HCPCS | Performed by: FAMILY MEDICINE

## 2020-11-10 PROCEDURE — G8428 CUR MEDS NOT DOCUMENT: HCPCS | Performed by: FAMILY MEDICINE

## 2020-11-10 PROCEDURE — 1101F PT FALLS ASSESS-DOCD LE1/YR: CPT | Performed by: FAMILY MEDICINE

## 2020-11-10 PROCEDURE — 99214 OFFICE O/P EST MOD 30 MIN: CPT | Performed by: FAMILY MEDICINE

## 2020-11-10 PROCEDURE — G8756 NO BP MEASURE DOC: HCPCS | Performed by: FAMILY MEDICINE

## 2020-11-10 RX ORDER — TRAMADOL HYDROCHLORIDE 50 MG/1
50 TABLET ORAL
Qty: 90 TAB | Refills: 0 | Status: SHIPPED | OUTPATIENT
Start: 2020-11-10 | End: 2020-12-10

## 2020-11-10 RX ORDER — ALBUTEROL SULFATE 90 UG/1
1 AEROSOL, METERED RESPIRATORY (INHALATION)
Qty: 1 INHALER | Refills: 1 | Status: SHIPPED | OUTPATIENT
Start: 2020-11-10 | End: 2021-12-22 | Stop reason: ALTCHOICE

## 2020-11-10 RX ORDER — FAMOTIDINE 20 MG/1
20 TABLET, FILM COATED ORAL 2 TIMES DAILY
Qty: 60 TAB | Refills: 3 | Status: SHIPPED | OUTPATIENT
Start: 2020-11-10 | End: 2021-03-02 | Stop reason: SDUPTHER

## 2020-11-10 RX ORDER — CIPROFLOXACIN 500 MG/1
500 TABLET ORAL 2 TIMES DAILY
Qty: 20 TAB | Refills: 0 | Status: SHIPPED | OUTPATIENT
Start: 2020-11-10 | End: 2020-11-20
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
## 2020-11-17 ENCOUNTER — ANCILLARY PROCEDURE (OUTPATIENT)
Dept: CARDIOLOGY CLINIC | Age: 75
End: 2020-11-17
Payer: MEDICARE

## 2020-11-17 VITALS
DIASTOLIC BLOOD PRESSURE: 66 MMHG | SYSTOLIC BLOOD PRESSURE: 114 MMHG | HEIGHT: 74 IN | WEIGHT: 224 LBS | BODY MASS INDEX: 28.75 KG/M2

## 2020-11-17 PROCEDURE — 93306 TTE W/DOPPLER COMPLETE: CPT | Performed by: INTERNAL MEDICINE

## 2020-11-20 LAB
ECHO AO ASC DIAM: 3.61 CM
ECHO AO ROOT DIAM: 3.36 CM
ECHO AV AREA PEAK VELOCITY: 2.88 CM2
ECHO AV AREA/BSA PEAK VELOCITY: 1.3 CM2/M2
ECHO AV PEAK GRADIENT: 10.87 MMHG
ECHO AV PEAK VELOCITY: 164.83 CM/S
ECHO EST RA PRESSURE: 3 MMHG
ECHO LA AREA 4C: 21.61 CM2
ECHO LA MAJOR AXIS: 4.02 CM
ECHO LA MINOR AXIS: 1.76 CM
ECHO LA VOL 2C: 88.35 ML (ref 18–58)
ECHO LA VOL 4C: 64.97 ML (ref 18–58)
ECHO LA VOL BP: 83.9 ML (ref 18–58)
ECHO LA VOL/BSA BIPLANE: 36.79 ML/M2 (ref 16–28)
ECHO LA VOLUME INDEX A2C: 38.74 ML/M2 (ref 16–28)
ECHO LA VOLUME INDEX A4C: 28.49 ML/M2 (ref 16–28)
ECHO LV E' LATERAL VELOCITY: 13.51 CM/S
ECHO LV E' SEPTAL VELOCITY: 9.92 CM/S
ECHO LV INTERNAL DIMENSION DIASTOLIC: 5.7 CM (ref 4.2–5.9)
ECHO LV INTERNAL DIMENSION SYSTOLIC: 3.28 CM
ECHO LV IVSD: 1.19 CM (ref 0.6–1)
ECHO LV MASS 2D: 261.4 G (ref 88–224)
ECHO LV MASS INDEX 2D: 114.6 G/M2 (ref 49–115)
ECHO LV POSTERIOR WALL DIASTOLIC: 1.04 CM (ref 0.6–1)
ECHO LVOT DIAM: 2.01 CM
ECHO LVOT PEAK GRADIENT: 8.89 MMHG
ECHO LVOT PEAK VELOCITY: 149.07 CM/S
ECHO LVOT SV: 109.6 ML
ECHO LVOT VTI: 34.48 CM
ECHO MV A VELOCITY: 62.88 CM/S
ECHO MV AREA PHT: 3.19 CM2
ECHO MV E DECELERATION TIME (DT): 237.46 MS
ECHO MV E VELOCITY: 76.67 CM/S
ECHO MV E/A RATIO: 1.22
ECHO MV E/E' LATERAL: 5.68
ECHO MV E/E' RATIO (AVERAGED): 6.7
ECHO MV E/E' SEPTAL: 7.73
ECHO MV PRESSURE HALF TIME (PHT): 68.86 MS
ECHO RIGHT VENTRICULAR SYSTOLIC PRESSURE (RVSP): 28.32 MMHG
ECHO RV TAPSE: 2.02 CM (ref 1.5–2)
ECHO TV REGURGITANT MAX VELOCITY: 251.6 CM/S
ECHO TV REGURGITANT PEAK GRADIENT: 25.32 MMHG

## 2020-11-20 NOTE — PROGRESS NOTES
Good morning Mr. Cachorro Ye,    Your echocardiogram is normal.  Your ejection fraction is 55-60%. This suggests your heart's pumping strength is normal and there is no concern for heart failure at this time. All your valves are working appropriately.     Thanks,  Josh Hernandez, ULYSSESP-BC

## 2020-12-04 RX ORDER — ATORVASTATIN CALCIUM 40 MG/1
TABLET, FILM COATED ORAL
Qty: 90 TAB | Refills: 3 | Status: SHIPPED | OUTPATIENT
Start: 2020-12-04 | End: 2021-02-24 | Stop reason: SDUPTHER

## 2020-12-14 ENCOUNTER — OFFICE VISIT (OUTPATIENT)
Dept: FAMILY MEDICINE CLINIC | Age: 75
End: 2020-12-14
Payer: MEDICARE

## 2020-12-14 VITALS
TEMPERATURE: 98.1 F | WEIGHT: 222 LBS | HEIGHT: 74 IN | OXYGEN SATURATION: 99 % | RESPIRATION RATE: 16 BRPM | SYSTOLIC BLOOD PRESSURE: 129 MMHG | DIASTOLIC BLOOD PRESSURE: 75 MMHG | BODY MASS INDEX: 28.49 KG/M2 | HEART RATE: 62 BPM

## 2020-12-14 DIAGNOSIS — G62.0 VITAMIN B6 INDUCED NEUROPATHY (HCC): ICD-10-CM

## 2020-12-14 DIAGNOSIS — Z13.31 SCREENING FOR DEPRESSION: ICD-10-CM

## 2020-12-14 DIAGNOSIS — K21.9 GASTROESOPHAGEAL REFLUX DISEASE WITHOUT ESOPHAGITIS: ICD-10-CM

## 2020-12-14 DIAGNOSIS — M25.562 CHRONIC PAIN OF LEFT KNEE: ICD-10-CM

## 2020-12-14 DIAGNOSIS — E53.8 B12 DEFICIENCY: ICD-10-CM

## 2020-12-14 DIAGNOSIS — M25.512 CHRONIC LEFT SHOULDER PAIN: ICD-10-CM

## 2020-12-14 DIAGNOSIS — G89.29 CHRONIC LEFT SHOULDER PAIN: ICD-10-CM

## 2020-12-14 DIAGNOSIS — Z71.89 ADVANCED DIRECTIVES, COUNSELING/DISCUSSION: ICD-10-CM

## 2020-12-14 DIAGNOSIS — T45.2X5A VITAMIN B6 INDUCED NEUROPATHY (HCC): ICD-10-CM

## 2020-12-14 DIAGNOSIS — Z12.11 SCREEN FOR COLON CANCER: ICD-10-CM

## 2020-12-14 DIAGNOSIS — Z00.00 MEDICARE ANNUAL WELLNESS VISIT, SUBSEQUENT: Primary | ICD-10-CM

## 2020-12-14 DIAGNOSIS — G89.29 CHRONIC PAIN OF LEFT KNEE: ICD-10-CM

## 2020-12-14 PROCEDURE — 96372 THER/PROPH/DIAG INJ SC/IM: CPT | Performed by: FAMILY MEDICINE

## 2020-12-14 PROCEDURE — G8510 SCR DEP NEG, NO PLAN REQD: HCPCS | Performed by: FAMILY MEDICINE

## 2020-12-14 PROCEDURE — G8752 SYS BP LESS 140: HCPCS | Performed by: FAMILY MEDICINE

## 2020-12-14 PROCEDURE — G8427 DOCREV CUR MEDS BY ELIG CLIN: HCPCS | Performed by: FAMILY MEDICINE

## 2020-12-14 PROCEDURE — G8536 NO DOC ELDER MAL SCRN: HCPCS | Performed by: FAMILY MEDICINE

## 2020-12-14 PROCEDURE — 1101F PT FALLS ASSESS-DOCD LE1/YR: CPT | Performed by: FAMILY MEDICINE

## 2020-12-14 PROCEDURE — G8754 DIAS BP LESS 90: HCPCS | Performed by: FAMILY MEDICINE

## 2020-12-14 PROCEDURE — 99213 OFFICE O/P EST LOW 20 MIN: CPT | Performed by: FAMILY MEDICINE

## 2020-12-14 PROCEDURE — G8419 CALC BMI OUT NRM PARAM NOF/U: HCPCS | Performed by: FAMILY MEDICINE

## 2020-12-14 PROCEDURE — G0439 PPPS, SUBSEQ VISIT: HCPCS | Performed by: FAMILY MEDICINE

## 2020-12-14 RX ORDER — TRAMADOL HYDROCHLORIDE 50 MG/1
100 TABLET ORAL
Qty: 60 TAB | Refills: 1 | Status: SHIPPED | OUTPATIENT
Start: 2020-12-14 | End: 2021-01-13

## 2020-12-14 RX ORDER — DICLOFENAC SODIUM 10 MG/G
2 GEL TOPICAL EVERY 6 HOURS
Qty: 150 G | Refills: 4 | Status: SHIPPED | OUTPATIENT
Start: 2020-12-14

## 2020-12-14 RX ORDER — PYRIDOXINE HCL (VITAMIN B6) 25 MG
25 TABLET ORAL DAILY
Qty: 90 TAB | Refills: 2 | Status: SHIPPED | OUTPATIENT
Start: 2020-12-14 | End: 2021-09-10 | Stop reason: ALTCHOICE

## 2020-12-14 RX ORDER — PANTOPRAZOLE SODIUM 40 MG/1
40 TABLET, DELAYED RELEASE ORAL AS NEEDED
Qty: 90 TAB | Refills: 3 | Status: SHIPPED | OUTPATIENT
Start: 2020-12-14 | End: 2021-06-02 | Stop reason: ALTCHOICE

## 2020-12-14 RX ORDER — CYANOCOBALAMIN 1000 UG/ML
1000 INJECTION, SOLUTION INTRAMUSCULAR; SUBCUTANEOUS ONCE
Qty: 1 ML | Refills: 0
Start: 2020-12-14 | End: 2020-12-14

## 2020-12-14 RX ORDER — MELATONIN
1000 DAILY
Qty: 30 TAB | Refills: 5 | Status: SHIPPED | OUTPATIENT
Start: 2020-12-14 | End: 2021-06-02 | Stop reason: ALTCHOICE

## 2020-12-14 NOTE — ACP (ADVANCE CARE PLANNING)
Advance Care Planning (ACP) Physician       Date of ACP Conversation: 4/24/2020    Conversation Conducted with:   Patient with Decision Making Capacity     Other Legally Authorized Decision Maker would be Spouse as SDM     For My patients who has currently great Decision Making Capacity:     Patient is on presence of no family member,, stated that the pt wants to be not DNR at this time,  The pt likes to be a full code individual,  The patient states that there is a lot of will to live,  Pt was given the form,   will sign and bring us a copy on the later date.       Conversation Outcomes / Follow-Up Plan:   Completed new Advance Directive      Length of ACP Conversation in minutes:  16 minutes

## 2020-12-14 NOTE — PROGRESS NOTES
Chief Complaint   Patient presents with   [facility] Annual Wellness Visit       1. Have you been to the ER, urgent care clinic since your last visit? Hospitalized since your last visit? No    2. Have you seen or consulted any other health care providers outside of the [address] [place] [address] since your last visit? Include any pap smears or colon screening. No    . Verified patient with two type of identifiers. requesting b12 injection    After obtaining consent, and per orders of , b12 injection  given to  Left deltoid IM  . Patient instructed to remain in clinic for 15 minutes afterwards, and to report any adverse reaction to me immediately. Patient did not have any adverse reactions during this office visit    This is the Subsequent Medicare Annual Wellness Exam, performed 12 months or more after the Initial AWV or the last Subsequent AWV    I have reviewed the patient's medical history in detail and updated the computerized patient record. Depression Risk Factor Screening:     3 most recent PHQ Screens 12/14/2020   Little interest or pleasure in doing things Not at all   Feeling down, depressed, irritable, or hopeless Not at all   Total Score PHQ 2 0       Alcohol Risk Screen   Do you average more than 1 drink per night or more than 7 drinks a week: No    In the past three months have you have had more than 4 drinks containing alcohol on one occasion: No        Functional Ability and Level of Safety:   Hearing: Hearing is good. Activities of Daily Living: The home contains: handrails, grab bars and rugs  Patient does total self care     Ambulation: with no difficulty     Fall Risk:  Fall Risk Assessment, last 12 mths 12/14/2020   Able to walk? Yes   Fall in past 12 months?  No   Fall with injury? -   Number of falls in past 12 months -   Fall Risk Score -     Abuse Screen:  Patient is not abused       Cognitive Screening   Has your family/caregiver stated any concerns about your memory: no Cognitive Screening: Normal - MMSE (Mini Mental Status Exam)    Assessment/Plan   Education and counseling provided:  Are appropriate based on today's review and evaluation  End-of-Life planning (with patient's consent)  Influenza Vaccine  Colorectal cancer screening tests    Diagnoses and all orders for this visit:    1. Vitamin B6 induced neuropathy (HCC)  -     pyridoxine, vitamin B6, (Vitamin B-6) 25 mg tablet; Take 1 Tab by mouth daily. 2. Medicare annual wellness visit, subsequent    3. Advanced directives, counseling/discussion  -     FULL CODE    4. Screening for depression  -     DEPRESSION SCREEN ANNUAL    Other orders  -     cholecalciferol (VITAMIN D3) (1000 Units /25 mcg) tablet; Take 1 Tab by mouth daily. -     diclofenac (VOLTAREN) 1 % gel; Apply 2 g to affected area as needed. -     VITAMIN B12 INJECTION  -     THER/PROPH/DIAG INJECTION, SUBCUT/IM  -     cyanocobalamin (Vitamin B-12) 1,000 mcg/mL injection; 1 mL by IntraMUSCular route once for 1 dose.         Health Maintenance Due     Health Maintenance Due   Topic Date Due    Shingrix Vaccine Age 49> (1 of 2) 05/15/1994    GLAUCOMA SCREENING Q2Y  12/07/2019    Medicare Yearly Exam  06/04/2020    Lipid Screen  06/04/2020    Flu Vaccine (1) 09/01/2020       Patient Care Team   Patient Care Team:  Catarino Thompson MD as PCP - Scot Hinds MD as PCP - [facility] Provider  Lizandro Ferris MD (Dermatology)  Nicolle Miller MD (Cardiology)  Morales Maldonado MD (Plastic Surgery)  Ayde Jeffries OD (Optometry)  Allie Ibanez MD as Physician (Cardiology)  Elodia Baker MD as Physician (Sleep Medicine)  Sangita Miles MD (Gastroenterology)  Allie Ibanez MD as Physician (Cardiology)    History     Patient Active Problem List   Diagnosis Code    Back pain M54.9    Hypercholesterolemia E78.00    HTN (hypertension) I10    Rash R21    Urinary frequency R35.0    Foot pain, right M79.671  Acute sinusitis J01.90    Corn L84    Tick bite W57. XXXA    Decreased vision H54.7    Knee pain, left M25.562    Acute bronchitis J20.9    Otitis externa of right ear H60.91    ED (erectile dysfunction) N52.9    Risk for falls Z91.81    Screening for depression Z13.31    Osteopenia M85.80    S/P knee replacement Z96.659    Constipation K59.00    Anemia D64.9    Tinea versicolor B36.0    Hemorrhoids K64.9    Diverticulitis K57.92    Calf pain M79.669    Right calf pain M79.661    Irregular heart beat I49.9    Enlarged LA (left atrium) I51.7    Mitral regurgitation I34.0    Fall at home W19. Kim Cindymiguel, Y92.009    Rib pain on right side R07.81    Anginal chest pain at rest [facility]) I20.8    Chronic left shoulder pain M25.512, G89.29    Acute nasopharyngitis J00    TIA (transient ischemic attack) G45.9    Hip pain, chronic, right M25.551, G89.29    Dizziness R42    Blurry vision, bilateral H53.8    Diastasis recti M62.08    Tinnitus of left ear H93.12    Orthostatic lightheadedness R42    Elevated BP without diagnosis of hypertension R03.0    Patient is full code Z78.9    Transient cerebral ischemia G45.9    Non-rheumatic mitral regurgitation I34.0    Coronary artery disease involving native coronary artery of native heart without angina pectoris I25.10    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, G63    Vitamin B6 induced neuropathy (HCC) G62.0, T45.2X5A     Past Medical History:   Diagnosis Date    Abdominal wall hernia 11/16/2017    Anemia 9/10/2014    Back pain 4/1/2010    Blurry vision, bilateral 11/14/2017    CAD (coronary artery disease)     Calf pain 3/11/2015    Chronic kidney disease     cyst on kidney    Chronic left shoulder pain 10/24/2016    Constipation 6/11/2014    Coronary artery disease involving native coronary artery of native heart without angina pectoris 6/4/2019    Decreased vision 6/13/2011    Diastasis recti 11/16/2017    Diverticulitis 3/11/2015    Dizziness 11/14/2017    Enlarged LA (left atrium)     Fall at home 3/17/2016    GERD (gastroesophageal reflux disease)     Hemorrhoids 9/10/2014    Hip pain, chronic, right 10/3/2017    HTN (hypertension) 8/24/2010    Hx-TIA (transient ischemic attack)     Hypercholesterolemia 4/1/2010    Hypertension     Irregular heart beat 9/23/2015    Kidney disease     Mitral regurgitation     Need for varicella vaccine 9/10/2014    Orthostatic lightheadedness 1/16/2018    Other ill-defined conditions(799.89)     heart murmur    Patient is full code 6/4/2018    Rash 8/24/2010    Rib pain on right side 3/17/2016    Right calf pain 3/11/2015    Risk for falls 4/4/2014    S/P knee replacement 6/11/2014    Screening for depression 4/4/2014    Tinea versicolor 9/10/2014    Tinnitus of left ear 1/16/2018    Urinary frequency 8/24/2010    Vitamin B12 deficiency neuropathy ([facility] [address] 7/17/2019      Past Surgical History:   Procedure Laterality Date    CARDIAC SURG PROCEDURE UNLIST      heart cath    HX HEENT      nasal septum repair    HX HEENT      HX HERNIA REPAIR      HX KNEE REPLACEMENT Left     HX ORTHOPAEDIC      left knee surgery    HX ORTHOPAEDIC      HX TONSILLECTOMY  age 5   [facility]      [place] COLONOSCOPY FLX DX W/COLLJ SPEC WHEN PFRMD  4/18/2011          Current Outpatient Medications   Medication Sig Dispense Refill    atorvastatin (LIPITOR) 40 mg tablet TAKE 1 TABLET NIGHTLY 90 Tab 3    famotidine (PEPCID) 20 mg tablet Take 1 Tab by mouth two (2) times a day. 60 Tab 3    albuterol (PROVENTIL HFA, VENTOLIN HFA, PROAIR HFA) 90 mcg/actuation inhaler Take 1 Puff by inhalation every four (4) hours as needed for Wheezing. 1 Inhaler 1    pyridoxine, vitamin B6, (Vitamin B-6) 25 mg tablet Take 1 Tab by mouth daily. 90 Tab 2    pantoprazole (PROTONIX) 40 mg tablet Take 1 Tab by mouth as needed for Nausea.  90 Tab 3    tamsulosin (FLOMAX) 0.4 mg capsule Take 2 Caps by mouth daily. TAKE 1 CAPSULE DAILY 180 Cap 3    clopidogrel (PLAVIX) 75 mg tab TAKE 1 TABLET DAILY 90 Tab 4    lisinopril (PRINIVIL, ZESTRIL) 30 mg tablet TAKE 1 TABLET DAILY (Patient taking differently: 15 mg.) 90 Tab 4    diclofenac (VOLTAREN) 1 % gel Apply 2 g to affected area four (4) times daily. (Patient taking differently: Apply 2 g to affected area as needed.) 100 g 4    aspirin delayed-release 81 mg tablet Take 1 Tab by mouth daily. 30 Tab 12    acetaminophen (TYLENOL) 500 mg tablet Take 1,000 mg by mouth every six (6) hours as needed for Pain.  cetirizine (ZYRTEC) 10 mg tablet Take 10 mg by mouth daily as needed.  calcium-cholecalciferol, D3, (CALTRATE 600+D) tablet Take 1 Tab by mouth two (2) times a day. (Patient not taking: Reported on 12/14/2020) 60 Tab 11    oxymetazoline (VICKS SINEX 12-HOUR) 0.05 % nasal spray 1 [place] by Both Nostrils route two (2) times daily as needed for Congestion. No Known Allergies    Family History   Problem Relation Age of Onset   [place] COPD Mother     Heart Attack Mother     Hypertension Mother     Heart Disease Mother     COPD Father     Heart Attack Father     Hypertension Father     Heart Disease Father     Cancer Father     Cancer Maternal Grandmother      Social History     Tobacco Use    Smoking status: Never Smoker    Smokeless tobacco: Never Used   Substance Use Topics    Alcohol use:  Yes     Alcohol/week: 0.0 standard drinks     Comment: 12 beers, half a fifth a month

## 2020-12-14 NOTE — PATIENT INSTRUCTIONS
Medicare Wellness Visit, Male The best way to live healthy is to have a lifestyle where you eat a well-balanced diet, exercise regularly, limit alcohol use, and quit all forms of tobacco/nicotine, if applicable. Regular preventive services are another way to keep healthy. Preventive services (vaccines, screening tests, monitoring & exams) can help personalize your care plan, which helps you manage your own care. Screening tests can find health problems at the earliest stages, when they are easiest to treat. [facility] follows the current, evidence-based guidelines published by the [facility] ([facility]) when recommending preventive services for our patients. Because we follow these guidelines, sometimes recommendations change over time as research supports it. (For example, a prostate screening blood test is no longer routinely recommended for men with no symptoms). Of course, you and your doctor may decide to screen more often for some diseases, based on your risk and co-morbidities (chronic disease you are already diagnosed with). Preventive services for you include: - Medicare offers their members a free annual wellness visit, which is time for you and your primary care provider to discuss and plan for your preventive service needs. Take advantage of this benefit every year! 
-All adults over age 72 should receive the recommended pneumonia vaccines. Current USPSTF guidelines recommend a series of two vaccines for the best pneumonia protection.  
-All adults should have a flu vaccine yearly and tetanus vaccine every 10 years. 
-All adults age 48 and older should receive the shingles vaccines (series of two vaccines).       
-All adults age 38-68 who are overweight should have a diabetes screening test once every three years.  
-Other screening tests & preventive services for persons with diabetes include: an eye exam to screen for diabetic retinopathy, a kidney function test, a foot exam, and stricter control over your cholesterol.  
-Cardiovascular screening for adults with routine risk involves an electrocardiogram (ECG) at intervals determined by the provider.  
-Colorectal cancer screening should be done for adults age 54-65 with no increased risk factors for colorectal cancer. There are a number of acceptable methods of screening for this type of cancer. Each test has its own benefits and drawbacks. Discuss with your provider what is most appropriate for you during your annual wellness visit. The different tests include: colonoscopy (considered the best screening method), a fecal occult blood test, a fecal DNA test, and sigmoidoscopy. 
-All adults born between [facility] should be screened once for Hepatitis C. 
-An Abdominal Aortic Aneurysm (AAA) Screening is recommended for men age 73-68 who has ever smoked in their lifetime. Here is a list of your current Health Maintenance items (your personalized list of preventive services) with a due date: 
Health Maintenance Due Topic Date Due  Shingles Vaccine (1 of 2) 05/15/1994  Glaucoma Screening   12/07/2019 [facility] Annual Well Visit  06/04/2020  Cholesterol Test   06/04/2020  Yearly Flu Vaccine (1) 09/01/2020 Colon Cancer Screening: Care Instructions Your Care Instructions Colorectal cancer occurs in the colon or rectum. That's the lower part of your digestive system. It is the second-leading cause of cancer deaths in the [place]. It often starts with small growths called polyps in the colon or rectum. Polyps are usually found with screening tests. Depending on the type of test, any polyps found may be removed during the tests.  
Colorectal cancer usually does not cause symptoms at first. But regular tests can help find it early, before it spreads and becomes harder to treat. Experts advise routine tests for colon cancer for people starting at age 48. And they advise people with a higher risk of colon cancer to get tested sooner. Talk with your doctor about when you should start testing. Discuss which tests you need. Follow-up care is a key part of your treatment and safety. Be sure to make and go to all appointments, and call your doctor if you are having problems. It's also a good idea to know your test results and keep a list of the medicines you take. What are the main screening tests for colon cancer? · Stool tests. These include the fecal immunochemical test (FIT) and the fecal occult blood test (FOBT). These tests check stool samples for signs of cancer. If your test is positive, you will need to have a colonoscopy. · Sigmoidoscopy. This test lets your doctor look at the lining of your rectum and the lowest part of your colon. Your doctor uses a lighted tube called a sigmoidoscope. This test can't find cancers or polyps in the upper part of your colon. In some cases, polyps that are found can be removed. But if your doctor finds polyps, you will need to have a colonoscopy to check the upper part of your colon. · Colonoscopy. This test lets your doctor look at the lining of your rectum and your entire colon. The doctor uses a thin, flexible tool called a colonoscope. It can also be used to remove polyps or get a tissue sample (biopsy). What tests do you need? The following guidelines are for people age 48 and over who are not at high risk for colorectal cancer. You may have at least one of these tests as directed by your doctor. · Fecal immunochemical test (FIT) or fecal occult blood test (FOBT) every year · Sigmoidoscopy every 5 years · Colonoscopy every 10 years If you are age 68 to 80, you can work with your doctor to decide if screening is a good option. If you are age 80 or older, your doctor will likely advise that screening is not helpful. Talk with your doctor about when you need to be tested. And discuss which tests are right for you. Your doctor may recommend earlier or more frequent testing if you: 
· Have had colorectal cancer before. · Have had colon polyps. · Have symptoms of colorectal cancer. These include blood in your stool and changes in your bowel habits. · Have a parent, brother or sister, or child with colon polyps or colorectal cancer. · Have a bowel disease. This includes ulcerative colitis and Crohn's disease. · Have a rare polyp syndrome that runs in families, such as familial adenomatous polyposis (FAP). · Have had radiation treatments to the belly or pelvis. When should you call for help? Watch closely for changes in your health, and be sure to contact your doctor if: 
  · You have any changes in your bowel habits.  
  · You have any problems. Where can you learn more? Go to http://www.gray.com/. Enter M541 in the search box to learn more about \"Colon Cancer Screening: Care Instructions. \" Current as of: March 28, 2018 Content Version: 11.8 © 2856-6933 [employer]. Care instructions adapted under license by [employer] (which disclaims liability or warranty for this information). If you have questions about a medical condition or this instruction, always ask your healthcare professional. [place] [address] any warranty or liability for your use of this information. Ascencion Benavidez 5163 What is a living will? A living will is a legal form you use to write down the kind of care you want at the end of your life. It is used by the health professionals who will treat you if you aren't able to decide for yourself. If you put your wishes in writing, your loved ones and others will know what kind of care you want. They won't need to guess. This can ease your mind and be helpful to others. A living will is not the same as an estate or property will. An estate will explains what you want to happen with your money and property after you die. Is a living will a legal document? A living will is a legal document. Each state has its own laws about living faria. If you move to another state, make sure that your living will is legal in the state where you now live. Or you might use a universal form that has been approved by many states. This kind of form can sometimes be completed and stored online. Your electronic copy will then be available wherever you have a connection to the Internet. In most cases, doctors will respect your wishes even if you have a form from a different state. · You don't need an  to complete a living will. But legal advice can be helpful if your state's laws are unclear, your health history is complicated, or your family can't agree on what should be in your living will. · You can change your living will at any time. Some people find that their wishes about end-of-life care change as their health changes. · In addition to making a living will, think about completing a medical power of  form. This form lets you name the person you want to make end-of-life treatment decisions for you (your \"health care agent\") if you're not able to. Many hospitals and nursing homes will give you the forms you need to complete a living will and a medical power of . · Your living will is used only if you can't make or communicate decisions for yourself anymore. If you become able to make decisions again, you can accept or refuse any treatment, no matter what you wrote in your living will. · Your state may offer an online registry. This is a place where you can store your living will online so the doctors and nurses who need to treat you can find it right away. What should you think about when creating a living will? Talk about your end-of-life wishes with your family members and your doctor. Let them know what you want. That way the people making decisions for you won't be surprised by your choices. Think about these questions as you make your living will: · Do you know enough about life support methods that might be used? If not, talk to your doctor so you know what might be done if you can't breathe on your own, your heart stops, or you're unable to swallow. · What things would you still want to be able to do after you receive life-support methods? Would you want to be able to walk? To speak? To eat on your own? To live without the help of machines? · If you have a choice, where do you want to be cared for? In your home? At a hospital or nursing home? · Do you want certain [employer]ist practices performed if you become very ill? · If you have a choice at the end of your life, where would you prefer to die? At home? In a hospital or nursing home? Somewhere else? · Would you prefer to be buried or cremated? · Do you want your organs to be donated after you die? What should you do with your living will? · Make sure that your family members and your health care agent have copies of your living will. · Give your doctor a copy of your living will to keep in your medical record. If you have more than one doctor, make sure that each one has a copy. · You may want to put a copy of your living will where it can be easily found. Where can you learn more? Go to http://www.gray.com/. Enter Z456 in the search box to learn more about \"Learning About Living Perrokishore. \" Current as of: August 8, 2016 Content Version: 11.3 © 7242-8750 [employer]. Care instructions adapted under license by [employer] (which disclaims liability or warranty for this information).  If you have questions about a medical condition or this instruction, always ask your healthcare professional. [employer] disclaims any warranty or liability for your use of this information. Preventing Falls: Care Instructions Your Care Instructions Getting around your home safely can be a challenge if you have injuries or health problems that make it easy for you to fall. Loose rugs and furniture in walkways are among the dangers for many older people who have problems walking or who have poor eyesight. People who have conditions such as arthritis, osteoporosis, or dementia also have to be careful not to fall. You can make your home safer with a few simple measures. Follow-up care is a key part of your treatment and safety. Be sure to make and go to all appointments, and call your doctor if you are having problems. It's also a good idea to know your test results and keep a list of the medicines you take. How can you care for yourself at home? Taking care of yourself · You may get dizzy if you do not drink enough water. To prevent dehydration, drink plenty of fluids, enough so that your urine is light yellow or clear like water. Choose water and other caffeine-free clear liquids. If you have kidney, heart, or liver disease and have to limit fluids, talk with your doctor before you increase the amount of fluids you drink. · Exercise regularly to improve your strength, muscle tone, and balance. Walk if you can. Swimming may be a good choice if you cannot walk easily. · Have your vision and hearing checked each year or any time you notice a change. If you have trouble seeing and hearing, you might not be able to avoid objects and could lose your balance. · Know the side effects of the medicines you take. Ask your doctor or pharmacist whether the medicines you take can affect your balance. Sleeping pills or sedatives can affect your balance. · Limit the amount of alcohol you drink. Alcohol can impair your balance and other senses.  
· Ask your doctor whether calluses or corns on your feet need to be removed. If you wear loose-fitting shoes because of calluses or corns, you can lose your balance and fall. · Talk to your doctor if you have numbness in your feet. Preventing falls at home · Remove raised doorway thresholds, throw rugs, and clutter. Repair loose carpet or raised areas in the floor. · Move furniture and electrical cords to keep them out of walking paths. · Use nonskid floor wax, and wipe up spills right away, especially on ceramic tile floors. · If you use a walker or cane, put rubber tips on it. If you use crutches, clean the bottoms of them regularly with an abrasive pad, such as steel wool. · Keep your house well lit, especially [place] [employer], and outside walkways. Use night-lights in areas such as hallways and bathrooms. Add extra light switches or use remote switches (such as switches that go on or off when you clap your hands) to make it easier to turn lights on if you have to get up during the night. · Install sturdy handrails on stairways. · Move items in your cabinets so that the things you use a lot are on the lower shelves (about waist level). · Keep a cordless phone and a flashlight with new batteries by your bed. If possible, put a phone in each of the main rooms of your house, or carry a cell phone in case you fall and cannot reach a phone. Or, you can wear a device around your neck or wrist. You push a button that sends a signal for help. · Wear low-heeled shoes that fit well and give your feet good support. Use footwear with nonskid soles. Check the heels and soles of your shoes for wear. Repair or replace worn heels or soles. · Do not wear socks without shoes on wood floors. · Walk on the grass when the sidewalks are slippery. If you live in an area that gets snow and ice in the winter, sprinkle salt on slippery steps and sidewalks. Preventing falls in the bath · Install grab bars and nonskid mats inside and outside your shower or tub and near the toilet and sinks. · Use shower chairs and bath benches. · Use a hand-held shower head that will allow you to sit while showering. · Get into a tub or shower by putting the weaker leg in first. Get out of a tub or shower with your strong side first. 
· Repair loose toilet seats and consider installing a raised toilet seat to make getting on and off the toilet easier. · Keep your bathroom door unlocked while you are in the shower. Where can you learn more? Go to http://www.velez.com/. Enter 0476 [id number] in the search box to learn more about \"Preventing Falls: Care Instructions. \" Current as of: March 16, 2018 Content Version: 11.8 © 6485-9012 [employer]. Care instructions adapted under license by [employer] (which disclaims liability or warranty for this information). If you have questions about a medical condition or this instruction, always ask your healthcare professional. [place] [address] any warranty or liability for your use of this information.

## 2020-12-18 LAB
ALBUMIN SERPL-MCNC: 4.7 G/DL (ref 3.7–4.7)
ALBUMIN/GLOB SERPL: 2 {RATIO} (ref 1.2–2.2)
ALP SERPL-CCNC: 98 IU/L (ref 39–117)
ALT SERPL-CCNC: 35 IU/L (ref 0–44)
APPEARANCE UR: CLEAR
AST SERPL-CCNC: 28 IU/L (ref 0–40)
BILIRUB SERPL-MCNC: 1 MG/DL (ref 0–1.2)
BILIRUB UR QL STRIP: NEGATIVE
BUN SERPL-MCNC: 13 MG/DL (ref 8–27)
BUN/CREAT SERPL: 12 (ref 10–24)
CALCIUM SERPL-MCNC: 10.2 MG/DL (ref 8.6–10.2)
CHLORIDE SERPL-SCNC: 104 MMOL/L (ref 96–106)
CHOLEST SERPL-MCNC: 143 MG/DL (ref 100–199)
CO2 SERPL-SCNC: 24 MMOL/L (ref 20–29)
COLOR UR: YELLOW
CREAT SERPL-MCNC: 1.1 MG/DL (ref 0.76–1.27)
ERYTHROCYTE [DISTWIDTH] IN BLOOD BY AUTOMATED COUNT: 12.2 % (ref 11.6–15.4)
GLOBULIN SER CALC-MCNC: 2.3 G/DL (ref 1.5–4.5)
GLUCOSE SERPL-MCNC: 128 MG/DL (ref 65–99)
GLUCOSE UR QL: NEGATIVE
HCT VFR BLD AUTO: 38.6 % (ref 37.5–51)
HDLC SERPL-MCNC: 48 MG/DL
HGB BLD-MCNC: 13.1 G/DL (ref 13–17.7)
HGB UR QL STRIP: NEGATIVE
KETONES UR QL STRIP: NEGATIVE
LDLC SERPL CALC-MCNC: 68 MG/DL (ref 0–99)
LEUKOCYTE ESTERASE UR QL STRIP: NEGATIVE
MCH RBC QN AUTO: 32.5 PG (ref 26.6–33)
MCHC RBC AUTO-ENTMCNC: 33.9 G/DL (ref 31.5–35.7)
MCV RBC AUTO: 96 FL (ref 79–97)
MICRO URNS: NORMAL
NITRITE UR QL STRIP: NEGATIVE
PH UR STRIP: 5 [PH] (ref 5–7.5)
PLATELET # BLD AUTO: 235 X10E3/UL (ref 150–450)
POTASSIUM SERPL-SCNC: 4 MMOL/L (ref 3.5–5.2)
PROT SERPL-MCNC: 7 G/DL (ref 6–8.5)
PROT UR QL STRIP: NEGATIVE
RBC # BLD AUTO: 4.03 X10E6/UL (ref 4.14–5.8)
SODIUM SERPL-SCNC: 143 MMOL/L (ref 134–144)
SP GR UR: 1.02 (ref 1–1.03)
TRIGL SERPL-MCNC: 157 MG/DL (ref 0–149)
TSH SERPL DL<=0.005 MIU/L-ACNC: 1.6 UIU/ML (ref 0.45–4.5)
UROBILINOGEN UR STRIP-MCNC: 0.2 MG/DL (ref 0.2–1)
VIT B6 SERPL-MCNC: 61.3 UG/L (ref 5.3–46.7)
VLDLC SERPL CALC-MCNC: 27 MG/DL (ref 5–40)
WBC # BLD AUTO: 6.5 X10E3/UL (ref 3.4–10.8)

## 2020-12-18 NOTE — PROGRESS NOTES
HISTORY OF PRESENT ILLNESS  Orly Weeks is a 67 y.o. male. Knee pain  The history is provided by the patient. This is a chronic problem. Episode onset: 6 yrs ago,  the patient is not able to do any walk for >1-2 blocks, using the walker for the mobility , the pain worsens with working mopping, and any going up and down the steps and it is becoming to be constantly regardless the pain intensity has changed and worsening since onset. Patient states that it is a dull nonradiating no numbness no tingling sensation disabling, morning stiffness which gets better with activity and with the severity of 8/10. in addition stating that current pain is also aggravated by movement and palpation. So for pt denies any history of extremity trauma,      b12 def    previously patient was taking oral B12 and patient states that the oral medication was not helping, the injection has been providing the patient more stamina patient is also feeling less fatigued more active, no metformin no etoh intake, on ++ multivitamin daily        Current Outpatient Medications   Medication Sig Dispense Refill    cholecalciferol (VITAMIN D3) (1000 Units /25 mcg) tablet Take 1 Tab by mouth daily. 30 Tab 5    diclofenac (VOLTAREN) 1 % gel Apply 2 g to affected area every six (6) hours. 150 g 4    pyridoxine, vitamin B6, (Vitamin B-6) 25 mg tablet Take 1 Tab by mouth daily. 90 Tab 2    traMADoL (ULTRAM) 50 mg tablet Take 2 Tabs by mouth two (2) times daily as needed for Pain for up to 30 days. Max Daily Amount: 200 mg. 60 Tab 1    pantoprazole (PROTONIX) 40 mg tablet Take 1 Tab by mouth as needed for Nausea. 90 Tab 3    atorvastatin (LIPITOR) 40 mg tablet TAKE 1 TABLET NIGHTLY 90 Tab 3    famotidine (PEPCID) 20 mg tablet Take 1 Tab by mouth two (2) times a day. 60 Tab 3    albuterol (PROVENTIL HFA, VENTOLIN HFA, PROAIR HFA) 90 mcg/actuation inhaler Take 1 Puff by inhalation every four (4) hours as needed for Wheezing.  1 Inhaler 1    tamsulosin (FLOMAX) 0.4 mg capsule Take 2 Caps by mouth daily. TAKE 1 CAPSULE DAILY 180 Cap 3    clopidogrel (PLAVIX) 75 mg tab TAKE 1 TABLET DAILY 90 Tab 4    lisinopril (PRINIVIL, ZESTRIL) 30 mg tablet TAKE 1 TABLET DAILY (Patient taking differently: 15 mg.) 90 Tab 4    aspirin delayed-release 81 mg tablet Take 1 Tab by mouth daily. 30 Tab 12    acetaminophen (TYLENOL) 500 mg tablet Take 1,000 mg by mouth every six (6) hours as needed for Pain.  calcium-cholecalciferol, D3, (CALTRATE 600+D) tablet Take 1 Tab by mouth two (2) times a day.  (Patient not taking: Reported on 12/14/2020) 60 Tab 11     No Known Allergies  Past Medical History:   Diagnosis Date    Abdominal wall hernia 11/16/2017    Anemia 9/10/2014    Back pain 4/1/2010    Blurry vision, bilateral 11/14/2017    CAD (coronary artery disease)     Calf pain 3/11/2015    Chronic kidney disease     cyst on kidney    Chronic left shoulder pain 10/24/2016    Constipation 6/11/2014    Coronary artery disease involving native coronary artery of native heart without angina pectoris 6/4/2019    Decreased vision 6/13/2011    Diastasis recti 11/16/2017    Diverticulitis 3/11/2015    Dizziness 11/14/2017    Enlarged LA (left atrium)     Fall at home 3/17/2016    GERD (gastroesophageal reflux disease)     Hemorrhoids 9/10/2014    Hip pain, chronic, right 10/3/2017    HTN (hypertension) 8/24/2010    Hx-TIA (transient ischemic attack)     Hypercholesterolemia 4/1/2010    Hypertension     Irregular heart beat 9/23/2015    Kidney disease     Mitral regurgitation     Need for varicella vaccine 9/10/2014    Orthostatic lightheadedness 1/16/2018    Other ill-defined conditions(799.89)     heart murmur    Patient is full code 6/4/2018    Rash 8/24/2010    Rib pain on right side 3/17/2016    Right calf pain 3/11/2015    Risk for falls 4/4/2014    S/P knee replacement 6/11/2014    Screening for depression 4/4/2014    Tinea versicolor 9/10/2014    Tinnitus of left ear 1/16/2018    Urinary frequency 8/24/2010    Vitamin B12 deficiency neuropathy ([facility] [address] 7/17/2019     Past Surgical History:   Procedure Laterality Date    CARDIAC SURG PROCEDURE UNLIST      heart cath    HX HEENT      nasal septum repair    HX HEENT      HX HERNIA REPAIR      HX KNEE REPLACEMENT Left     HX ORTHOPAEDIC      left knee surgery    HX ORTHOPAEDIC      HX TONSILLECTOMY  age 5   [de-identified] UROLOGICAL      VT COLONOSCOPY FLX DX W/COLLJ Donna 1978 PFRMD  4/18/2011          Family History   Problem Relation Age of Onset    COPD Mother     Heart Attack Mother     Hypertension Mother     Heart Disease Mother     COPD Father     Heart Attack Father     Hypertension Father     Heart Disease Father     Cancer Father     Cancer Maternal Grandmother      Social History     Tobacco Use    Smoking status: Never Smoker    Smokeless tobacco: Never Used   Substance Use Topics    Alcohol use: Yes     Alcohol/week: 0.0 standard drinks     Comment: 12 beers, half a fifth a month      Lab Results   Component Value Date/Time    WBC 6.5 12/14/2020 01:43 PM    HGB 13.1 12/14/2020 01:43 PM    HCT 38.6 12/14/2020 01:43 PM    PLATELET 350 35/71/9216 01:43 PM    MCV 96 12/14/2020 01:43 PM     Lab Results   Component Value Date/Time    TSH 1.600 12/14/2020 01:43 PM         Review of Systems   Constitutional: Negative for chills, fever and malaise/fatigue. HENT: Negative for nosebleeds. Eyes: Negative for pain. Respiratory: Negative for cough and wheezing. Cardiovascular: Negative for chest pain and leg swelling. Gastrointestinal: Negative for constipation, diarrhea and nausea. Genitourinary: Negative for frequency. Musculoskeletal: Positive for back pain, joint pain and myalgias. Skin: Negative for rash. Neurological: Negative for loss of consciousness and headaches. Endo/Heme/Allergies: Does not bruise/bleed easily.    Psychiatric/Behavioral: Negative for depression. The patient has insomnia. The patient is not nervous/anxious. All other systems reviewed and are negative. Physical Exam  Vitals signs and nursing note reviewed. Constitutional:       Appearance: He is well-developed. HENT:      Head: Normocephalic and atraumatic. Mouth/Throat:      Pharynx: No oropharyngeal exudate. Eyes:      Conjunctiva/sclera: Conjunctivae normal.      Pupils: Pupils are equal, round, and reactive to light. Neck:      Musculoskeletal: Normal range of motion and neck supple. Thyroid: No thyromegaly. Vascular: No JVD. Cardiovascular:      Rate and Rhythm: Normal rate and regular rhythm. Heart sounds: Normal heart sounds. No murmur. No friction rub. Pulmonary:      Effort: Pulmonary effort is normal. No respiratory distress. Breath sounds: Normal breath sounds. No wheezing or rales. Abdominal:      General: Bowel sounds are normal. There is no distension. Palpations: Abdomen is soft. Tenderness: There is no abdominal tenderness. There is no rebound. Musculoskeletal:         General: Tenderness present. Right knee: He exhibits decreased range of motion and swelling. Tenderness found. Left knee: He exhibits decreased range of motion and abnormal alignment. He exhibits normal meniscus and no MCL laxity. Tenderness found. Patellar tendon tenderness noted. No medial joint line, no lateral joint line, no MCL and no LCL tenderness noted. Thoracic back: He exhibits decreased range of motion, tenderness, pain and spasm. Lumbar back: He exhibits decreased range of motion, tenderness, bony tenderness, pain and spasm. Left hand: He exhibits no tenderness. Left foot: Normal range of motion and normal capillary refill. No tenderness, bony tenderness or swelling. Lymphadenopathy:      Cervical: No cervical adenopathy. Skin:     General: Skin is warm. Findings: No erythema or rash.    Neurological: Mental Status: He is alert and oriented to person, place, and time. Deep Tendon Reflexes: Reflexes are normal and symmetric. Psychiatric:         Behavior: Behavior normal.         ASSESSMENT and PLAN  Diagnoses and all orders for this visit:    1. Medicare annual wellness visit, subsequent    2. Vitamin B6 induced neuropathy (HCC)  -     pyridoxine, vitamin B6, (Vitamin B-6) 25 mg tablet; Take 1 Tab by mouth daily. -     VITAMIN B12 INJECTION  -     THER/PROPH/DIAG INJECTION, SUBCUT/IM  -     VITAMIN B6    3. Advanced directives, counseling/discussion  -     FULL CODE    4. Screening for depression  -     DEPRESSION SCREEN ANNUAL    5. Chronic pain of left knee  -     traMADoL (ULTRAM) 50 mg tablet; Take 2 Tabs by mouth two (2) times daily as needed for Pain for up to 30 days. Max Daily Amount: 200 mg.  -     CBC W/O DIFF; Future  -     LIPID PANEL; Future  -     METABOLIC PANEL, COMPREHENSIVE; Future  -     TSH 3RD GENERATION; Future  -     URINALYSIS W/ RFLX MICROSCOPIC; Future    6. Chronic left shoulder pain  -     traMADoL (ULTRAM) 50 mg tablet; Take 2 Tabs by mouth two (2) times daily as needed for Pain for up to 30 days. Max Daily Amount: 200 mg.  -     CBC W/O DIFF; Future  -     LIPID PANEL; Future  -     METABOLIC PANEL, COMPREHENSIVE; Future  -     TSH 3RD GENERATION; Future  -     URINALYSIS W/ RFLX MICROSCOPIC; Future    7. Screen for colon cancer  -     COLOGUARD TEST (FECAL DNA COLORECTAL CANCER SCREENING)    8. Gastroesophageal reflux disease without esophagitis  -     pantoprazole (PROTONIX) 40 mg tablet; Take 1 Tab by mouth as needed for Nausea. -     VITAMIN B6; Future    9. B12 deficiency    Other orders  -     cholecalciferol (VITAMIN D3) (1000 Units /25 mcg) tablet; Take 1 Tab by mouth daily. -     diclofenac (VOLTAREN) 1 % gel; Apply 2 g to affected area every six (6) hours. -     cyanocobalamin (Vitamin B-12) 1,000 mcg/mL injection; 1 mL by IntraMUSCular route once for 1 dose.   - METABOLIC PANEL, COMPREHENSIVE  -     URINALYSIS W/ RFLX MICROSCOPIC  -     CBC W/O DIFF  -     LIPID PANEL  -     TSH 3RD GENERATION  -     VITAMIN B6      Follow-up and Dispositions    · Return if symptoms worsen or fail to improve.

## 2020-12-29 ENCOUNTER — OFFICE VISIT (OUTPATIENT)
Dept: CARDIOLOGY CLINIC | Age: 75
End: 2020-12-29
Payer: MEDICARE

## 2020-12-29 VITALS
SYSTOLIC BLOOD PRESSURE: 110 MMHG | HEIGHT: 74 IN | WEIGHT: 227.2 LBS | OXYGEN SATURATION: 98 % | DIASTOLIC BLOOD PRESSURE: 76 MMHG | RESPIRATION RATE: 16 BRPM | HEART RATE: 58 BPM | BODY MASS INDEX: 29.16 KG/M2

## 2020-12-29 DIAGNOSIS — E78.00 HYPERCHOLESTEROLEMIA: ICD-10-CM

## 2020-12-29 DIAGNOSIS — R06.09 DOE (DYSPNEA ON EXERTION): ICD-10-CM

## 2020-12-29 DIAGNOSIS — I51.7 ENLARGED LA (LEFT ATRIUM): ICD-10-CM

## 2020-12-29 DIAGNOSIS — I34.0 NON-RHEUMATIC MITRAL REGURGITATION: ICD-10-CM

## 2020-12-29 DIAGNOSIS — I10 ESSENTIAL HYPERTENSION: ICD-10-CM

## 2020-12-29 DIAGNOSIS — I10 ESSENTIAL HYPERTENSION: Primary | ICD-10-CM

## 2020-12-29 PROCEDURE — 1101F PT FALLS ASSESS-DOCD LE1/YR: CPT | Performed by: INTERNAL MEDICINE

## 2020-12-29 PROCEDURE — G8754 DIAS BP LESS 90: HCPCS | Performed by: INTERNAL MEDICINE

## 2020-12-29 PROCEDURE — G8536 NO DOC ELDER MAL SCRN: HCPCS | Performed by: INTERNAL MEDICINE

## 2020-12-29 PROCEDURE — G8419 CALC BMI OUT NRM PARAM NOF/U: HCPCS | Performed by: INTERNAL MEDICINE

## 2020-12-29 PROCEDURE — G8432 DEP SCR NOT DOC, RNG: HCPCS | Performed by: INTERNAL MEDICINE

## 2020-12-29 PROCEDURE — 99214 OFFICE O/P EST MOD 30 MIN: CPT | Performed by: INTERNAL MEDICINE

## 2020-12-29 PROCEDURE — G8427 DOCREV CUR MEDS BY ELIG CLIN: HCPCS | Performed by: INTERNAL MEDICINE

## 2020-12-29 PROCEDURE — 93000 ELECTROCARDIOGRAM COMPLETE: CPT | Performed by: INTERNAL MEDICINE

## 2020-12-29 PROCEDURE — G8752 SYS BP LESS 140: HCPCS | Performed by: INTERNAL MEDICINE

## 2020-12-29 RX ORDER — BISMUTH SUBSALICYLATE 262 MG
1 TABLET,CHEWABLE ORAL DAILY
COMMUNITY
End: 2021-06-02 | Stop reason: ALTCHOICE

## 2020-12-29 NOTE — PROGRESS NOTES
Subjective/HPI:     Mr. Elissa Islas is a 67 y.o. male is here for routine f/u. He has a PMHx of mitral regurgitation, HTN and HLD. He denies complaints of chest pains, dizziness, orthopnea, PND or edema. He denies palpitation symptoms. He reports shortness of breath has worsened in the past 2-3 months. He now needs to take rest breaks with his normal activities like going up stairs. He is always active, goes out on his land, hunts. However, this has been more challenging recently. He had an episode of \"bloating\" recently. Saw PCP who placed him on abx and changed up his antacid with improvement.         PCP Provider  Rajesh Sultana MD  Past Medical History:   Diagnosis Date    Abdominal wall hernia 11/16/2017    Anemia 9/10/2014    Back pain 4/1/2010    Blurry vision, bilateral 11/14/2017    CAD (coronary artery disease)     Calf pain 3/11/2015    Chronic kidney disease     cyst on kidney    Chronic left shoulder pain 10/24/2016    Constipation 6/11/2014    Coronary artery disease involving native coronary artery of native heart without angina pectoris 6/4/2019    Decreased vision 6/13/2011    Diastasis recti 11/16/2017    Diverticulitis 3/11/2015    Dizziness 11/14/2017    Enlarged LA (left atrium)     Fall at home 3/17/2016    GERD (gastroesophageal reflux disease)     Hemorrhoids 9/10/2014    Hip pain, chronic, right 10/3/2017    HTN (hypertension) 8/24/2010    Hx-TIA (transient ischemic attack)     Hypercholesterolemia 4/1/2010    Hypertension     Irregular heart beat 9/23/2015    Kidney disease     Mitral regurgitation     Need for varicella vaccine 9/10/2014    Orthostatic lightheadedness 1/16/2018    Other ill-defined conditions(799.89)     heart murmur    Patient is full code 6/4/2018    Rash 8/24/2010    Rib pain on right side 3/17/2016    Right calf pain 3/11/2015    Risk for falls 4/4/2014    S/P knee replacement 6/11/2014    Screening for depression 4/4/2014    Tinea versicolor 9/10/2014    Tinnitus of left ear 1/16/2018    Urinary frequency 8/24/2010    Vitamin B12 deficiency neuropathy ([facility] [address] 7/17/2019      Past Surgical History:   Procedure Laterality Date    CARDIAC SURG PROCEDURE UNLIST      heart cath    HX HEENT      nasal septum repair    HX HEENT      HX HERNIA REPAIR      HX KNEE REPLACEMENT Left     HX ORTHOPAEDIC      left knee surgery    HX ORTHOPAEDIC      HX TONSILLECTOMY  age 5   [facility] UR[facility]      UT COLONOSCOPY FLX DX W/COLLJ Donna 1978 PFRMD  4/18/2011          Family History   Problem Relation Age of Onset    COPD Mother     Heart Attack Mother     Hypertension Mother     Heart Disease Mother     COPD Father     Heart Attack Father     Hypertension Father     Heart Disease Father     Cancer Father     Cancer Maternal Grandmother      Social History     Socioeconomic History    Marital status:      Spouse name: Not on file    Number of children: Not on file    Years of education: Not on file    Highest education level: Not on file   Occupational History    Not on file   Social Needs    Financial resource strain: Not on file    Food insecurity     Worry: Not on file     Inability: Not on file    Transportation needs     Medical: Not on file     Non-medical: Not on file   Tobacco Use    Smoking status: Never Smoker    Smokeless tobacco: Never Used   Substance and Sexual Activity    Alcohol use: Not Currently     Comment: not since 2 years ago    Drug use: No    Sexual activity: Not Currently   Lifestyle    Physical activity     Days per week: Not on file     Minutes per session: Not on file    Stress: Not on file   Relationships    Social connections     Talks on phone: Not on file     Gets together: Not on file     Attends Nondenominational service: Not on file     Active member of club or organization: Not on file     Attends meetings of clubs or organizations: Not on file     Relationship status: Not on file    Intimate partner violence     Fear of current or ex partner: Not on file     Emotionally abused: Not on file     Physically abused: Not on file     Forced sexual activity: Not on file   Other Topics Concern    Not on file   Social History Narrative    ** Merged History Encounter **            No Known Allergies     Current Outpatient Medications   Medication Sig    multivitamin (ONE A DAY) tablet Take 1 Tab by mouth daily.  diclofenac (VOLTAREN) 1 % gel Apply 2 g to affected area every six (6) hours.  pyridoxine, vitamin B6, (Vitamin B-6) 25 mg tablet Take 1 Tab by mouth daily.  traMADoL (ULTRAM) 50 mg tablet Take 2 Tabs by mouth two (2) times daily as needed for Pain for up to 30 days. Max Daily Amount: 200 mg.  pantoprazole (PROTONIX) 40 mg tablet Take 1 Tab by mouth as needed for Nausea.  atorvastatin (LIPITOR) 40 mg tablet TAKE 1 TABLET NIGHTLY    albuterol (PROVENTIL HFA, VENTOLIN HFA, PROAIR HFA) 90 mcg/actuation inhaler Take 1 Puff by inhalation every four (4) hours as needed for Wheezing.  tamsulosin (FLOMAX) 0.4 mg capsule Take 2 Caps by mouth daily. TAKE 1 CAPSULE DAILY    clopidogrel (PLAVIX) 75 mg tab TAKE 1 TABLET DAILY    lisinopril (PRINIVIL, ZESTRIL) 30 mg tablet TAKE 1 TABLET DAILY (Patient taking differently: 15 mg.)    aspirin delayed-release 81 mg tablet Take 1 Tab by mouth daily.  acetaminophen (TYLENOL) 500 mg tablet Take 1,000 mg by mouth every six (6) hours as needed for Pain.  cholecalciferol (VITAMIN D3) (1000 Units /25 mcg) tablet Take 1 Tab by mouth daily.  famotidine (PEPCID) 20 mg tablet Take 1 Tab by mouth two (2) times a day. No current facility-administered medications for this visit. I have reviewed the problem list, allergy list, medical history, family, social history and medications. Review of Symptoms:    Review of Systems   Constitutional: Negative for chills, fever and weight loss.    HENT: Negative for nosebleeds. Eyes: Negative for blurred vision and double vision. Respiratory: Positive for shortness of breath. Negative for cough and wheezing. Cardiovascular: Negative for chest pain, palpitations, orthopnea, leg swelling and PND. Gastrointestinal: Negative for abdominal pain, blood in stool, diarrhea, nausea and vomiting. Musculoskeletal: Negative for joint pain. Skin: Negative for rash. Neurological: Negative for dizziness, tingling and loss of consciousness. Endo/Heme/Allergies: Does not bruise/bleed easily. Physical Exam:      General: Well developed, in no acute distress, cooperative and alert  HEENT: No carotid bruits, no JVD, trach is midline. Neck Supple, PEERL, EOM intact. Heart:  reg rate and rhythm; normal S1/S2; no murmurs, gallops or rubs. Respiratory: Clear bilaterally x 4, no wheezing or rales  Abdomen:   Soft, non-tender, no distention, no masses. + BS. Extremities:  Normal cap refill, no cyanosis, atraumatic. No edema. Neuro: A&Ox3, speech clear, gait stable. Skin: Skin color is normal. No rashes or lesions.  Non diaphoretic  Vascular: 2+ pulses symmetric in all extremities    Vitals:    12/29/20 0905 12/29/20 0925   BP: 120/72 110/76   Pulse: (!) 58    Resp: 16    SpO2: 98%    Weight: 227 lb 3.2 oz (103.1 kg)    Height: 6' 2\" (1.88 m)        Cardiographics    ECG: sinus rhythm      Results for orders placed or performed during the hospital encounter of 11/14/17   EKG, 12 LEAD, INITIAL   Result Value Ref Range    Ventricular Rate 66 BPM    Atrial Rate 66 BPM    P-R Interval 198 ms    QRS Duration 94 ms    Q-T Interval 398 ms    QTC Calculation (Bezet) 417 ms    Calculated P Axis 36 degrees    Calculated R Axis 23 degrees    Calculated T Axis 24 degrees    Diagnosis       Normal sinus rhythm      Confirmed by Kendra Ta ([id number]) on 11/15/2017 4:27:41 PM         Cardiology Labs:  Lab Results   Component Value Date/Time    Cholesterol, total 143 12/14/2020 01:43 PM    HDL Cholesterol 48 12/14/2020 01:43 PM    LDL, calculated 68 12/14/2020 01:43 PM    LDL, calculated 72 06/04/2019 09:26 AM    Triglyceride 157 (H) 12/14/2020 01:43 PM    CHOL/HDL Ratio 3.0 11/15/2017 02:48 AM       Lab Results   Component Value Date/Time    Sodium 143 12/14/2020 01:43 PM    Potassium 4.0 12/14/2020 01:43 PM    Chloride 104 12/14/2020 01:43 PM    CO2 24 12/14/2020 01:43 PM    Anion gap 9 11/15/2017 02:48 AM    Glucose 128 (H) 12/14/2020 01:43 PM    BUN 13 12/14/2020 01:43 PM    Creatinine 1.10 12/14/2020 01:43 PM    BUN/Creatinine ratio 12 12/14/2020 01:43 PM    GFR est AA 75 12/14/2020 01:43 PM    GFR est non-AA 65 12/14/2020 01:43 PM    Calcium 10.2 12/14/2020 01:43 PM    Bilirubin, total 1.0 12/14/2020 01:43 PM    Alk. phosphatase 98 12/14/2020 01:43 PM    Protein, total 7.0 12/14/2020 01:43 PM    Albumin 4.7 12/14/2020 01:43 PM    Globulin 3.6 11/14/2017 03:18 PM    A-G Ratio 2.0 12/14/2020 01:43 PM    ALT (SGPT) 35 12/14/2020 01:43 PM           Assessment:     Assessment:       ICD-10-CM ICD-9-CM    1. Essential hypertension  I10 401.9 AMB POC EKG ROUTINE W/ 12 LEADS, INTER & REP      ECHO STRESS   2. Enlarged LA (left atrium)  I51.7 429.3 AMB POC EKG ROUTINE W/ 12 LEADS, INTER & REP      ECHO STRESS   3. Non-rheumatic mitral regurgitation  I34.0 424.0 AMB POC EKG ROUTINE W/ 12 LEADS, INTER & REP      ECHO STRESS   4. Hypercholesterolemia  E78.00 272.0 ECHO STRESS   5. BEAR (dyspnea on exertion)  R06.00 786.09 ECHO STRESS        Plan:   1. BEAR: worsening BEAR in the past 2-3 months. 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 No stress test since then. EKG SR. Evaluate with stress echo. 2. Nonrheumatic mitral valve regurgitation  No MR noted on echo in 11/2017, preserved ejection fraction 55-60%. 11/17/2020 echo with preserved EF and trace MR. Continue present medical management; remains asymptomatic. 3. Essential hypertension  BP controlled.   Continue anti-hypertensive therapy and low sodium diet    4. Hypercholesterolemia  LDL 68 in 12/2020  Continue statin therapy and low fat, low cholesterol diet  Lipids managed by PCP    F/u with Dr. Lucía Carreno dependent on results. Serg Conley NP       [facility]    12/29/2020         Patient seen, examined by me personally. Plan discussed as detailed. Agree with note as outlined by  NP. I confirm findings in history and physical exam. No additional findings noted. Agree with plan as outlined above.      Alexandria Deluca MD

## 2020-12-29 NOTE — PROGRESS NOTES
1. Have you been to the ER, urgent care clinic since your last visit? Hospitalized since your last visit? No.    2. Have you seen or consulted any other health care providers outside of the [address] [place] [address] since your last visit? Include any pap smears or colon screening.    No.        Chief Complaint   Patient presents with    Annual Exam     having some chest discomfort-thinks it could be muscle related- denies any new cardiac symptoms

## 2020-12-29 NOTE — LETTER
12/29/2020 Patient: Gunjan Medeiros YOB: 1945 Date of Visit: 12/29/2020 Tu Schaefer MD 
[address] [place] [id number] [address] [employer] Dear Tu Schaefer MD, Thank you for referring Mr. Kathryn Huang to [address] [place] [address] for evaluation. My notes for this consultation are attached. If you have questions, please do not hesitate to call me. I look forward to following your patient along with you. Sincerely, [phone] Sidhu MD

## 2021-01-05 ENCOUNTER — OFFICE VISIT (OUTPATIENT)
Dept: URGENT CARE | Age: 76
End: 2021-01-05
Payer: MEDICARE

## 2021-01-05 VITALS — OXYGEN SATURATION: 95 % | HEART RATE: 59 BPM | TEMPERATURE: 98.3 F | RESPIRATION RATE: 20 BRPM

## 2021-01-05 DIAGNOSIS — U07.1 COVID-19: Primary | ICD-10-CM

## 2021-01-05 PROCEDURE — 99211 OFF/OP EST MAY X REQ PHY/QHP: CPT | Performed by: FAMILY MEDICINE

## 2021-01-07 LAB — SARS-COV-2, NAA: NOT DETECTED

## 2021-01-12 ENCOUNTER — ANCILLARY PROCEDURE (OUTPATIENT)
Dept: CARDIOLOGY CLINIC | Age: 76
End: 2021-01-12
Payer: MEDICARE

## 2021-01-12 VITALS
HEIGHT: 74 IN | DIASTOLIC BLOOD PRESSURE: 76 MMHG | BODY MASS INDEX: 29.13 KG/M2 | SYSTOLIC BLOOD PRESSURE: 110 MMHG | WEIGHT: 227 LBS

## 2021-01-12 LAB
STRESS BASELINE DIAS BP: 80 MMHG
STRESS BASELINE HR: 66 BPM
STRESS BASELINE SYS BP: 120 MMHG
STRESS ESTIMATED WORKLOAD: 7 METS
STRESS EXERCISE DUR MIN: NORMAL MIN:SEC
STRESS O2 SAT PEAK: 97 %
STRESS O2 SAT REST: 98 %
STRESS PEAK DIAS BP: 85 MMHG
STRESS PEAK SYS BP: 180 MMHG
STRESS PERCENT HR ACHIEVED: 97 %
STRESS POST PEAK HR: 139 BPM
STRESS RATE PRESSURE PRODUCT: NORMAL BPM*MMHG
STRESS ST DEPRESSION: 0 MM
STRESS ST ELEVATION: 0 MM
STRESS STAGE 1 BP: NORMAL MMHG
STRESS STAGE 1 DURATION: NORMAL MIN:SEC
STRESS STAGE 1 HR: 114 BPM
STRESS STAGE 2 DURATION: NORMAL MIN:SEC
STRESS STAGE 2 HR: 139 BPM
STRESS STAGE RECOVERY 1 BP: NORMAL MMHG
STRESS STAGE RECOVERY 1 DURATION: NORMAL MIN:SEC
STRESS STAGE RECOVERY 1 HR: 90 BPM
STRESS TARGET HR: 144 BPM

## 2021-01-12 PROCEDURE — 93351 STRESS TTE COMPLETE: CPT | Performed by: INTERNAL MEDICINE

## 2021-01-13 ENCOUNTER — TELEPHONE (OUTPATIENT)
Dept: CARDIOLOGY CLINIC | Age: 76
End: 2021-01-13

## 2021-01-13 NOTE — TELEPHONE ENCOUNTER
----- Message from Mary Mathis NP sent at 1/13/2021  8:24 AM EST -----  Message sent via [employer]  The stress test was negative for indications of blood flow blockages. However, if still concerned about symptoms schedule follow up appointment soon to discuss. If not having symptoms make follow up appointment for 3 months.

## 2021-01-13 NOTE — PROGRESS NOTES
Message sent via [employer]  The stress test was negative for indications of blood flow blockages. However, if still concerned about symptoms schedule follow up appointment soon to discuss. If not having symptoms make follow up appointment for 3 months.

## 2021-01-13 NOTE — TELEPHONE ENCOUNTER
Spoke with patient. Verified patient with two patient identifiers. Advised EST negative. States he is not having symptoms at this time, have resolved. Call back if symptoms return. Patient verbalized understanding.

## 2021-01-27 RX ORDER — LISINOPRIL 30 MG/1
TABLET ORAL
Qty: 90 TAB | Refills: 3 | Status: SHIPPED | OUTPATIENT
Start: 2021-01-27 | End: 2022-07-19 | Stop reason: SDUPTHER

## 2021-02-24 ENCOUNTER — PATIENT MESSAGE (OUTPATIENT)
Dept: NEUROLOGY | Age: 76
End: 2021-02-24

## 2021-02-24 DIAGNOSIS — R35.0 URINARY FREQUENCY: ICD-10-CM

## 2021-02-24 RX ORDER — CLOPIDOGREL BISULFATE 75 MG/1
TABLET ORAL
Qty: 90 TAB | Refills: 4 | OUTPATIENT
Start: 2021-02-24

## 2021-02-26 RX ORDER — ATORVASTATIN CALCIUM 40 MG/1
40 TABLET, FILM COATED ORAL
Qty: 90 TAB | Refills: 3 | Status: SHIPPED | OUTPATIENT
Start: 2021-02-26 | End: 2022-04-25

## 2021-02-26 RX ORDER — TAMSULOSIN HYDROCHLORIDE 0.4 MG/1
0.8 CAPSULE ORAL DAILY
Qty: 180 CAP | Refills: 3 | Status: SHIPPED | OUTPATIENT
Start: 2021-02-26 | End: 2022-05-27

## 2021-02-27 ENCOUNTER — PATIENT MESSAGE (OUTPATIENT)
Dept: FAMILY MEDICINE CLINIC | Age: 76
End: 2021-02-27

## 2021-03-01 RX ORDER — CLOPIDOGREL BISULFATE 75 MG/1
TABLET ORAL
Qty: 90 TAB | Refills: 4 | Status: SHIPPED | OUTPATIENT
Start: 2021-03-01 | End: 2022-03-21

## 2021-03-02 DIAGNOSIS — N41.0 ACUTE PROSTATITIS: ICD-10-CM

## 2021-03-02 DIAGNOSIS — M25.562 CHRONIC PAIN OF LEFT KNEE: ICD-10-CM

## 2021-03-02 DIAGNOSIS — I20.8 ANGINAL CHEST PAIN AT REST (HCC): ICD-10-CM

## 2021-03-02 DIAGNOSIS — G89.29 CHRONIC PAIN OF LEFT KNEE: ICD-10-CM

## 2021-03-02 DIAGNOSIS — M54.40 BILATERAL LOW BACK PAIN WITH SCIATICA, SCIATICA LATERALITY UNSPECIFIED, UNSPECIFIED CHRONICITY: ICD-10-CM

## 2021-03-02 DIAGNOSIS — Z96.659 STATUS POST KNEE REPLACEMENT, UNSPECIFIED LATERALITY: ICD-10-CM

## 2021-03-04 RX ORDER — FAMOTIDINE 20 MG/1
20 TABLET, FILM COATED ORAL 2 TIMES DAILY
Qty: 60 TAB | Refills: 3 | Status: SHIPPED | OUTPATIENT
Start: 2021-03-04 | End: 2021-05-28

## 2021-03-17 ENCOUNTER — VIRTUAL VISIT (OUTPATIENT)
Dept: FAMILY MEDICINE CLINIC | Age: 76
End: 2021-03-17
Payer: MEDICARE

## 2021-03-17 DIAGNOSIS — M25.521 RIGHT ELBOW PAIN: Primary | ICD-10-CM

## 2021-03-17 DIAGNOSIS — Z71.89 ADVICE GIVEN ABOUT COVID-19 VIRUS INFECTION: ICD-10-CM

## 2021-03-17 PROCEDURE — 1101F PT FALLS ASSESS-DOCD LE1/YR: CPT | Performed by: FAMILY MEDICINE

## 2021-03-17 PROCEDURE — G8432 DEP SCR NOT DOC, RNG: HCPCS | Performed by: FAMILY MEDICINE

## 2021-03-17 PROCEDURE — 99213 OFFICE O/P EST LOW 20 MIN: CPT | Performed by: FAMILY MEDICINE

## 2021-03-17 PROCEDURE — G8427 DOCREV CUR MEDS BY ELIG CLIN: HCPCS | Performed by: FAMILY MEDICINE

## 2021-03-17 PROCEDURE — G8756 NO BP MEASURE DOC: HCPCS | Performed by: FAMILY MEDICINE

## 2021-03-17 RX ORDER — METHYLPREDNISOLONE 4 MG/1
TABLET ORAL
Qty: 1 DOSE PACK | Refills: 0 | Status: SHIPPED | OUTPATIENT
Start: 2021-03-17 | End: 2021-06-02 | Stop reason: ALTCHOICE

## 2021-03-17 RX ORDER — AZITHROMYCIN 250 MG/1
TABLET, FILM COATED ORAL
Qty: 6 TAB | Refills: 0 | Status: SHIPPED | OUTPATIENT
Start: 2021-03-17 | End: 2021-06-02 | Stop reason: ALTCHOICE

## 2021-03-17 NOTE — PROGRESS NOTES
Name and  Verified. Chief Complaint   Patient presents with    Elbow Pain     Right  x 3 weeks     Denies any injury. Swelling, the size of a walnut. , comes and goes. Problem started after COVID-19 vaccine    1. Have you been to the ER, urgent care clinic since your last visit? Hospitalized since your last visit? No    2. Have you seen or consulted any other health care providers outside of the [address] [place] [address] since your last visit? Include any pap smears or colon screening.    No

## 2021-03-17 NOTE — PROGRESS NOTES
HISTORY OF PRESENT ILLNESS  Quinn Jarrell is a 67 y.o. male. Pt's main concerns were provided on virtual visit, a telemed format,  pt is w/ comorbid medical history and unaware of been exposed to covid-19 individual, Pt Have been staying at home for couple of wks,  pt has no fever no cough no dyspnea, no ha, not dizzy, nl smell nl taste, no N/V/D, no body ache. Elbow Pain   The history is provided by the patient. This is a acute problem. Episode onset: 6 wks ago, not obese, not an  does a lot of upper ext movement with the      The problem has not changed since onset. The pain is present in the RT elbow,  Radiating to the upper arm,  The quality of the pain is described as sharp, The pain is at a severity of 8/10. Took OTC did not help, Associated symptoms include limited range of motion. Pertinent negatives include no numbness, no stiffness, no tingling, no itching, no back pain and + neck pain. The symptoms are aggravated by movement and palpation. There has been no history of extremity trauma. Current Outpatient Medications   Medication Sig Dispense Refill    fexofenadine HCl (ALLEGRA PO) Take  by mouth. As needed      azithromycin (ZITHROMAX) 250 mg tablet 2 first day then one tab daily till finished 6 Tab 0    methylPREDNISolone (MEDROL DOSEPACK) 4 mg tablet As directed for 6 days one package 1 Dose Pack 0    famotidine (PEPCID) 20 mg tablet Take 1 Tab by mouth two (2) times a day. 60 Tab 3    clopidogreL (PLAVIX) 75 mg tab TAKE 1 TABLET DAILY 90 Tab 4    tamsulosin (FLOMAX) 0.4 mg capsule Take 2 Caps by mouth daily. TAKE 1 CAPSULE DAILY 180 Cap 3    atorvastatin (LIPITOR) 40 mg tablet Take 1 Tab by mouth nightly. 90 Tab 3    lisinopriL (PRINIVIL, ZESTRIL) 30 mg tablet TAKE 1 TABLET DAILY 90 Tab 3    multivitamin (ONE A DAY) tablet Take 1 Tab by mouth daily.  diclofenac (VOLTAREN) 1 % gel Apply 2 g to affected area every six (6) hours.  150 g 4    pyridoxine, vitamin B6, (Vitamin B-6) 25 mg tablet Take 1 Tab by mouth daily. 90 Tab 2    pantoprazole (PROTONIX) 40 mg tablet Take 1 Tab by mouth as needed for Nausea. 90 Tab 3    albuterol (PROVENTIL HFA, VENTOLIN HFA, PROAIR HFA) 90 mcg/actuation inhaler Take 1 Puff by inhalation every four (4) hours as needed for Wheezing. 1 Inhaler 1    aspirin delayed-release 81 mg tablet Take 1 Tab by mouth daily. 30 Tab 12    acetaminophen (TYLENOL) 500 mg tablet Take 1,000 mg by mouth every six (6) hours as needed for Pain.  cholecalciferol (VITAMIN D3) (1000 Units /25 mcg) tablet Take 1 Tab by mouth daily.  30 Tab 5     No Known Allergies  Past Medical History:   Diagnosis Date    Abdominal wall hernia 11/16/2017    Anemia 9/10/2014    Back pain 4/1/2010    Blurry vision, bilateral 11/14/2017    CAD (coronary artery disease)     Calf pain 3/11/2015    Chronic kidney disease     cyst on kidney    Chronic left shoulder pain 10/24/2016    Constipation 6/11/2014    Coronary artery disease involving native coronary artery of native heart without angina pectoris 6/4/2019    Decreased vision 6/13/2011    Diastasis recti 11/16/2017    Diverticulitis 3/11/2015    Dizziness 11/14/2017    Enlarged LA (left atrium)     Fall at home 3/17/2016    GERD (gastroesophageal reflux disease)     Hemorrhoids 9/10/2014    Hip pain, chronic, right 10/3/2017    HTN (hypertension) 8/24/2010    Hx-TIA (transient ischemic attack)     Hypercholesterolemia 4/1/2010    Hypertension     Irregular heart beat 9/23/2015    Kidney disease     Mitral regurgitation     Need for varicella vaccine 9/10/2014    Orthostatic lightheadedness 1/16/2018    Other ill-defined conditions(799.89)     heart murmur    Patient is full code 6/4/2018    Rash 8/24/2010    Rib pain on right side 3/17/2016    Right calf pain 3/11/2015    Risk for falls 4/4/2014    S/P knee replacement 6/11/2014    Screening for depression 4/4/2014    Tinea versicolor 9/10/2014    Tinnitus of left ear 1/16/2018    Urinary frequency 8/24/2010    Vitamin B12 deficiency neuropathy ([address] 7/17/2019     Past Surgical History:   Procedure Laterality Date    HX HEENT      nasal septum repair    HX HEENT      HX HERNIA REPAIR      HX KNEE REPLACEMENT Left     HX ORTHOPAEDIC      left knee surgery    HX ORTHOPAEDIC      HX TONSILLECTOMY  age 5   [de-identified] UROLOGICAL      ID CARDIAC SURG PROCEDURE UNLIST      heart cath    ID COLONOSCOPY FLX DX W/COLLJ SPEC WHEN PFRMD  4/18/2011          Family History   Problem Relation Age of Onset    COPD Mother     Heart Attack Mother     Hypertension Mother     Heart Disease Mother     COPD Father     Heart Attack Father     Hypertension Father     Heart Disease Father     Cancer Father     Cancer Maternal Grandmother      Social History     Tobacco Use    Smoking status: Never Smoker    Smokeless tobacco: Never Used   Substance Use Topics    Alcohol use: Not Currently     Comment: not since 2 years ago      Lab Results   Component Value Date/Time    Hemoglobin A1c 5.6 11/15/2017 02:48 AM    Hemoglobin A1c 5.5 05/02/2017 03:56 AM    Hemoglobin A1c 5.9 05/13/2014 09:30 AM    Glucose 128 (H) 12/14/2020 01:43 PM    LDL, calculated 68 12/14/2020 01:43 PM    LDL, calculated 72 06/04/2019 09:26 AM    Creatinine (POC) 0.8 03/29/2011 09:23 AM    Creatinine 1.10 12/14/2020 01:43 PM      Lab Results   Component Value Date/Time    Cholesterol, total 143 12/14/2020 01:43 PM    HDL Cholesterol 48 12/14/2020 01:43 PM    LDL, calculated 68 12/14/2020 01:43 PM    LDL, calculated 72 06/04/2019 09:26 AM    Triglyceride 157 (H) 12/14/2020 01:43 PM    CHOL/HDL Ratio 3.0 11/15/2017 02:48 AM        Review of Systems   Constitutional: Negative for chills, fever and malaise/fatigue. HENT: Negative for nosebleeds. Eyes: Negative for pain. Respiratory: Negative for cough and wheezing.     Cardiovascular: Negative for chest pain and leg swelling. Gastrointestinal: Negative for constipation, diarrhea and nausea. Genitourinary: Negative for frequency. Musculoskeletal: Positive for joint pain and myalgias. Skin: Negative for rash. Neurological: Negative for loss of consciousness and headaches. Endo/Heme/Allergies: Does not bruise/bleed easily. Psychiatric/Behavioral: Negative for depression. The patient is not nervous/anxious and does not have insomnia. All other systems reviewed and are negative. Physical Exam  Constitutional:       Appearance: Normal appearance. HENT:      Head: Normocephalic and atraumatic. Nose: Nose normal. No congestion. Musculoskeletal:         General: Tenderness present. Neurological:      Mental Status: He is alert and oriented to person, place, and time. Psychiatric:         Mood and Affect: Mood normal.         Behavior: Behavior normal.         Thought Content: Thought content normal.         Judgment: Judgment normal.         ASSESSMENT and PLAN  Diagnoses and all orders for this visit:    1. Right elbow pain  -     azithromycin (ZITHROMAX) 250 mg tablet; 2 first day then one tab daily till finished  -     methylPREDNISolone (MEDROL DOSEPACK) 4 mg tablet; As directed for 6 days one package    2. Advice given about COVID-19 virus infection  -     azithromycin (ZITHROMAX) 250 mg tablet; 2 first day then one tab daily till finished  -     methylPREDNISolone (MEDROL DOSEPACK) 4 mg tablet;  As directed for 6 days one package      Patient advised to have the mask on most of the time, social distance and handwashing avoid crowded area pursuant to the emergency declaration under the [address] and [place] [address] [facility] authority and the [employer] Act, this Virtual Visit was conducted, with patient's consent, to reduce the patient's risk of exposure to COVID-19 and provide continuity of care for an established patient Services were provided through a Video synchronous discussion virtually to substitute for in-person appointment.

## 2021-05-28 DIAGNOSIS — I20.8 ANGINAL CHEST PAIN AT REST (HCC): ICD-10-CM

## 2021-05-28 DIAGNOSIS — Z96.659 STATUS POST KNEE REPLACEMENT, UNSPECIFIED LATERALITY: ICD-10-CM

## 2021-05-28 DIAGNOSIS — M54.40 BILATERAL LOW BACK PAIN WITH SCIATICA, SCIATICA LATERALITY UNSPECIFIED, UNSPECIFIED CHRONICITY: ICD-10-CM

## 2021-05-28 DIAGNOSIS — N41.0 ACUTE PROSTATITIS: ICD-10-CM

## 2021-05-28 DIAGNOSIS — M25.562 CHRONIC PAIN OF LEFT KNEE: ICD-10-CM

## 2021-05-28 DIAGNOSIS — G89.29 CHRONIC PAIN OF LEFT KNEE: ICD-10-CM

## 2021-05-28 RX ORDER — FAMOTIDINE 20 MG/1
TABLET, FILM COATED ORAL
Qty: 60 TABLET | Refills: 3 | Status: SHIPPED | OUTPATIENT
Start: 2021-05-28 | End: 2021-06-02 | Stop reason: ALTCHOICE

## 2021-06-02 ENCOUNTER — OFFICE VISIT (OUTPATIENT)
Dept: FAMILY MEDICINE CLINIC | Age: 76
End: 2021-06-02
Payer: MEDICARE

## 2021-06-02 VITALS
SYSTOLIC BLOOD PRESSURE: 114 MMHG | DIASTOLIC BLOOD PRESSURE: 72 MMHG | BODY MASS INDEX: 27.44 KG/M2 | TEMPERATURE: 97.7 F | OXYGEN SATURATION: 98 % | HEART RATE: 59 BPM | RESPIRATION RATE: 16 BRPM | HEIGHT: 75 IN | WEIGHT: 220.7 LBS

## 2021-06-02 DIAGNOSIS — T45.2X5A VITAMIN B6 INDUCED NEUROPATHY (HCC): ICD-10-CM

## 2021-06-02 DIAGNOSIS — Z13.31 POSITIVE DEPRESSION SCREENING: ICD-10-CM

## 2021-06-02 DIAGNOSIS — M54.40 BILATERAL LOW BACK PAIN WITH SCIATICA, SCIATICA LATERALITY UNSPECIFIED, UNSPECIFIED CHRONICITY: ICD-10-CM

## 2021-06-02 DIAGNOSIS — M25.562 CHRONIC PAIN OF LEFT KNEE: ICD-10-CM

## 2021-06-02 DIAGNOSIS — G89.29 CHRONIC PAIN OF LEFT KNEE: ICD-10-CM

## 2021-06-02 DIAGNOSIS — R53.83 LOW ENERGY: Primary | ICD-10-CM

## 2021-06-02 DIAGNOSIS — G62.0 VITAMIN B6 INDUCED NEUROPATHY (HCC): ICD-10-CM

## 2021-06-02 PROBLEM — I25.10 CORONARY ARTERY DISEASE INVOLVING NATIVE CORONARY ARTERY OF NATIVE HEART WITHOUT ANGINA PECTORIS: Status: RESOLVED | Noted: 2019-06-04 | Resolved: 2021-06-02

## 2021-06-02 PROCEDURE — 99214 OFFICE O/P EST MOD 30 MIN: CPT | Performed by: FAMILY MEDICINE

## 2021-06-02 PROCEDURE — 96372 THER/PROPH/DIAG INJ SC/IM: CPT | Performed by: FAMILY MEDICINE

## 2021-06-02 PROCEDURE — G8432 DEP SCR NOT DOC, RNG: HCPCS | Performed by: FAMILY MEDICINE

## 2021-06-02 PROCEDURE — G8536 NO DOC ELDER MAL SCRN: HCPCS | Performed by: FAMILY MEDICINE

## 2021-06-02 PROCEDURE — G8752 SYS BP LESS 140: HCPCS | Performed by: FAMILY MEDICINE

## 2021-06-02 PROCEDURE — G8427 DOCREV CUR MEDS BY ELIG CLIN: HCPCS | Performed by: FAMILY MEDICINE

## 2021-06-02 PROCEDURE — G8419 CALC BMI OUT NRM PARAM NOF/U: HCPCS | Performed by: FAMILY MEDICINE

## 2021-06-02 PROCEDURE — 1101F PT FALLS ASSESS-DOCD LE1/YR: CPT | Performed by: FAMILY MEDICINE

## 2021-06-02 PROCEDURE — G8754 DIAS BP LESS 90: HCPCS | Performed by: FAMILY MEDICINE

## 2021-06-02 RX ORDER — OMEPRAZOLE 20 MG/1
20 TABLET, DELAYED RELEASE ORAL DAILY
COMMUNITY

## 2021-06-02 RX ORDER — SERTRALINE HYDROCHLORIDE 50 MG/1
25 TABLET, FILM COATED ORAL DAILY
Qty: 15 TABLET | Refills: 0 | Status: SHIPPED | OUTPATIENT
Start: 2021-06-02 | End: 2021-06-02 | Stop reason: ALTCHOICE

## 2021-06-02 RX ORDER — TRAMADOL HYDROCHLORIDE 50 MG/1
50 TABLET ORAL
Qty: 90 TABLET | Refills: 0 | Status: SHIPPED | OUTPATIENT
Start: 2021-06-02 | End: 2021-07-02

## 2021-06-02 RX ORDER — OMEPRAZOLE 40 MG/1
40 CAPSULE, DELAYED RELEASE ORAL DAILY
Qty: 30 CAPSULE | Refills: 11
Start: 2021-06-02 | End: 2021-12-22 | Stop reason: ALTCHOICE

## 2021-06-02 RX ORDER — CYANOCOBALAMIN 1000 UG/ML
1000 INJECTION, SOLUTION INTRAMUSCULAR; SUBCUTANEOUS ONCE
Qty: 1 ML | Refills: 0
Start: 2021-06-02 | End: 2021-06-02

## 2021-06-02 NOTE — PROGRESS NOTES
After obtaining consent, and per orders of , b12 1000 mcg/ml given to  Left deltoid Im  . Patient instructed to remain in clinic for 15 minutes afterwards, and to report any adverse reaction to me immediately.  Patient did not have any adverse reactions during this office visit

## 2021-06-02 NOTE — PROGRESS NOTES
1. Have you been to the ER, urgent care clinic since your last visit? Hospitalized since your last visit? No    2. Have you seen or consulted any other health care providers outside of the [address] [place] [address] since your last visit? Include any pap smears or colon screening. No     Chief Complaint   Patient presents with    Fatigue     Patient is feeling very fatigued and states his balance is off x 3 weeks    Anxiety     Feels like his heart is going to beat out of his chest x 3 weeks. Patient was receiving b12 shots prior to covid.

## 2021-06-02 NOTE — PROGRESS NOTES
HISTORY OF PRESENT ILLNESS  Edyta Cisneros is a 67 y.o. male. Patient presents stating that has been feeling tired and fatigue does not go away has been feeling like this for a while, started few wks ago,  in addition stating that the patient has decreased libido does not use any alcoholic beverages no illicit drug no cigarette patient states that has been under a lot of stress regarding the finances,   Currently on no oral multivitamin daily,  at this time denies any other complaint, recently, had the B12 checked fortunately is in the low detectable state,  \    Knee pain  The history is provided by the patient. This is a chronic problem. Episode onset: 6 yrs ago,  the patient is not able to do any walk for >1-2 blocks, using the walker for the mobility , the pain worsens with working mopping, and any going up and down the steps and it is becoming to be constantly regardless the pain intensity has changed and worsening since onset. Patient states that it is a dull nonradiating no numbness no tingling sensation disabling, morning stiffness which gets better with activity and with the severity of 8/10. in addition stating that current pain is also aggravated by movement and palpation. So for pt denies any history of extremity trauma,     Patient also states that he has been feeling stressed out anxious feeling exhausted having more energy does not feel depressed denies any suicidal homicidal ideation does not like to take any extra medication for depression or anxiety at disorder likes to get a medication that was given the pain and give him some energy,      Current Outpatient Medications   Medication Sig Dispense Refill    omeprazole (PriLOSEC OTC) 20 mg tablet Take 20 mg by mouth daily.  clopidogreL (PLAVIX) 75 mg tab TAKE 1 TABLET DAILY 90 Tab 4    tamsulosin (FLOMAX) 0.4 mg capsule Take 2 Caps by mouth daily.  TAKE 1 CAPSULE DAILY 180 Cap 3    atorvastatin (LIPITOR) 40 mg tablet Take 1 Tab by mouth nightly. 90 Tab 3    lisinopriL (PRINIVIL, ZESTRIL) 30 mg tablet TAKE 1 TABLET DAILY 90 Tab 3    diclofenac (VOLTAREN) 1 % gel Apply 2 g to affected area every six (6) hours. 150 g 4    pyridoxine, vitamin B6, (Vitamin B-6) 25 mg tablet Take 1 Tab by mouth daily. 90 Tab 2    albuterol (PROVENTIL HFA, VENTOLIN HFA, PROAIR HFA) 90 mcg/actuation inhaler Take 1 Puff by inhalation every four (4) hours as needed for Wheezing. 1 Inhaler 1    aspirin delayed-release 81 mg tablet Take 1 Tab by mouth daily. 30 Tab 12    acetaminophen (TYLENOL) 500 mg tablet Take 1,000 mg by mouth every six (6) hours as needed for Pain.  famotidine (PEPCID) 20 mg tablet TAKE 1 TABLET BY MOUTH TWICE DAILY (Patient not taking: Reported on 6/2/2021) 60 Tablet 3    fexofenadine HCl (ALLEGRA PO) Take  by mouth. As needed (Patient not taking: Reported on 6/2/2021)      multivitamin (ONE A DAY) tablet Take 1 Tab by mouth daily. (Patient not taking: Reported on 6/2/2021)      cholecalciferol (VITAMIN D3) (1000 Units /25 mcg) tablet Take 1 Tab by mouth daily. (Patient not taking: Reported on 6/2/2021) 30 Tab 5    pantoprazole (PROTONIX) 40 mg tablet Take 1 Tab by mouth as needed for Nausea.  (Patient not taking: Reported on 6/2/2021) 90 Tab 3     No Known Allergies  Past Medical History:   Diagnosis Date    Abdominal wall hernia 11/16/2017    Anemia 9/10/2014    Back pain 4/1/2010    Blurry vision, bilateral 11/14/2017    CAD (coronary artery disease)     Calf pain 3/11/2015    Chronic kidney disease     cyst on kidney    Chronic left shoulder pain 10/24/2016    Constipation 6/11/2014    Coronary artery disease involving native coronary artery of native heart without angina pectoris 6/4/2019    Decreased vision 6/13/2011    Diastasis recti 11/16/2017    Diverticulitis 3/11/2015    Dizziness 11/14/2017    Enlarged LA (left atrium)     Fall at home 3/17/2016    GERD (gastroesophageal reflux disease)     Hemorrhoids 9/10/2014    Hip pain, chronic, right 10/3/2017    HTN (hypertension) 8/24/2010    Hx-TIA (transient ischemic attack)     Hypercholesterolemia 4/1/2010    Hypertension     Irregular heart beat 9/23/2015    Kidney disease     Mitral regurgitation     Need for varicella vaccine 9/10/2014    Orthostatic lightheadedness 1/16/2018    Other ill-defined conditions(799.89)     heart murmur    Patient is full code 6/4/2018    Rash 8/24/2010    Rib pain on right side 3/17/2016    Right calf pain 3/11/2015    Risk for falls 4/4/2014    S/P knee replacement 6/11/2014    Screening for depression 4/4/2014    Tinea versicolor 9/10/2014    Tinnitus of left ear 1/16/2018    Urinary frequency 8/24/2010    Vitamin B12 deficiency neuropathy ([facility] [address] 7/17/2019     Past Surgical History:   Procedure Laterality Date    HX HEENT      nasal septum repair    HX HEENT      HX HERNIA REPAIR      HX KNEE REPLACEMENT Left     HX ORTHOPAEDIC      left knee surgery    HX ORTHOPAEDIC      HX TONSILLECTOMY  age 5   [facility]      ME CARDIAC SURG PROCEDURE UNLIST      heart cath    ME COLONOSCOPY FLX DX W/COLLJ SPEC WHEN PFRMD  4/18/2011          Family History   Problem Relation Age of Onset    Heart Disease Brother     COPD Mother     Heart Attack Mother     Hypertension Mother     Heart Disease Mother     COPD Father     Heart Attack Father     Hypertension Father     Heart Disease Father     Cancer Father     Cancer Maternal Grandmother      Social History     Tobacco Use    Smoking status: Never Smoker    Smokeless tobacco: Never Used   Substance Use Topics    Alcohol use: Not Currently     Comment: not since 2 years ago      Lab Results   Component Value Date/Time    WBC 6.5 12/14/2020 01:43 PM    HGB 13.1 12/14/2020 01:43 PM    HCT 38.6 12/14/2020 01:43 PM    PLATELET 309 26/63/8860 01:43 PM    MCV 96 12/14/2020 01:43 PM     Lab Results   Component Value Date/Time    GFR est non-AA 65 12/14/2020 01:43 PM    GFRNA, POC >60 03/29/2011 09:23 AM    GFR est AA 75 12/14/2020 01:43 PM    GFRAA, POC >60 03/29/2011 09:23 AM    Creatinine 1.10 12/14/2020 01:43 PM    Creatinine (POC) 0.8 03/29/2011 09:23 AM    BUN 13 12/14/2020 01:43 PM    Sodium 143 12/14/2020 01:43 PM    Potassium 4.0 12/14/2020 01:43 PM    Chloride 104 12/14/2020 01:43 PM    CO2 24 12/14/2020 01:43 PM    Magnesium 1.9 01/10/2010 08:05 PM        Review of Systems   Constitutional: Negative for chills and fever. HENT: Negative for congestion and nosebleeds. Eyes: Negative for blurred vision and pain. Respiratory: Negative for cough, shortness of breath and wheezing. Cardiovascular: Negative for chest pain and leg swelling. Gastrointestinal: Negative for constipation, diarrhea, nausea and vomiting. Genitourinary: Negative for dysuria and frequency. Musculoskeletal: Negative for joint pain and myalgias. Skin: Negative for itching and rash. Neurological: Negative for dizziness, loss of consciousness and headaches. Psychiatric/Behavioral: Negative for depression. The patient is not nervous/anxious and does not have insomnia. Physical Exam  Vitals and nursing note reviewed. Constitutional:       Appearance: He is well-developed. HENT:      Head: Normocephalic and atraumatic. Mouth/Throat:      Pharynx: No oropharyngeal exudate. Eyes:      Conjunctiva/sclera: Conjunctivae normal.      Pupils: Pupils are equal, round, and reactive to light. Neck:      Thyroid: No thyromegaly. Vascular: No JVD. Cardiovascular:      Rate and Rhythm: Normal rate and regular rhythm. Heart sounds: Normal heart sounds. No murmur heard. No friction rub. Pulmonary:      Effort: Pulmonary effort is normal. No respiratory distress. Breath sounds: Normal breath sounds. No wheezing or rales. Abdominal:      General: Bowel sounds are normal. There is no distension. Palpations: Abdomen is soft. Tenderness:  There is no abdominal tenderness. Musculoskeletal:         General: No tenderness. Cervical back: Normal range of motion and neck supple. Lymphadenopathy:      Cervical: No cervical adenopathy. Skin:     General: Skin is warm. Findings: No erythema or rash. Neurological:      Mental Status: He is alert and oriented to person, place, and time. Deep Tendon Reflexes: Reflexes are normal and symmetric. Psychiatric:         Behavior: Behavior normal.         ASSESSMENT and PLAN  Diagnoses and all orders for this visit:    1. Low energy  -     VITAMIN B12 INJECTION  -     THER/PROPH/DIAG INJECTION, SUBCUT/IM  -     cyanocobalamin (Vitamin B-12) 1,000 mcg/mL injection; 1 mL by IntraMUSCular route once for 1 dose. -     traMADoL (ULTRAM) 50 mg tablet; Take 1 Tablet by mouth every eight (8) hours as needed for Pain for up to 30 days. Max Daily Amount: 150 mg. Indications: pain    2. Vitamin B6 induced neuropathy (HCC)  -     VITAMIN B12 INJECTION  -     THER/PROPH/DIAG INJECTION, SUBCUT/IM  -     cyanocobalamin (Vitamin B-12) 1,000 mcg/mL injection; 1 mL by IntraMUSCular route once for 1 dose. -     traMADoL (ULTRAM) 50 mg tablet; Take 1 Tablet by mouth every eight (8) hours as needed for Pain for up to 30 days. Max Daily Amount: 150 mg. Indications: pain    3. Positive depression screening  -     traMADoL (ULTRAM) 50 mg tablet; Take 1 Tablet by mouth every eight (8) hours as needed for Pain for up to 30 days. Max Daily Amount: 150 mg. Indications: pain    4. Bilateral low back pain with sciatica, sciatica laterality unspecified, unspecified chronicity    5. Chronic pain of left knee    Other orders  -     omeprazole (PRILOSEC) 40 mg capsule; Take 1 Capsule by mouth daily for 360 days. Depression screen positive, PHQ 9 Score: 8, C-SSRS completed and patient instructed to schedule a follow-up visit at this practice.

## 2021-07-21 ENCOUNTER — TELEPHONE (OUTPATIENT)
Dept: PHARMACY | Age: 76
End: 2021-07-21

## 2021-07-21 NOTE — TELEPHONE ENCOUNTER
CLINICAL PHARMACY: ADHERENCE REVIEW  Identified care gap per [place]; fills at [facility]: Statin adherence    Last Visit: 6/2/21    ASSESSMENT  STATIN ADHERENCE    Per Insurance Records through 7/19/21 (2020 [facility] = n/a%; YTD [facility] = filled only once): Atorvastatin 40 mg last filled on 2/27/21 for 90 day supply. Next refill due: 5/28/21    Per [facility]:   Atorvastatin 40 mg tablets last picked up on 2/28/21 for 90 day supply. 3 refills remaining. Billed through [employer] Rx Value Date/Time    Cholesterol, total 143 12/14/2020 01:43 PM    HDL Cholesterol 48 12/14/2020 01:43 PM    LDL, calculated 68 12/14/2020 01:43 PM    LDL, calculated 72 06/04/2019 09:26 AM    VLDL, calculated 27 12/14/2020 01:43 PM    VLDL, calculated 21 06/04/2019 09:26 AM    Triglyceride 157 (H) 12/14/2020 01:43 PM    CHOL/HDL Ratio 3.0 11/15/2017 02:48 AM     ALT (SGPT)   Date Value Ref Range Status   12/14/2020 35 0 - 44 IU/L Final     AST (SGOT)   Date Value Ref Range Status   12/14/2020 28 0 - 40 IU/L Final     The ASCVD Risk score (Sai Jeter, et al., 2013) failed to calculate for the following reasons: The patient has a prior MI or stroke diagnosis     PLAN  The following are interventions that have been identified:  - Patient overdue refilling atorvastatin and active on home medication list    Attempting to reach patient to review.  Left message asking for return call.     Future Appointments   Date Time Provider Laura Edwards   12/30/2021  1:15 PM Joseph Jose MD [facility] [place], PharmD, [address]  Direct: [phone]  Department, toll free: [phone], option 7

## 2021-07-28 NOTE — TELEPHONE ENCOUNTER
Another attempt made to contact patient. Left voice message to return call to [phone] or [phone], option 7. MyChart sent.     For Pharmacy Admin Tracking Only     CPA in place: No   Gap Closed?: No   Time Spent (min): 10

## 2021-08-03 PROBLEM — R42 DIZZINESS: Status: RESOLVED | Noted: 2017-11-14 | Resolved: 2021-08-03

## 2021-09-01 DIAGNOSIS — T45.2X5A VITAMIN B6 INDUCED NEUROPATHY (HCC): ICD-10-CM

## 2021-09-01 DIAGNOSIS — G62.0 VITAMIN B6 INDUCED NEUROPATHY (HCC): ICD-10-CM

## 2021-09-01 DIAGNOSIS — Z13.31 POSITIVE DEPRESSION SCREENING: ICD-10-CM

## 2021-09-01 DIAGNOSIS — R53.83 LOW ENERGY: ICD-10-CM

## 2021-09-02 RX ORDER — SERTRALINE HYDROCHLORIDE 50 MG/1
TABLET, FILM COATED ORAL
Qty: 15 TABLET | Refills: 0 | Status: SHIPPED | OUTPATIENT
Start: 2021-09-02 | End: 2021-09-10 | Stop reason: SDUPTHER

## 2021-09-10 ENCOUNTER — CLINICAL SUPPORT (OUTPATIENT)
Dept: FAMILY MEDICINE CLINIC | Age: 76
End: 2021-09-10
Payer: MEDICARE

## 2021-09-10 DIAGNOSIS — Z13.31 POSITIVE DEPRESSION SCREENING: ICD-10-CM

## 2021-09-10 DIAGNOSIS — T45.2X5A VITAMIN B6 INDUCED NEUROPATHY (HCC): ICD-10-CM

## 2021-09-10 DIAGNOSIS — R53.83 LOW ENERGY: ICD-10-CM

## 2021-09-10 DIAGNOSIS — G62.0 VITAMIN B6 INDUCED NEUROPATHY (HCC): ICD-10-CM

## 2021-09-10 DIAGNOSIS — Z23 NEEDS FLU SHOT: Primary | ICD-10-CM

## 2021-09-10 PROCEDURE — 99213 OFFICE O/P EST LOW 20 MIN: CPT | Performed by: FAMILY MEDICINE

## 2021-09-10 PROCEDURE — G0008 ADMIN INFLUENZA VIRUS VAC: HCPCS | Performed by: FAMILY MEDICINE

## 2021-09-10 PROCEDURE — 90694 VACC AIIV4 NO PRSRV 0.5ML IM: CPT | Performed by: FAMILY MEDICINE

## 2021-09-10 RX ORDER — SERTRALINE HYDROCHLORIDE 50 MG/1
25 TABLET, FILM COATED ORAL DAILY
Qty: 15 TABLET | Refills: 3
Start: 2021-09-10 | End: 2021-12-30

## 2021-09-10 NOTE — PATIENT INSTRUCTIONS
Vaccine Information Statement    Influenza (Flu) Vaccine (Inactivated or Recombinant): What You Need to Know    Many vaccine information statements are available in [place] and other languages. See www.immunize.org/vis. Hojas de información sobre vacunas están disponibles en español y en muchos otros idiomas. Visite www.immunize.org/vis. 1. Why get vaccinated? Influenza vaccine can prevent influenza (flu). Flu is a contagious disease that spreads around the [facility] every year, usually between October and May. Anyone can get the flu, but it is more dangerous for some people. Infants and young children, people 72 years and older, pregnant people, and people with certain health conditions or a weakened immune system are at greatest risk of flu complications. Pneumonia, bronchitis, sinus infections, and ear infections are examples of flu-related complications. If you have a medical condition, such as heart disease, cancer, or diabetes, flu can make it worse. Flu can cause fever and chills, sore throat, muscle aches, fatigue, cough, headache, and runny or stuffy nose. Some people may have vomiting and diarrhea, though this is more common in children than adults. In an average year, thousands of people in the [facility] die from flu, and many more are hospitalized. Flu vaccine prevents millions of illnesses and flu-related visits to the doctor each year. 2. Influenza vaccines     CDC recommends everyone 6 months and older get vaccinated every flu season. Children 6 months through 6years of age may need 2 doses during a single flu season. Everyone else needs only 1 dose each flu season. It takes about 2 weeks for protection to develop after vaccination. There are many flu viruses, and they are always changing. Each year a new flu vaccine is made to protect against the influenza viruses believed to be likely to cause disease in the upcoming flu season.  Even when the vaccine doesnt exactly match these viruses, it may still provide some protection. Influenza vaccine does not cause flu. Influenza vaccine may be given at the same time as other vaccines. 3. Talk with your health care provider    Tell your vaccination provider if the person getting the vaccine:   Has had an allergic reaction after a previous dose of influenza vaccine, or has any severe, life-threatening allergies    Has ever had Guillain-Barré Syndrome (also called GBS)    In some cases, your health care provider may decide to postpone influenza vaccination until a future visit. Influenza vaccine can be administered at any time during pregnancy. People who are or will be pregnant during influenza season should receive inactivated influenza vaccine. People with minor illnesses, such as a cold, may be vaccinated. People who are moderately or severely ill should usually wait until they recover before getting influenza vaccine. Your health care provider can give you more information. 4. Risks of a vaccine reaction     Soreness, redness, and swelling where the shot is given, fever, muscle aches, and headache can happen after influenza vaccination.  There may be a very small increased risk of Guillain-Barré Syndrome (GBS) after inactivated influenza vaccine (the flu shot). [facility] children who get the flu shot along with pneumococcal vaccine (PCV13) and/or DTaP vaccine at the same time might be slightly more likely to have a seizure caused by fever. Tell your health care provider if a child who is getting flu vaccine has ever had a seizure. People sometimes faint after medical procedures, including vaccination. Tell your provider if you feel dizzy or have vision changes or ringing in the ears. As with any medicine, there is a very remote chance of a vaccine causing a severe allergic reaction, other serious injury, or death. 5. What if there is a serious problem?     An allergic reaction could occur after the vaccinated person leaves the clinic. If you see signs of a severe allergic reaction (hives, swelling of the face and throat, difficulty breathing, a fast heartbeat, dizziness, or weakness), call 9-1-1 and get the person to the nearest hospital.    For other signs that concern you, call your health care provider. Adverse reactions should be reported to the Vaccine Adverse Event Reporting System (VAERS). Your health care provider will usually file this report, or you can do it yourself. Visit the VAERS website at www.vaers. [facility].gov or call 4-305.751.4363. VAERS is only for reporting reactions, and VAERS staff members do not give medical advice. 6. The National Vaccine Injury Compensation Program    The [facility] Vaccine Injury Compensation Program (VICP) is a federal program that was created to compensate people who may have been injured by certain vaccines. Claims regarding alleged injury or death due to vaccination have a time limit for filing, which may be as short as two years. Visit the VICP website at www.[facility].gov/vaccinecompensation or call 7-589.514.2540 to learn about the program and about filing a claim. 7. How can I learn more?  Ask your health care provider.  Call your local or state health department.  Visit the website of the Food and Drug Administration (FDA) for vaccine package inserts and additional information at www.fda.gov/vaccines-blood-biologics/vaccines.  Contact the Centers for Disease Control and Prevention (CDC):  - Call 6-607.565.1784 (1-800-CDC-INFO) or  - Visit CDCs influenza website at www.cdc.gov/flu. Vaccine Information Statement   Inactivated Influenza Vaccine   8/6/2021  42 GINNA Todd [id number]   Department of Health and Human Services  Centers for Disease Control and Prevention    Office Use Only

## 2021-09-10 NOTE — PROGRESS NOTES
HISTORY OF PRESENT ILLNESS  Anupama Sánchez is a 67 y.o. male. Today's Covid Unvaccinated Pt main concerns were provided in the clinic,    pt is w/ comorbid history and unaware if the pt has been exposed to covid-19 individual, Pt has been trying to be very covid [place]ab[place],  pt has no fever no cough no dyspnea, no ha, not dizzy, nl smell nl taste, no N/V/D, has no orbital pain,  no body ache. Fell  Knee pain  Back pain  Depression with Anxiety state    Patient state that thins are not getting better: pt states that  unable to sleep, , able to concentrate  at home at this time, +feeling anxius, no guilty feeling, has nl interest to do things, ++depressed, nl appetite,  No Hoplessness, not wanted to heart self or others, no weight gain or loss,       In addition pt states that there is no etoh or illicit drug use, not the first time,  no hx of physical nor of mental abuse, and currently is feeling safe in general.      Current Outpatient Medications   Medication Sig Dispense Refill    sertraline (ZOLOFT) 50 mg tablet TAKE 1/2 TABLET BY MOUTH DAILY 15 Tablet 0    omeprazole (PriLOSEC OTC) 20 mg tablet Take 20 mg by mouth daily.  omeprazole (PRILOSEC) 40 mg capsule Take 1 Capsule by mouth daily for 360 days. 30 Capsule 11    clopidogreL (PLAVIX) 75 mg tab TAKE 1 TABLET DAILY 90 Tab 4    tamsulosin (FLOMAX) 0.4 mg capsule Take 2 Caps by mouth daily. TAKE 1 CAPSULE DAILY 180 Cap 3    atorvastatin (LIPITOR) 40 mg tablet Take 1 Tab by mouth nightly. 90 Tab 3    lisinopriL (PRINIVIL, ZESTRIL) 30 mg tablet TAKE 1 TABLET DAILY 90 Tab 3    diclofenac (VOLTAREN) 1 % gel Apply 2 g to affected area every six (6) hours. 150 g 4    pyridoxine, vitamin B6, (Vitamin B-6) 25 mg tablet Take 1 Tab by mouth daily. 90 Tab 2    albuterol (PROVENTIL HFA, VENTOLIN HFA, PROAIR HFA) 90 mcg/actuation inhaler Take 1 Puff by inhalation every four (4) hours as needed for Wheezing.  1 Inhaler 1    aspirin delayed-release 81 mg tablet Take 1 Tab by mouth daily. 30 Tab 12    acetaminophen (TYLENOL) 500 mg tablet Take 1,000 mg by mouth every six (6) hours as needed for Pain.        No Known Allergies  Past Medical History:   Diagnosis Date    Abdominal wall hernia 11/16/2017    Anemia 9/10/2014    Back pain 4/1/2010    Blurry vision, bilateral 11/14/2017    CAD (coronary artery disease)     Calf pain 3/11/2015    Chronic kidney disease     cyst on kidney    Chronic left shoulder pain 10/24/2016    Constipation 6/11/2014    Coronary artery disease involving native coronary artery of native heart without angina pectoris 6/4/2019    Decreased vision 6/13/2011    Diastasis recti 11/16/2017    Diverticulitis 3/11/2015    Dizziness 11/14/2017    Enlarged LA (left atrium)     Fall at home 3/17/2016    GERD (gastroesophageal reflux disease)     Hemorrhoids 9/10/2014    Hip pain, chronic, right 10/3/2017    HTN (hypertension) 8/24/2010    Hx-TIA (transient ischemic attack)     Hypercholesterolemia 4/1/2010    Hypertension     Irregular heart beat 9/23/2015    Kidney disease     Mitral regurgitation     Need for varicella vaccine 9/10/2014    Orthostatic lightheadedness 1/16/2018    Other ill-defined conditions(799.89)     heart murmur    Patient is full code 6/4/2018    Rash 8/24/2010    Rib pain on right side 3/17/2016    Right calf pain 3/11/2015    Risk for falls 4/4/2014    S/P knee replacement 6/11/2014    Screening for depression 4/4/2014    Tinea versicolor 9/10/2014    Tinnitus of left ear 1/16/2018    Urinary frequency 8/24/2010    Vitamin B12 deficiency neuropathy ([facility] [address] 7/17/2019     Past Surgical History:   Procedure Laterality Date    HX HEENT      nasal septum repair    HX HEENT      HX HERNIA REPAIR      HX KNEE REPLACEMENT Left     HX ORTHOPAEDIC      left knee surgery    HX ORTHOPAEDIC      HX TONSILLECTOMY  age 5   [de-identified] UROLOGICAL      CT CARDIAC SURG PROCEDURE UNLIST      heart cath  NH COLONOSCOPY FLX DX W/COLLJ SPEC WHEN PFRMD  4/18/2011          Family History   Problem Relation Age of Onset    Heart Disease Brother     COPD Mother     Heart Attack Mother     Hypertension Mother     Heart Disease Mother     COPD Father     Heart Attack Father     Hypertension Father     Heart Disease Father     Cancer Father     Cancer Maternal Grandmother      Social History     Tobacco Use    Smoking status: Never Smoker    Smokeless tobacco: Never Used   Substance Use Topics    Alcohol use: Not Currently     Comment: not since 2 years ago      Lab Results   Component Value Date/Time    WBC 6.5 12/14/2020 01:43 PM    HGB 13.1 12/14/2020 01:43 PM    HCT 38.6 12/14/2020 01:43 PM    PLATELET 251 94/48/1001 01:43 PM    MCV 96 12/14/2020 01:43 PM     Lab Results   Component Value Date/Time    Hemoglobin A1c 5.6 11/15/2017 02:48 AM    Hemoglobin A1c 5.5 05/02/2017 03:56 AM    Hemoglobin A1c 5.9 05/13/2014 09:30 AM    Glucose 128 (H) 12/14/2020 01:43 PM    LDL, calculated 68 12/14/2020 01:43 PM    LDL, calculated 72 06/04/2019 09:26 AM    Creatinine (POC) 0.8 03/29/2011 09:23 AM    Creatinine 1.10 12/14/2020 01:43 PM         Review of Systems   Constitutional: Negative for chills and fever. HENT: Negative for congestion and nosebleeds. Eyes: Negative for blurred vision and pain. Respiratory: Negative for cough, shortness of breath and wheezing. Cardiovascular: Negative for chest pain and leg swelling. Gastrointestinal: Negative for constipation, diarrhea, nausea and vomiting. Genitourinary: Negative for dysuria and frequency. Musculoskeletal: Positive for joint pain. Negative for myalgias. Skin: Negative for itching and rash. Neurological: Negative for dizziness, loss of consciousness and headaches. Psychiatric/Behavioral: Positive for depression. The patient is nervous/anxious and has insomnia. Physical Exam  Vitals and nursing note reviewed.    Constitutional: Appearance: He is well-developed. HENT:      Head: Normocephalic and atraumatic. Mouth/Throat:      Pharynx: No oropharyngeal exudate. Eyes:      Conjunctiva/sclera: Conjunctivae normal.      Pupils: Pupils are equal, round, and reactive to light. Neck:      Thyroid: No thyromegaly. Vascular: No JVD. Cardiovascular:      Rate and Rhythm: Normal rate and regular rhythm. Heart sounds: Normal heart sounds. No murmur heard. No friction rub. Pulmonary:      Effort: Pulmonary effort is normal. No respiratory distress. Breath sounds: Normal breath sounds. No wheezing or rales. Abdominal:      General: Bowel sounds are normal. There is no distension. Palpations: Abdomen is soft. Tenderness: There is no abdominal tenderness. Musculoskeletal:         General: No tenderness. Cervical back: Normal range of motion and neck supple. Lymphadenopathy:      Cervical: No cervical adenopathy. Skin:     General: Skin is warm. Findings: No erythema or rash. Neurological:      Mental Status: He is alert and oriented to person, place, and time. Deep Tendon Reflexes: Reflexes are normal and symmetric. Psychiatric:         Behavior: Behavior normal.         ASSESSMENT and PLAN  Diagnoses and all orders for this visit:    1. Needs flu shot  -     FLU (FLUAD QUAD INFLUENZA VACCINE,QUAD,ADJUVANTED)    2. Low energy  -     sertraline (ZOLOFT) 50 mg tablet; Take 0.5 Tablets by mouth daily. 3. Vitamin B6 induced neuropathy (HCC)  -     sertraline (ZOLOFT) 50 mg tablet; Take 0.5 Tablets by mouth daily. 4. Positive depression screening  -     sertraline (ZOLOFT) 50 mg tablet; Take 0.5 Tablets by mouth daily.          Concern abdout COVID-19 addressed and detailed, pt was told that the best way to prevent illness is by protection with vaccination, and to Wear a facemask , having social distance, and to get tested if possible,   Pt was also told if develop dyspnea needs to call 911 or go to er, call for stef advise, pt agreed with todays recommendations,

## 2021-09-10 NOTE — PROGRESS NOTES
Chief Complaint   Patient presents with    Immunization/Injection     flu vaccine      1. Have you been to the ER, urgent care clinic since your last visit? Hospitalized since your last visit? No    2. Have you seen or consulted any other health care providers outside of the [address] [place] [address] since your last visit? Include any pap smears or colon screening. No  Requesting flu vaccine    After obtaining consent, and per orders of , flu vaccine  given to  Left deltoid IM  . Patient instructed to remain in clinic for 15 minutes afterwards, and to report any adverse reaction to me immediately.  Patient did not have any adverse reactions during this office visit

## 2021-11-15 ENCOUNTER — HOSPITAL ENCOUNTER (OUTPATIENT)
Dept: PREADMISSION TESTING | Age: 76
Discharge: HOME | End: 2021-11-15
Payer: MEDICARE

## 2021-11-15 PROCEDURE — U0005 INFEC AGEN DETEC AMPLI PROBE: HCPCS

## 2021-11-16 LAB
SARS-COV-2, XPLCVT: NOT DETECTED
SOURCE, COVRS: NORMAL

## 2021-11-18 ENCOUNTER — HOSPITAL ENCOUNTER (OUTPATIENT)
Age: 76
Setting detail: OUTPATIENT SURGERY
Discharge: HOME | End: 2021-11-18
Attending: INTERNAL MEDICINE | Admitting: INTERNAL MEDICINE
Payer: MEDICARE

## 2021-11-18 ENCOUNTER — ANESTHESIA (OUTPATIENT)
Dept: ENDOSCOPY | Age: 76
End: 2021-11-18
Payer: MEDICARE

## 2021-11-18 ENCOUNTER — ANESTHESIA EVENT (OUTPATIENT)
Dept: ENDOSCOPY | Age: 76
End: 2021-11-18
Payer: MEDICARE

## 2021-11-18 VITALS
DIASTOLIC BLOOD PRESSURE: 67 MMHG | RESPIRATION RATE: 15 BRPM | OXYGEN SATURATION: 100 % | HEIGHT: 75 IN | WEIGHT: 217 LBS | BODY MASS INDEX: 26.98 KG/M2 | TEMPERATURE: 98.2 F | SYSTOLIC BLOOD PRESSURE: 114 MMHG | HEART RATE: 57 BPM

## 2021-11-18 PROCEDURE — 74011000250 HC RX REV CODE- 250: Performed by: NURSE ANESTHETIST, CERTIFIED REGISTERED

## 2021-11-18 PROCEDURE — 74011250636 HC RX REV CODE- 250/636: Performed by: INTERNAL MEDICINE

## 2021-11-18 PROCEDURE — 74011250636 HC RX REV CODE- 250/636: Performed by: NURSE ANESTHETIST, CERTIFIED REGISTERED

## 2021-11-18 PROCEDURE — 2709999900 HC NON-CHARGEABLE SUPPLY: Performed by: INTERNAL MEDICINE

## 2021-11-18 PROCEDURE — 76040000019: Performed by: INTERNAL MEDICINE

## 2021-11-18 PROCEDURE — 76060000031 HC ANESTHESIA FIRST 0.5 HR: Performed by: INTERNAL MEDICINE

## 2021-11-18 RX ORDER — DEXTROMETHORPHAN/PSEUDOEPHED 2.5-7.5/.8
1.2 DROPS ORAL
Status: DISCONTINUED | OUTPATIENT
Start: 2021-11-18 | End: 2021-11-18 | Stop reason: HOSPADM

## 2021-11-18 RX ORDER — PROPOFOL 10 MG/ML
INJECTION, EMULSION INTRAVENOUS AS NEEDED
Status: DISCONTINUED | OUTPATIENT
Start: 2021-11-18 | End: 2021-11-18 | Stop reason: HOSPADM

## 2021-11-18 RX ORDER — NALOXONE HYDROCHLORIDE 0.4 MG/ML
0.4 INJECTION, SOLUTION INTRAMUSCULAR; INTRAVENOUS; SUBCUTANEOUS
Status: DISCONTINUED | OUTPATIENT
Start: 2021-11-18 | End: 2021-11-18 | Stop reason: HOSPADM

## 2021-11-18 RX ORDER — GLYCOPYRROLATE 0.2 MG/ML
INJECTION INTRAMUSCULAR; INTRAVENOUS AS NEEDED
Status: DISCONTINUED | OUTPATIENT
Start: 2021-11-18 | End: 2021-11-18 | Stop reason: HOSPADM

## 2021-11-18 RX ORDER — EPINEPHRINE 0.1 MG/ML
1 INJECTION INTRACARDIAC; INTRAVENOUS
Status: DISCONTINUED | OUTPATIENT
Start: 2021-11-18 | End: 2021-11-18 | Stop reason: HOSPADM

## 2021-11-18 RX ORDER — SODIUM CHLORIDE 9 MG/ML
75 INJECTION, SOLUTION INTRAVENOUS CONTINUOUS
Status: DISCONTINUED | OUTPATIENT
Start: 2021-11-18 | End: 2021-11-18 | Stop reason: HOSPADM

## 2021-11-18 RX ORDER — LIDOCAINE HYDROCHLORIDE 20 MG/ML
INJECTION, SOLUTION EPIDURAL; INFILTRATION; INTRACAUDAL; PERINEURAL AS NEEDED
Status: DISCONTINUED | OUTPATIENT
Start: 2021-11-18 | End: 2021-11-18 | Stop reason: HOSPADM

## 2021-11-18 RX ORDER — SODIUM CHLORIDE 0.9 % (FLUSH) 0.9 %
5-40 SYRINGE (ML) INJECTION AS NEEDED
Status: DISCONTINUED | OUTPATIENT
Start: 2021-11-18 | End: 2021-11-18 | Stop reason: HOSPADM

## 2021-11-18 RX ORDER — ATROPINE SULFATE 0.1 MG/ML
0.5 INJECTION INTRAVENOUS
Status: DISCONTINUED | OUTPATIENT
Start: 2021-11-18 | End: 2021-11-18 | Stop reason: HOSPADM

## 2021-11-18 RX ORDER — FLUMAZENIL 0.1 MG/ML
0.2 INJECTION INTRAVENOUS
Status: DISCONTINUED | OUTPATIENT
Start: 2021-11-18 | End: 2021-11-18 | Stop reason: HOSPADM

## 2021-11-18 RX ORDER — SODIUM CHLORIDE 0.9 % (FLUSH) 0.9 %
5-40 SYRINGE (ML) INJECTION EVERY 8 HOURS
Status: DISCONTINUED | OUTPATIENT
Start: 2021-11-18 | End: 2021-11-18 | Stop reason: HOSPADM

## 2021-11-18 RX ADMIN — PROPOFOL 20 MG: 10 INJECTION, EMULSION INTRAVENOUS at 09:22

## 2021-11-18 RX ADMIN — PROPOFOL 20 MG: 10 INJECTION, EMULSION INTRAVENOUS at 09:17

## 2021-11-18 RX ADMIN — LIDOCAINE HYDROCHLORIDE 50 MG: 20 INJECTION, SOLUTION EPIDURAL; INFILTRATION; INTRACAUDAL; PERINEURAL at 09:14

## 2021-11-18 RX ADMIN — GLYCOPYRROLATE 0.2 MG: 0.2 INJECTION, SOLUTION INTRAMUSCULAR; INTRAVENOUS at 09:18

## 2021-11-18 RX ADMIN — PROPOFOL 20 MG: 10 INJECTION, EMULSION INTRAVENOUS at 09:15

## 2021-11-18 RX ADMIN — PROPOFOL 20 MG: 10 INJECTION, EMULSION INTRAVENOUS at 09:25

## 2021-11-18 RX ADMIN — PROPOFOL 20 MG: 10 INJECTION, EMULSION INTRAVENOUS at 09:19

## 2021-11-18 RX ADMIN — PROPOFOL 80 MG: 10 INJECTION, EMULSION INTRAVENOUS at 09:14

## 2021-11-18 RX ADMIN — SODIUM CHLORIDE 75 ML/HR: 9 INJECTION, SOLUTION INTRAVENOUS at 09:08

## 2021-11-18 NOTE — PROGRESS NOTES
Marjorie Slice  [birth year]  [id number]    Situation:  Verbal report received from: Margot Warner RN  Procedure: Procedure(s):  COLONOSCOPY    Background:    Preoperative diagnosis: FAMILY HX COLON POLYPS/LONG TERM ANTIPLATELET USE  Postoperative diagnosis: diverticulosis, hemorrhoids    :  Dr. Milagros Izaguirre  Assistant(s): Endoscopy Technician-1: Dixon Cadena  Endoscopy RN-1: Phyllis Moralez    Specimens: * No specimens in log *  H. Pylori  no    Assessment:  Intra-procedure medications   Anesthesia gave intra-procedure sedation and medications, see anesthesia flow sheet yes    Intravenous fluids: NS@ Anca Asai     Vital signs stable yes    Abdominal assessment: round and soft yes    Recommendation:  Discharge patient per MD order yes  Return to floor n/a  Family or [place], wife  Permission to share finding with family or friend yes

## 2021-11-18 NOTE — DISCHARGE INSTRUCTIONS
Joe Claude  [id number]  1945    COLON DISCHARGE INSTRUCTIONS  Discomfort:  Redness at IV site- apply warm compress to area; if redness or soreness persist- contact your physician  There may be a slight amount of blood passed from the rectum  Gaseous discomfort- walking, belching will help relieve any discomfort  You may not operate a vehicle for 12 hours  You may not engage in an occupation involving machinery or appliances for rest of today  You may not drink alcoholic beverages for at least 12 hours  Avoid making any critical decisions for at least 24 hour  DIET:   Regular diet. - however -  remember your colon is empty and a heavy meal will produce gas. Avoid these foods:  vegetables, fried / greasy foods, carbonated drinks for today  MEDICATION:  Per Medication Reconciliation  Resume Plavix on 11/20       ACTIVITY:  You may not resume your normal daily activities until tomorrow AM; it is recommended that you spend the remainder of the day resting -  avoid any strenuous activity. CALL M.D. ANY SIGN OF:   Increasing pain, nausea, vomiting  Abdominal distension (swelling)  New increased bleeding (oral or rectal)  Fever (chills)  Pain in chest area  Bloody discharge from nose or mouth  Shortness of breath    You may not  take any Advil, Aspirin, Ibuprofen, Motrin, Aleve, or Goodys for 10 days, ONLY  Tylenol as needed for pain. IMPRESSION:  Impression:    1. Severe left-sided diverticulosis  2. Medium sized internal hemorrhoids seen on retroflexion  3. Otherwise normal ileo-colonoscopy      Recommendations:   1.  No further colonoscopies necessary for screening purposes    Follow-up Instructions:  Telephone # [phone]      Jeanie Card MD

## 2021-11-18 NOTE — PERIOP NOTES
Endoscope was pre-cleaned at bedside immediately following procedure by LAURA Meraz. Glasses returned to patient post procure.     Medications     glycopyrrolate 0.2 mg/mL (mg)     Date/Time Rate/Dose/Volume Action Route Admin User Audit       11/18/21  0918 0.2 mg Given IntraVENous Pankaj Calix, CRNA            lidocaine (PF) 2% (mg)     Date/Time Rate/Dose/Volume Action Route Admin User Audit       11/18/21  0914 50 mg Given IntraVENous Haskel Silk, CRNA            propofol 10 mg/mL (mg)     Date/Time Rate/Dose/Volume Action Route Admin User Audit       11/18/21  0914 80 mg Given IntraVENous Haskel Silk, CRNA       0915 20 mg Given IntraVENous Haskel Silk, CRNA       7045 20 mg Given IntraVENous Haskel Silk, CRNA edited      0681 20 mg Given IntraVENous Haskel Silk, CRNA       8393 20 mg Given IntraVENous Haskel Silk, CRNA       0997 20 mg Given IntraVENous Haskel Silk, CRNA            0.9% sodium chloride infusion (mL)     Date/Time Rate/Dose/Volume Action Route Admin User Audit       11/18/21  0910 75 mL/hr Rate Verify IntraVENous Hasroger Silk, CRNA       [address], CRNA      Comment: Switch to gravity       9150 500 mL Restarted IntraVENous Hasroger Silk, CRNA

## 2021-11-18 NOTE — ANESTHESIA POSTPROCEDURE EVALUATION
Procedure(s):  COLONOSCOPY. MAC    Anesthesia Post Evaluation      Multimodal analgesia: multimodal analgesia used between 6 hours prior to anesthesia start to PACU discharge  Patient location during evaluation: PACU  Patient participation: complete - patient participated  Level of consciousness: awake and alert  Pain management: adequate  Airway patency: patent  Anesthetic complications: no  Cardiovascular status: acceptable, hemodynamically stable and blood pressure returned to baseline  Respiratory status: acceptable and room air  Hydration status: euvolemic  Post anesthesia nausea and vomiting:  none  Final Post Anesthesia Temperature Assessment:  Normothermia (36.0-37.5 degrees C)      INITIAL Post-op Vital signs:   Vitals Value Taken Time   /73 11/18/21 0957   Temp 36.8 °C (98.2 °F) 11/18/21 0937   Pulse 56 11/18/21 0957   Resp 20 11/18/21 0957   SpO2 99 % 11/18/21 0957   Vitals shown include unvalidated device data.

## 2021-11-18 NOTE — PROCEDURES
NAME:  Jonathan Rico   :   [birth year]   MRN:   [id number]     Date/Time:  2021 9:32 AM    Colonoscopy Operative Report    Procedure Type:   Colonoscopy --screening     Indications:     Family history of coloretal adenoma  (screening only)  Pre-operative Diagnosis: see indication above  Post-operative Diagnosis:  See findings below  :  Bertha Liang MD  Referring Provider: --Gisela Canales MD    Exam:  Airway: clear, no airway problems anticipated  Heart: RRR, without gallops or rubs  Lungs: clear bilaterally without wheezes, crackles, or rhonchi  Abdomen: soft, nontender, nondistended, bowel sounds present  Mental Status: awake, alert and oriented to person, place and time    Sedation:  MAC anesthesia Propofol  Procedure Details:  After informed consent was obtained with all risks and benefits of procedure explained and preoperative exam completed, the patient was taken to the endoscopy suite and placed in the left lateral decubitus position. Upon sequential sedation as per above, a digital rectal exam was performed demonstrating internal hemorrhoids. The Olympus videocolonoscope  was inserted in the rectum and carefully advanced to the cecum, which was identified by the ileocecal valve and appendiceal orifice. The quality of preparation was fair. The colonoscope was slowly withdrawn with careful evaluation between folds. Retroflexion in the rectum was completed demonstrating internal hemorrhoids. Findings:   1. Severe left-sided diverticulosis  2. Medium sized internal hemorrhoids seen on retroflexion  3. Otherwise normal ileo-colonoscopy    Specimen Removed:  None  Complications: None. EBL:  None. Impression:    1. Severe left-sided diverticulosis  2. Medium sized internal hemorrhoids seen on retroflexion  3. Otherwise normal ileo-colonoscopy      Recommendations:   1.  No further colonoscopies necessary for screening purposes    Discharge Disposition:  Home in the company of a  when able to ambulate.       Josh Winslow MD

## 2021-11-18 NOTE — ANESTHESIA PREPROCEDURE EVALUATION
Anesthetic History     Other anesthesia complications     Comments: H/o PDPH requiring multiple blood patches     Review of Systems / Medical History  Patient summary reviewed, nursing notes reviewed and pertinent labs reviewed    Pulmonary              Pertinent negatives: No COPD and asthma     Neuro/Psych         TIA     Cardiovascular    Hypertension: well controlled          CAD    Exercise tolerance: >4 METS  Comments: TTE (11/2020): · LV: Estimated LVEF is 55 - 60%. · LA: Mildly dilated left atrium. · RA: Mildly dilated right atrium. · PA: Pulmonary arterial systolic pressure is 28 mmHg.        GI/Hepatic/Renal     GERD: well controlled           Endo/Other        Arthritis  Pertinent negatives: No diabetes and morbid obesity   Other Findings              Physical Exam    Airway  Mallampati: II  TM Distance: > 6 cm  Neck ROM: normal range of motion   Mouth opening: Normal     Cardiovascular  Regular rate and rhythm,  S1 and S2 normal,  no murmur, click, rub, or gallop             Dental    Dentition: Upper dentition intact and Lower dentition intact     Pulmonary  Breath sounds clear to auscultation               Abdominal  GI exam deferred       Other Findings            Anesthetic Plan    ASA: 3  Anesthesia type: MAC          Induction: Intravenous  Anesthetic plan and risks discussed with: Patient

## 2021-11-18 NOTE — H&P
Gastroenterology Outpatient History and Physical    Patient: [facility]    Physician: Otto Mccloud MD    Chief Complaint: [facility] colon polyps  History of Present Illness: No GI complaints    History:  Past Medical History:   Diagnosis Date    Abdominal wall hernia 11/16/2017    Anemia 9/10/2014    Back pain 4/1/2010    Blurry vision, bilateral 11/14/2017    CAD (coronary artery disease)     Calf pain 3/11/2015    Chronic kidney disease     cyst on kidney    Chronic left shoulder pain 10/24/2016    Constipation 6/11/2014    Coronary artery disease involving native coronary artery of native heart without angina pectoris 6/4/2019    Decreased vision 6/13/2011    Diastasis recti 11/16/2017    Diverticulitis 3/11/2015    Dizziness 11/14/2017    Enlarged LA (left atrium)     Fall at home 3/17/2016    GERD (gastroesophageal reflux disease)     Hemorrhoids 9/10/2014    Hip pain, chronic, right 10/3/2017    HTN (hypertension) 8/24/2010    Hx-TIA (transient ischemic attack)     Hypercholesterolemia 4/1/2010    Hypertension     Irregular heart beat 9/23/2015    Kidney disease     Mitral regurgitation     Need for varicella vaccine 9/10/2014    Orthostatic lightheadedness 1/16/2018    Other ill-defined conditions(799.89)     heart murmur    Patient is full code 6/4/2018    Rash 8/24/2010    Rib pain on right side 3/17/2016    Right calf pain 3/11/2015    Risk for falls 4/4/2014    S/P knee replacement 6/11/2014    Screening for depression 4/4/2014    Tinea versicolor 9/10/2014    Tinnitus of left ear 1/16/2018    Urinary frequency 8/24/2010    [facility] 7/17/2019      Past Surgical History:   Procedure Laterality Date    HX HEENT      nasal septum repair    HX HEENT      HX HERNIA REPAIR      HX KNEE REPLACEMENT Left     HX ORTHOPAEDIC      left knee surgery    HX ORTHOPAEDIC      HX TONSILLECTOMY  age 5    [address] SURG PROCEDURE UNLIST      heart cath    NC COLONOSCOPY FLX DX W/COLLJ SPEC WHEN PFRMD  4/18/2011           Social History     Socioeconomic History    Marital status:    Tobacco Use    Smoking status: Never Smoker    Smokeless tobacco: Never Used   Vaping Use    Vaping Use: Never used   Substance and Sexual Activity    Alcohol use: Not Currently     Comment: not since 2 years ago    Drug use: No    Sexual activity: Not Currently   Social History Narrative    ** Merged History Encounter **           Family History   Problem Relation Age of Onset    Heart Disease Brother     COPD Mother     Heart Attack Mother     Hypertension Mother     Heart Disease Mother     COPD Father     Heart Attack Father     Hypertension Father     Heart Disease Father     Cancer Father     Cancer Maternal Grandmother       Patient Active Problem List   Diagnosis Code    Back pain M54.9    Hypercholesterolemia E78.00    HTN (hypertension) I10    Rash R21    Urinary frequency R35.0    Foot pain, right M79.671    Acute sinusitis J01.90    Corn L84    Tick bite W57. XXXA    Decreased vision H54.7    Knee pain, left M25.562    Acute bronchitis J20.9    Otitis externa of right ear H60.91    ED (erectile dysfunction) N52.9    Risk for falls Z91.81    Screening for depression Z13.31    Osteopenia M85.80    S/P knee replacement Z96.659    Constipation K59.00    Anemia D64.9    Tinea versicolor B36.0    Hemorrhoids K64.9    Diverticulitis K57.92    Calf pain M79.669    Right calf pain M79.661    Irregular heart beat I49.9    Enlarged LA (left atrium) I51.7    Mitral regurgitation I34.0    Fall at home W19. 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 Authorizing Provider   sertraline (ZOLOFT) 50 mg tablet Take 0.5 Tablets by mouth daily. 9/10/21  Yes Jennifer Ureña MD   omeprazole (PriLOSEC OTC) 20 mg tablet Take 20 mg by mouth daily. Yes Provider, Historical   tamsulosin (FLOMAX) 0.4 mg capsule Take 2 Caps by mouth daily. TAKE 1 CAPSULE DAILY 2/26/21  Yes Jennifer Ureña MD   atorvastatin (LIPITOR) 40 mg tablet Take 1 Tab by mouth nightly. 2/26/21  Yes Jennifer Ureña MD   lisinopriL (PRINIVIL, ZESTRIL) 30 mg tablet TAKE 1 TABLET DAILY 1/27/21  Yes Jennifer Ureña MD   diclofenac (VOLTAREN) 1 % gel Apply 2 g to affected area every six (6) hours. 12/14/20  Yes Jennifer Ureña MD   aspirin delayed-release 81 mg tablet Take 1 Tab by mouth daily. 10/9/18  Yes Verónica Reid MD   omeprazole (PRILOSEC) 40 mg capsule Take 1 Capsule by mouth daily for 360 days. 6/2/21 5/28/22  Jennifer Ureña MD   clopidogreL (PLAVIX) 75 mg tab TAKE 1 TABLET DAILY 3/1/21   Jennifer Ureña MD   albuterol (PROVENTIL HFA, VENTOLIN HFA, PROAIR HFA) 90 mcg/actuation inhaler Take 1 Puff by inhalation every four (4) hours as needed for Wheezing. 11/10/20   Jennifer Ureña MD   acetaminophen (TYLENOL) 500 mg tablet Take 1,000 mg by mouth every six (6) hours as needed for Pain. Provider, Historical     Physical Exam:   Vital Signs: Blood pressure (!) 146/80, pulse 72, temperature 98.1 °F (36.7 °C), resp. rate 16, height 6' 3\" (1.905 m), weight 98.4 kg (217 lb), SpO2 97 %.   General: well developed, well nourished   HEENT: unremarkable   Heart: regular rhythm no mumur    Lungs: clear   Abdominal:  benign   Neurological: unremarkable   Extremities: no edema     Findings/Diagnosis: FHx colon polyps  Plan of Care/Planned Procedure: Colonoscopy with conscious/deep sedation    Signed:  Horace Deng MD 11/18/2021

## 2021-12-10 ENCOUNTER — OFFICE VISIT (OUTPATIENT)
Dept: FAMILY MEDICINE CLINIC | Age: 76
End: 2021-12-10
Payer: MEDICARE

## 2021-12-10 VITALS
RESPIRATION RATE: 16 BRPM | SYSTOLIC BLOOD PRESSURE: 127 MMHG | OXYGEN SATURATION: 99 % | HEIGHT: 75 IN | HEART RATE: 65 BPM | DIASTOLIC BLOOD PRESSURE: 72 MMHG | BODY MASS INDEX: 27.8 KG/M2 | WEIGHT: 223.6 LBS

## 2021-12-10 DIAGNOSIS — R68.89 OTHER GENERAL SYMPTOMS AND SIGNS: ICD-10-CM

## 2021-12-10 DIAGNOSIS — Z00.00 MEDICARE ANNUAL WELLNESS VISIT, SUBSEQUENT: Primary | ICD-10-CM

## 2021-12-10 DIAGNOSIS — Z71.89 ADVANCED DIRECTIVES, COUNSELING/DISCUSSION: ICD-10-CM

## 2021-12-10 DIAGNOSIS — G89.29 CHRONIC PAIN OF RIGHT KNEE: ICD-10-CM

## 2021-12-10 DIAGNOSIS — M25.561 CHRONIC PAIN OF RIGHT KNEE: ICD-10-CM

## 2021-12-10 DIAGNOSIS — Z00.00 LABORATORY EXAM ORDERED AS PART OF ROUTINE GENERAL MEDICAL EXAMINATION: ICD-10-CM

## 2021-12-10 LAB
ALBUMIN SERPL-MCNC: 4.3 G/DL (ref 3.5–5)
ALBUMIN/GLOB SERPL: 1.5 {RATIO} (ref 1.1–2.2)
ALP SERPL-CCNC: 97 U/L (ref 45–117)
ALT SERPL-CCNC: 40 U/L (ref 12–78)
ANION GAP SERPL CALC-SCNC: 5 MMOL/L (ref 5–15)
APPEARANCE UR: CLEAR
AST SERPL-CCNC: 22 U/L (ref 15–37)
BILIRUB SERPL-MCNC: 0.8 MG/DL (ref 0.2–1)
BILIRUB UR QL: NEGATIVE
BUN SERPL-MCNC: 15 MG/DL (ref 6–20)
BUN/CREAT SERPL: 16 (ref 12–20)
CALCIUM SERPL-MCNC: 9.7 MG/DL (ref 8.5–10.1)
CHLORIDE SERPL-SCNC: 107 MMOL/L (ref 97–108)
CHOLEST SERPL-MCNC: 138 MG/DL
CO2 SERPL-SCNC: 26 MMOL/L (ref 21–32)
COLOR UR: NORMAL
CREAT SERPL-MCNC: 0.95 MG/DL (ref 0.7–1.3)
ERYTHROCYTE [DISTWIDTH] IN BLOOD BY AUTOMATED COUNT: 13 % (ref 11.5–14.5)
EST. AVERAGE GLUCOSE BLD GHB EST-MCNC: 108 MG/DL
FOLATE SERPL-MCNC: 37.3 NG/ML (ref 5–21)
GLOBULIN SER CALC-MCNC: 2.9 G/DL (ref 2–4)
GLUCOSE SERPL-MCNC: 97 MG/DL (ref 65–100)
GLUCOSE UR STRIP.AUTO-MCNC: NEGATIVE MG/DL
HAV IGM SER QL: NONREACTIVE
HBA1C MFR BLD: 5.4 % (ref 4–5.6)
HBV CORE IGM SER QL: NONREACTIVE
HBV SURFACE AG SER QL: <0.1 INDEX
HBV SURFACE AG SER QL: NEGATIVE
HCT VFR BLD AUTO: 38.3 % (ref 36.6–50.3)
HCV AB SERPL QL IA: NONREACTIVE
HDLC SERPL-MCNC: 47 MG/DL
HDLC SERPL: 2.9 {RATIO} (ref 0–5)
HGB BLD-MCNC: 12.7 G/DL (ref 12.1–17)
HGB UR QL STRIP: NEGATIVE
KETONES UR QL STRIP.AUTO: NEGATIVE MG/DL
LDLC SERPL CALC-MCNC: 59.6 MG/DL (ref 0–100)
LEUKOCYTE ESTERASE UR QL STRIP.AUTO: NEGATIVE
MCH RBC QN AUTO: 32.2 PG (ref 26–34)
MCHC RBC AUTO-ENTMCNC: 33.2 G/DL (ref 30–36.5)
MCV RBC AUTO: 97 FL (ref 80–99)
NITRITE UR QL STRIP.AUTO: NEGATIVE
NRBC # BLD: 0 K/UL (ref 0–0.01)
NRBC BLD-RTO: 0 PER 100 WBC
PH UR STRIP: 5 [PH] (ref 5–8)
PLATELET # BLD AUTO: 221 K/UL (ref 150–400)
PMV BLD AUTO: 11.5 FL (ref 8.9–12.9)
POTASSIUM SERPL-SCNC: 4.4 MMOL/L (ref 3.5–5.1)
PROT SERPL-MCNC: 7.2 G/DL (ref 6.4–8.2)
PROT UR STRIP-MCNC: NEGATIVE MG/DL
RBC # BLD AUTO: 3.95 M/UL (ref 4.1–5.7)
SODIUM SERPL-SCNC: 138 MMOL/L (ref 136–145)
SP GR UR REFRACTOMETRY: 1.02 (ref 1–1.03)
SP1: NORMAL
SP2: NORMAL
SP3: NORMAL
TRIGL SERPL-MCNC: 157 MG/DL (ref ?–150)
TSH SERPL DL<=0.05 MIU/L-ACNC: 1.71 UIU/ML (ref 0.36–3.74)
UROBILINOGEN UR QL STRIP.AUTO: 0.2 EU/DL (ref 0.2–1)
VIT B12 SERPL-MCNC: 456 PG/ML (ref 193–986)
VLDLC SERPL CALC-MCNC: 31.4 MG/DL
WBC # BLD AUTO: 5.3 K/UL (ref 4.1–11.1)

## 2021-12-10 PROCEDURE — G8427 DOCREV CUR MEDS BY ELIG CLIN: HCPCS | Performed by: FAMILY MEDICINE

## 2021-12-10 PROCEDURE — 1101F PT FALLS ASSESS-DOCD LE1/YR: CPT | Performed by: FAMILY MEDICINE

## 2021-12-10 PROCEDURE — G8752 SYS BP LESS 140: HCPCS | Performed by: FAMILY MEDICINE

## 2021-12-10 PROCEDURE — 20610 DRAIN/INJ JOINT/BURSA W/O US: CPT | Performed by: FAMILY MEDICINE

## 2021-12-10 PROCEDURE — 99497 ADVNCD CARE PLAN 30 MIN: CPT | Performed by: FAMILY MEDICINE

## 2021-12-10 PROCEDURE — G8510 SCR DEP NEG, NO PLAN REQD: HCPCS | Performed by: FAMILY MEDICINE

## 2021-12-10 PROCEDURE — G8754 DIAS BP LESS 90: HCPCS | Performed by: FAMILY MEDICINE

## 2021-12-10 PROCEDURE — G0439 PPPS, SUBSEQ VISIT: HCPCS | Performed by: FAMILY MEDICINE

## 2021-12-10 PROCEDURE — G8536 NO DOC ELDER MAL SCRN: HCPCS | Performed by: FAMILY MEDICINE

## 2021-12-10 PROCEDURE — 99213 OFFICE O/P EST LOW 20 MIN: CPT | Performed by: FAMILY MEDICINE

## 2021-12-10 PROCEDURE — G8419 CALC BMI OUT NRM PARAM NOF/U: HCPCS | Performed by: FAMILY MEDICINE

## 2021-12-10 RX ORDER — ZOSTER VACCINE RECOMBINANT, ADJUVANTED 50 MCG/0.5
0.5 KIT INTRAMUSCULAR ONCE
Qty: 0.5 ML | Refills: 0 | Status: SHIPPED | OUTPATIENT
Start: 2021-12-10 | End: 2021-12-10

## 2021-12-10 NOTE — ACP (ADVANCE CARE PLANNING)
Advance Care Planning          Advance Care Planning (ACP) Physician         Conversation Conducted with:   Patient with Decision Making Capacity     Other Legally Authorized Decision Maker would be Spouse as SDM     For My patients who has currently great Decision Making Capacity:     Patient is on presence of no family member,, stated that the pt wants to be not DNR at this time,  The pt likes to be a full code individual,  The patient states that there is a lot of will to live,  Pt was given the form,   will sign and bring us a copy on the later date.       Conversation Outcomes / Follow-Up Plan:   Completed new Advance Directive      Length of ACP Conversation in minutes:  16 minutes                Mary Weir MD

## 2021-12-10 NOTE — PROGRESS NOTES
Chief Complaint   Patient presents with    Complete Physical     1. Have you been to the ER, urgent care clinic since your last visit? Hospitalized since your last visit? No    2. Have you seen or consulted any other health care providers outside of the [address] [place] [address] since your last visit? Include any pap smears or colon screening. No    verified patient with two type of identifiers.    C/o right knee pain x 1 month,  Worsens throughout the day, Unable to get an appt with ortho    [facility] OFFICE-[facility]  OFFICE PROCEDURE PROGRESS NOTE        Chart reviewed for the following:   Cullen Brush LPN, have reviewed the History, Physical and updated the Allergic reactions for [address] performed immediately prior to start of procedure:   Cullen Brush LPN, have performed the following reviews on Jessica Nagy prior to the start of the procedure:            * Patient was identified by name and date of birth   * Agreement on procedure being performed was verified  * Risks and Benefits explained to the patient  * Procedure site verified and marked as necessary  * Patient was positioned for comfort  * Consent was signed and verified     Time: 9:00am      Date of procedure: 12/10/2021    Procedure performed by:  Ghanshyam Alexander MD    Provider assisted by: Sherry Elkins LPN    Patient assisted by: self    How tolerated by patient: tolerated the procedure well with no complications    Post Procedural Pain Scale: 2 - Hurts Little Bit    Comments: none    TIME ENDED: 9:15am    PROCEDURE: right knee steroid injection    PRE-PAIN: 4    POST-PAIN:2    SPECIMEN SENT TO LAB:no
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
## 2021-12-10 NOTE — PATIENT INSTRUCTIONS
Medicare Wellness Visit, Male    The best way to live healthy is to have a lifestyle where you eat a well-balanced diet, exercise regularly, limit alcohol use, and quit all forms of tobacco/nicotine, if applicable. Regular preventive services are another way to keep healthy. Preventive services (vaccines, screening tests, monitoring & exams) can help personalize your care plan, which helps you manage your own care. Screening tests can find health problems at the earliest stages, when they are easiest to treat. [facility] follows the current, evidence-based guidelines published by the [facility] Varun ([facility]) when recommending preventive services for our patients. Because we follow these guidelines, sometimes recommendations change over time as research supports it. (For example, a prostate screening blood test is no longer routinely recommended for men with no symptoms). Of course, you and your doctor may decide to screen more often for some diseases, based on your risk and co-morbidities (chronic disease you are already diagnosed with). Preventive services for you include:  - Medicare offers their members a free annual wellness visit, which is time for you and your primary care provider to discuss and plan for your preventive service needs. Take advantage of this benefit every year!  -All adults over age 72 should receive the recommended pneumonia vaccines. Current USPSTF guidelines recommend a series of two vaccines for the best pneumonia protection.   -All adults should have a flu vaccine yearly and tetanus vaccine every 10 years.  -All adults age 48 and older should receive the shingles vaccines (series of two vaccines).        -All adults age 38-68 who are overweight should have a diabetes screening test once every three years.   -Other screening tests & preventive services for persons with diabetes include: an eye exam to screen for diabetic retinopathy, a kidney function test, a foot exam, and stricter control over your cholesterol.   -Cardiovascular screening for adults with routine risk involves an electrocardiogram (ECG) at intervals determined by the provider.   -Colorectal cancer screening should be done for adults age 54-65 with no increased risk factors for colorectal cancer. There are a number of acceptable methods of screening for this type of cancer. Each test has its own benefits and drawbacks. Discuss with your provider what is most appropriate for you during your annual wellness visit. The different tests include: colonoscopy (considered the best screening method), a fecal occult blood test, a fecal DNA test, and sigmoidoscopy.  -All adults born between [facility] should be screened once for Hepatitis C.  -An Abdominal Aortic Aneurysm (AAA) Screening is recommended for men age 73-68 who has ever smoked in their lifetime.      Here is a list of your current Health Maintenance items (your personalized list of preventive services) with a due date:  Health Maintenance Due   Topic Date Due    Hepatitis C Test  Never done    Shingles Vaccine (1 of 2) Never done    COVID-19 Vaccine (3 - Booster for Moderna series) 08/17/2021    Cholesterol Test   12/14/2021

## 2021-12-22 RX ORDER — METHYLPREDNISOLONE ACETATE 40 MG/ML
40 INJECTION, SUSPENSION INTRA-ARTICULAR; INTRALESIONAL; INTRAMUSCULAR; SOFT TISSUE ONCE
Qty: 40 ML | Refills: 0
Start: 2021-12-22 | End: 2021-12-22

## 2021-12-22 RX ORDER — DEXAMETHASONE SODIUM PHOSPHATE 10 MG/ML
10 INJECTION INTRAMUSCULAR; INTRAVENOUS ONCE
Qty: 1 ML | Refills: 0
Start: 2021-12-22 | End: 2021-12-22

## 2021-12-30 ENCOUNTER — OFFICE VISIT (OUTPATIENT)
Dept: CARDIOLOGY CLINIC | Age: 76
End: 2021-12-30
Payer: MEDICARE

## 2021-12-30 VITALS
WEIGHT: 223.9 LBS | SYSTOLIC BLOOD PRESSURE: 121 MMHG | HEIGHT: 75 IN | HEART RATE: 57 BPM | DIASTOLIC BLOOD PRESSURE: 70 MMHG | RESPIRATION RATE: 18 BRPM | BODY MASS INDEX: 27.84 KG/M2 | OXYGEN SATURATION: 98 %

## 2021-12-30 DIAGNOSIS — E78.00 HYPERCHOLESTEROLEMIA: ICD-10-CM

## 2021-12-30 DIAGNOSIS — I10 ESSENTIAL HYPERTENSION: ICD-10-CM

## 2021-12-30 DIAGNOSIS — I34.0 NON-RHEUMATIC MITRAL REGURGITATION: Primary | ICD-10-CM

## 2021-12-30 PROCEDURE — 1101F PT FALLS ASSESS-DOCD LE1/YR: CPT | Performed by: NURSE PRACTITIONER

## 2021-12-30 PROCEDURE — 99214 OFFICE O/P EST MOD 30 MIN: CPT | Performed by: NURSE PRACTITIONER

## 2021-12-30 PROCEDURE — G8536 NO DOC ELDER MAL SCRN: HCPCS | Performed by: NURSE PRACTITIONER

## 2021-12-30 PROCEDURE — G8432 DEP SCR NOT DOC, RNG: HCPCS | Performed by: NURSE PRACTITIONER

## 2021-12-30 PROCEDURE — 93000 ELECTROCARDIOGRAM COMPLETE: CPT | Performed by: NURSE PRACTITIONER

## 2021-12-30 PROCEDURE — G8419 CALC BMI OUT NRM PARAM NOF/U: HCPCS | Performed by: NURSE PRACTITIONER

## 2021-12-30 PROCEDURE — G8427 DOCREV CUR MEDS BY ELIG CLIN: HCPCS | Performed by: NURSE PRACTITIONER

## 2021-12-30 PROCEDURE — G8754 DIAS BP LESS 90: HCPCS | Performed by: NURSE PRACTITIONER

## 2021-12-30 PROCEDURE — G8752 SYS BP LESS 140: HCPCS | Performed by: NURSE PRACTITIONER

## 2021-12-30 NOTE — PROGRESS NOTES
1. Have you been to the ER, urgent care clinic since your last visit? Hospitalized since your last visit? No    2. Have you seen or consulted any other health care providers outside of the [address] [place] [address] since your last visit? Include any pap smears or colon screening. 11/8/21 endoscopy    Chief Complaint   Patient presents with    Hypertension     anuual f/u. No caridac concerns. Occassional gas pains. Pec muscle pains from climbing tree stands.

## 2021-12-30 NOTE — PROGRESS NOTES
Raisa Rios, FNP-BC    Subjective/HPI:     Ananth Posadas is a 67 y.o. male is here for routine f/u. He has a PMHx of mitral regurgitation, HTN and HLD. Feels well. Still has some shortness of breath but claims his legs bother him more than the shortness of breath. They give out when he is walking. Does have arthritis of the right knee. Admits to having some burning and leg cramping at bedtime. Denies LE swelling. Still able to go hunting, climbing up trees, walking through the woods without other limitations. Admits is not exercising as much, but has many responsibilities at home as his wife is not able to do much housework due to chronic illness. Checking BP at home, last checked was 121/70. Has not checked it in a few weeks. Current Outpatient Medications on File Prior to Visit   Medication Sig Dispense Refill    omeprazole (PriLOSEC OTC) 20 mg tablet Take 20 mg by mouth daily. As needed      clopidogreL (PLAVIX) 75 mg tab TAKE 1 TABLET DAILY 90 Tab 4    tamsulosin (FLOMAX) 0.4 mg capsule Take 2 Caps by mouth daily. TAKE 1 CAPSULE DAILY 180 Cap 3    atorvastatin (LIPITOR) 40 mg tablet Take 1 Tab by mouth nightly. 90 Tab 3    lisinopriL (PRINIVIL, ZESTRIL) 30 mg tablet TAKE 1 TABLET DAILY 90 Tab 3    diclofenac (VOLTAREN) 1 % gel Apply 2 g to affected area every six (6) hours. 150 g 4    aspirin delayed-release 81 mg tablet Take 1 Tab by mouth daily. 30 Tab 12    acetaminophen (TYLENOL) 500 mg tablet Take 1,000 mg by mouth every six (6) hours as needed for Pain.  [DISCONTINUED] sertraline (ZOLOFT) 50 mg tablet Take 0.5 Tablets by mouth daily. (Patient not taking: Reported on 12/30/2021) 15 Tablet 3     No current facility-administered medications on file prior to visit. Review of Symptoms:    Review of Systems   Constitutional: Negative for chills, fever and weight loss. HENT: Negative for nosebleeds.     Eyes: Negative for blurred vision and double vision. Respiratory: Negative for cough, shortness of breath and wheezing. Cardiovascular: Negative for chest pain, palpitations, orthopnea, leg swelling and PND. Skin: Negative for rash. Neurological: Negative for dizziness and loss of consciousness. Physical Exam:      General: Well developed, in no acute distress, cooperative and alert  Heart:  reg rate and rhythm; normal S1/S2; no murmurs, no gallops or rubs. Respiratory: Clear bilaterally x 4, no wheezing or rales  Extremities:  Normal cap refill, no cyanosis, atraumatic. No edema. Vascular: 2+ pulses symmetric in all extremities    Vitals:    12/30/21 0920 12/30/21 0942 12/30/21 1009   BP: (!) 140/80 (!) 140/90 121/70  Comment: Home BP reading   BP 1 Location: Left upper arm Right upper arm    BP Patient Position: Sitting Sitting    BP Cuff Size: Adult Adult    Pulse: (!) 57     Resp: 18     Height: 6' 3\" (1.905 m)     Weight: 223 lb 14.4 oz (101.6 kg)     SpO2: 98%         ECG done today shows sinus bradycardia     Assessment:       ICD-10-CM ICD-9-CM    1. Non-rheumatic mitral regurgitation  I34.0 424.0    2. Essential hypertension  I10 401.9 AMB POC EKG ROUTINE W/ 12 LEADS, INTER & REP   3. Hypercholesterolemia  E78.00 272.0         Plan:     1. Non-rheumatic mitral regurgitation  Echo done 11/2020 with preserved LVEF 55-60% with mild biatrial dilation, no evidence of MR  Stress echo done 1/2021 without evidence of ischemia, completed 7 METs on Porter Protocol  Continue present management    2. Essential hypertension  BP controlled at home. Continue anti-hypertensive therapy and low sodium diet  Recommended to call for BP > 140/80 consistently    3.  Hypercholesterolemia  LLD 59 in 12/2021  Continue statin therapy and low fat, low cholesterol diet  Lipids managed by PCP     4.  Leg weakness  Offered venous duplex to r/o venous insufficiency, he defers at this time  Will call back if he feels symptoms are worsening  Some symptoms are related to MSK joint pains    F/u with Dr. Shivam Davey in 1 year    Candance Given, NP

## 2022-03-18 PROBLEM — J00 ACUTE NASOPHARYNGITIS: Status: ACTIVE | Noted: 2017-02-21

## 2022-03-18 PROBLEM — R42 ORTHOSTATIC LIGHTHEADEDNESS: Status: ACTIVE | Noted: 2018-01-16

## 2022-03-19 PROBLEM — Z78.9 PATIENT IS FULL CODE: Status: ACTIVE | Noted: 2018-06-04

## 2022-03-19 PROBLEM — M62.08 DIASTASIS RECTI: Status: ACTIVE | Noted: 2017-11-16

## 2022-03-19 PROBLEM — E53.8 VITAMIN B12 DEFICIENCY NEUROPATHY (HCC): Status: ACTIVE | Noted: 2019-07-17

## 2022-03-19 PROBLEM — I34.0 NON-RHEUMATIC MITRAL REGURGITATION: Status: ACTIVE | Noted: 2018-12-11

## 2022-03-19 PROBLEM — H53.8 BLURRY VISION, BILATERAL: Status: ACTIVE | Noted: 2017-11-14

## 2022-03-19 PROBLEM — H93.12 TINNITUS OF LEFT EAR: Status: ACTIVE | Noted: 2018-01-16

## 2022-03-19 PROBLEM — G63 VITAMIN B12 DEFICIENCY NEUROPATHY (HCC): Status: ACTIVE | Noted: 2019-07-17

## 2022-03-19 PROBLEM — M25.551 HIP PAIN, CHRONIC, RIGHT: Status: ACTIVE | Noted: 2017-10-03

## 2022-03-19 PROBLEM — G89.29 HIP PAIN, CHRONIC, RIGHT: Status: ACTIVE | Noted: 2017-10-03

## 2022-03-19 PROBLEM — G45.9 TIA (TRANSIENT ISCHEMIC ATTACK): Status: ACTIVE | Noted: 2017-05-02

## 2022-03-20 PROBLEM — G62.0 VITAMIN B6 INDUCED NEUROPATHY (HCC): Status: ACTIVE | Noted: 2019-07-17

## 2022-03-20 PROBLEM — R03.0 ELEVATED BP WITHOUT DIAGNOSIS OF HYPERTENSION: Status: ACTIVE | Noted: 2018-01-31

## 2022-03-20 PROBLEM — G45.9 TRANSIENT CEREBRAL ISCHEMIA: Status: ACTIVE | Noted: 2018-12-11

## 2022-03-20 PROBLEM — T45.2X5A VITAMIN B6 INDUCED NEUROPATHY (HCC): Status: ACTIVE | Noted: 2019-07-17

## 2022-03-21 RX ORDER — CLOPIDOGREL BISULFATE 75 MG/1
TABLET ORAL
Qty: 90 TABLET | Refills: 4 | Status: SHIPPED | OUTPATIENT
Start: 2022-03-21

## 2022-04-25 ENCOUNTER — OFFICE VISIT (OUTPATIENT)
Dept: FAMILY MEDICINE CLINIC | Age: 77
End: 2022-04-25
Payer: MEDICARE

## 2022-04-25 VITALS
HEIGHT: 75 IN | HEART RATE: 60 BPM | TEMPERATURE: 98.4 F | SYSTOLIC BLOOD PRESSURE: 104 MMHG | RESPIRATION RATE: 17 BRPM | OXYGEN SATURATION: 97 % | WEIGHT: 221.3 LBS | BODY MASS INDEX: 27.52 KG/M2 | DIASTOLIC BLOOD PRESSURE: 69 MMHG

## 2022-04-25 DIAGNOSIS — R21 RASH AND NONSPECIFIC SKIN ERUPTION: Primary | ICD-10-CM

## 2022-04-25 DIAGNOSIS — Z71.85 IMMUNIZATION COUNSELING: ICD-10-CM

## 2022-04-25 DIAGNOSIS — G62.0 VITAMIN B6 INDUCED NEUROPATHY (HCC): ICD-10-CM

## 2022-04-25 DIAGNOSIS — E78.00 HYPERCHOLESTEROLEMIA: ICD-10-CM

## 2022-04-25 DIAGNOSIS — Z71.89 ADVICE GIVEN ABOUT COVID-19 VIRUS INFECTION: ICD-10-CM

## 2022-04-25 DIAGNOSIS — T45.2X5A VITAMIN B6 INDUCED NEUROPATHY (HCC): ICD-10-CM

## 2022-04-25 PROCEDURE — G8427 DOCREV CUR MEDS BY ELIG CLIN: HCPCS | Performed by: FAMILY MEDICINE

## 2022-04-25 PROCEDURE — 99213 OFFICE O/P EST LOW 20 MIN: CPT | Performed by: FAMILY MEDICINE

## 2022-04-25 PROCEDURE — 1101F PT FALLS ASSESS-DOCD LE1/YR: CPT | Performed by: FAMILY MEDICINE

## 2022-04-25 PROCEDURE — G8510 SCR DEP NEG, NO PLAN REQD: HCPCS | Performed by: FAMILY MEDICINE

## 2022-04-25 PROCEDURE — G8419 CALC BMI OUT NRM PARAM NOF/U: HCPCS | Performed by: FAMILY MEDICINE

## 2022-04-25 PROCEDURE — G8536 NO DOC ELDER MAL SCRN: HCPCS | Performed by: FAMILY MEDICINE

## 2022-04-25 PROCEDURE — G8754 DIAS BP LESS 90: HCPCS | Performed by: FAMILY MEDICINE

## 2022-04-25 PROCEDURE — G8752 SYS BP LESS 140: HCPCS | Performed by: FAMILY MEDICINE

## 2022-04-25 RX ORDER — ZOSTER VACCINE RECOMBINANT, ADJUVANTED 50 MCG/0.5
0.5 KIT INTRAMUSCULAR ONCE
Qty: 0.5 ML | Refills: 0 | Status: SHIPPED | OUTPATIENT
Start: 2022-04-25 | End: 2022-04-25

## 2022-04-25 RX ORDER — ATORVASTATIN CALCIUM 40 MG/1
TABLET, FILM COATED ORAL
Qty: 90 TABLET | Refills: 3 | Status: SHIPPED | OUTPATIENT
Start: 2022-04-25 | End: 2022-04-25 | Stop reason: SDUPTHER

## 2022-04-25 RX ORDER — ATORVASTATIN CALCIUM 40 MG/1
40 TABLET, FILM COATED ORAL
Qty: 90 TABLET | Refills: 3 | Status: SHIPPED | OUTPATIENT
Start: 2022-04-25 | End: 2022-10-17 | Stop reason: SDUPTHER

## 2022-04-25 RX ORDER — AZITHROMYCIN 250 MG/1
TABLET, FILM COATED ORAL
Qty: 6 TABLET | Refills: 0 | Status: SHIPPED | OUTPATIENT
Start: 2022-04-25 | End: 2022-07-19 | Stop reason: ALTCHOICE

## 2022-04-25 NOTE — PROGRESS NOTES
Chief Complaint   Patient presents with    Rash       1. \"Have you been to the ER, urgent care clinic since your last visit? Hospitalized since your last visit? \" No    2. \"Have you seen or consulted any other health care providers outside of the [address] [place] [address] since your last visit? \" No     3. For patients aged 39-70: Has the patient had a colonoscopy / FIT/ Cologuard? Yes - no Care Gap present  10/2021    If the patient is female:    4. For patients aged 41-77: Has the patient had a mammogram within the past 2 years? NA - based on age or sex      11. For patients aged 21-65: Has the patient had a pap smear?  NA - based on age or sex    Health Maintenance Due   Topic Date Due    Shingrix Vaccine Age 49> (1 of 2) Never done    COVID-19 Vaccine (3 - Booster for Moderna series) 07/17/2021

## 2022-04-28 NOTE — PROGRESS NOTES
HISTORY OF PRESENT ILLNESS  Stephanie Cabrera is a 67 y.o. male, present for Rash of the neck  Started few weeks ago not better tried alcohol washing and OTC antibiotic ointments, dosenot tingles and not pain full, states that is not expanding red, and ++ swelled up, red patch rounds, without itchiness     Current Outpatient Medications   Medication Sig Dispense Refill    atorvastatin (LIPITOR) 40 mg tablet Take 1 Tablet by mouth nightly. 90 Tablet 3    azithromycin (ZITHROMAX) 250 mg tablet 2 first day then one tab daily till finished 6 Tablet 0    clopidogreL (PLAVIX) 75 mg tab TAKE 1 TABLET BY MOUTH DAILY 90 Tablet 4    omeprazole (PriLOSEC OTC) 20 mg tablet Take 20 mg by mouth daily. As needed      tamsulosin (FLOMAX) 0.4 mg capsule Take 2 Caps by mouth daily. TAKE 1 CAPSULE DAILY 180 Cap 3    lisinopriL (PRINIVIL, ZESTRIL) 30 mg tablet TAKE 1 TABLET DAILY 90 Tab 3    diclofenac (VOLTAREN) 1 % gel Apply 2 g to affected area every six (6) hours. 150 g 4    aspirin delayed-release 81 mg tablet Take 1 Tab by mouth daily. 30 Tab 12    acetaminophen (TYLENOL) 500 mg tablet Take 1,000 mg by mouth every six (6) hours as needed for Pain.        No Known Allergies  Past Medical History:   Diagnosis Date    Abdominal wall hernia 11/16/2017    Anemia 9/10/2014    Back pain 4/1/2010    Blurry vision, bilateral 11/14/2017    CAD (coronary artery disease)     Calf pain 3/11/2015    Cancer (HCC)     Chronic kidney disease     cyst on kidney    Chronic left shoulder pain 10/24/2016    Constipation 6/11/2014    Coronary artery disease involving native coronary artery of native heart without angina pectoris 6/4/2019    Decreased vision 6/13/2011    Diastasis recti 11/16/2017    Diverticulitis 3/11/2015    Dizziness 11/14/2017    Enlarged LA (left atrium)     Fall at home 3/17/2016    GERD (gastroesophageal reflux disease)     Hemorrhoids 9/10/2014    Hip pain, chronic, right 10/3/2017    HTN (hypertension) 8/24/2010    Hx-TIA (transient ischemic attack)     Hypercholesterolemia 4/1/2010    Hypertension     Irregular heart beat 9/23/2015    Kidney disease     Mitral regurgitation     Need for varicella vaccine 9/10/2014    Orthostatic lightheadedness 1/16/2018    Other ill-defined conditions(799.89)     heart murmur    Patient is full code 6/4/2018    Rash 8/24/2010    Rib pain on right side 3/17/2016    Right calf pain 3/11/2015    Risk for falls 4/4/2014    S/P knee replacement 6/11/2014    Screening for depression 4/4/2014    Stroke ([facility] [address]     Tinea versicolor 9/10/2014    Tinnitus of left ear 1/16/2018    Urinary frequency 8/24/2010    Vitamin B12 deficiency neuropathy ([facility] [address] 7/17/2019     Past Surgical History:   Procedure Laterality Date    COLONOSCOPY N/A 11/18/2021    COLONOSCOPY performed by Tamara Andrews MD at [facility] ENDOSCOPY    HX HEENT      nasal septum repair    HX HEENT      HX HERNIA REPAIR      HX KNEE REPLACEMENT Left     HX ORTHOPAEDIC      left knee surgery    HX ORTHOPAEDIC      HX TONSILLECTOMY  age 5   [de-identified] UROLOGICAL      MS CARDIAC SURG PROCEDURE UNLIST      heart cath    MS COLONOSCOPY FLX DX W/COLLJ SPEC WHEN PFRMD  4/18/2011          Family History   Problem Relation Age of Onset    Heart Disease Brother     Alcohol abuse Son     COPD Mother     Heart Attack Mother     Hypertension Mother     Heart Disease Mother     COPD Father     Heart Attack Father     Hypertension Father     Heart Disease Father     Cancer Father     Cancer Maternal Grandmother      Social History     Tobacco Use    Smoking status: Never Smoker    Smokeless tobacco: Never Used   Substance Use Topics    Alcohol use: Not Currently     Comment: not since 2 years ago      Lab Results   Component Value Date/Time    WBC 5.3 12/10/2021 09:00 AM    HGB 12.7 12/10/2021 09:00 AM    HCT 38.3 12/10/2021 09:00 AM    PLATELET 817 25/62/3361 09:00 AM    MCV 97.0 12/10/2021 09:00 AM     Lab Results   Component Value Date/Time    ALT (SGPT) 40 12/10/2021 09:00 AM    Alk. phosphatase 97 12/10/2021 09:00 AM    Bilirubin, total 0.8 12/10/2021 09:00 AM    Albumin 4.3 12/10/2021 09:00 AM    Protein, total 7.2 12/10/2021 09:00 AM    INR 1.3 (H) 05/22/2014 03:30 AM    Prothrombin time 13.6 (H) 05/22/2014 03:30 AM    PLATELET 571 52/33/5903 09:00 AM    Hepatitis B surface Ag <0.10 12/10/2021 09:00 AM        Review of Systems   Constitutional: Negative for chills and fever. HENT: Negative for congestion and nosebleeds. Eyes: Negative for blurred vision and pain. Respiratory: Negative for cough, shortness of breath and wheezing. Cardiovascular: Negative for chest pain and leg swelling. Gastrointestinal: Negative for constipation, diarrhea, nausea and vomiting. Genitourinary: Negative for dysuria and frequency. Musculoskeletal: Negative for joint pain and myalgias. Skin: Positive for rash. Negative for itching. Neurological: Negative for dizziness, loss of consciousness and headaches. Psychiatric/Behavioral: Negative for depression. The patient is not nervous/anxious and does not have insomnia. Physical Exam  Vitals and nursing note reviewed. Constitutional:       Appearance: He is well-developed. HENT:      Head: Normocephalic and atraumatic. Mouth/Throat:      Pharynx: No oropharyngeal exudate. Eyes:      Conjunctiva/sclera: Conjunctivae normal.      Pupils: Pupils are equal, round, and reactive to light. Neck:      Thyroid: No thyromegaly. Vascular: No JVD. Cardiovascular:      Rate and Rhythm: Normal rate and regular rhythm. Heart sounds: Normal heart sounds. No murmur heard. No friction rub. Pulmonary:      Effort: Pulmonary effort is normal. No respiratory distress. Breath sounds: Normal breath sounds. No wheezing or rales. Abdominal:      General: Bowel sounds are normal. There is no distension.       Palpations: Abdomen is soft.      Tenderness: There is no abdominal tenderness. Musculoskeletal:         General: No tenderness. Cervical back: Normal range of motion and neck supple. Lymphadenopathy:      Cervical: No cervical adenopathy. Skin:     General: Skin is warm. Findings: Erythema and rash present. Neurological:      Mental Status: He is alert and oriented to person, place, and time. Deep Tendon Reflexes: Reflexes are normal and symmetric. Psychiatric:         Behavior: Behavior normal.          ASSESSMENT and PLAN  Diagnoses and all orders for this visit:    1. Rash and nonspecific skin eruption  -     azithromycin (ZITHROMAX) 250 mg tablet; 2 first day then one tab daily till finished    2. Vitamin B6 induced neuropathy ([facility] [address]    3. Advice given about COVID-19 virus infection    4. Immunization counseling  -     varicella-zoster recombinant, PF, (Shingrix, PF,) 50 mcg/0.5 mL susr injection; 0.5 mL by IntraMUSCular route once for 1 dose. 5. Hypercholesterolemia  -     atorvastatin (LIPITOR) 40 mg tablet; Take 1 Tablet by mouth nightly. pt was advised about the covid protection with vaccination, and to Wear a facemask , having social distance, and to get tested if possible,     patient acknowledged understanding and agreed with today's recommendations,    Follow-up and Dispositions    · Return in about 6 months (around 10/25/2022), or if symptoms worsen or fail to improve.

## 2022-05-25 DIAGNOSIS — R35.0 URINARY FREQUENCY: ICD-10-CM

## 2022-05-27 RX ORDER — TAMSULOSIN HYDROCHLORIDE 0.4 MG/1
CAPSULE ORAL
Qty: 180 CAPSULE | Refills: 3 | Status: SHIPPED | OUTPATIENT
Start: 2022-05-27

## 2022-06-08 DIAGNOSIS — M25.562 CHRONIC PAIN OF LEFT KNEE: ICD-10-CM

## 2022-06-08 DIAGNOSIS — Z96.659 STATUS POST KNEE REPLACEMENT, UNSPECIFIED LATERALITY: ICD-10-CM

## 2022-06-08 DIAGNOSIS — I20.8 ANGINAL CHEST PAIN AT REST (HCC): ICD-10-CM

## 2022-06-08 DIAGNOSIS — M54.40 BILATERAL LOW BACK PAIN WITH SCIATICA, SCIATICA LATERALITY UNSPECIFIED, UNSPECIFIED CHRONICITY: ICD-10-CM

## 2022-06-08 DIAGNOSIS — N41.0 ACUTE PROSTATITIS: ICD-10-CM

## 2022-06-08 DIAGNOSIS — G89.29 CHRONIC PAIN OF LEFT KNEE: ICD-10-CM

## 2022-06-09 RX ORDER — FAMOTIDINE 20 MG/1
TABLET, FILM COATED ORAL
Qty: 60 TABLET | Refills: 3 | Status: SHIPPED | OUTPATIENT
Start: 2022-06-09

## 2022-07-19 ENCOUNTER — OFFICE VISIT (OUTPATIENT)
Dept: FAMILY MEDICINE CLINIC | Age: 77
End: 2022-07-19
Payer: MEDICARE

## 2022-07-19 VITALS — WEIGHT: 229.4 LBS | BODY MASS INDEX: 28.52 KG/M2 | HEIGHT: 75 IN | RESPIRATION RATE: 16 BRPM

## 2022-07-19 DIAGNOSIS — Z23 ENCOUNTER FOR IMMUNIZATION: ICD-10-CM

## 2022-07-19 DIAGNOSIS — I10 HTN, GOAL BELOW 140/90: ICD-10-CM

## 2022-07-19 DIAGNOSIS — G89.29 CHRONIC PAIN OF RIGHT KNEE: Primary | ICD-10-CM

## 2022-07-19 DIAGNOSIS — Z71.89 ADVICE GIVEN ABOUT COVID-19 VIRUS INFECTION: ICD-10-CM

## 2022-07-19 DIAGNOSIS — M25.561 CHRONIC PAIN OF RIGHT KNEE: Primary | ICD-10-CM

## 2022-07-19 PROCEDURE — G8510 SCR DEP NEG, NO PLAN REQD: HCPCS | Performed by: FAMILY MEDICINE

## 2022-07-19 PROCEDURE — G8536 NO DOC ELDER MAL SCRN: HCPCS | Performed by: FAMILY MEDICINE

## 2022-07-19 PROCEDURE — G8756 NO BP MEASURE DOC: HCPCS | Performed by: FAMILY MEDICINE

## 2022-07-19 PROCEDURE — 1123F ACP DISCUSS/DSCN MKR DOCD: CPT | Performed by: FAMILY MEDICINE

## 2022-07-19 PROCEDURE — 99213 OFFICE O/P EST LOW 20 MIN: CPT | Performed by: FAMILY MEDICINE

## 2022-07-19 PROCEDURE — G8427 DOCREV CUR MEDS BY ELIG CLIN: HCPCS | Performed by: FAMILY MEDICINE

## 2022-07-19 PROCEDURE — G8417 CALC BMI ABV UP PARAM F/U: HCPCS | Performed by: FAMILY MEDICINE

## 2022-07-19 PROCEDURE — 1101F PT FALLS ASSESS-DOCD LE1/YR: CPT | Performed by: FAMILY MEDICINE

## 2022-07-19 PROCEDURE — 20610 DRAIN/INJ JOINT/BURSA W/O US: CPT | Performed by: FAMILY MEDICINE

## 2022-07-19 RX ORDER — TRIAMCINOLONE ACETONIDE 40 MG/ML
40 INJECTION, SUSPENSION INTRA-ARTICULAR; INTRAMUSCULAR ONCE
Status: COMPLETED | OUTPATIENT
Start: 2022-07-19 | End: 2022-07-19

## 2022-07-19 RX ORDER — LISINOPRIL 30 MG/1
TABLET ORAL
Qty: 90 TABLET | Refills: 3
Start: 2022-07-19

## 2022-07-19 RX ORDER — TRIAMCINOLONE ACETONIDE 40 MG/ML
INJECTION, SUSPENSION INTRA-ARTICULAR; INTRAMUSCULAR ONCE
Status: CANCELLED | OUTPATIENT
Start: 2022-07-19 | End: 2022-07-19

## 2022-07-19 RX ORDER — ZOSTER VACCINE RECOMBINANT, ADJUVANTED 50 MCG/0.5
0.5 KIT INTRAMUSCULAR ONCE
Qty: 0.5 ML | Refills: 0 | Status: SHIPPED | OUTPATIENT
Start: 2022-07-19 | End: 2022-07-19

## 2022-07-19 RX ADMIN — TRIAMCINOLONE ACETONIDE 40 MG: 40 INJECTION, SUSPENSION INTRA-ARTICULAR; INTRAMUSCULAR at 15:44
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
## 2022-07-19 NOTE — PROGRESS NOTES
Chief Complaint   Patient presents with    Knee Pain     right     1. Have you been to the ER, urgent care clinic since your last visit? Hospitalized since your last visit? No    2. Have you seen or consulted any other health care providers outside of the [address] [place] [address] since your last visit? Include any pap smears or colon screening. Yes When: April 1, 2022 Where: ortho  Reason for visit: knee pain    Health Maintenance   Topic Date Due    Shingrix Vaccine Age 49> (1 of 2) Never done    Flu Vaccine (1) 09/01/2022    Lipid Screen  12/10/2022    Medicare Yearly Exam  12/11/2022    Depression Screen  04/25/2023    DTaP/Tdap/Td series (2 - Td or Tdap) 10/01/2023    Hepatitis C Screening  Completed    COVID-19 Vaccine  Completed    Pneumococcal 65+ years  Completed     Verified patient with two type of identifiers.   requesting steroid injection to right knee      [address] MAIN OFFICE-[facility]  OFFICE PROCEDURE PROGRESS NOTE        Chart reviewed for the following:   Rome Sánchez LPN, have reviewed the History, Physical and updated the Allergic reactions for [address] performed immediately prior to start of procedure:   Rome Sánchez LPN, have performed the following reviews on Yoandy Pelletier prior to the start of the procedure:            * Patient was identified by name and date of birth   * Agreement on procedure being performed was verified  * Risks and Benefits explained to the patient  * Procedure site verified and marked as necessary  * Patient was positioned for comfort  * Consent was signed and verified     Time:  3:00pm      Date of procedure: 7/19/2022    Procedure performed by:  Dov Pink MD    Provider assisted by:  Adryan Novak LPN    Patient assisted by: self    How tolerated by patient: tolerated the procedure well with no complications    Post Procedural Pain Scale: 2 - Hurts Little Bit    Comments: none  TIME ENDED:3:15pm    PROCEDURE: right knee steroid injection    PRE-PAIN:5  POST-PAIN:2    SPECIMEN SENT TO LAB:none

## 2022-10-17 ENCOUNTER — OFFICE VISIT (OUTPATIENT)
Dept: FAMILY MEDICINE CLINIC | Age: 77
End: 2022-10-17
Payer: MEDICARE

## 2022-10-17 VITALS
HEART RATE: 61 BPM | BODY MASS INDEX: 30.53 KG/M2 | TEMPERATURE: 98.3 F | WEIGHT: 225.4 LBS | SYSTOLIC BLOOD PRESSURE: 102 MMHG | HEIGHT: 72 IN | OXYGEN SATURATION: 98 % | RESPIRATION RATE: 18 BRPM | DIASTOLIC BLOOD PRESSURE: 68 MMHG

## 2022-10-17 DIAGNOSIS — E53.8 B12 DEFICIENCY: ICD-10-CM

## 2022-10-17 DIAGNOSIS — E78.00 HYPERCHOLESTEROLEMIA: ICD-10-CM

## 2022-10-17 DIAGNOSIS — E53.8 B12 NEUROPATHY (HCC): ICD-10-CM

## 2022-10-17 DIAGNOSIS — R53.82 CHRONIC FATIGUE: Primary | ICD-10-CM

## 2022-10-17 DIAGNOSIS — G63 B12 NEUROPATHY (HCC): ICD-10-CM

## 2022-10-17 DIAGNOSIS — K57.92 ACUTE DIVERTICULITIS: ICD-10-CM

## 2022-10-17 PROCEDURE — G8752 SYS BP LESS 140: HCPCS | Performed by: FAMILY MEDICINE

## 2022-10-17 PROCEDURE — 99214 OFFICE O/P EST MOD 30 MIN: CPT | Performed by: FAMILY MEDICINE

## 2022-10-17 PROCEDURE — G8510 SCR DEP NEG, NO PLAN REQD: HCPCS | Performed by: FAMILY MEDICINE

## 2022-10-17 PROCEDURE — G8417 CALC BMI ABV UP PARAM F/U: HCPCS | Performed by: FAMILY MEDICINE

## 2022-10-17 PROCEDURE — G8427 DOCREV CUR MEDS BY ELIG CLIN: HCPCS | Performed by: FAMILY MEDICINE

## 2022-10-17 PROCEDURE — 1123F ACP DISCUSS/DSCN MKR DOCD: CPT | Performed by: FAMILY MEDICINE

## 2022-10-17 PROCEDURE — G8754 DIAS BP LESS 90: HCPCS | Performed by: FAMILY MEDICINE

## 2022-10-17 PROCEDURE — G8536 NO DOC ELDER MAL SCRN: HCPCS | Performed by: FAMILY MEDICINE

## 2022-10-17 PROCEDURE — 1101F PT FALLS ASSESS-DOCD LE1/YR: CPT | Performed by: FAMILY MEDICINE

## 2022-10-17 PROCEDURE — 96372 THER/PROPH/DIAG INJ SC/IM: CPT | Performed by: FAMILY MEDICINE

## 2022-10-17 RX ORDER — METHYLPREDNISOLONE 4 MG/1
TABLET ORAL
Qty: 1 DOSE PACK | Refills: 0 | Status: SHIPPED | OUTPATIENT
Start: 2022-10-17

## 2022-10-17 RX ORDER — METRONIDAZOLE 500 MG/1
500 TABLET ORAL 3 TIMES DAILY
Qty: 30 TABLET | Refills: 0 | Status: SHIPPED | OUTPATIENT
Start: 2022-10-17 | End: 2022-10-27

## 2022-10-17 RX ORDER — CIPROFLOXACIN 500 MG/1
500 TABLET ORAL 2 TIMES DAILY
Qty: 20 TABLET | Refills: 0 | Status: SHIPPED | OUTPATIENT
Start: 2022-10-17 | End: 2022-10-27

## 2022-10-17 RX ORDER — CYANOCOBALAMIN 1000 UG/ML
1000 INJECTION, SOLUTION INTRAMUSCULAR; SUBCUTANEOUS ONCE
Qty: 1 ML | Refills: 0
Start: 2022-10-17 | End: 2022-10-17

## 2022-10-17 RX ORDER — ATORVASTATIN CALCIUM 40 MG/1
40 TABLET, FILM COATED ORAL
Qty: 90 TABLET | Refills: 3 | Status: SHIPPED | OUTPATIENT
Start: 2022-10-17

## 2022-10-17 NOTE — PROGRESS NOTES
HISTORY OF PRESENT ILLNESS  Yaneth Dodson is a 65 y.o. male, presents stating that recently the wife got a new dog and he has to take care of the dog and everything else and is getting tired and stressed out but he does not want to take any medication he denies any homicidal suicidal ideation he is happy not very depressed but feeling fatigue and tired has no energy to go hunting his hunting season is on him he only done 2 hunting episodes, sleeping normal, he also complaining of left-sided abdominal pain he has had history of diverticulitis he states that he went to emergency room couple of months ago and he got treated for diverticulitis and his problem has gone away he state that this time is the same problem as in the past and requesting to get treatment for diverticulitis otherwise with history of B12 deficiency current injection has been helpful rate oral medication not helpful, the pain is 4 out of 10 dull start from med lower abdominal and radiates to the left side state that his bowel habits are acting up feeling chills and slight night sweats     Current Outpatient Medications   Medication Sig Dispense Refill    atorvastatin (LIPITOR) 40 mg tablet Take 1 Tablet by mouth nightly. 90 Tablet 3    metroNIDAZOLE (FLAGYL) 500 mg tablet Take 1 Tablet by mouth three (3) times daily for 10 days. 30 Tablet 0    ciprofloxacin HCl (CIPRO) 500 mg tablet Take 1 Tablet by mouth two (2) times a day for 10 days. 20 Tablet 0    cyanocobalamin (Vitamin B-12) 1,000 mcg/mL injection 1 mL by IntraMUSCular route once for 1 dose.  1 mL 0    methylPREDNISolone (MEDROL DOSEPACK) 4 mg tablet As directed for 6 days one package 1 Dose Pack 0    lisinopriL (PRINIVIL, ZESTRIL) 30 mg tablet TAKE 1 TABLET DAILY 90 Tablet 3    famotidine (PEPCID) 20 mg tablet TAKE 1 TABLET BY MOUTH TWICE DAILY 60 Tablet 3    tamsulosin (FLOMAX) 0.4 mg capsule TAKE 2 CAPSULES BY MOUTH EVERY  Capsule 3    clopidogreL (PLAVIX) 75 mg tab TAKE 1 TABLET BY MOUTH DAILY 90 Tablet 4    omeprazole (PRILOSEC OTC) 20 mg tablet Take 20 mg by mouth daily. As needed      diclofenac (VOLTAREN) 1 % gel Apply 2 g to affected area every six (6) hours. 150 g 4    aspirin delayed-release 81 mg tablet Take 1 Tab by mouth daily. 30 Tab 12    acetaminophen (TYLENOL) 500 mg tablet Take 1,000 mg by mouth every six (6) hours as needed for Pain.        No Known Allergies  Past Medical History:   Diagnosis Date    Abdominal wall hernia 11/16/2017    Anemia 9/10/2014    Back pain 4/1/2010    Blurry vision, bilateral 11/14/2017    CAD (coronary artery disease)     Calf pain 3/11/2015    Cancer ([address]     Chronic kidney disease     cyst on kidney    Chronic left shoulder pain 10/24/2016    Constipation 6/11/2014    Coronary artery disease involving native coronary artery of native heart without angina pectoris 6/4/2019    Decreased vision 6/13/2011    Diastasis recti 11/16/2017    Diverticulitis 3/11/2015    Dizziness 11/14/2017    Enlarged LA (left atrium)     Fall at home 3/17/2016    GERD (gastroesophageal reflux disease)     Hemorrhoids 9/10/2014    Hip pain, chronic, right 10/3/2017    HTN (hypertension) 8/24/2010    Hx-TIA (transient ischemic attack)     Hypercholesterolemia 4/1/2010    Hypertension     Irregular heart beat 9/23/2015    Kidney disease     Mitral regurgitation     Need for varicella vaccine 9/10/2014    Orthostatic lightheadedness 1/16/2018    Other ill-defined conditions(799.89)     heart murmur    Patient is full code 6/4/2018    Rash 8/24/2010    Rib pain on right side 3/17/2016    Right calf pain 3/11/2015    Risk for falls 4/4/2014    S/P knee replacement 6/11/2014    Screening for depression 4/4/2014    Stroke ([address]     Tinea versicolor 9/10/2014    Tinnitus of left ear 1/16/2018    Urinary frequency 8/24/2010    Vitamin B12 deficiency neuropathy ([address] 7/17/2019     Past Surgical History:   Procedure Laterality Date    COLONOSCOPY N/A 11/18/2021 COLONOSCOPY performed by Robert Fuentes MD at [facility] ENDOSCOPY    HX HEENT      nasal septum repair    HX HEENT      HX HERNIA REPAIR      HX KNEE REPLACEMENT Left     HX ORTHOPAEDIC      left knee surgery    HX ORTHOPAEDIC      HX TONSILLECTOMY  age 5    HX UROLOGICAL      UT CARDIAC SURG PROCEDURE UNLIST      heart cath    UT COLONOSCOPY FLX DX W/COLLJ SPEC WHEN PFRMD  4/18/2011          Family History   Problem Relation Age of Onset    Heart Disease Brother     Alcohol abuse Son     COPD Mother     Heart Attack Mother     Hypertension Mother     Heart Disease Mother     COPD Father     Heart Attack Father     Hypertension Father     Heart Disease Father     Cancer Father     Cancer Maternal Grandmother      Social History     Tobacco Use    Smoking status: Never    Smokeless tobacco: Never   Substance Use Topics    Alcohol use: Not Currently     Comment: not since 2 years ago      Lab Results   Component Value Date/Time    WBC 5.3 12/10/2021 09:00 AM    HGB 12.7 12/10/2021 09:00 AM    HCT 38.3 12/10/2021 09:00 AM    PLATELET 088 39/90/7709 09:00 AM    MCV 97.0 12/10/2021 09:00 AM     Lab Results   Component Value Date/Time    Hemoglobin A1c 5.4 12/10/2021 09:00 AM    Hemoglobin A1c 5.6 11/15/2017 02:48 AM    Hemoglobin A1c 5.5 05/02/2017 03:56 AM    Glucose 97 12/10/2021 09:00 AM    LDL, calculated 59.6 12/10/2021 09:00 AM    Creatinine (POC) 0.8 03/29/2011 09:23 AM    Creatinine 0.95 12/10/2021 09:00 AM      Lab Results   Component Value Date/Time    Cholesterol, total 138 12/10/2021 09:00 AM    HDL Cholesterol 47 12/10/2021 09:00 AM    LDL, calculated 59.6 12/10/2021 09:00 AM    Triglyceride 157 (H) 12/10/2021 09:00 AM    CHOL/HDL Ratio 2.9 12/10/2021 09:00 AM        Review of Systems   Constitutional:  Negative for chills and fever. HENT:  Negative for congestion and nosebleeds. Eyes:  Negative for blurred vision and pain. Respiratory:  Negative for cough, shortness of breath and wheezing. Cardiovascular:  Negative for chest pain and leg swelling. Gastrointestinal:  Positive for abdominal pain and constipation. Negative for blood in stool, diarrhea, melena, nausea and vomiting. Genitourinary:  Negative for dysuria and frequency. Musculoskeletal:  Negative for joint pain and myalgias. Skin:  Negative for itching and rash. Neurological:  Negative for dizziness, loss of consciousness and headaches. Psychiatric/Behavioral:  Negative for depression, hallucinations and suicidal ideas. The patient is not nervous/anxious and does not have insomnia. Physical Exam  Vitals and nursing note reviewed. Constitutional:       Appearance: He is well-developed. HENT:      Head: Normocephalic and atraumatic. Mouth/Throat:      Pharynx: No oropharyngeal exudate. Eyes:      Conjunctiva/sclera: Conjunctivae normal.      Pupils: Pupils are equal, round, and reactive to light. Neck:      Thyroid: No thyromegaly. Vascular: No JVD. Cardiovascular:      Rate and Rhythm: Normal rate and regular rhythm. Heart sounds: Normal heart sounds. No murmur heard. No friction rub. Pulmonary:      Effort: Pulmonary effort is normal. No respiratory distress. Breath sounds: Normal breath sounds. No wheezing or rales. Abdominal:      General: Bowel sounds are normal. There is no distension. Palpations: Abdomen is soft. Tenderness: There is abdominal tenderness. Musculoskeletal:         General: No tenderness. Cervical back: Normal range of motion and neck supple. Lymphadenopathy:      Cervical: No cervical adenopathy. Skin:     General: Skin is warm. Findings: No erythema or rash. Neurological:      Mental Status: He is alert and oriented to person, place, and time. Deep Tendon Reflexes: Reflexes are normal and symmetric. Psychiatric:         Behavior: Behavior normal.       ASSESSMENT and PLAN  1.  Hypercholesterolemia     - atorvastatin (LIPITOR) 40 mg tablet; Take 1 Tablet by mouth nightly. Dispense: 90 Tablet; Refill: 3    2. Chronic fatigue     - methylPREDNISolone (MEDROL DOSEPACK) 4 mg tablet; As directed for 6 days one package  Dispense: 1 Dose Pack; Refill: 0    3. B12 neuropathy (HCC)     - VITAMIN B12 INJECTION  - THER/PROPH/DIAG INJECTION, SUBCUT/IM  - cyanocobalamin (Vitamin B-12) 1,000 mcg/mL injection; 1 mL by IntraMUSCular route once for 1 dose. Dispense: 1 mL; Refill: 0    4. B12 deficiency     - VITAMIN B12 INJECTION  - THER/PROPH/DIAG INJECTION, SUBCUT/IM  - cyanocobalamin (Vitamin B-12) 1,000 mcg/mL injection; 1 mL by IntraMUSCular route once for 1 dose. Dispense: 1 mL; Refill: 0    5. Acute diverticulitis     - metroNIDAZOLE (FLAGYL) 500 mg tablet; Take 1 Tablet by mouth three (3) times daily for 10 days. Dispense: 30 Tablet; Refill: 0  - ciprofloxacin HCl (CIPRO) 500 mg tablet; Take 1 Tablet by mouth two (2) times a day for 10 days. Dispense: 20 Tablet;  Refill: 0      Patient was told to avoid heavy lifting heavy pushing diet and lean diet increase oral hydration resting heat therapy taking brisk prescription as prescribed observe for worsening condition if the pain does not respond to the current medication patient was told to call 911 PCP or go to emergency room patient acknowledged understanding

## 2022-10-17 NOTE — PROGRESS NOTES
Chief Complaint   Patient presents with    Follow-up     No energy, no strength diverticulosis or hernia mass is missed up. 1. \"Have you been to the ER, urgent care clinic since your last visit? Hospitalized since your last visit? \" No    2. \"Have you seen or consulted any other health care providers outside of the [address] [place] [address] since your last visit? \" Yes Where: Dr. Kris Tolentino      3. For patients aged 39-70: Has the patient had a colonoscopy / FIT/ Cologuard? Yes - no Care Gap present      If the patient is female:    4. For patients aged 41-77: Has the patient had a mammogram within the past 2 years? NA - based on age or sex      11. For patients aged 21-65: Has the patient had a pap smear?  NA - based on age or sex    Health Maintenance Due   Topic Date Due    Shingrix Vaccine Age 49> (1 of 2) Never done    COVID-19 Vaccine (4 - Booster for Moderna series) 01/23/2022    Flu Vaccine (1) 08/01/2022

## 2022-11-21 ENCOUNTER — OFFICE VISIT (OUTPATIENT)
Dept: FAMILY MEDICINE CLINIC | Age: 77
End: 2022-11-21
Payer: MEDICARE

## 2022-11-21 VITALS
WEIGHT: 223 LBS | BODY MASS INDEX: 27.73 KG/M2 | RESPIRATION RATE: 16 BRPM | DIASTOLIC BLOOD PRESSURE: 69 MMHG | SYSTOLIC BLOOD PRESSURE: 121 MMHG | HEART RATE: 61 BPM | OXYGEN SATURATION: 99 % | HEIGHT: 75 IN | TEMPERATURE: 98.7 F

## 2022-11-21 DIAGNOSIS — Z23 ENCOUNTER FOR IMMUNIZATION: ICD-10-CM

## 2022-11-21 DIAGNOSIS — E78.00 HYPERCHOLESTEROLEMIA: ICD-10-CM

## 2022-11-21 DIAGNOSIS — Z00.00 MEDICARE ANNUAL WELLNESS VISIT, SUBSEQUENT: Primary | ICD-10-CM

## 2022-11-21 DIAGNOSIS — Z71.89 ADVANCED DIRECTIVES, COUNSELING/DISCUSSION: ICD-10-CM

## 2022-11-21 DIAGNOSIS — E53.8 B12 NEUROPATHY (HCC): ICD-10-CM

## 2022-11-21 DIAGNOSIS — I10 PRIMARY HYPERTENSION: ICD-10-CM

## 2022-11-21 DIAGNOSIS — G63 B12 NEUROPATHY (HCC): ICD-10-CM

## 2022-11-21 PROCEDURE — G8536 NO DOC ELDER MAL SCRN: HCPCS | Performed by: FAMILY MEDICINE

## 2022-11-21 PROCEDURE — G8752 SYS BP LESS 140: HCPCS | Performed by: FAMILY MEDICINE

## 2022-11-21 PROCEDURE — 1123F ACP DISCUSS/DSCN MKR DOCD: CPT | Performed by: FAMILY MEDICINE

## 2022-11-21 PROCEDURE — 90750 HZV VACC RECOMBINANT IM: CPT | Performed by: FAMILY MEDICINE

## 2022-11-21 PROCEDURE — G8427 DOCREV CUR MEDS BY ELIG CLIN: HCPCS | Performed by: FAMILY MEDICINE

## 2022-11-21 PROCEDURE — G8754 DIAS BP LESS 90: HCPCS | Performed by: FAMILY MEDICINE

## 2022-11-21 PROCEDURE — 3074F SYST BP LT 130 MM HG: CPT | Performed by: FAMILY MEDICINE

## 2022-11-21 PROCEDURE — 96372 THER/PROPH/DIAG INJ SC/IM: CPT | Performed by: FAMILY MEDICINE

## 2022-11-21 PROCEDURE — 90471 IMMUNIZATION ADMIN: CPT | Performed by: FAMILY MEDICINE

## 2022-11-21 PROCEDURE — 1101F PT FALLS ASSESS-DOCD LE1/YR: CPT | Performed by: FAMILY MEDICINE

## 2022-11-21 PROCEDURE — G8510 SCR DEP NEG, NO PLAN REQD: HCPCS | Performed by: FAMILY MEDICINE

## 2022-11-21 PROCEDURE — G8417 CALC BMI ABV UP PARAM F/U: HCPCS | Performed by: FAMILY MEDICINE

## 2022-11-21 PROCEDURE — 3078F DIAST BP <80 MM HG: CPT | Performed by: FAMILY MEDICINE

## 2022-11-21 PROCEDURE — G0439 PPPS, SUBSEQ VISIT: HCPCS | Performed by: FAMILY MEDICINE

## 2022-11-21 RX ORDER — CYANOCOBALAMIN 1000 UG/ML
1000 INJECTION, SOLUTION INTRAMUSCULAR; SUBCUTANEOUS ONCE
Qty: 1 ML | Refills: 0
Start: 2022-11-21 | End: 2022-11-21

## 2022-11-21 RX ORDER — FINASTERIDE 5 MG/1
5 TABLET, FILM COATED ORAL DAILY
Qty: 30 TABLET | Refills: 3 | Status: SHIPPED | OUTPATIENT
Start: 2022-11-21

## 2022-11-21 NOTE — PROGRESS NOTES
Patient identified with two identification factors, Name and Date of Birth. Chief Complaint   Patient presents with    Knee Pain     4 month follow-up chronic knee pain. Visit Vitals  /69 (BP 1 Location: Right arm, BP Patient Position: Sitting, BP Cuff Size: Adult)   Pulse 61   Temp 98.7 °F (37.1 °C) (Oral)   Resp 16   Ht 6' 3\" (1.905 m)   Wt 223 lb (101.2 kg)   SpO2 99%   BMI 27.87 kg/m²       Health Maintenance Due   Topic    Shingrix Vaccine Age 50> (1 of 2)    COVID-19 Vaccine (4 - Booster for Moderna series)    Flu Vaccine (1)    Medicare Yearly Exam         1. \"Have you been to the ER, urgent care clinic since your last visit? Hospitalized since your last visit? \" No    2. \"Have you seen or consulted any other health care providers outside of the [address] [place] [address] since your last visit? \" No     3. For patients aged 39-70: Has the patient had a colonoscopy / FIT/ Cologuard?  Yes - no Care Gap present

## 2022-11-21 NOTE — PATIENT INSTRUCTIONS
Medicare Wellness Visit, Male    The best way to live healthy is to have a lifestyle where you eat a well-balanced diet, exercise regularly, limit alcohol use, and quit all forms of tobacco/nicotine, if applicable. Regular preventive services are another way to keep healthy. Preventive services (vaccines, screening tests, monitoring & exams) can help personalize your care plan, which helps you manage your own care. Screening tests can find health problems at the earliest stages, when they are easiest to treat. [facility] follows the current, evidence-based guidelines published by the [facility] ([facility]) when recommending preventive services for our patients. Because we follow these guidelines, sometimes recommendations change over time as research supports it. (For example, a prostate screening blood test is no longer routinely recommended for men with no symptoms). Of course, you and your doctor may decide to screen more often for some diseases, based on your risk and co-morbidities (chronic disease you are already diagnosed with). Preventive services for you include:  - Medicare offers their members a free annual wellness visit, which is time for you and your primary care provider to discuss and plan for your preventive service needs. Take advantage of this benefit every year!  -All adults over age 72 should receive the recommended pneumonia vaccines. Current USPSTF guidelines recommend a series of two vaccines for the best pneumonia protection.   -All adults should have a flu vaccine yearly and tetanus vaccine every 10 years.  -All adults age 48 and older should receive the shingles vaccines (series of two vaccines).        -All adults age 38-68 who are overweight should have a diabetes screening test once every three years.   -Other screening tests & preventive services for persons with diabetes include: an eye exam to screen for diabetic retinopathy, a kidney function test, a foot exam, and stricter control over your cholesterol.   -Cardiovascular screening for adults with routine risk involves an electrocardiogram (ECG) at intervals determined by the provider.   -Colorectal cancer screening should be done for adults age 54-65 with no increased risk factors for colorectal cancer. There are a number of acceptable methods of screening for this type of cancer. Each test has its own benefits and drawbacks. Discuss with your provider what is most appropriate for you during your annual wellness visit. The different tests include: colonoscopy (considered the best screening method), a fecal occult blood test, a fecal DNA test, and sigmoidoscopy.  -All adults born between [facility] should be screened once for Hepatitis C.  -An Abdominal Aortic Aneurysm (AAA) Screening is recommended for men age 73-68 who has ever smoked in their lifetime.      Here is a list of your current Health Maintenance items (your personalized list of preventive services) with a due date:  Health Maintenance Due   Topic Date Due    Shingles Vaccine (1 of 2) Never done    COVID-19 Vaccine (4 - Booster for [place] series) 11/18/2021    Yearly Flu Vaccine (1) 08/01/2022

## 2022-11-21 NOTE — PROGRESS NOTES
B12  This is the Subsequent Medicare Annual Wellness Exam, performed 12 months or more after the Initial AWV or the last Subsequent AWV    I have reviewed the patient's medical history in detail and updated the computerized patient record. Last clonoscopy in 2021  Last psa level in couple yrs ago,       Vax's offered but opted     Assessment/Plan   Education and counseling provided:  Are appropriate based on today's review and evaluation  End-of-Life planning (with patient's consent)  Influenza Vaccine    1. Medicare annual wellness visit, subsequent  2. Advanced directives, counseling/discussion  -     FULL CODE  -     ADVANCE CARE PLANNING FIRST 30 MINS  3. B12 neuropathy (HCC)  -     CBC W/O DIFF; Future  -     LIPID PANEL; Future  -     METABOLIC PANEL, COMPREHENSIVE; Future  -     VITAMIN B12 & FOLATE; Future  -     TSH 3RD GENERATION; Future  -     VITAMIN B12 INJECTION  -     THER/PROPH/DIAG INJECTION, SUBCUT/IM  4. Hypercholesterolemia  -     CBC W/O DIFF; Future  -     LIPID PANEL; Future  -     METABOLIC PANEL, COMPREHENSIVE; Future  -     VITAMIN B12 & FOLATE; Future  -     TSH 3RD GENERATION; Future  5. Primary hypertension  -     CBC W/O DIFF; Future  -     LIPID PANEL; Future  -     METABOLIC PANEL, COMPREHENSIVE; Future  -     VITAMIN B12 & FOLATE; Future  -     TSH 3RD GENERATION;  Future       Depression Risk Factor Screening     3 most recent PHQ Screens 11/21/2022   Little interest or pleasure in doing things Not at all   Feeling down, depressed, irritable, or hopeless Not at all   Total Score PHQ 2 0   Trouble falling or staying asleep, or sleeping too much -   Feeling tired or having little energy -   Poor appetite, weight loss, or overeating -   Feeling bad about yourself - or that you are a failure or have let yourself or your family down -   Trouble concentrating on things such as school, work, reading, or watching TV -   Moving or speaking so slowly that other people could have noticed; or the opposite being so fidgety that others notice -   Thoughts of being better off dead, or hurting yourself in some way -   PHQ 9 Score -   How difficult have these problems made it for you to do your work, take care of your home and get along with others -       Alcohol & Drug Abuse Risk Screen    Do you average more than 1 drink per night or more than 7 drinks a week: No    In the past three months have you have had more than 4 drinks containing alcohol on one occasion: No          Functional Ability and Level of Safety    Hearing: Hearing is good. Activities of Daily Living: The home contains: handrails, grab bars, and rugs  Patient does total self care      Ambulation: with mild difficulty     Fall Risk:  Fall Risk Assessment, last 12 mths 11/21/2022   Able to walk? Yes   Fall in past 12 months? 0   Do you feel unsteady? 1   Are you worried about falling 1   Is TUG test greater than 12 seconds? 0   Is the gait abnormal? 0   Number of falls in past 12 months -   Fall with injury?  -      Abuse Screen:  Patient is not abused       Cognitive Screening    Has your family/caregiver stated any concerns about your memory: no     Cognitive Screening: Normal - MMSE (Mini Mental Status Exam)    Health Maintenance Due     Health Maintenance Due   Topic Date Due    Shingrix Vaccine Age 50> (1 of 2) Never done    COVID-19 Vaccine (4 - Booster for Moderna series) 11/18/2021    Flu Vaccine (1) 08/01/2022       Patient Care Team   Patient Care Team:  Jarad Lao MD as PCP - Fermin Glover MD as PCP - [facility] Empaneled Provider  April Cabot, MD (Dermatology Physician)  She Hernandez MD (Cardiovascular Disease Physician)  Larry Martel MD (Plastic Surgery)  Agusto Mace, [address] (Optometry)  Lucio Bullard MD as Physician (Cardiovascular Disease Physician)  Suleman Barrera MD as Physician (Sleep Medicine Physician)  Jesica Weinstein MD (Inactive) (Gastroenterology)  Kiko Lees Luis A Low MD as Physician (Cardiovascular Disease Physician)  Hudson Montelongo NP as Nurse Practitioner (Nurse Practitioner)    History     Patient Active Problem List   Diagnosis Code    Back pain M54.9    Hypercholesterolemia E78.00    HTN (hypertension) I10    Rash R21    Urinary frequency R35.0    Foot pain, right M79.671    Acute sinusitis J01.90    [place] L84    Tick bite W57. XXXA    Decreased vision H54.7    Knee pain, left M25.562    Acute bronchitis J20.9    Otitis externa of right ear H60.91    ED (erectile dysfunction) N52.9    Risk for falls Z91.81    Screening for depression Z13.31    Osteopenia M85.80    S/P knee replacement Z96.659    Constipation K59.00    Anemia D64.9    Tinea versicolor B36.0    Hemorrhoids K64.9    Diverticulitis K57.92    Calf pain M79.669    Right calf pain M79.661    Irregular heart beat I49.9    Enlarged LA (left atrium) I51.7    Mitral regurgitation I34.0    Fall at home W19. 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, 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     Past Medical History:   Diagnosis Date    Abdominal wall hernia 11/16/2017    Anemia 9/10/2014    Back pain 4/1/2010    Blurry vision, bilateral 11/14/2017    CAD (coronary artery disease)     Calf pain 3/11/2015    Cancer ([facility]     Chronic kidney disease     cyst on kidney    Chronic left shoulder pain 10/24/2016    Constipation 6/11/2014    Coronary artery disease involving native coronary artery of native heart without angina pectoris 6/4/2019    Decreased vision 6/13/2011    Diastasis recti 11/16/2017    Diverticulitis 3/11/2015    Dizziness 11/14/2017    Enlarged LA (left atrium)     Fall at home 3/17/2016    GERD (gastroesophageal reflux disease)     Hemorrhoids 9/10/2014    Hip pain, chronic, right 10/3/2017    HTN (hypertension) 8/24/2010    Hx-TIA (transient ischemic attack)     Hypercholesterolemia 4/1/2010    Hypertension     Irregular heart beat 9/23/2015    Kidney disease     Mitral regurgitation     Need for varicella vaccine 9/10/2014    Orthostatic lightheadedness 1/16/2018    Other ill-defined conditions(799.89)     heart murmur    Patient is full code 6/4/2018    Rash 8/24/2010    Rib pain on right side 3/17/2016    Right calf pain 3/11/2015    Risk for falls 4/4/2014    S/P knee replacement 6/11/2014    Screening for depression 4/4/2014    Stroke ([facility]     Tinea versicolor 9/10/2014    Tinnitus of left ear 1/16/2018    Urinary frequency 8/24/2010    Vitamin B12 deficiency neuropathy ([facility] 7/17/2019      Past Surgical History:   Procedure Laterality Date    COLONOSCOPY N/A 11/18/2021    COLONOSCOPY performed by Leandro Hong MD at [facility] ENDOSCOPY    HX HEENT      nasal septum repair    HX HEENT      HX HERNIA REPAIR      HX KNEE REPLACEMENT Left     HX ORTHOPAEDIC      left knee surgery    HX ORTHOPAEDIC      HX TONSILLECTOMY  age 5    HX UROLOGICAL      CO CARDIAC SURG PROCEDURE UNLIST      heart cath    CO COLONOSCOPY FLX DX W/COLLJ SPEC WHEN PFRMD  4/18/2011          Current Outpatient Medications   Medication Sig Dispense Refill    atorvastatin (LIPITOR) 40 mg tablet Take 1 Tablet by mouth nightly.  90 Tablet 3    lisinopriL (PRINIVIL, ZESTRIL) 30 mg tablet TAKE 1 TABLET DAILY (Patient taking differently: TAKE 0.5mg TABLET DAILY) 90 Tablet 3    famotidine (PEPCID) 20 mg tablet TAKE 1 TABLET BY MOUTH TWICE DAILY 60 Tablet 3    tamsulosin (FLOMAX) 0.4 mg capsule TAKE 2 CAPSULES BY MOUTH EVERY  Capsule 3    clopidogreL (PLAVIX) 75 mg tab TAKE 1 TABLET BY MOUTH DAILY 90 Tablet 4    omeprazole (PRILOSEC OTC) 20 mg tablet Take 20 mg by mouth daily. As needed      diclofenac (VOLTAREN) 1 % gel Apply 2 g to affected area every six (6) hours. 150 g 4    aspirin delayed-release 81 mg tablet Take 1 Tab by mouth daily. 30 Tab 12    acetaminophen (TYLENOL) 500 mg tablet Take 1,000 mg by mouth every six (6) hours as needed for Pain.       methylPREDNISolone (MEDROL DOSEPACK) 4 mg tablet As directed for 6 days one package (Patient not taking: Reported on 11/21/2022) 1 Dose Pack 0     No Known Allergies    Family History   Problem Relation Age of Onset    Heart Disease Brother     Alcohol abuse Son     COPD Mother     Heart Attack Mother     Hypertension Mother     Heart Disease Mother     COPD Father     Heart Attack Father     Hypertension Father     Heart Disease Father     Cancer Father     Cancer Maternal Grandmother      Social History     Tobacco Use    Smoking status: Never    Smokeless tobacco: Never   Substance Use Topics    Alcohol use: Not Currently     Comment: not since 2 years ago         Fran Salinas MD

## 2022-11-21 NOTE — ACP (ADVANCE CARE PLANNING)
Advance Care Planning       Conversation Conducted with:   Patient with Decision Making Capacity, stating that the Other Legally Authorized Decision Maker would be Spouse as SDM,      Patient is on presence of no family member,, stated that the pt wants to be not DNR at this time,  The pt likes to be a full code individual,  The patient states that there is a lot of will to live,      Conversation Outcomes / Follow-Up Plan:   Completed new Advance Directive      Length of ACP Conversation in minutes:  12 minutes      Betsy Galeano MD

## 2022-11-21 NOTE — PROGRESS NOTES
After obtaining consent, and per orders of , b12 1000 mcg and shingrix vaccine  given to  left deltoid Im . Patient instructed to remain in clinic for 15 minutes afterwards, and to report any adverse reaction to me immediately.  Patient did not have any adverse reactions during this office visit

## 2022-11-22 LAB
ALBUMIN SERPL-MCNC: 3.9 G/DL (ref 3.5–5)
ALBUMIN/GLOB SERPL: 1.3 {RATIO} (ref 1.1–2.2)
ALP SERPL-CCNC: 95 U/L (ref 45–117)
ALT SERPL-CCNC: 39 U/L (ref 12–78)
ANION GAP SERPL CALC-SCNC: 3 MMOL/L (ref 5–15)
AST SERPL-CCNC: 23 U/L (ref 15–37)
BILIRUB SERPL-MCNC: 0.7 MG/DL (ref 0.2–1)
BUN SERPL-MCNC: 18 MG/DL (ref 6–20)
BUN/CREAT SERPL: 19 (ref 12–20)
CALCIUM SERPL-MCNC: 9.3 MG/DL (ref 8.5–10.1)
CHLORIDE SERPL-SCNC: 106 MMOL/L (ref 97–108)
CHOLEST SERPL-MCNC: 124 MG/DL
CO2 SERPL-SCNC: 30 MMOL/L (ref 21–32)
CREAT SERPL-MCNC: 0.93 MG/DL (ref 0.7–1.3)
ERYTHROCYTE [DISTWIDTH] IN BLOOD BY AUTOMATED COUNT: 12.7 % (ref 11.5–14.5)
FOLATE SERPL-MCNC: 29.7 NG/ML (ref 5–21)
GLOBULIN SER CALC-MCNC: 3.1 G/DL (ref 2–4)
GLUCOSE SERPL-MCNC: 90 MG/DL (ref 65–100)
HCT VFR BLD AUTO: 34.5 % (ref 36.6–50.3)
HDLC SERPL-MCNC: 56 MG/DL
HDLC SERPL: 2.2 {RATIO} (ref 0–5)
HGB BLD-MCNC: 11.4 G/DL (ref 12.1–17)
LDLC SERPL CALC-MCNC: 50.4 MG/DL (ref 0–100)
MCH RBC QN AUTO: 32.2 PG (ref 26–34)
MCHC RBC AUTO-ENTMCNC: 33 G/DL (ref 30–36.5)
MCV RBC AUTO: 97.5 FL (ref 80–99)
NRBC # BLD: 0 K/UL (ref 0–0.01)
NRBC BLD-RTO: 0 PER 100 WBC
PLATELET # BLD AUTO: 213 K/UL (ref 150–400)
PMV BLD AUTO: 11.7 FL (ref 8.9–12.9)
POTASSIUM SERPL-SCNC: 4.3 MMOL/L (ref 3.5–5.1)
PROT SERPL-MCNC: 7 G/DL (ref 6.4–8.2)
RBC # BLD AUTO: 3.54 M/UL (ref 4.1–5.7)
SODIUM SERPL-SCNC: 139 MMOL/L (ref 136–145)
TRIGL SERPL-MCNC: 88 MG/DL (ref ?–150)
TSH SERPL DL<=0.05 MIU/L-ACNC: 0.99 UIU/ML (ref 0.36–3.74)
VIT B12 SERPL-MCNC: >2000 PG/ML (ref 193–986)
VLDLC SERPL CALC-MCNC: 17.6 MG/DL
WBC # BLD AUTO: 5.9 K/UL (ref 4.1–11.1)

## 2023-03-08 NOTE — TELEPHONE ENCOUNTER
From: Go Monique  To: Velasquez Diaz MD  Sent: 2/1/2018 11:29 AM EST  Subject: Non-Urgent Medical Question    Dr Shivam Fletcher, This is the first appointment I could get with Dr Sonu Morales.    Appointment Information:   Dept: [facility] [address] [address]  [phone]  Provider: Yancy Blair PsyD   Date: 8/15/18
To get better and follow your care plan as instructed.

## 2023-03-10 NOTE — PROGRESS NOTES
[address]        Name and  verified        Chief Complaint   Patient presents with    Hip Pain     3-4 weeks (right) denies injury         Health Maintenance reviewed-discussed with patient. Complex Repair And Melolabial Flap Text: The defect edges were debeveled with a #15 scalpel blade.  The primary defect was closed partially with a complex linear closure.  Given the location of the remaining defect, shape of the defect and the proximity to free margins a melolabial flap was deemed most appropriate for complete closure of the defect.  Using a sterile surgical marker, an appropriate advancement flap was drawn incorporating the defect and placing the expected incisions within the relaxed skin tension lines where possible.    The area thus outlined was incised deep to adipose tissue with a #15 scalpel blade.  The skin margins were undermined to an appropriate distance in all directions utilizing iris scissors.

## 2023-04-19 RX ORDER — CLOPIDOGREL BISULFATE 75 MG/1
TABLET ORAL
Qty: 90 TABLET | Refills: 4 | Status: SHIPPED | OUTPATIENT
Start: 2023-04-19

## 2023-06-27 ENCOUNTER — OFFICE VISIT (OUTPATIENT)
Age: 78
End: 2023-06-27
Payer: COMMERCIAL

## 2023-06-27 VITALS
SYSTOLIC BLOOD PRESSURE: 117 MMHG | HEART RATE: 51 BPM | OXYGEN SATURATION: 97 % | TEMPERATURE: 97.9 F | BODY MASS INDEX: 26.73 KG/M2 | WEIGHT: 215 LBS | RESPIRATION RATE: 14 BRPM | HEIGHT: 75 IN | DIASTOLIC BLOOD PRESSURE: 74 MMHG

## 2023-06-27 DIAGNOSIS — R42 DIZZINESSES: Primary | ICD-10-CM

## 2023-06-27 DIAGNOSIS — G63 POLYNEUROPATHY IN DISEASES CLASSIFIED ELSEWHERE (HCC): ICD-10-CM

## 2023-06-27 DIAGNOSIS — I20.8 OTHER FORMS OF ANGINA PECTORIS (HCC): ICD-10-CM

## 2023-06-27 DIAGNOSIS — Z91.81 AT HIGH RISK FOR FALLS: ICD-10-CM

## 2023-06-27 PROBLEM — I20.89 OTHER FORMS OF ANGINA PECTORIS: Status: ACTIVE | Noted: 2023-06-27

## 2023-06-27 PROCEDURE — 3078F DIAST BP <80 MM HG: CPT | Performed by: FAMILY MEDICINE

## 2023-06-27 PROCEDURE — 1123F ACP DISCUSS/DSCN MKR DOCD: CPT | Performed by: FAMILY MEDICINE

## 2023-06-27 PROCEDURE — G8427 DOCREV CUR MEDS BY ELIG CLIN: HCPCS | Performed by: FAMILY MEDICINE

## 2023-06-27 PROCEDURE — 99214 OFFICE O/P EST MOD 30 MIN: CPT | Performed by: FAMILY MEDICINE

## 2023-06-27 PROCEDURE — G8419 CALC BMI OUT NRM PARAM NOF/U: HCPCS | Performed by: FAMILY MEDICINE

## 2023-06-27 PROCEDURE — 3074F SYST BP LT 130 MM HG: CPT | Performed by: FAMILY MEDICINE

## 2023-06-27 PROCEDURE — 1036F TOBACCO NON-USER: CPT | Performed by: FAMILY MEDICINE

## 2023-06-27 RX ORDER — HYDROCODONE BITARTRATE AND ACETAMINOPHEN 7.5; 325 MG/1; MG/1
TABLET ORAL
COMMUNITY
Start: 2023-05-17

## 2023-06-27 RX ORDER — LISINOPRIL 10 MG/1
10 TABLET ORAL DAILY
Qty: 90 TABLET | Refills: 1 | Status: SHIPPED | OUTPATIENT
Start: 2023-06-27

## 2023-06-27 SDOH — ECONOMIC STABILITY: HOUSING INSECURITY
IN THE LAST 12 MONTHS, WAS THERE A TIME WHEN YOU DID NOT HAVE A STEADY PLACE TO SLEEP OR SLEPT IN A SHELTER (INCLUDING NOW)?: NO

## 2023-06-27 SDOH — ECONOMIC STABILITY: FOOD INSECURITY: WITHIN THE PAST 12 MONTHS, YOU WORRIED THAT YOUR FOOD WOULD RUN OUT BEFORE YOU GOT MONEY TO BUY MORE.: NEVER TRUE

## 2023-06-27 SDOH — ECONOMIC STABILITY: INCOME INSECURITY: HOW HARD IS IT FOR YOU TO PAY FOR THE VERY BASICS LIKE FOOD, HOUSING, MEDICAL CARE, AND HEATING?: NOT HARD AT ALL

## 2023-06-27 SDOH — ECONOMIC STABILITY: FOOD INSECURITY: WITHIN THE PAST 12 MONTHS, THE FOOD YOU BOUGHT JUST DIDN'T LAST AND YOU DIDN'T HAVE MONEY TO GET MORE.: NEVER TRUE

## 2023-06-27 ASSESSMENT — PATIENT HEALTH QUESTIONNAIRE - PHQ9
SUM OF ALL RESPONSES TO PHQ QUESTIONS 1-9: 0
SUM OF ALL RESPONSES TO PHQ9 QUESTIONS 1 & 2: 0
SUM OF ALL RESPONSES TO PHQ QUESTIONS 1-9: 0
1. LITTLE INTEREST OR PLEASURE IN DOING THINGS: 0
2. FEELING DOWN, DEPRESSED OR HOPELESS: 0

## 2023-06-28 LAB
ALBUMIN SERPL-MCNC: 4.1 G/DL (ref 3.5–5)
ALBUMIN/GLOB SERPL: 1.5 (ref 1.1–2.2)
ALP SERPL-CCNC: 100 U/L (ref 45–117)
ALT SERPL-CCNC: 36 U/L (ref 12–78)
ANION GAP SERPL CALC-SCNC: 7 MMOL/L (ref 5–15)
AST SERPL-CCNC: 21 U/L (ref 15–37)
BILIRUB SERPL-MCNC: 1.2 MG/DL (ref 0.2–1)
BUN SERPL-MCNC: 14 MG/DL (ref 6–20)
BUN/CREAT SERPL: 14 (ref 12–20)
CALCIUM SERPL-MCNC: 10.2 MG/DL (ref 8.5–10.1)
CHLORIDE SERPL-SCNC: 110 MMOL/L (ref 97–108)
CO2 SERPL-SCNC: 26 MMOL/L (ref 21–32)
CREAT SERPL-MCNC: 1.02 MG/DL (ref 0.7–1.3)
ERYTHROCYTE [DISTWIDTH] IN BLOOD BY AUTOMATED COUNT: 12.9 % (ref 11.5–14.5)
GLOBULIN SER CALC-MCNC: 2.7 G/DL (ref 2–4)
GLUCOSE SERPL-MCNC: 99 MG/DL (ref 65–100)
HCT VFR BLD AUTO: 35.5 % (ref 36.6–50.3)
HGB BLD-MCNC: 11.6 G/DL (ref 12.1–17)
MCH RBC QN AUTO: 31.6 PG (ref 26–34)
MCHC RBC AUTO-ENTMCNC: 32.7 G/DL (ref 30–36.5)
MCV RBC AUTO: 96.7 FL (ref 80–99)
NRBC # BLD: 0 K/UL (ref 0–0.01)
NRBC BLD-RTO: 0 PER 100 WBC
PLATELET # BLD AUTO: 195 K/UL (ref 150–400)
PMV BLD AUTO: 11.6 FL (ref 8.9–12.9)
POTASSIUM SERPL-SCNC: 4.6 MMOL/L (ref 3.5–5.1)
PROT SERPL-MCNC: 6.8 G/DL (ref 6.4–8.2)
RBC # BLD AUTO: 3.67 M/UL (ref 4.1–5.7)
SODIUM SERPL-SCNC: 143 MMOL/L (ref 136–145)
TSH SERPL DL<=0.05 MIU/L-ACNC: 1.12 UIU/ML (ref 0.36–3.74)
WBC # BLD AUTO: 6.1 K/UL (ref 4.1–11.1)

## 2023-09-08 RX ORDER — TAMSULOSIN HYDROCHLORIDE 0.4 MG/1
0.4 CAPSULE ORAL DAILY
Qty: 30 CAPSULE | Refills: 0 | Status: SHIPPED | OUTPATIENT
Start: 2023-09-08

## 2023-11-14 ENCOUNTER — OFFICE VISIT (OUTPATIENT)
Age: 78
End: 2023-11-14
Payer: COMMERCIAL

## 2023-11-14 VITALS — SYSTOLIC BLOOD PRESSURE: 128 MMHG | DIASTOLIC BLOOD PRESSURE: 62 MMHG | HEART RATE: 63 BPM | OXYGEN SATURATION: 99 %

## 2023-11-14 DIAGNOSIS — R41.3 MEMORY DIFFICULTY: Primary | ICD-10-CM

## 2023-11-14 DIAGNOSIS — G25.0 ESSENTIAL TREMOR: ICD-10-CM

## 2023-11-14 PROCEDURE — G8419 CALC BMI OUT NRM PARAM NOF/U: HCPCS | Performed by: PSYCHIATRY & NEUROLOGY

## 2023-11-14 PROCEDURE — 1123F ACP DISCUSS/DSCN MKR DOCD: CPT | Performed by: PSYCHIATRY & NEUROLOGY

## 2023-11-14 PROCEDURE — 1036F TOBACCO NON-USER: CPT | Performed by: PSYCHIATRY & NEUROLOGY

## 2023-11-14 PROCEDURE — 96132 NRPSYC TST EVAL PHYS/QHP 1ST: CPT | Performed by: PSYCHIATRY & NEUROLOGY

## 2023-11-14 PROCEDURE — G8427 DOCREV CUR MEDS BY ELIG CLIN: HCPCS | Performed by: PSYCHIATRY & NEUROLOGY

## 2023-11-14 PROCEDURE — 99204 OFFICE O/P NEW MOD 45 MIN: CPT | Performed by: PSYCHIATRY & NEUROLOGY

## 2023-11-14 PROCEDURE — G8484 FLU IMMUNIZE NO ADMIN: HCPCS | Performed by: PSYCHIATRY & NEUROLOGY

## 2023-11-14 PROCEDURE — 3078F DIAST BP <80 MM HG: CPT | Performed by: PSYCHIATRY & NEUROLOGY

## 2023-11-14 PROCEDURE — 96138 PSYCL/NRPSYC TECH 1ST: CPT | Performed by: PSYCHIATRY & NEUROLOGY

## 2023-11-14 PROCEDURE — 3074F SYST BP LT 130 MM HG: CPT | Performed by: PSYCHIATRY & NEUROLOGY

## 2023-12-29 DIAGNOSIS — G89.4 CHRONIC PAIN DISORDER: ICD-10-CM

## 2023-12-29 DIAGNOSIS — Z96.653 STATUS POST BILATERAL KNEE REPLACEMENTS: Primary | ICD-10-CM

## 2023-12-29 RX ORDER — TRAMADOL HYDROCHLORIDE 50 MG/1
50 TABLET ORAL EVERY 8 HOURS PRN
Qty: 60 TABLET | Refills: 0 | Status: SHIPPED | OUTPATIENT
Start: 2023-12-29 | End: 2024-01-12

## 2024-03-08 ENCOUNTER — TELEMEDICINE (OUTPATIENT)
Age: 79
End: 2024-03-08
Payer: COMMERCIAL

## 2024-03-08 DIAGNOSIS — K57.92 DIVERTICULITIS: Primary | ICD-10-CM

## 2024-03-08 DIAGNOSIS — G63 POLYNEUROPATHY IN DISEASES CLASSIFIED ELSEWHERE (HCC): ICD-10-CM

## 2024-03-08 PROCEDURE — 1123F ACP DISCUSS/DSCN MKR DOCD: CPT | Performed by: FAMILY MEDICINE

## 2024-03-08 PROCEDURE — 99214 OFFICE O/P EST MOD 30 MIN: CPT | Performed by: FAMILY MEDICINE

## 2024-03-08 PROCEDURE — G8427 DOCREV CUR MEDS BY ELIG CLIN: HCPCS | Performed by: FAMILY MEDICINE

## 2024-03-08 RX ORDER — METRONIDAZOLE 500 MG/1
500 TABLET ORAL 3 TIMES DAILY
Qty: 21 TABLET | Refills: 0 | Status: SHIPPED | OUTPATIENT
Start: 2024-03-08 | End: 2024-03-15

## 2024-03-08 RX ORDER — TRAMADOL HYDROCHLORIDE 50 MG/1
50 TABLET ORAL EVERY 8 HOURS PRN
Qty: 42 TABLET | Refills: 0 | Status: SHIPPED | OUTPATIENT
Start: 2024-03-08 | End: 2024-03-22

## 2024-03-08 RX ORDER — ESOMEPRAZOLE MAGNESIUM 40 MG/1
1 CAPSULE, DELAYED RELEASE ORAL
COMMUNITY

## 2024-03-08 RX ORDER — TRAMADOL HYDROCHLORIDE 50 MG/1
TABLET ORAL
COMMUNITY
Start: 2023-12-30 | End: 2024-03-08 | Stop reason: SDUPTHER

## 2024-03-08 RX ORDER — CIPROFLOXACIN 500 MG/1
500 TABLET, FILM COATED ORAL 2 TIMES DAILY
Qty: 14 TABLET | Refills: 0 | Status: SHIPPED | OUTPATIENT
Start: 2024-03-08 | End: 2024-03-15

## 2024-03-08 NOTE — PROGRESS NOTES
Chief Complaint   Patient presents with    diverticullitis       \"Have you been to the ER, urgent care clinic since your last visit?  Hospitalized since your last visit?\"    NO    “Have you seen or consulted any other health care providers outside of [facility] since your last visit?”    NO           The patient, Rj MARLEE Pearl Jr, identity was verified by name and

## 2024-03-08 NOTE — PROGRESS NOTES
Rj Pearl Jr, was evaluated through a synchronous (real-time) audio-video encounter. The patient (or guardian if applicable) is aware that this is a billable service, which includes applicable co-pays. This Virtual Visit was conducted with patient's (and/or legal guardian's) consent. Patient identification was verified, and a caregiver was present when appropriate.   The patient was located at Home: [address] [facility] [address]  Provider was located at Home (Appt Dept State): [place]      Rj Pearl Jr (:  1945) is a Established patient, presenting virtually for evaluation of the following:    Patient presents with abdominal pain which started few days ago left lower quadrant the pain is 3 out of 10 not constant  comes and goes with fatty intake no history of radiation noted by the patient,  patient states that there is some nausea feeling but no vomiting,  also patient had abnormal bowel movement hard, denies any melena no blood in the stool patient denies any history of colon cancer in the family has had history of abdominal pain in the past w/ hx of diverticulitis,  pain is dull did try a lot of nonsteroidal anti-inflammatory over-the-counter pain medication pain has relationship able to the food that the patient eats no family member having the same problem no recent traveling eats variety of foods as per the patient the pain is getting better also stating that there is no fever and did not take any antifever medications:         Constitutional: no chills and fever,   nad     HENT: no ear pain or nosebleeds. No blurred vision  Respiratory: no shortness of breath, wheezing cough   Cardiovascular: Has no chest pain, ,and racing heart .   Gastrointestinal: +++ constipation, no diarrhea, no nausea and no vomiting.   Genitourinary: No frequency.   Musculoskeletal: Negative for joint pain.   Skin: no itching, no rash.   Neurological: Negative for dizziness, no

## 2024-03-20 DIAGNOSIS — G63 POLYNEUROPATHY IN DISEASES CLASSIFIED ELSEWHERE (HCC): ICD-10-CM

## 2024-03-20 DIAGNOSIS — R42 DIZZINESSES: ICD-10-CM

## 2024-03-20 DIAGNOSIS — Z91.81 AT HIGH RISK FOR FALLS: ICD-10-CM

## 2024-03-20 DIAGNOSIS — I20.89 OTHER FORMS OF ANGINA PECTORIS: ICD-10-CM

## 2024-03-21 RX ORDER — LISINOPRIL 10 MG/1
10 TABLET ORAL DAILY
Qty: 90 TABLET | Refills: 1 | Status: SHIPPED | OUTPATIENT
Start: 2024-03-21

## 2024-04-01 ENCOUNTER — OFFICE VISIT (OUTPATIENT)
Age: 79
End: 2024-04-01
Payer: COMMERCIAL

## 2024-04-01 VITALS
SYSTOLIC BLOOD PRESSURE: 142 MMHG | BODY MASS INDEX: 27.95 KG/M2 | RESPIRATION RATE: 16 BRPM | WEIGHT: 224.8 LBS | DIASTOLIC BLOOD PRESSURE: 86 MMHG | HEART RATE: 60 BPM | OXYGEN SATURATION: 97 % | TEMPERATURE: 98 F | HEIGHT: 75 IN

## 2024-04-01 DIAGNOSIS — Z91.81 AT HIGH RISK FOR FALLS: ICD-10-CM

## 2024-04-01 DIAGNOSIS — F11.20 OPIOID DEPENDENCE WITH CURRENT USE (HCC): ICD-10-CM

## 2024-04-01 DIAGNOSIS — I20.89 OTHER FORMS OF ANGINA PECTORIS (HCC): ICD-10-CM

## 2024-04-01 DIAGNOSIS — G63 POLYNEUROPATHY IN DISEASES CLASSIFIED ELSEWHERE (HCC): ICD-10-CM

## 2024-04-01 DIAGNOSIS — I25.10 CORONARY ARTERY DISEASE INVOLVING NATIVE CORONARY ARTERY OF NATIVE HEART WITHOUT ANGINA PECTORIS: ICD-10-CM

## 2024-04-01 DIAGNOSIS — Z00.00 MEDICARE ANNUAL WELLNESS VISIT, SUBSEQUENT: ICD-10-CM

## 2024-04-01 DIAGNOSIS — R42 DIZZINESSES: ICD-10-CM

## 2024-04-01 DIAGNOSIS — Z96.653 STATUS POST BILATERAL KNEE REPLACEMENTS: Primary | ICD-10-CM

## 2024-04-01 LAB
ALBUMIN SERPL-MCNC: 3.9 G/DL (ref 3.5–5)
ALBUMIN/GLOB SERPL: 1.3 (ref 1.1–2.2)
ALP SERPL-CCNC: 101 U/L (ref 45–117)
ALT SERPL-CCNC: 39 U/L (ref 12–78)
ANION GAP SERPL CALC-SCNC: 4 MMOL/L (ref 5–15)
AST SERPL-CCNC: 21 U/L (ref 15–37)
BILIRUB SERPL-MCNC: 1.1 MG/DL (ref 0.2–1)
BUN SERPL-MCNC: 17 MG/DL (ref 6–20)
BUN/CREAT SERPL: 17 (ref 12–20)
CALCIUM SERPL-MCNC: 10 MG/DL (ref 8.5–10.1)
CHLORIDE SERPL-SCNC: 107 MMOL/L (ref 97–108)
CHOLEST SERPL-MCNC: 160 MG/DL
CO2 SERPL-SCNC: 27 MMOL/L (ref 21–32)
CREAT SERPL-MCNC: 1.01 MG/DL (ref 0.7–1.3)
ERYTHROCYTE [DISTWIDTH] IN BLOOD BY AUTOMATED COUNT: 13.2 % (ref 11.5–14.5)
EST. AVERAGE GLUCOSE BLD GHB EST-MCNC: 114 MG/DL
GLOBULIN SER CALC-MCNC: 3.1 G/DL (ref 2–4)
GLUCOSE SERPL-MCNC: 98 MG/DL (ref 65–100)
HBA1C MFR BLD: 5.6 % (ref 4–5.6)
HCT VFR BLD AUTO: 38.2 % (ref 36.6–50.3)
HDLC SERPL-MCNC: 50 MG/DL
HDLC SERPL: 3.2 (ref 0–5)
HGB BLD-MCNC: 12.9 G/DL (ref 12.1–17)
LDLC SERPL CALC-MCNC: 72.8 MG/DL (ref 0–100)
MCH RBC QN AUTO: 32.3 PG (ref 26–34)
MCHC RBC AUTO-ENTMCNC: 33.8 G/DL (ref 30–36.5)
MCV RBC AUTO: 95.7 FL (ref 80–99)
NRBC # BLD: 0 K/UL (ref 0–0.01)
NRBC BLD-RTO: 0 PER 100 WBC
PLATELET # BLD AUTO: 222 K/UL (ref 150–400)
PMV BLD AUTO: 11.6 FL (ref 8.9–12.9)
POTASSIUM SERPL-SCNC: 4.4 MMOL/L (ref 3.5–5.1)
PROT SERPL-MCNC: 7 G/DL (ref 6.4–8.2)
RBC # BLD AUTO: 3.99 M/UL (ref 4.1–5.7)
SODIUM SERPL-SCNC: 138 MMOL/L (ref 136–145)
TRIGL SERPL-MCNC: 186 MG/DL
TSH SERPL DL<=0.05 MIU/L-ACNC: 1.6 UIU/ML (ref 0.36–3.74)
VLDLC SERPL CALC-MCNC: 37.2 MG/DL
WBC # BLD AUTO: 7.2 K/UL (ref 4.1–11.1)

## 2024-04-01 PROCEDURE — 3079F DIAST BP 80-89 MM HG: CPT | Performed by: FAMILY MEDICINE

## 2024-04-01 PROCEDURE — 3077F SYST BP >= 140 MM HG: CPT | Performed by: FAMILY MEDICINE

## 2024-04-01 PROCEDURE — G0439 PPPS, SUBSEQ VISIT: HCPCS | Performed by: FAMILY MEDICINE

## 2024-04-01 PROCEDURE — 1123F ACP DISCUSS/DSCN MKR DOCD: CPT | Performed by: FAMILY MEDICINE

## 2024-04-01 RX ORDER — PYRIDOXINE HCL (VITAMIN B6) 25 MG
25 TABLET ORAL DAILY
COMMUNITY
Start: 2019-11-08

## 2024-04-01 RX ORDER — CLOPIDOGREL BISULFATE 75 MG/1
75 TABLET ORAL DAILY
Qty: 90 TABLET | Refills: 3 | Status: SHIPPED | OUTPATIENT
Start: 2024-04-01

## 2024-04-01 RX ORDER — LISINOPRIL 10 MG/1
10 TABLET ORAL DAILY
Qty: 90 TABLET | Refills: 1 | Status: SHIPPED | OUTPATIENT
Start: 2024-04-01

## 2024-04-01 RX ORDER — TRAMADOL HYDROCHLORIDE 50 MG/1
50 TABLET ORAL 2 TIMES DAILY PRN
Qty: 60 TABLET | Refills: 1 | Status: SHIPPED | OUTPATIENT
Start: 2024-04-01 | End: 2024-05-31

## 2024-04-01 RX ORDER — OMEPRAZOLE 20 MG/1
CAPSULE, DELAYED RELEASE ORAL
COMMUNITY
Start: 2015-11-19 | End: 2024-04-01 | Stop reason: ALTCHOICE

## 2024-04-01 RX ORDER — ATORVASTATIN CALCIUM 40 MG/1
40 TABLET, FILM COATED ORAL NIGHTLY
Qty: 90 TABLET | Refills: 3 | Status: SHIPPED | OUTPATIENT
Start: 2024-04-01

## 2024-04-01 ASSESSMENT — PATIENT HEALTH QUESTIONNAIRE - PHQ9
SUM OF ALL RESPONSES TO PHQ QUESTIONS 1-9: 0
2. FEELING DOWN, DEPRESSED OR HOPELESS: NOT AT ALL
SUM OF ALL RESPONSES TO PHQ QUESTIONS 1-9: 0
SUM OF ALL RESPONSES TO PHQ QUESTIONS 1-9: 0
1. LITTLE INTEREST OR PLEASURE IN DOING THINGS: NOT AT ALL
SUM OF ALL RESPONSES TO PHQ QUESTIONS 1-9: 0
SUM OF ALL RESPONSES TO PHQ QUESTIONS 1-9: 0
SUM OF ALL RESPONSES TO PHQ9 QUESTIONS 1 & 2: 0
SUM OF ALL RESPONSES TO PHQ9 QUESTIONS 1 & 2: 0
1. LITTLE INTEREST OR PLEASURE IN DOING THINGS: NOT AT ALL
SUM OF ALL RESPONSES TO PHQ QUESTIONS 1-9: 0
2. FEELING DOWN, DEPRESSED OR HOPELESS: NOT AT ALL

## 2024-04-01 ASSESSMENT — LIFESTYLE VARIABLES
HOW MANY STANDARD DRINKS CONTAINING ALCOHOL DO YOU HAVE ON A TYPICAL DAY: PATIENT DOES NOT DRINK
HOW OFTEN DO YOU HAVE A DRINK CONTAINING ALCOHOL: NEVER

## 2024-04-01 NOTE — PROGRESS NOTES
Chief Complaint   Patient presents with    Medicare AWV     Here with wife for annual wellness exam.            1. Have you been to the ER, urgent care clinic since your last visit?  Hospitalized since your last visit?No    2. Have you seen or consulted any other health care providers outside of the [facility] since your last visit?  Include any pap smears or colon screening. No

## 2024-04-01 NOTE — PROGRESS NOTES
Medicare Annual Wellness Visit    Rj Pearl Jr is here for Medicare AWV    Assessment & Plan   Status post bilateral knee replacements  -     traMADol (ULTRAM) 50 MG tablet; Take 1 tablet by mouth 2 times daily as needed for Pain for up to 60 days. Intended supply: 5 days. Take lowest dose possible to manage pain Max Daily Amount: 100 mg, Disp-60 tablet, R-1Normal  -     CBC; Future  -     Comprehensive Metabolic Panel; Future  -     TSH; Future  -     Lipid Panel; Future  -     Urinalysis; Future  -     Hemoglobin A1C; Future  Dizzinesses  -     lisinopril (PRINIVIL;ZESTRIL) 10 MG tablet; Take 1 tablet by mouth daily, Disp-90 tablet, R-1ZERO refills remain on this prescription. Your patient is requesting advance approval of refills for this medication to PREVENT ANY MISSED DOSESNormal  -     CBC; Future  -     Comprehensive Metabolic Panel; Future  -     TSH; Future  -     Lipid Panel; Future  -     Urinalysis; Future  -     Hemoglobin A1C; Future  Other forms of angina pectoris (HCC)  -     lisinopril (PRINIVIL;ZESTRIL) 10 MG tablet; Take 1 tablet by mouth daily, Disp-90 tablet, R-1ZERO refills remain on this prescription. Your patient is requesting advance approval of refills for this medication to PREVENT ANY MISSED DOSESNormal  At high risk for falls  -     lisinopril (PRINIVIL;ZESTRIL) 10 MG tablet; Take 1 tablet by mouth daily, Disp-90 tablet, R-1ZERO refills remain on this prescription. 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 Your patient is requesting advance approval of

## 2024-04-02 DIAGNOSIS — F11.20 OPIOID DEPENDENCE WITH CURRENT USE (HCC): ICD-10-CM

## 2024-04-02 DIAGNOSIS — Z91.81 AT HIGH RISK FOR FALLS: ICD-10-CM

## 2024-04-02 DIAGNOSIS — Z96.653 STATUS POST BILATERAL KNEE REPLACEMENTS: ICD-10-CM

## 2024-04-02 DIAGNOSIS — G63 POLYNEUROPATHY IN DISEASES CLASSIFIED ELSEWHERE (HCC): ICD-10-CM

## 2024-04-02 DIAGNOSIS — R42 DIZZINESSES: ICD-10-CM

## 2024-04-02 LAB
APPEARANCE UR: CLEAR
BILIRUB UR QL: NEGATIVE
COLOR UR: NORMAL
GLUCOSE UR STRIP.AUTO-MCNC: NEGATIVE MG/DL
HGB UR QL STRIP: NEGATIVE
KETONES UR QL STRIP.AUTO: NEGATIVE MG/DL
LEUKOCYTE ESTERASE UR QL STRIP.AUTO: NEGATIVE
NITRITE UR QL STRIP.AUTO: NEGATIVE
PH UR STRIP: 5.5 (ref 5–8)
PROT UR STRIP-MCNC: NEGATIVE MG/DL
SP GR UR REFRACTOMETRY: 1.02 (ref 1–1.03)
SPECIMEN HOLD: NORMAL
UROBILINOGEN UR QL STRIP.AUTO: 0.2 EU/DL (ref 0.2–1)

## 2024-05-02 ENCOUNTER — OFFICE VISIT (OUTPATIENT)
Age: 79
End: 2024-05-02
Payer: COMMERCIAL

## 2024-05-02 VITALS
OXYGEN SATURATION: 96 % | RESPIRATION RATE: 18 BRPM | SYSTOLIC BLOOD PRESSURE: 121 MMHG | HEIGHT: 75 IN | WEIGHT: 223 LBS | BODY MASS INDEX: 27.73 KG/M2 | HEART RATE: 62 BPM | TEMPERATURE: 98.6 F | DIASTOLIC BLOOD PRESSURE: 79 MMHG

## 2024-05-02 DIAGNOSIS — Z01.818 PREOPERATIVE EXAMINATION: Primary | ICD-10-CM

## 2024-05-02 PROCEDURE — 1036F TOBACCO NON-USER: CPT | Performed by: FAMILY MEDICINE

## 2024-05-02 PROCEDURE — 1123F ACP DISCUSS/DSCN MKR DOCD: CPT | Performed by: FAMILY MEDICINE

## 2024-05-02 PROCEDURE — 3074F SYST BP LT 130 MM HG: CPT | Performed by: FAMILY MEDICINE

## 2024-05-02 PROCEDURE — G8419 CALC BMI OUT NRM PARAM NOF/U: HCPCS | Performed by: FAMILY MEDICINE

## 2024-05-02 PROCEDURE — 3078F DIAST BP <80 MM HG: CPT | Performed by: FAMILY MEDICINE

## 2024-05-02 PROCEDURE — 99213 OFFICE O/P EST LOW 20 MIN: CPT | Performed by: FAMILY MEDICINE

## 2024-05-02 PROCEDURE — G8427 DOCREV CUR MEDS BY ELIG CLIN: HCPCS | Performed by: FAMILY MEDICINE

## 2024-05-02 RX ORDER — LOPERAMIDE HYDROCHLORIDE 2 MG/1
2 CAPSULE ORAL 4 TIMES DAILY PRN
COMMUNITY

## 2024-05-02 RX ORDER — BROMFENAC 0.76 MG/ML
SOLUTION/ DROPS OPHTHALMIC
COMMUNITY

## 2024-05-02 RX ORDER — PREDNISOLONE ACETATE 10 MG/ML
SUSPENSION/ DROPS OPHTHALMIC
COMMUNITY

## 2024-05-02 RX ORDER — BROMFENAC SODIUM 0.81 MG/ML
SOLUTION/ DROPS OPHTHALMIC
COMMUNITY

## 2024-05-02 RX ORDER — MOXIFLOXACIN 5 MG/ML
SOLUTION/ DROPS OPHTHALMIC
COMMUNITY
Start: 2024-04-25

## 2024-05-02 ASSESSMENT — PATIENT HEALTH QUESTIONNAIRE - PHQ9
SUM OF ALL RESPONSES TO PHQ9 QUESTIONS 1 & 2: 0
2. FEELING DOWN, DEPRESSED OR HOPELESS: NOT AT ALL
SUM OF ALL RESPONSES TO PHQ QUESTIONS 1-9: 0
SUM OF ALL RESPONSES TO PHQ QUESTIONS 1-9: 0
1. LITTLE INTEREST OR PLEASURE IN DOING THINGS: NOT AT ALL
SUM OF ALL RESPONSES TO PHQ QUESTIONS 1-9: 0
SUM OF ALL RESPONSES TO PHQ QUESTIONS 1-9: 0

## 2024-05-02 NOTE — PROGRESS NOTES
Subjective:      Rj Pearl Jr is a 78 y.o. male who presents to the office today for a preoperative consultation at the request of surgeon,  who presents for preoperative evaluation with current medical hx of cataract disorder  pt has been in a stable conditions,  and seeking alternative surgical correction ON 5/16/AND 5/30/2024, for current medical condition, ,      pt's physical functioning is capable of doing >5 blocks of walking w/out getting shortness of breath, at this time there is no assistive devices used physically,   Patient with a good social support which include living at home, the pt is self cared, and the pt's other primary care giver is  at home,  no recent major trauma, does not have any history of blood clots, patient currently on daily baby dose aspirin, PLAVIX,  for many years stating that the pt has had no hx of abnormal bleeding or easy bruisabiltity.  In addition pt is currently not taking any herbal supplements, nor any illicit drugs,  pt is stable       Past Medical History:   Diagnosis Date    Abdominal wall hernia 11/16/2017    Anemia 9/10/2014    Back pain 4/1/2010    Blurry vision, bilateral 11/14/2017    CAD (coronary artery disease)     Calf pain 3/11/2015    Cancer (HCC)     Chronic kidney disease     cyst on kidney    Chronic left shoulder pain 10/24/2016    Constipation 6/11/2014    Coronary artery disease involving native coronary artery of native heart without angina pectoris 6/4/2019    Decreased vision 6/13/2011    Diastasis recti 11/16/2017    Diverticulitis 3/11/2015    Dizziness 11/14/2017    Enlarged LA (left atrium)     Fall at home 3/17/2016    GERD (gastroesophageal reflux disease)     Hemorrhoids 9/10/2014    Hip pain, chronic, right 10/3/2017    HTN (hypertension) 8/24/2010    Hx-TIA (transient ischemic attack)     Hypercholesterolemia 4/1/2010    Hypertension     Irregular heart beat 9/23/2015    Kidney disease     Mitral regurgitation     Need for varicella

## 2024-05-02 NOTE — PROGRESS NOTES
Chief Complaint   Patient presents with    Pre-op Exam     Cataract    Ears clogged       \"Have you been to the ER, urgent care clinic since your last visit?  Hospitalized since your last visit?\"    NO    “Have you seen or consulted any other health care providers outside of [facility] since your last visit?”    Yes, Eye surgeon.          Vitals:    24 1356   BP: 121/79   Pulse: 62   Resp: 18   Temp: 98.6 °F (37 °C)   TempSrc: Tympanic   SpO2: 96%   Weight: 101.2 kg (223 lb)   Height: 1.905 m (6' 3\")         There are no preventive care reminders to display for this patient.     The patient, Rj Pearl Jr, identity was verified by name and .

## 2024-07-11 RX ORDER — BENZONATATE 200 MG/1
200 CAPSULE ORAL 3 TIMES DAILY PRN
Qty: 30 CAPSULE | Refills: 0 | Status: SHIPPED | OUTPATIENT
Start: 2024-07-11 | End: 2024-07-18

## 2024-07-12 ENCOUNTER — TELEMEDICINE (OUTPATIENT)
Age: 79
End: 2024-07-12
Payer: COMMERCIAL

## 2024-07-12 DIAGNOSIS — Z71.89 ADVICE GIVEN ABOUT COVID-19 VIRUS INFECTION: ICD-10-CM

## 2024-07-12 DIAGNOSIS — J20.9 ACUTE BRONCHITIS, UNSPECIFIED ORGANISM: Primary | ICD-10-CM

## 2024-07-12 DIAGNOSIS — U07.1 COVID-19 VIRUS INFECTION: ICD-10-CM

## 2024-07-12 PROCEDURE — G8427 DOCREV CUR MEDS BY ELIG CLIN: HCPCS | Performed by: FAMILY MEDICINE

## 2024-07-12 PROCEDURE — 1123F ACP DISCUSS/DSCN MKR DOCD: CPT | Performed by: FAMILY MEDICINE

## 2024-07-12 PROCEDURE — 99213 OFFICE O/P EST LOW 20 MIN: CPT | Performed by: FAMILY MEDICINE

## 2024-07-12 RX ORDER — ALBUTEROL SULFATE 90 UG/1
2 AEROSOL, METERED RESPIRATORY (INHALATION) 4 TIMES DAILY PRN
Qty: 18 G | Refills: 0 | Status: SHIPPED | OUTPATIENT
Start: 2024-07-12

## 2024-07-12 RX ORDER — CODEINE PHOSPHATE/GUAIFENESIN 10-100MG/5
5 LIQUID (ML) ORAL 3 TIMES DAILY PRN
Qty: 95 ML | Refills: 0 | Status: SHIPPED | OUTPATIENT
Start: 2024-07-12 | End: 2024-07-19

## 2024-07-12 RX ORDER — METHYLPREDNISOLONE 4 MG/1
TABLET ORAL
Qty: 21 TABLET | Refills: 0 | Status: SHIPPED | OUTPATIENT
Start: 2024-07-12

## 2024-07-12 RX ORDER — AZITHROMYCIN 250 MG/1
TABLET, FILM COATED ORAL
Qty: 6 TABLET | Refills: 0 | Status: SHIPPED | OUTPATIENT
Start: 2024-07-12 | End: 2024-07-22

## 2024-07-12 SDOH — ECONOMIC STABILITY: FOOD INSECURITY: WITHIN THE PAST 12 MONTHS, YOU WORRIED THAT YOUR FOOD WOULD RUN OUT BEFORE YOU GOT MONEY TO BUY MORE.: NEVER TRUE

## 2024-07-12 SDOH — ECONOMIC STABILITY: FOOD INSECURITY

## 2024-07-12 SDOH — ECONOMIC STABILITY: FOOD INSECURITY: WITHIN THE PAST 12 MONTHS, THE FOOD YOU BOUGHT JUST DIDN'T LAST AND YOU DIDN'T HAVE MONEY TO GET MORE.: NEVER TRUE

## 2024-07-12 SDOH — ECONOMIC STABILITY: HOUSING INSECURITY

## 2024-07-12 SDOH — ECONOMIC STABILITY: INCOME INSECURITY: HOW HARD IS IT FOR YOU TO PAY FOR THE VERY BASICS LIKE FOOD, HOUSING, MEDICAL CARE, AND HEATING?: NOT HARD AT ALL

## 2024-07-12 SDOH — ECONOMIC STABILITY: TRANSPORTATION INSECURITY
IN THE PAST 12 MONTHS, HAS LACK OF TRANSPORTATION KEPT YOU FROM MEETINGS, WORK, OR FROM GETTING THINGS NEEDED FOR DAILY LIVING?: NO

## 2024-07-12 SDOH — ECONOMIC STABILITY: INCOME INSECURITY

## 2024-07-12 NOTE — PROGRESS NOTES
Chief Complaint   Patient presents with    Positive For Covid-19       \"Have you been to the ER, urgent care clinic since your last visit?  Hospitalized since your last visit?\"    YES    “Have you seen or consulted any other health care providers outside of [facility] since your last visit?”    NO         The patient, Rj WHALEN Dae Tobias, identity was verified by name and    
       [] Abnormal -            Psychiatric:       [x] Normal Affect [] Abnormal -        [x] No Hallucinations    Other pertinent observable physical exam findings:-           --Vazquez Dove MD

## 2024-09-24 DIAGNOSIS — R42 DIZZINESSES: ICD-10-CM

## 2024-09-24 DIAGNOSIS — G63 POLYNEUROPATHY IN DISEASES CLASSIFIED ELSEWHERE (HCC): ICD-10-CM

## 2024-09-24 DIAGNOSIS — I20.89 OTHER FORMS OF ANGINA PECTORIS (HCC): ICD-10-CM

## 2024-09-24 DIAGNOSIS — Z91.81 AT HIGH RISK FOR FALLS: ICD-10-CM

## 2024-09-30 RX ORDER — LISINOPRIL 10 MG/1
10 TABLET ORAL DAILY
Qty: 90 TABLET | Refills: 3 | Status: SHIPPED | OUTPATIENT
Start: 2024-09-30

## 2024-10-01 DIAGNOSIS — R42 DIZZINESSES: ICD-10-CM

## 2024-10-01 DIAGNOSIS — I20.89 OTHER FORMS OF ANGINA PECTORIS (HCC): ICD-10-CM

## 2024-10-01 DIAGNOSIS — Z91.81 AT HIGH RISK FOR FALLS: ICD-10-CM

## 2024-10-01 DIAGNOSIS — G63 POLYNEUROPATHY IN DISEASES CLASSIFIED ELSEWHERE (HCC): ICD-10-CM

## 2024-11-11 ENCOUNTER — OFFICE VISIT (OUTPATIENT)
Age: 79
End: 2024-11-11
Payer: MEDICARE

## 2024-11-11 VITALS
RESPIRATION RATE: 15 BRPM | TEMPERATURE: 97.6 F | WEIGHT: 218 LBS | BODY MASS INDEX: 27.25 KG/M2 | HEART RATE: 55 BPM | SYSTOLIC BLOOD PRESSURE: 125 MMHG | OXYGEN SATURATION: 96 % | DIASTOLIC BLOOD PRESSURE: 77 MMHG

## 2024-11-11 DIAGNOSIS — G89.4 CHRONIC PAIN SYNDROME: ICD-10-CM

## 2024-11-11 DIAGNOSIS — G63 POLYNEUROPATHY IN DISEASES CLASSIFIED ELSEWHERE (HCC): ICD-10-CM

## 2024-11-11 DIAGNOSIS — Z96.653 STATUS POST BILATERAL KNEE REPLACEMENTS: Primary | ICD-10-CM

## 2024-11-11 PROCEDURE — 99214 OFFICE O/P EST MOD 30 MIN: CPT | Performed by: FAMILY MEDICINE

## 2024-11-11 RX ORDER — FINASTERIDE 5 MG/1
5 TABLET, FILM COATED ORAL DAILY
Qty: 30 TABLET | Refills: 3 | Status: SHIPPED | OUTPATIENT
Start: 2024-11-11

## 2024-11-11 RX ORDER — TRAMADOL HYDROCHLORIDE 50 MG/1
50 TABLET ORAL 2 TIMES DAILY PRN
Qty: 60 TABLET | Refills: 3 | Status: SHIPPED | OUTPATIENT
Start: 2024-11-11 | End: 2025-03-11

## 2024-11-11 ASSESSMENT — PATIENT HEALTH QUESTIONNAIRE - PHQ9
SUM OF ALL RESPONSES TO PHQ QUESTIONS 1-9: 0
2. FEELING DOWN, DEPRESSED OR HOPELESS: NOT AT ALL
1. LITTLE INTEREST OR PLEASURE IN DOING THINGS: NOT AT ALL
SUM OF ALL RESPONSES TO PHQ QUESTIONS 1-9: 0
SUM OF ALL RESPONSES TO PHQ9 QUESTIONS 1 & 2: 0
SUM OF ALL RESPONSES TO PHQ QUESTIONS 1-9: 0
SUM OF ALL RESPONSES TO PHQ QUESTIONS 1-9: 0

## 2024-11-11 NOTE — PROGRESS NOTES
Chief Complaint   Patient presents with    Hypertension     Follow up        \"Have you been to the ER, urgent care clinic since your last visit?  Hospitalized since your last visit?\"    NO    “Have you seen or consulted any other health care providers outside of [facility] since your last visit?”    NO          Click Here for Release of Records Request     No results found for this visit on 24.   Vitals:    24 1026   BP: 125/77   Pulse: 55   Resp: 15   Temp: 97.6 °F (36.4 °C)   TempSrc: Infrared   SpO2: 96%   Weight: 98.9 kg (218 lb)      There are no preventive care reminders to display for this patient.       The patient, Rj WHALEN Dae Tobias, identity was verified by name and .

## 2024-11-11 NOTE — ASSESSMENT & PLAN NOTE
Orders:    finasteride (PROSCAR) 5 MG tablet; Take 1 tablet by mouth daily    traMADol (ULTRAM) 50 MG tablet; Take 1 tablet by mouth 2 times daily as needed for Pain for up to 120 days. Intended supply: 7 days. Take lowest dose possible to manage pain Max Daily Amount: 100 mg

## 2024-11-11 NOTE — PROGRESS NOTES
2024    Rj Pearl Jr (:  1945) is a 79 y.o. male, with: Medical history of hypertension mitral regurgitation enlarged left atrium history of TIA coronary artery disease history of bilateral knee replacement present with complaint of Chronic lower back,b/l  knees The patient's pain has been a chonic problem , present for refills, pt has alot of difficulty walking aournd,   never abused any prescribed meds, currently working, enjand states that pt is very functional ,   no hx of illicit nor etoh abuse, pt has tried all the other alternative approach for the current pain including PT,  pain management Orthopedic sx,   meds has been kept in safe place, pt has no hx of MH disorder,  pt states that the quality of life has been better since on the current med, CAGE answers no's    Patient states that the current  meds given,  is helping,   Zero count,    Pt is compliant with the current medications, and take it as directed,  the pt capable of doing things specially the activity of the daily living,     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                Subjective   Patient Active Problem List   Diagnosis    Irregular heart beat    Rash    Orthostatic lightheadedness    ED (erectile dysfunction)    S/P knee replacement    HTN (hypertension)    Acute nasopharyngitis    Knee pain, left    Diastasis recti    Constipation    Chronic left shoulder pain    Screening for depression    Back pain    Hypercholesterolemia    Hip pain, chronic, right    Osteopenia    Vitamin B12 deficiency neuropathy (HCC)    Non-rheumatic mitral regurgitation    Decreased vision    Rib pain on right side    Risk for falls    Foot pain, right    Corn    TIA (transient ischemic attack)    Urinary frequency    Hemorrhoids    Mitral regurgitation    Tinea versicolor    Blurry vision, bilateral    Tinnitus of left ear    Diverticulitis    Right calf pain    Patient is full code    Fall at home    Acute

## 2025-01-15 RX ORDER — ATORVASTATIN CALCIUM 40 MG/1
40 TABLET, FILM COATED ORAL NIGHTLY
Qty: 90 TABLET | Refills: 3 | Status: SHIPPED | OUTPATIENT
Start: 2025-01-15

## 2025-01-15 NOTE — TELEPHONE ENCOUNTER
Last appointment: 11/11/24  Next appointment: Advised to follow-up 5/11/25  Previous refill encounter(s): 4/1/24 #90 with 3 refills    Requested Prescriptions     Pending Prescriptions Disp Refills    atorvastatin (LIPITOR) 40 MG tablet [Pharmacy Med Name: Atorvastatin Calcium 40 MG Oral Tablet] 90 tablet 3     Sig: TAKE 1 TABLET BY MOUTH AT  BEDTIME         For Pharmacy Admin Tracking Only    Program: Medication Refill  CPA in place:    Recommendation Provided To:   Intervention Detail: New Rx: 1, reason: Patient Preference  Intervention Accepted By:   Gap Closed?:    Time Spent (min): 5

## 2025-01-16 NOTE — TELEPHONE ENCOUNTER
Last appointment: 11/11/24  Next appointment: Advised to follow-up 5/11/25  Previous refill encounter(s): 4/1/24 #90 with 3 refills    Requested Prescriptions     Pending Prescriptions Disp Refills    clopidogrel (PLAVIX) 75 MG tablet [Pharmacy Med Name: Clopidogrel Bisulfate 75 MG Oral Tablet] 90 tablet 3     Sig: TAKE 1 TABLET BY MOUTH DAILY         For Pharmacy Admin Tracking Only    Program: Medication Refill  CPA in place:    Recommendation Provided To:   Intervention Detail: New Rx: 1, reason: Patient Preference  Intervention Accepted By:   Gap Closed?:    Time Spent (min): 5

## 2025-01-17 DIAGNOSIS — J20.9 ACUTE BRONCHITIS, UNSPECIFIED ORGANISM: Primary | ICD-10-CM

## 2025-01-17 RX ORDER — CODEINE PHOSPHATE AND GUAIFENESIN 10; 100 MG/5ML; MG/5ML
5 SOLUTION ORAL 3 TIMES DAILY PRN
Qty: 105 ML | Refills: 0 | Status: SHIPPED | OUTPATIENT
Start: 2025-01-17 | End: 2025-01-24

## 2025-01-20 RX ORDER — CLOPIDOGREL BISULFATE 75 MG/1
75 TABLET ORAL DAILY
Qty: 90 TABLET | Refills: 3 | Status: SHIPPED | OUTPATIENT
Start: 2025-01-20

## 2025-01-22 ENCOUNTER — OFFICE VISIT (OUTPATIENT)
Age: 80
End: 2025-01-22
Payer: MEDICARE

## 2025-01-22 VITALS
RESPIRATION RATE: 16 BRPM | BODY MASS INDEX: 27.12 KG/M2 | HEART RATE: 72 BPM | DIASTOLIC BLOOD PRESSURE: 82 MMHG | WEIGHT: 217 LBS | TEMPERATURE: 97.9 F | OXYGEN SATURATION: 95 % | SYSTOLIC BLOOD PRESSURE: 129 MMHG

## 2025-01-22 DIAGNOSIS — J20.9 ACUTE BRONCHITIS, UNSPECIFIED ORGANISM: ICD-10-CM

## 2025-01-22 DIAGNOSIS — F11.20 OPIOID DEPENDENCE WITH CURRENT USE (HCC): ICD-10-CM

## 2025-01-22 DIAGNOSIS — Z86.73 HISTORY OF STROKE: Primary | ICD-10-CM

## 2025-01-22 PROCEDURE — 99213 OFFICE O/P EST LOW 20 MIN: CPT | Performed by: FAMILY MEDICINE

## 2025-01-22 RX ORDER — CODEINE PHOSPHATE AND GUAIFENESIN 10; 100 MG/5ML; MG/5ML
5 SOLUTION ORAL 3 TIMES DAILY PRN
Qty: 105 ML | Refills: 0 | Status: SHIPPED | OUTPATIENT
Start: 2025-01-22 | End: 2025-01-29

## 2025-01-22 SDOH — ECONOMIC STABILITY: FOOD INSECURITY: WITHIN THE PAST 12 MONTHS, YOU WORRIED THAT YOUR FOOD WOULD RUN OUT BEFORE YOU GOT MONEY TO BUY MORE.: NEVER TRUE

## 2025-01-22 SDOH — ECONOMIC STABILITY: FOOD INSECURITY: WITHIN THE PAST 12 MONTHS, THE FOOD YOU BOUGHT JUST DIDN'T LAST AND YOU DIDN'T HAVE MONEY TO GET MORE.: NEVER TRUE

## 2025-01-22 ASSESSMENT — PATIENT HEALTH QUESTIONNAIRE - PHQ9
1. LITTLE INTEREST OR PLEASURE IN DOING THINGS: NOT AT ALL
SUM OF ALL RESPONSES TO PHQ QUESTIONS 1-9: 0
SUM OF ALL RESPONSES TO PHQ QUESTIONS 1-9: 0
SUM OF ALL RESPONSES TO PHQ9 QUESTIONS 1 & 2: 0
SUM OF ALL RESPONSES TO PHQ QUESTIONS 1-9: 0
SUM OF ALL RESPONSES TO PHQ QUESTIONS 1-9: 0
2. FEELING DOWN, DEPRESSED OR HOPELESS: NOT AT ALL

## 2025-01-22 NOTE — PROGRESS NOTES
Chief Complaint   Patient presents with    Cough       \"Have you been to the ER, urgent care clinic since your last visit?  Hospitalized since your last visit?\"    NO    “Have you seen or consulted any other health care providers outside of [facility] since your last visit?”    NO          Click Here for Release of Records Request     No results found for this visit on 25.   Vitals:    25 1115   BP: 129/82   Pulse: 72   Resp: 16   Temp: 97.9 °F (36.6 °C)   TempSrc: Infrared   SpO2: 95%   Weight: 98.4 kg (217 lb)      The patient, Rj WHALEN Dae Tobias, identity was verified by name and .

## 2025-07-28 DIAGNOSIS — Z91.81 AT HIGH RISK FOR FALLS: ICD-10-CM

## 2025-07-28 DIAGNOSIS — R42 DIZZINESSES: ICD-10-CM

## 2025-07-28 DIAGNOSIS — I20.89 OTHER FORMS OF ANGINA PECTORIS: ICD-10-CM

## 2025-07-28 DIAGNOSIS — G63 POLYNEUROPATHY IN DISEASES CLASSIFIED ELSEWHERE: ICD-10-CM

## 2025-07-29 RX ORDER — LISINOPRIL 10 MG/1
10 TABLET ORAL DAILY
Qty: 90 TABLET | Refills: 3 | Status: SHIPPED | OUTPATIENT
Start: 2025-07-29

## (undated) DEVICE — BASIN EMSIS 16OZ GRAPHITE PLAS KID SHP MOLD GRAD FOR ORAL

## (undated) DEVICE — SYR 10ML LUER LOK 1/5ML GRAD --

## (undated) DEVICE — Device

## (undated) DEVICE — CATH IV AUTOGRD BC PNK 20GA 25 -- INSYTE

## (undated) DEVICE — NEEDLE HYPO 18GA L1.5IN PNK S STL HUB POLYPR SHLD REG BVL

## (undated) DEVICE — 1200 GUARD II KIT W/5MM TUBE W/O VAC TUBE: Brand: GUARDIAN

## (undated) DEVICE — SET ADMIN 16ML TBNG L100IN 2 Y INJ SITE IV PIGGY BK DISP

## (undated) DEVICE — Z DISCONTINUED PER MEDLINE LINE GAS SAMPLING O2/CO2 LNG AD 13 FT NSL W/ TBNG FILTERLINE

## (undated) DEVICE — SYR 3ML LL TIP 1/10ML GRAD --

## (undated) DEVICE — NEONATAL-ADULT SPO2 SENSOR: Brand: NELLCOR

## (undated) DEVICE — ELECTRODE,RADIOTRANSLUCENT,FOAM,5PK: Brand: MEDLINE

## (undated) DEVICE — SOLIDIFIER FLD 2OZ 1500CC N DISINF IN BTL DISP SAFESORB

## (undated) DEVICE — TOWEL 4 PLY TISS 19X30 SUE WHT